# Patient Record
Sex: FEMALE | Race: WHITE | NOT HISPANIC OR LATINO | Employment: OTHER | URBAN - METROPOLITAN AREA
[De-identification: names, ages, dates, MRNs, and addresses within clinical notes are randomized per-mention and may not be internally consistent; named-entity substitution may affect disease eponyms.]

---

## 2018-02-12 ENCOUNTER — TELEPHONE (OUTPATIENT)
Dept: ORTHOPEDICS | Facility: CLINIC | Age: 58
End: 2018-02-12

## 2018-02-12 NOTE — TELEPHONE ENCOUNTER
----- Message from Denise Zavala sent at 2/12/2018  9:48 AM CST -----  Contact: Self / [phone]  Calling to see if a referral was sent to Dr. Acosta from Dr. Najera's office.  Please call.

## 2018-02-19 ENCOUNTER — OFFICE VISIT (OUTPATIENT)
Dept: ORTHOPEDICS | Facility: CLINIC | Age: 58
End: 2018-02-19
Payer: COMMERCIAL

## 2018-02-19 VITALS
SYSTOLIC BLOOD PRESSURE: 148 MMHG | WEIGHT: 147 LBS | DIASTOLIC BLOOD PRESSURE: 87 MMHG | HEIGHT: 64 IN | HEART RATE: 78 BPM | BODY MASS INDEX: 25.1 KG/M2

## 2018-02-19 DIAGNOSIS — M25.551 RIGHT HIP PAIN: Primary | ICD-10-CM

## 2018-02-19 PROCEDURE — 99203 OFFICE O/P NEW LOW 30 MIN: CPT | Mod: 25,S$GLB,, | Performed by: ORTHOPAEDIC SURGERY

## 2018-02-19 PROCEDURE — 20610 DRAIN/INJ JOINT/BURSA W/O US: CPT | Mod: RT,S$GLB,, | Performed by: ORTHOPAEDIC SURGERY

## 2018-02-19 PROCEDURE — 3008F BODY MASS INDEX DOCD: CPT | Mod: S$GLB,,, | Performed by: ORTHOPAEDIC SURGERY

## 2018-02-19 RX ORDER — METFORMIN HYDROCHLORIDE 500 MG/1
500 TABLET ORAL DAILY
COMMUNITY
End: 2018-06-04 | Stop reason: SDUPTHER

## 2018-02-19 RX ADMIN — TRIAMCINOLONE ACETONIDE 40 MG: 40 INJECTION, SUSPENSION INTRA-ARTICULAR; INTRAMUSCULAR at 11:02

## 2018-02-19 NOTE — LETTER
February 22, 2018      Sue Najera MD  [address] [place] [address]           [facility]  [address] [place] [address]  Phone: [phone]  Fax: [phone]          Patient: Liv Garland   MR Number: [id number]   YOB: 1960   Date of Visit: 2/19/2018       Dear Dr. Sue Najera:    Thank you for referring Liv Garland to me for evaluation. Attached you will find relevant portions of my assessment and plan of care.    If you have questions, please do not hesitate to call me. I look forward to following Liv Garland along with you.    Sincerely,    Sebastian Acosta MD    Enclosure  CC:  No Recipients    If you would like to receive this communication electronically, please contact [email or web address] or [phone] to request more information on [facility] access.    For providers and/or their staff who would like to refer a patient to [facility], please contact us through our one-stop-shop provider referral line, [facility], at [phone].    If you feel you have received this communication in error or would no longer like to receive these types of communications, please e-mail [email or web address]

## 2018-02-22 RX ORDER — TRIAMCINOLONE ACETONIDE 40 MG/ML
40 INJECTION, SUSPENSION INTRA-ARTICULAR; INTRAMUSCULAR
Status: DISCONTINUED | OUTPATIENT
Start: 2018-02-19 | End: 2018-02-23 | Stop reason: HOSPADM

## 2018-02-23 NOTE — PROGRESS NOTES
Past Medical History:   Diagnosis Date    Bilateral breast cancer     Breast cancer 2004    Dr. Dos Santos, bilat mast, no rt, tamoxifen stopped after 9 mos due to side effects    High cholesterol     Hypertension        Past Surgical History:   Procedure Laterality Date    ABLATION OF DYSRHYTHMIC FOCUS  2000    Dr. Melgoza    CHOLECYSTECTOMY  2/2001    COLONOSCOPY      Dr. Dos Santos    HYSTERECTOMY  10/1/2003    MASTECTOMY Bilateral 5/16/2004    OOPHORECTOMY Left 5/18/2004    TONSILLECTOMY  1974    TOTAL ABDOMINAL HYSTERECTOMY W/ BILATERAL SALPINGOOPHORECTOMY         Current Outpatient Prescriptions   Medication Sig    metFORMIN (GLUCOPHAGE) 500 MG tablet Take 500 mg by mouth once daily.     No current facility-administered medications for this visit.        Review of patient's allergies indicates:  No Known Allergies    Family History   Problem Relation Age of Onset    Liver cancer Mother     Heart failure Father        Social History     Social History    Marital status: Single     Spouse name: N/A    Number of children: N/A    Years of education: N/A     Occupational History    Not on file.     Social History Main Topics    Smoking status: Former Smoker     Quit date: 1/1/1999    Smokeless tobacco: Never Used    Alcohol use Yes    Drug use: No    Sexual activity: Not on file     Other Topics Concern    Not on file     Social History Narrative    No narrative on file       Chief Complaint:   Chief Complaint   Patient presents with    Left Hip - Pain       Consulting Physician: Sue Najera MD    History of present illness:    This is a 57 y.o. female who complains of right hip pain since fall 12-27-17. Pain 8/10 and worse with use.    Review of Systems:    Constitution: Denies chills, fever, and sweats.  HENT: Denies headaches or blurry vision.  Cardiovascular: Denies chest pain or irregular heart beat.  Respiratory: Denies cough or shortness of breath.  Gastrointestinal: Denies abdominal  "pain, nausea, or vomiting.  Musculoskeletal:  Denies muscle cramps.  Neurological: Denies dizziness or focal weakness.  Psychiatric/Behavioral: Normal mental status.  Hematologic/Lymphatic: Denies bleeding problem or easy bruising/bleeding.  Skin: Denies rash or suspicious lesions.    Examination:    Vital Signs:    Vitals:    02/19/18 0857   BP: (!) 148/87   Pulse: 78   Weight: 66.7 kg (147 lb)   Height: 5' 4" (1.626 m)   PainSc:   7   PainLoc: Hip       Body mass index is 25.23 kg/m².    This a well-developed, well nourished patient in no acute distress.    Alert and oriented x 3 and cooperative to examination.       Physical Exam: Right Hip Exam    Gait:   Normal    Skin  Rash:   None  Scars:   None    Inspection  Erythema:  None  Bruising:  None  Swelling:  None  Masses:  None  Lymphadenopathy: None    Range of Motion  Flexion:  150°  Extension:  0°  External Rotation: 50°  Internal Rotation: 15°  Abduction:  50°    No pain with hip range of motion.    Straight Leg Raise: Negative  Log Roll:  Negative    Tenderness  Groin:   Negative  Greater Trochanter: Positive    Strength:  5/5    Stability:  Normal    Sensation:  Intact    Vascular  Pulses:  Palpable distally          Imaging: X-rays ordered and reviewed today personally of the right hip appear normal        Assessment: Right hip pain  -     Large Joint Aspiration/Injection        Plan:  We will inject her for trochanteric bursits today and start PT. Ultram rx. PRN.      DISCLAIMER: This note may have been dictated using voice recognition software and may contain grammatical errors.     NOTE: Consult report sent to referring provider via EPIC EMR.  "

## 2018-02-23 NOTE — PROCEDURES
Large Joint Aspiration/Injection  Date/Time: 2/19/2018 11:02 PM  Performed by: MEJIA OROZCO  Authorized by: MEJIA OROZCO     Consent Done?:  Yes (Verbal)  Indications:  Pain  Timeout: Prior to procedure the correct patient, procedure, and site was verified      Location:  Hip  Site:  R greater trochanteric bursa  Prep: Patient was prepped and draped in usual sterile fashion    Approach:  Lateral  Medications:  40 mg triamcinolone acetonide 40 mg/mL

## 2018-03-07 ENCOUNTER — TELEPHONE (OUTPATIENT)
Dept: ORTHOPEDICS | Facility: CLINIC | Age: 58
End: 2018-03-07

## 2018-03-07 NOTE — TELEPHONE ENCOUNTER
Contacted pt's PCP's office and spoke w/ Kristy to see if anyone had submitted request to pt's insurance authorizing her to see Dr. Acosta.  She stated that this has not been done, but that they will send request in.

## 2018-03-21 DIAGNOSIS — M70.62 GREATER TROCHANTERIC BURSITIS OF LEFT HIP: Primary | ICD-10-CM

## 2018-03-21 DIAGNOSIS — M25.552 LEFT HIP PAIN: ICD-10-CM

## 2018-04-13 ENCOUNTER — TELEPHONE (OUTPATIENT)
Dept: FAMILY MEDICINE | Facility: CLINIC | Age: 58
End: 2018-04-13

## 2018-04-13 DIAGNOSIS — R73.09 ABNORMAL GLUCOSE: Primary | ICD-10-CM

## 2018-04-13 NOTE — TELEPHONE ENCOUNTER
Pt would like to have labs put in so she can have them done before her 6 month check up with Dr Melton. Pt stated the normal labs for every 6 months.

## 2018-04-17 ENCOUNTER — TELEPHONE (OUTPATIENT)
Dept: FAMILY MEDICINE | Facility: CLINIC | Age: 58
End: 2018-04-17

## 2018-04-17 ENCOUNTER — OFFICE VISIT (OUTPATIENT)
Dept: FAMILY MEDICINE | Facility: CLINIC | Age: 58
End: 2018-04-17
Payer: COMMERCIAL

## 2018-04-17 VITALS
RESPIRATION RATE: 18 BRPM | HEIGHT: 64 IN | SYSTOLIC BLOOD PRESSURE: 131 MMHG | DIASTOLIC BLOOD PRESSURE: 76 MMHG | HEART RATE: 74 BPM | BODY MASS INDEX: 25.1 KG/M2 | OXYGEN SATURATION: 98 % | TEMPERATURE: 97 F | WEIGHT: 147 LBS

## 2018-04-17 DIAGNOSIS — I10 ESSENTIAL HYPERTENSION: ICD-10-CM

## 2018-04-17 DIAGNOSIS — R73.02 IMPAIRED GLUCOSE TOLERANCE: Primary | ICD-10-CM

## 2018-04-17 PROCEDURE — 99213 OFFICE O/P EST LOW 20 MIN: CPT | Mod: S$GLB,,, | Performed by: FAMILY MEDICINE

## 2018-04-17 RX ORDER — METFORMIN HYDROCHLORIDE 500 MG/1
500 TABLET ORAL DAILY
COMMUNITY
End: 2018-06-04 | Stop reason: SDUPTHER

## 2018-04-17 NOTE — PROGRESS NOTES
Subjective:       Patient ID: Liv Garland is a 57 y.o. female.    Chief Complaint: No chief complaint on file.    History of glucose intolerance.  Taking metformin.  Last A1C was 5.7.        Hypertension   This is a chronic problem. The problem has been resolved since onset. The problem is controlled. Pertinent negatives include no chest pain, headaches, neck pain, palpitations or shortness of breath. There are no associated agents to hypertension. Risk factors for coronary artery disease include post-menopausal state. Past treatments include ACE inhibitors. The current treatment provides significant improvement. There are no compliance problems.      Review of Systems   Constitutional: Negative for activity change, appetite change, chills and fatigue.   HENT: Negative for congestion, ear discharge, ear pain, rhinorrhea, sinus pain, sore throat and trouble swallowing.    Eyes: Negative for photophobia, pain, redness, itching and visual disturbance.   Respiratory: Negative for cough, chest tightness, shortness of breath and wheezing.    Cardiovascular: Negative for chest pain, palpitations and leg swelling.   Gastrointestinal: Negative for abdominal distention, abdominal pain, blood in stool, diarrhea, nausea and vomiting.   Genitourinary: Negative for dysuria, pelvic pain, vaginal bleeding, vaginal discharge and vaginal pain.   Musculoskeletal: Negative for arthralgias, back pain, gait problem and neck pain.   Skin: Negative for color change, pallor and rash.   Neurological: Negative for dizziness, tremors, weakness, light-headedness, numbness and headaches.   Psychiatric/Behavioral: Negative for agitation, behavioral problems, confusion and sleep disturbance.       Objective:      Physical Exam   Constitutional: She appears well-developed and well-nourished.   HENT:   Head: Normocephalic.   Right Ear: External ear normal.   Left Ear: External ear normal.   Mouth/Throat: Oropharynx is clear and moist.   Eyes:  Conjunctivae and EOM are normal. Pupils are equal, round, and reactive to light.   Neck: Normal range of motion. Neck supple.   Cardiovascular: Normal rate, regular rhythm, normal heart sounds and intact distal pulses.    Pulmonary/Chest: Effort normal and breath sounds normal.   Neurological: She is alert.   Skin: Skin is warm and dry.   Psychiatric: Her behavior is normal. Judgment normal.       Assessment:       1. Impaired glucose tolerance    2. Essential hypertension        Plan:       Impaired glucose tolerance  -     Hemoglobin A1c; Future; Expected date: 10/17/2018    Essential hypertension

## 2018-04-17 NOTE — TELEPHONE ENCOUNTER
----- Message from Jessy Kuhn sent at 4/17/2018  2:59 PM CDT -----  Contact: [facility]   [facility]  Needs to speak to the nurse about patient labs     Please call lab  [phone]

## 2018-04-18 ENCOUNTER — LAB VISIT (OUTPATIENT)
Dept: LAB | Facility: HOSPITAL | Age: 58
End: 2018-04-18
Attending: FAMILY MEDICINE
Payer: COMMERCIAL

## 2018-04-18 DIAGNOSIS — R73.09 ABNORMAL GLUCOSE: ICD-10-CM

## 2018-04-18 LAB
ALBUMIN SERPL BCP-MCNC: 4.4 G/DL
ALP SERPL-CCNC: 46 U/L
ALT SERPL W/O P-5'-P-CCNC: 37 U/L
ANION GAP SERPL CALC-SCNC: 7 MMOL/L
AST SERPL-CCNC: 26 U/L
BASOPHILS # BLD AUTO: 0.04 K/UL
BASOPHILS NFR BLD: 0.7 %
BILIRUB SERPL-MCNC: 0.3 MG/DL
BUN SERPL-MCNC: 18 MG/DL
CALCIUM SERPL-MCNC: 9.4 MG/DL
CHLORIDE SERPL-SCNC: 107 MMOL/L
CHOLEST SERPL-MCNC: 231 MG/DL
CHOLEST/HDLC SERPL: 4.1 {RATIO}
CO2 SERPL-SCNC: 25 MMOL/L
CREAT SERPL-MCNC: 0.6 MG/DL
DIFFERENTIAL METHOD: ABNORMAL
EOSINOPHIL # BLD AUTO: 0.2 K/UL
EOSINOPHIL NFR BLD: 2.9 %
ERYTHROCYTE [DISTWIDTH] IN BLOOD BY AUTOMATED COUNT: 13.9 %
EST. GFR  (AFRICAN AMERICAN): >60 ML/MIN/1.73 M^2
EST. GFR  (NON AFRICAN AMERICAN): >60 ML/MIN/1.73 M^2
ESTIMATED AVG GLUCOSE: 117 MG/DL
GLUCOSE SERPL-MCNC: 100 MG/DL
HBA1C MFR BLD HPLC: 5.7 %
HCT VFR BLD AUTO: 42.2 %
HDLC SERPL-MCNC: 56 MG/DL
HDLC SERPL: 24.2 %
HGB BLD-MCNC: 14.5 G/DL
IMM GRANULOCYTES # BLD AUTO: 0.01 K/UL
IMM GRANULOCYTES NFR BLD AUTO: 0.2 %
LDLC SERPL CALC-MCNC: 145.4 MG/DL
LYMPHOCYTES # BLD AUTO: 2.5 K/UL
LYMPHOCYTES NFR BLD: 43.8 %
MCH RBC QN AUTO: 31.9 PG
MCHC RBC AUTO-ENTMCNC: 34.4 G/DL
MCV RBC AUTO: 93 FL
MONOCYTES # BLD AUTO: 0.5 K/UL
MONOCYTES NFR BLD: 7.9 %
NEUTROPHILS # BLD AUTO: 2.6 K/UL
NEUTROPHILS NFR BLD: 44.5 %
NONHDLC SERPL-MCNC: 175 MG/DL
NRBC BLD-RTO: 0 /100 WBC
PLATELET # BLD AUTO: 347 K/UL
PMV BLD AUTO: 10.1 FL
POTASSIUM SERPL-SCNC: 4.1 MMOL/L
PROT SERPL-MCNC: 6.9 G/DL
RBC # BLD AUTO: 4.54 M/UL
SODIUM SERPL-SCNC: 139 MMOL/L
TRIGL SERPL-MCNC: 148 MG/DL
WBC # BLD AUTO: 5.8 K/UL

## 2018-04-18 PROCEDURE — 83036 HEMOGLOBIN GLYCOSYLATED A1C: CPT

## 2018-04-18 PROCEDURE — 36415 COLL VENOUS BLD VENIPUNCTURE: CPT

## 2018-04-18 PROCEDURE — 85025 COMPLETE CBC W/AUTO DIFF WBC: CPT

## 2018-04-18 PROCEDURE — 80053 COMPREHEN METABOLIC PANEL: CPT

## 2018-04-18 PROCEDURE — 80061 LIPID PANEL: CPT

## 2018-04-19 ENCOUNTER — PATIENT MESSAGE (OUTPATIENT)
Dept: FAMILY MEDICINE | Facility: CLINIC | Age: 58
End: 2018-04-19

## 2018-06-04 RX ORDER — METFORMIN HYDROCHLORIDE 500 MG/1
TABLET ORAL
Qty: 90 TABLET | Refills: 3 | Status: SHIPPED | OUTPATIENT
Start: 2018-06-04 | End: 2019-04-17

## 2018-06-25 ENCOUNTER — TELEPHONE (OUTPATIENT)
Dept: FAMILY MEDICINE | Facility: CLINIC | Age: 58
End: 2018-06-25

## 2018-06-25 DIAGNOSIS — N60.09 SOLITARY CYST OF BREAST, UNSPECIFIED LATERALITY: ICD-10-CM

## 2018-06-25 DIAGNOSIS — L72.0 EPIDERMAL CYST OF NECK: Primary | ICD-10-CM

## 2018-07-11 ENCOUNTER — OFFICE VISIT (OUTPATIENT)
Dept: SURGERY | Facility: CLINIC | Age: 58
End: 2018-07-11
Payer: COMMERCIAL

## 2018-07-11 VITALS
BODY MASS INDEX: 26.29 KG/M2 | HEART RATE: 80 BPM | DIASTOLIC BLOOD PRESSURE: 92 MMHG | TEMPERATURE: 97 F | OXYGEN SATURATION: 96 % | RESPIRATION RATE: 18 BRPM | WEIGHT: 154 LBS | HEIGHT: 64 IN | SYSTOLIC BLOOD PRESSURE: 148 MMHG

## 2018-07-11 DIAGNOSIS — M79.89 SOFT TISSUE MASS: Primary | ICD-10-CM

## 2018-07-11 PROCEDURE — 99203 OFFICE O/P NEW LOW 30 MIN: CPT | Mod: S$GLB,,, | Performed by: SURGERY

## 2018-07-12 NOTE — PROGRESS NOTES
[facility] H&P Note    Subjective:       Patient ID: Liv Garland is a 58 y.o. female.    Chief Complaint: Consult (epidermal neck cyst)    HPI:  Liv Garland is a 58 y.o. female with a history of breast cancer status post bilateral mastectomy with reconstruction hypertension presents today as a new patient for evaluation of a right neck skin cyst versus soft tissue mass.  Patient stated that this skin cyst/soft tissue mass has been present for a number of weeks to months.  Dr Dos Santos a previous surgeon here in [place] told the patient that she needed to come to get evaluated and had the skin cyst removed. Patient is not noticing pain or symptoms associated with the cyst.  She presents today as a new patient evaluation.    Past Medical History:   Diagnosis Date    Bilateral breast cancer     Breast cancer 2004    Dr. Dos Santos, bilat mast, no rt, tamoxifen stopped after 9 mos due to side effects    High cholesterol     Hypertension      Past Surgical History:   Procedure Laterality Date    ABLATION OF DYSRHYTHMIC FOCUS  2000    Dr. Melgoza    CHOLECYSTECTOMY  2/2001    COLONOSCOPY      Dr. Dos Santos    HYSTERECTOMY  10/1/2003    MASTECTOMY Bilateral 5/16/2004    OOPHORECTOMY Left 5/18/2004    TONSILLECTOMY  1974    TOTAL ABDOMINAL HYSTERECTOMY W/ BILATERAL SALPINGOOPHORECTOMY       Family History   Problem Relation Age of Onset    Liver cancer Mother     Heart failure Father      Social History     Social History    Marital status: Single     Spouse name: N/A    Number of children: N/A    Years of education: N/A     Social History Main Topics    Smoking status: Former Smoker     Quit date: 1/1/1999    Smokeless tobacco: Never Used    Alcohol use Yes    Drug use: No    Sexual activity: Not Asked     Other Topics Concern    None     Social History Narrative    None       Current Outpatient Prescriptions   Medication Sig Dispense Refill    metFORMIN (GLUCOPHAGE) 500 MG tablet TAKE 1 TABLET  "BY MOUTH DAILY 90 tablet 3     No current facility-administered medications for this visit.      Review of patient's allergies indicates:   Allergen Reactions    Lisinopril      Other reaction(s): Angioedema       Review of Systems   Constitutional: Negative for activity change, appetite change, chills and fever.   HENT: Negative for congestion, dental problem and ear discharge.    Eyes: Negative for discharge and itching.   Respiratory: Negative for apnea, choking and chest tightness.    Cardiovascular: Negative for chest pain and leg swelling.   Gastrointestinal: Negative for abdominal distention, abdominal pain, anal bleeding, constipation, diarrhea and nausea.   Endocrine: Negative for cold intolerance and heat intolerance.   Genitourinary: Negative for difficulty urinating and dyspareunia.   Musculoskeletal: Negative for arthralgias and back pain.   Skin: Negative for color change and pallor.   Neurological: Negative for dizziness and facial asymmetry.   Hematological: Negative for adenopathy. Does not bruise/bleed easily.   Psychiatric/Behavioral: Negative for agitation and behavioral problems.       Objective:      Vitals:    07/11/18 1439   BP: (!) 148/92   Pulse: 80   Resp: 18   Temp: 97.2 °F (36.2 °C)   TempSrc: Oral   SpO2: 96%   Weight: 69.9 kg (154 lb)   Height: 5' 4" (1.626 m)     Physical Exam   Constitutional: She is oriented to person, place, and time. She appears well-developed and well-nourished. No distress.   HENT:   Head: Normocephalic and atraumatic.       Eyes: EOM are normal. Pupils are equal, round, and reactive to light.   Neck: Normal range of motion. Neck supple. No thyromegaly present.   Cardiovascular: Normal rate and regular rhythm.    Pulmonary/Chest: Effort normal and breath sounds normal.   Abdominal: Soft. Bowel sounds are normal. She exhibits no distension. There is no tenderness.   Musculoskeletal: Normal range of motion. She exhibits no edema or deformity.   Neurological: She " is alert and oriented to person, place, and time. No cranial nerve deficit.   Skin: Skin is warm. Capillary refill takes less than 2 seconds. No erythema.   Psychiatric: She has a normal mood and affect. Her behavior is normal.        Assessment:       1. Soft tissue mass        Plan:   Soft tissue mass        Medical Decision Making/Counseling:  Patient has a right neck posterior soft tissue mass/skin cyst.  A medical recommendation is for surgical excision of the skin cyst.  I offered the patient both the operative room or resection in the office.  The patient would like to proceed with resection in the office in the coming weeks.  Patient will set up a clinic day and come in for soft tissue mass right neck resection.  Risk and benefits discussed today.  She wished to proceed the near future.  Total clinic time spent today 30 min of which greater than half the time spent face-to-face counseling

## 2018-07-18 ENCOUNTER — OFFICE VISIT (OUTPATIENT)
Dept: SURGERY | Facility: CLINIC | Age: 58
End: 2018-07-18
Payer: COMMERCIAL

## 2018-07-18 VITALS
SYSTOLIC BLOOD PRESSURE: 148 MMHG | TEMPERATURE: 98 F | WEIGHT: 152 LBS | HEIGHT: 64 IN | OXYGEN SATURATION: 96 % | DIASTOLIC BLOOD PRESSURE: 80 MMHG | HEART RATE: 72 BPM | BODY MASS INDEX: 25.95 KG/M2

## 2018-07-18 DIAGNOSIS — M79.89 SOFT TISSUE MASS: Primary | ICD-10-CM

## 2018-07-18 PROCEDURE — 11403 EXC TR-EXT B9+MARG 2.1-3CM: CPT | Mod: S$GLB,,, | Performed by: SURGERY

## 2018-07-18 PROCEDURE — 88304 TISSUE EXAM BY PATHOLOGIST: CPT | Mod: 26,,, | Performed by: PATHOLOGY

## 2018-07-18 PROCEDURE — 99024 POSTOP FOLLOW-UP VISIT: CPT | Mod: S$GLB,,, | Performed by: SURGERY

## 2018-07-18 PROCEDURE — 88304 TISSUE EXAM BY PATHOLOGIST: CPT | Performed by: PATHOLOGY

## 2018-07-18 RX ORDER — CIPROFLOXACIN 500 MG/1
500 TABLET ORAL EVERY 12 HOURS
Qty: 14 TABLET | Refills: 0 | Status: SHIPPED | OUTPATIENT
Start: 2018-07-18 | End: 2018-07-28

## 2018-07-18 RX ORDER — OXYCODONE AND ACETAMINOPHEN 5; 325 MG/1; MG/1
1 TABLET ORAL EVERY 4 HOURS PRN
Qty: 20 TABLET | Refills: 0 | Status: SHIPPED | OUTPATIENT
Start: 2018-07-18 | End: 2018-07-28

## 2018-07-22 NOTE — PROCEDURES
Procedures   Preop diagnosis right neck cyst  Postop diagnosis same  Procedure right neck skin cyst excision 2.2 cm    Indications Ms. Garland is a 58-year-old female presented my clinic with a right neck skin cyst which was causing symptomatic problems with pain. She wished for excision.  Risk and benefits were discussed and she wished to proceed today by signing informed consent.    Procedure patient's right posterior neck was prepped and draped in the standard sterile surgical fashion. Time-out was conducted with all on the operative room in agreement of correct patient correct procedure correct allergies correct antibiotics.  Patient was given 5 cc Marcaine 0.25% with epinephrine.  A 15 blade was used to make a elliptical incision over top of the soft tissue mass.  Soft tissue mass was completely circumscribed with a Bovie electric cautery. Soft tissue mass was consistent with a skin cyst which was handed off as specimen.  It was approximately 2.2 cm in greatest diameter.  Soft tissue was then irrigated out copiously  irrigant.  Three 0 Vicryl were used in the subdermal layer and a for 0 Vicryl was used in a running subcuticular fashion to close the soft tissue defect.  Dermabond was placed over top as a dressing.  The patient tolerated procedure well all counts correct in the procedure including lap pads instruments well as needles.  Plan will be for follow-up in 2 weeks

## 2018-07-25 ENCOUNTER — OFFICE VISIT (OUTPATIENT)
Dept: SURGERY | Facility: CLINIC | Age: 58
End: 2018-07-25
Payer: COMMERCIAL

## 2018-07-25 VITALS
BODY MASS INDEX: 26.46 KG/M2 | HEIGHT: 64 IN | HEART RATE: 80 BPM | WEIGHT: 155 LBS | DIASTOLIC BLOOD PRESSURE: 83 MMHG | SYSTOLIC BLOOD PRESSURE: 153 MMHG | TEMPERATURE: 97 F | OXYGEN SATURATION: 96 %

## 2018-07-25 DIAGNOSIS — Z09 POSTOP CHECK: Primary | ICD-10-CM

## 2018-07-25 PROCEDURE — 99024 POSTOP FOLLOW-UP VISIT: CPT | Mod: S$GLB,,, | Performed by: SURGERY

## 2018-07-25 NOTE — PROGRESS NOTES
"General Surgery  [facility]  Follow-up    HPI/Follow-up exam:  Liv Garland is a 58 y.o. female status post excision of right neck soft tissue mass presents today for follow-up examination.  Patient since surgery has done well. No issues.    PHYSICAL EXAM:  BP (!) 153/83   Pulse 80   Temp 97 °F (36.1 °C) (Oral)   Ht 5' 4" (1.626 m)   Wt 70.3 kg (155 lb)   SpO2 96%   BMI 26.61 kg/m²   Gen: Wd Wn female in NAD  Heent: Nc/At, MMM, right posterior neck surgical site clean dry intact no signs or symptoms of infection.  Cv: RRR  Lung: Non-labored breathing, clear bilaterally  Abd: Soft, non-tender, non-distended  Ext: No cyanosis clubbing or edema    Pathology shows benign inclusion cyst.  Assessment:  Liv Garland is a 58 y.o. female s/p right neck soft tissue mass excision    Plan/Medical Decision Making:  Doing well in the postoperative phase.  Last colonoscopy 2011.  One hyperplastic polyp.  Recommendations are for repeat colonoscopy and 2021    No Follow-up on file.        "

## 2018-08-21 ENCOUNTER — LAB VISIT (OUTPATIENT)
Dept: LAB | Facility: HOSPITAL | Age: 58
End: 2018-08-21
Attending: FAMILY MEDICINE
Payer: COMMERCIAL

## 2018-08-21 ENCOUNTER — OFFICE VISIT (OUTPATIENT)
Dept: FAMILY MEDICINE | Facility: CLINIC | Age: 58
End: 2018-08-21
Payer: COMMERCIAL

## 2018-08-21 VITALS
HEART RATE: 78 BPM | SYSTOLIC BLOOD PRESSURE: 130 MMHG | HEIGHT: 64 IN | RESPIRATION RATE: 15 BRPM | WEIGHT: 149 LBS | DIASTOLIC BLOOD PRESSURE: 70 MMHG | TEMPERATURE: 98 F | BODY MASS INDEX: 25.44 KG/M2

## 2018-08-21 DIAGNOSIS — R73.02 IMPAIRED GLUCOSE TOLERANCE: ICD-10-CM

## 2018-08-21 DIAGNOSIS — Z79.899 ENCOUNTER FOR LONG-TERM (CURRENT) USE OF MEDICATIONS: ICD-10-CM

## 2018-08-21 DIAGNOSIS — G44.52 NEW DAILY PERSISTENT HEADACHE: Primary | ICD-10-CM

## 2018-08-21 DIAGNOSIS — G44.52 NEW DAILY PERSISTENT HEADACHE: ICD-10-CM

## 2018-08-21 LAB
ALBUMIN SERPL BCP-MCNC: 4.8 G/DL
ALP SERPL-CCNC: 53 U/L
ALT SERPL W/O P-5'-P-CCNC: 52 U/L
ANION GAP SERPL CALC-SCNC: 12 MMOL/L
AST SERPL-CCNC: 33 U/L
BASOPHILS # BLD AUTO: 0.04 K/UL
BASOPHILS NFR BLD: 0.6 %
BILIRUB SERPL-MCNC: 0.5 MG/DL
BUN SERPL-MCNC: 17 MG/DL
CALCIUM SERPL-MCNC: 10 MG/DL
CHLORIDE SERPL-SCNC: 103 MMOL/L
CO2 SERPL-SCNC: 26 MMOL/L
CREAT SERPL-MCNC: 0.7 MG/DL
DIFFERENTIAL METHOD: ABNORMAL
EOSINOPHIL # BLD AUTO: 0.1 K/UL
EOSINOPHIL NFR BLD: 1.9 %
ERYTHROCYTE [DISTWIDTH] IN BLOOD BY AUTOMATED COUNT: 12.9 %
ERYTHROCYTE [SEDIMENTATION RATE] IN BLOOD BY WESTERGREN METHOD: 8 MM/HR
EST. GFR  (AFRICAN AMERICAN): >60 ML/MIN/1.73 M^2
EST. GFR  (NON AFRICAN AMERICAN): >60 ML/MIN/1.73 M^2
ESTIMATED AVG GLUCOSE: 120 MG/DL
FOLATE SERPL-MCNC: 11.1 NG/ML
GLUCOSE SERPL-MCNC: 117 MG/DL
HBA1C MFR BLD HPLC: 5.8 %
HCT VFR BLD AUTO: 41.5 %
HGB BLD-MCNC: 14.4 G/DL
IMM GRANULOCYTES # BLD AUTO: 0.01 K/UL
IMM GRANULOCYTES NFR BLD AUTO: 0.1 %
INR PPP: 1
LYMPHOCYTES # BLD AUTO: 2.3 K/UL
LYMPHOCYTES NFR BLD: 33.1 %
MCH RBC QN AUTO: 31.4 PG
MCHC RBC AUTO-ENTMCNC: 34.7 G/DL
MCV RBC AUTO: 91 FL
MONOCYTES # BLD AUTO: 0.5 K/UL
MONOCYTES NFR BLD: 6.8 %
NEUTROPHILS # BLD AUTO: 4 K/UL
NEUTROPHILS NFR BLD: 57.5 %
NRBC BLD-RTO: 0 /100 WBC
PLATELET # BLD AUTO: 369 K/UL
PMV BLD AUTO: 10.1 FL
POTASSIUM SERPL-SCNC: 4.4 MMOL/L
PROT SERPL-MCNC: 7.7 G/DL
PROTHROMBIN TIME: 11.3 SEC
RBC # BLD AUTO: 4.58 M/UL
SODIUM SERPL-SCNC: 141 MMOL/L
TSH SERPL DL<=0.005 MIU/L-ACNC: 1.36 UIU/ML
VIT B12 SERPL-MCNC: 535 PG/ML
WBC # BLD AUTO: 6.94 K/UL

## 2018-08-21 PROCEDURE — 84443 ASSAY THYROID STIM HORMONE: CPT

## 2018-08-21 PROCEDURE — 99213 OFFICE O/P EST LOW 20 MIN: CPT | Mod: S$GLB,,, | Performed by: FAMILY MEDICINE

## 2018-08-21 PROCEDURE — 82607 VITAMIN B-12: CPT

## 2018-08-21 PROCEDURE — 85025 COMPLETE CBC W/AUTO DIFF WBC: CPT

## 2018-08-21 PROCEDURE — 82746 ASSAY OF FOLIC ACID SERUM: CPT

## 2018-08-21 PROCEDURE — 80053 COMPREHEN METABOLIC PANEL: CPT

## 2018-08-21 PROCEDURE — 83036 HEMOGLOBIN GLYCOSYLATED A1C: CPT

## 2018-08-21 PROCEDURE — 85610 PROTHROMBIN TIME: CPT

## 2018-08-21 PROCEDURE — 85651 RBC SED RATE NONAUTOMATED: CPT

## 2018-08-21 PROCEDURE — 36415 COLL VENOUS BLD VENIPUNCTURE: CPT

## 2018-08-21 NOTE — PROGRESS NOTES
Subjective:       Patient ID: Liv Garland is a 58 y.o. female.    Chief Complaint: Headache    Headache    This is a new problem. The current episode started 1 to 4 weeks ago. The problem occurs daily. The pain is located in the occipital region. The pain does not radiate. The pain quality is not similar to prior headaches. The quality of the pain is described as aching and throbbing. Associated symptoms include insomnia. Pertinent negatives include no anorexia, numbness, scalp tenderness, seizures, visual change, weakness or weight loss. Nothing aggravates the symptoms. She has tried NSAIDs for the symptoms. The treatment provided mild relief. Her past medical history is significant for cancer. There is no history of hypertension, migraine headaches, migraines in the family, obesity or recent head traumas.     Review of Systems   Constitutional: Negative for weight loss.   Gastrointestinal: Negative for anorexia.   Neurological: Positive for headaches. Negative for seizures, weakness and numbness.   Psychiatric/Behavioral: The patient has insomnia.        Past Medical History:   Diagnosis Date    Arthritis     Bilateral breast cancer     Breast cancer 2004    Dr. Dos Santos, bilat mast, no rt, tamoxifen stopped after 9 mos due to side effects    High cholesterol     Hypertension      Past Surgical History:   Procedure Laterality Date    ABLATION OF DYSRHYTHMIC FOCUS  2000    Dr. Melgoza    CHOLECYSTECTOMY  2/2001    COLONOSCOPY      Dr. Dos Santos    HYSTERECTOMY  10/1/2003    MASTECTOMY Bilateral 5/16/2004    OOPHORECTOMY Left 5/18/2004    TONSILLECTOMY  1974    TOTAL ABDOMINAL HYSTERECTOMY W/ BILATERAL SALPINGOOPHORECTOMY       Social History     Socioeconomic History    Marital status: Single     Spouse name: Not on file    Number of children: Not on file    Years of education: Not on file    Highest education level: Not on file   Social Needs    Financial resource strain: Not on file    Food  "insecurity - worry: Not on file    Food insecurity - inability: Not on file    Transportation needs - medical: Not on file    Transportation needs - non-medical: Not on file   Occupational History    Not on file   Tobacco Use    Smoking status: Former Smoker     Last attempt to quit: 1999     Years since quittin.6    Smokeless tobacco: Never Used   Substance and Sexual Activity    Alcohol use: Yes    Drug use: No    Sexual activity: Not on file   Other Topics Concern    Not on file   Social History Narrative    Not on file     Family History   Problem Relation Age of Onset    Liver cancer Mother     Cancer Mother     Heart failure Father        Objective:      /70   Pulse 78   Temp 97.8 °F (36.6 °C) (Tympanic)   Resp 15   Ht 5' 4" (1.626 m)   Wt 67.6 kg (149 lb)   BMI 25.58 kg/m²   Physical Exam    Assessment:       1. New daily persistent headache    2. Encounter for long-term (current) use of medications    3. Impaired glucose tolerance        Plan:       New daily persistent headache  -     Sedimentation rate; Future; Expected date: 2018  -     Normal neuro exam, but patient with history of breast cancer  -     Await lab results; will likely refer to neuro for further evaluation    Encounter for long-term (current) use of medications  -     Comprehensive metabolic panel; Future; Expected date: 2018  -     CBC auto differential; Future; Expected date: 2018  -     TSH; Future; Expected date: 2018  -     Sedimentation rate; Future; Expected date: 2018  -     Folate; Future; Expected date: 2018  -     Vitamin B12; Future; Expected date: 2018  -     Protime-INR; Future; Expected date: 2018    Impaired glucose tolerance  -     Hemoglobin A1c; Future; Expected date: 2018            Risks, benefits, and side effects were discussed with the patient. All questions were answered to the fullest satisfaction of the patient, and pt " verbalized understanding and agreement to treatment plan. Pt was to call with any new or worsening symptoms, or present to the ER.

## 2018-08-29 ENCOUNTER — PATIENT MESSAGE (OUTPATIENT)
Dept: FAMILY MEDICINE | Facility: CLINIC | Age: 58
End: 2018-08-29

## 2018-10-17 ENCOUNTER — OFFICE VISIT (OUTPATIENT)
Dept: FAMILY MEDICINE | Facility: CLINIC | Age: 58
End: 2018-10-17
Payer: COMMERCIAL

## 2018-10-17 ENCOUNTER — LAB VISIT (OUTPATIENT)
Dept: LAB | Facility: HOSPITAL | Age: 58
End: 2018-10-17
Attending: FAMILY MEDICINE
Payer: COMMERCIAL

## 2018-10-17 VITALS
RESPIRATION RATE: 18 BRPM | TEMPERATURE: 98 F | BODY MASS INDEX: 25.78 KG/M2 | OXYGEN SATURATION: 99 % | WEIGHT: 151 LBS | SYSTOLIC BLOOD PRESSURE: 153 MMHG | DIASTOLIC BLOOD PRESSURE: 79 MMHG | HEART RATE: 63 BPM | HEIGHT: 64 IN

## 2018-10-17 DIAGNOSIS — Z11.59 ENCOUNTER FOR HEPATITIS C SCREENING TEST FOR LOW RISK PATIENT: ICD-10-CM

## 2018-10-17 DIAGNOSIS — E16.2 HYPOGLYCEMIA: ICD-10-CM

## 2018-10-17 DIAGNOSIS — R74.8 ELEVATED LIVER ENZYMES: ICD-10-CM

## 2018-10-17 DIAGNOSIS — Z23 NEEDS FLU SHOT: Primary | ICD-10-CM

## 2018-10-17 LAB
ALBUMIN SERPL BCP-MCNC: 4.5 G/DL
ALP SERPL-CCNC: 63 U/L
ALT SERPL W/O P-5'-P-CCNC: 48 U/L
ANION GAP SERPL CALC-SCNC: 8 MMOL/L
AST SERPL-CCNC: 31 U/L
BILIRUB SERPL-MCNC: 0.7 MG/DL
BUN SERPL-MCNC: 16 MG/DL
CALCIUM SERPL-MCNC: 9.8 MG/DL
CHLORIDE SERPL-SCNC: 104 MMOL/L
CO2 SERPL-SCNC: 28 MMOL/L
CREAT SERPL-MCNC: 0.5 MG/DL
EST. GFR  (AFRICAN AMERICAN): >60 ML/MIN/1.73 M^2
EST. GFR  (NON AFRICAN AMERICAN): >60 ML/MIN/1.73 M^2
GLUCOSE SERPL-MCNC: 86 MG/DL
POTASSIUM SERPL-SCNC: 3.8 MMOL/L
PROT SERPL-MCNC: 7.6 G/DL
SODIUM SERPL-SCNC: 140 MMOL/L

## 2018-10-17 PROCEDURE — 86803 HEPATITIS C AB TEST: CPT

## 2018-10-17 PROCEDURE — 80053 COMPREHEN METABOLIC PANEL: CPT

## 2018-10-17 PROCEDURE — 36415 COLL VENOUS BLD VENIPUNCTURE: CPT

## 2018-10-17 PROCEDURE — 90682 RIV4 VACC RECOMBINANT DNA IM: CPT | Mod: S$GLB,,, | Performed by: FAMILY MEDICINE

## 2018-10-17 PROCEDURE — 90471 IMMUNIZATION ADMIN: CPT | Mod: S$GLB,,, | Performed by: FAMILY MEDICINE

## 2018-10-17 PROCEDURE — 99214 OFFICE O/P EST MOD 30 MIN: CPT | Mod: 25,S$GLB,, | Performed by: FAMILY MEDICINE

## 2018-10-17 NOTE — PROGRESS NOTES
"Subjective:       Patient ID: Liv Garland is a 58 y.o. female.    Chief Complaint: Follow-up    HPI   Ms. Garland presents for follow-up. Requests a flu shot at this time.     Is taking metformin 500 mg every morning. Reports feeling a "low" in the afternoons about 90 minutes after eating lunch. Has been going on for 4-5 months. Has not noticed any difference in the "low" feeling when changing what she eats for lunch.     Did have mildly elevated ALT on last metabolic panel. Has not been screened for hep c; has 2 tattoos but no h/o blood transfusions or IVDA.    Review of Systems   Constitutional: Negative for chills, fatigue, fever and unexpected weight change.   Respiratory: Negative for cough, chest tightness, shortness of breath and wheezing.    Cardiovascular: Negative for chest pain, palpitations and leg swelling.   Psychiatric/Behavioral: Negative for decreased concentration, dysphoric mood, hallucinations and suicidal ideas.       Past Medical History:   Diagnosis Date    Arthritis     Bilateral breast cancer     Breast cancer 2004    Dr. Dos Santos, bilat mast, no rt, tamoxifen stopped after 9 mos due to side effects    High cholesterol     Hypertension      Past Surgical History:   Procedure Laterality Date    ABLATION OF DYSRHYTHMIC FOCUS  2000    Dr. Melgoza    CHOLECYSTECTOMY  2/2001    COLONOSCOPY      repeat in 2020    HYSTERECTOMY  10/1/2003    MASTECTOMY Bilateral 5/16/2004    OOPHORECTOMY Left 5/18/2004    TONSILLECTOMY  1974    TOTAL ABDOMINAL HYSTERECTOMY W/ BILATERAL SALPINGOOPHORECTOMY       Social History     Socioeconomic History    Marital status: Single     Spouse name: Not on file    Number of children: Not on file    Years of education: Not on file    Highest education level: Not on file   Social Needs    Financial resource strain: Not on file    Food insecurity - worry: Not on file    Food insecurity - inability: Not on file    Transportation needs - medical: Not on " "file    Transportation needs - non-medical: Not on file   Occupational History    Not on file   Tobacco Use    Smoking status: Former Smoker     Last attempt to quit: 1999     Years since quittin.8    Smokeless tobacco: Never Used   Substance and Sexual Activity    Alcohol use: Yes    Drug use: No    Sexual activity: Not on file   Other Topics Concern    Not on file   Social History Narrative    Not on file     Family History   Problem Relation Age of Onset    Liver cancer Mother     Cancer Mother     Heart failure Father        Objective:      BP (!) 153/79   Pulse 63   Temp 98.1 °F (36.7 °C) (Oral)   Resp 18   Ht 5' 4" (1.626 m)   Wt 68.5 kg (151 lb)   SpO2 99%   BMI 25.92 kg/m²   Physical Exam   Constitutional: She is oriented to person, place, and time. She appears well-developed and well-nourished. No distress.   Pulmonary/Chest: Effort normal and breath sounds normal. No stridor. No respiratory distress.   Neurological: She is alert and oriented to person, place, and time.   Skin: Skin is warm and dry. She is not diaphoretic. No erythema.   Psychiatric: She has a normal mood and affect. Her behavior is normal. Judgment and thought content normal.   Vitals reviewed.      Assessment:       1. Needs flu shot    2. Hypoglycemia    3. Elevated liver enzymes    4. Encounter for hepatitis C screening test for low risk patient        Plan:       Needs flu shot  -     Influenza - Quadrivalent (Recombinant) (PF)    Hypoglycemia  -  Patient to check blood sugars 1-2 hours after lunch; may start cutting dose of metformin in half    Elevated liver enzymes  -     Comprehensive metabolic panel; Future; Expected date: 10/17/2018    Encounter for hepatitis C screening test for low risk patient  -     Hepatitis C antibody; Future; Expected date: 10/17/2018            Risks, benefits, and side effects were discussed with the patient. All questions were answered to the fullest satisfaction of the " patient, and pt verbalized understanding and agreement to treatment plan. Pt was to call with any new or worsening symptoms, or present to the ER.

## 2018-10-18 LAB — HCV AB SERPL QL IA: NEGATIVE

## 2018-10-19 ENCOUNTER — TELEPHONE (OUTPATIENT)
Dept: FAMILY MEDICINE | Facility: CLINIC | Age: 58
End: 2018-10-19

## 2018-10-19 DIAGNOSIS — R74.8 ELEVATED LIVER ENZYMES: Primary | ICD-10-CM

## 2018-10-22 ENCOUNTER — HOSPITAL ENCOUNTER (OUTPATIENT)
Dept: RADIOLOGY | Facility: HOSPITAL | Age: 58
Discharge: HOME | End: 2018-10-22
Attending: FAMILY MEDICINE
Payer: COMMERCIAL

## 2018-10-22 DIAGNOSIS — R74.8 ELEVATED LIVER ENZYMES: ICD-10-CM

## 2018-10-22 PROCEDURE — 76705 ECHO EXAM OF ABDOMEN: CPT | Mod: 26,,, | Performed by: RADIOLOGY

## 2018-10-22 PROCEDURE — 76705 ECHO EXAM OF ABDOMEN: CPT | Mod: TC

## 2019-04-01 ENCOUNTER — TELEPHONE (OUTPATIENT)
Dept: FAMILY MEDICINE | Facility: CLINIC | Age: 59
End: 2019-04-01

## 2019-04-01 DIAGNOSIS — R74.8 ELEVATED LIVER ENZYMES: Primary | ICD-10-CM

## 2019-04-01 DIAGNOSIS — Z79.899 ENCOUNTER FOR LONG-TERM (CURRENT) USE OF MEDICATIONS: ICD-10-CM

## 2019-04-01 DIAGNOSIS — E11.9 DIABETES MELLITUS WITHOUT COMPLICATION: ICD-10-CM

## 2019-04-01 DIAGNOSIS — I10 ESSENTIAL HYPERTENSION: ICD-10-CM

## 2019-04-02 ENCOUNTER — LAB VISIT (OUTPATIENT)
Dept: LAB | Facility: HOSPITAL | Age: 59
End: 2019-04-02
Attending: FAMILY MEDICINE
Payer: COMMERCIAL

## 2019-04-02 DIAGNOSIS — R74.8 ELEVATED LIVER ENZYMES: ICD-10-CM

## 2019-04-02 DIAGNOSIS — E11.9 DIABETES MELLITUS WITHOUT COMPLICATION: ICD-10-CM

## 2019-04-02 DIAGNOSIS — Z79.899 ENCOUNTER FOR LONG-TERM (CURRENT) USE OF MEDICATIONS: ICD-10-CM

## 2019-04-02 DIAGNOSIS — I10 ESSENTIAL HYPERTENSION: ICD-10-CM

## 2019-04-02 LAB
ALBUMIN SERPL BCP-MCNC: 4.4 G/DL (ref 3.5–5.2)
ALP SERPL-CCNC: 55 U/L (ref 55–135)
ALT SERPL W/O P-5'-P-CCNC: 30 U/L (ref 10–44)
ANION GAP SERPL CALC-SCNC: 12 MMOL/L (ref 8–16)
AST SERPL-CCNC: 27 U/L (ref 10–40)
BASOPHILS # BLD AUTO: 0.04 K/UL (ref 0–0.2)
BASOPHILS NFR BLD: 0.8 % (ref 0–1.9)
BILIRUB SERPL-MCNC: 0.8 MG/DL (ref 0.1–1)
BUN SERPL-MCNC: 15 MG/DL (ref 6–20)
CALCIUM SERPL-MCNC: 9.3 MG/DL (ref 8.7–10.5)
CHLORIDE SERPL-SCNC: 103 MMOL/L (ref 95–110)
CHOLEST SERPL-MCNC: 215 MG/DL (ref 120–199)
CHOLEST/HDLC SERPL: 4.5 {RATIO} (ref 2–5)
CO2 SERPL-SCNC: 24 MMOL/L (ref 23–29)
CREAT SERPL-MCNC: 0.7 MG/DL (ref 0.5–1.4)
DIFFERENTIAL METHOD: NORMAL
EOSINOPHIL # BLD AUTO: 0.2 K/UL (ref 0–0.5)
EOSINOPHIL NFR BLD: 3.2 % (ref 0–8)
ERYTHROCYTE [DISTWIDTH] IN BLOOD BY AUTOMATED COUNT: 13.7 % (ref 11.5–14.5)
EST. GFR  (AFRICAN AMERICAN): >60 ML/MIN/1.73 M^2
EST. GFR  (NON AFRICAN AMERICAN): >60 ML/MIN/1.73 M^2
ESTIMATED AVG GLUCOSE: 120 MG/DL (ref 68–131)
GLUCOSE SERPL-MCNC: 114 MG/DL (ref 70–110)
HBA1C MFR BLD HPLC: 5.8 % (ref 4.5–6.2)
HCT VFR BLD AUTO: 41.3 % (ref 37–48.5)
HDLC SERPL-MCNC: 48 MG/DL (ref 40–75)
HDLC SERPL: 22.3 % (ref 20–50)
HGB BLD-MCNC: 13.7 G/DL (ref 12–16)
IMM GRANULOCYTES # BLD AUTO: 0.02 K/UL (ref 0–0.04)
IMM GRANULOCYTES NFR BLD AUTO: 0.4 % (ref 0–0.5)
LDLC SERPL CALC-MCNC: 124.4 MG/DL (ref 63–159)
LYMPHOCYTES # BLD AUTO: 2.1 K/UL (ref 1–4.8)
LYMPHOCYTES NFR BLD: 42.7 % (ref 18–48)
MCH RBC QN AUTO: 30.9 PG (ref 27–31)
MCHC RBC AUTO-ENTMCNC: 33.2 G/DL (ref 32–36)
MCV RBC AUTO: 93 FL (ref 82–98)
MONOCYTES # BLD AUTO: 0.4 K/UL (ref 0.3–1)
MONOCYTES NFR BLD: 8.5 % (ref 4–15)
NEUTROPHILS # BLD AUTO: 2.2 K/UL (ref 1.8–7.7)
NEUTROPHILS NFR BLD: 44.4 % (ref 38–73)
NONHDLC SERPL-MCNC: 167 MG/DL
NRBC BLD-RTO: 0 /100 WBC
PLATELET # BLD AUTO: 294 K/UL (ref 150–350)
PMV BLD AUTO: 9.9 FL (ref 9.2–12.9)
POTASSIUM SERPL-SCNC: 3.9 MMOL/L (ref 3.5–5.1)
PROT SERPL-MCNC: 7.3 G/DL (ref 6–8.4)
RBC # BLD AUTO: 4.44 M/UL (ref 4–5.4)
SODIUM SERPL-SCNC: 139 MMOL/L (ref 136–145)
T4 FREE SERPL-MCNC: 0.83 NG/DL (ref 0.71–1.51)
TRIGL SERPL-MCNC: 213 MG/DL (ref 30–150)
TSH SERPL DL<=0.005 MIU/L-ACNC: 2.63 UIU/ML (ref 0.34–5.6)
WBC # BLD AUTO: 4.96 K/UL (ref 3.9–12.7)

## 2019-04-02 PROCEDURE — 84443 ASSAY THYROID STIM HORMONE: CPT

## 2019-04-02 PROCEDURE — 85025 COMPLETE CBC W/AUTO DIFF WBC: CPT

## 2019-04-02 PROCEDURE — 80053 COMPREHEN METABOLIC PANEL: CPT

## 2019-04-02 PROCEDURE — 83036 HEMOGLOBIN GLYCOSYLATED A1C: CPT

## 2019-04-02 PROCEDURE — 84439 ASSAY OF FREE THYROXINE: CPT

## 2019-04-02 PROCEDURE — 80061 LIPID PANEL: CPT

## 2019-04-02 PROCEDURE — 36415 COLL VENOUS BLD VENIPUNCTURE: CPT

## 2019-04-17 ENCOUNTER — OFFICE VISIT (OUTPATIENT)
Dept: FAMILY MEDICINE | Facility: CLINIC | Age: 59
End: 2019-04-17
Payer: COMMERCIAL

## 2019-04-17 VITALS
TEMPERATURE: 99 F | BODY MASS INDEX: 26.4 KG/M2 | HEIGHT: 64 IN | HEART RATE: 65 BPM | WEIGHT: 154.63 LBS | SYSTOLIC BLOOD PRESSURE: 137 MMHG | OXYGEN SATURATION: 95 % | DIASTOLIC BLOOD PRESSURE: 78 MMHG

## 2019-04-17 DIAGNOSIS — E78.00 HIGH CHOLESTEROL: ICD-10-CM

## 2019-04-17 DIAGNOSIS — R74.8 ELEVATED LIVER ENZYMES: Primary | ICD-10-CM

## 2019-04-17 DIAGNOSIS — M25.50 ARTHRALGIA, UNSPECIFIED JOINT: ICD-10-CM

## 2019-04-17 DIAGNOSIS — E11.9 DIABETES MELLITUS WITHOUT COMPLICATION: ICD-10-CM

## 2019-04-17 PROCEDURE — 99212 PR OFFICE/OUTPT VISIT, EST, LEVL II, 10-19 MIN: ICD-10-PCS | Mod: S$GLB,,, | Performed by: NURSE PRACTITIONER

## 2019-04-17 PROCEDURE — 99212 OFFICE O/P EST SF 10 MIN: CPT | Mod: S$GLB,,, | Performed by: NURSE PRACTITIONER

## 2019-04-17 RX ORDER — FLUOROURACIL 50 MG/G
CREAM TOPICAL
COMMUNITY
Start: 2019-03-28 | End: 2019-09-09

## 2019-04-17 RX ORDER — ASCORBIC ACID 1000 MG
175 TABLET ORAL DAILY
COMMUNITY
End: 2019-09-09

## 2019-04-17 NOTE — PROGRESS NOTES
Subjective:       Patient ID: Liv Garland is a 58 y.o. female.    Chief Complaint: No chief complaint on file.  59 y/o female new to this provider presents for 6 mo check up. She has a significant history but does not take medication. H/o taking metformin with multiple low's thus stopped and Hgb A1c WNL. All labs discussed as normal. H/o elevated LFT's which have also normalized. She is up to date on all of her screening. She does take a few herbal medications including CBD oil with decreased pain noted. History and MAR updated by NP.     Past Medical History:   Diagnosis Date    Arthritis     Bilateral breast cancer     Breast cancer     Dr. Dos Santos, bilat mast, no rt, tamoxifen stopped after 9 mos due to side effects    High cholesterol     Hypertension        Past Surgical History:   Procedure Laterality Date    ABLATION OF DYSRHYTHMIC FOCUS      Dr. Melgoza    CHOLECYSTECTOMY  2001    COLONOSCOPY      repeat in     HYSTERECTOMY  10/1/2003    MASTECTOMY Bilateral 2004    MITRAL VALVE REPLACEMENT  1980    OOPHORECTOMY Left 2004    TONSILLECTOMY  1974    TOTAL ABDOMINAL HYSTERECTOMY W/ BILATERAL SALPINGOOPHORECTOMY          Social History     Socioeconomic History    Marital status: Single     Spouse name: Not on file    Number of children: Not on file    Years of education: Not on file    Highest education level: Not on file   Occupational History    Not on file   Social Needs    Financial resource strain: Not on file    Food insecurity:     Worry: Not on file     Inability: Not on file    Transportation needs:     Medical: Not on file     Non-medical: Not on file   Tobacco Use    Smoking status: Former Smoker     Last attempt to quit: 1999     Years since quittin.3    Smokeless tobacco: Never Used   Substance and Sexual Activity    Alcohol use: Yes    Drug use: No    Sexual activity: Not on file   Lifestyle    Physical activity:     Days per week: Not  on file     Minutes per session: Not on file    Stress: Not on file   Relationships    Social connections:     Talks on phone: Not on file     Gets together: Not on file     Attends Hindu service: Not on file     Active member of club or organization: Not on file     Attends meetings of clubs or organizations: Not on file     Relationship status: Not on file   Other Topics Concern    Not on file   Social History Narrative    Not on file       Review of patient's allergies indicates:   Allergen Reactions    Lisinopril      Other reaction(s): Angioedema          Current Outpatient Medications:     Berb Esparza/herbal complex no.18 (BERBERINE-HERBAL COMB NO.18 ORAL), Take by mouth., Disp: , Rfl:     fluorouracil (EFUDEX) 5 % cream, , Disp: , Rfl:     milk thistle 175 mg tablet, Take 175 mg by mouth once daily., Disp: , Rfl:      HPI  Review of Systems   Constitutional: Negative for chills, diaphoresis, fever and unexpected weight change.   HENT: Negative for dental problem and trouble swallowing.    Eyes: Negative for visual disturbance.   Respiratory: Negative for shortness of breath and wheezing.    Cardiovascular: Negative for chest pain and palpitations.   Gastrointestinal: Negative for abdominal pain, constipation, diarrhea, nausea and vomiting.   Endocrine: Negative for polydipsia and polyuria.   Genitourinary: Negative for dysuria.   Musculoskeletal: Negative for joint swelling.   Skin: Negative for rash.   Neurological: Negative for syncope and headaches.   Psychiatric/Behavioral: Negative for agitation and confusion.       Objective:      Physical Exam   Constitutional: She is oriented to person, place, and time. She appears well-developed and well-nourished.   HENT:   Head: Normocephalic.   Eyes: Pupils are equal, round, and reactive to light.   Neck: Normal range of motion. Neck supple.   Cardiovascular: Normal rate, regular rhythm and normal heart sounds.   No murmur heard.  Pulmonary/Chest: Effort  normal and breath sounds normal. She has no wheezes.   Abdominal: Soft. Bowel sounds are normal. She exhibits no distension. There is no tenderness.   Musculoskeletal: Normal range of motion. She exhibits no edema.   Neurological: She is alert and oriented to person, place, and time.   Skin: Skin is warm and dry. Capillary refill takes less than 2 seconds. No rash noted.   Psychiatric: She has a normal mood and affect. Judgment and thought content normal.   Vitals reviewed.      Assessment:       1. Elevated liver enzymes    2. Diabetes mellitus without complication    3. High cholesterol    4. Arthralgia, unspecified joint        Plan:     1- RTC in one year or sooner if needed.     Risks, benefits, and side effects were discussed with the patient. All questions were answered to the fullest satisfaction of the patient, and pt verbalized understanding and agreement to treatment plan. Pt was to call with any new or worsening symptoms, or present to the ER.

## 2019-05-02 DIAGNOSIS — Z12.11 COLON CANCER SCREENING: ICD-10-CM

## 2019-09-09 ENCOUNTER — TELEPHONE (OUTPATIENT)
Dept: FAMILY MEDICINE | Facility: CLINIC | Age: 59
End: 2019-09-09

## 2019-09-09 ENCOUNTER — OFFICE VISIT (OUTPATIENT)
Dept: FAMILY MEDICINE | Facility: CLINIC | Age: 59
End: 2019-09-09
Payer: COMMERCIAL

## 2019-09-09 ENCOUNTER — HOSPITAL ENCOUNTER (OUTPATIENT)
Dept: CARDIOLOGY | Facility: HOSPITAL | Age: 59
Discharge: HOME | End: 2019-09-09
Attending: FAMILY MEDICINE
Payer: COMMERCIAL

## 2019-09-09 VITALS
BODY MASS INDEX: 25.95 KG/M2 | WEIGHT: 152 LBS | SYSTOLIC BLOOD PRESSURE: 156 MMHG | DIASTOLIC BLOOD PRESSURE: 83 MMHG | RESPIRATION RATE: 18 BRPM | HEIGHT: 64 IN | HEART RATE: 64 BPM | OXYGEN SATURATION: 98 %

## 2019-09-09 DIAGNOSIS — R07.9 CHEST PAIN, UNSPECIFIED TYPE: Primary | ICD-10-CM

## 2019-09-09 DIAGNOSIS — R07.89 COSTOCHONDRAL CHEST PAIN: ICD-10-CM

## 2019-09-09 PROCEDURE — 99214 OFFICE O/P EST MOD 30 MIN: CPT | Mod: S$GLB,,, | Performed by: FAMILY MEDICINE

## 2019-09-09 PROCEDURE — 99214 PR OFFICE/OUTPT VISIT, EST, LEVL IV, 30-39 MIN: ICD-10-PCS | Mod: S$GLB,,, | Performed by: FAMILY MEDICINE

## 2019-09-09 PROCEDURE — 93005 ELECTROCARDIOGRAM TRACING: CPT

## 2019-09-09 RX ORDER — NAPROXEN 500 MG/1
500 TABLET ORAL 2 TIMES DAILY WITH MEALS
Qty: 30 TABLET | Refills: 0 | Status: SHIPPED | OUTPATIENT
Start: 2019-09-09 | End: 2019-09-16

## 2019-09-09 NOTE — PROGRESS NOTES
"[facility] - Clinic Note    Subjective      Ms. Garland is a 59 y.o. female who presents to clinic for chest pain.     Chest pain present for the past 2 weeks.   Occurred randomly and gradually.   Describes the pain in the middle, substernal tightness. No radiation  Constant tight nagging pain  No alleviating factors. Goes away on its own  No aggravating factors. Not worse with exertion, eating, or positions.   Has never had this pain before  Works in a hair salon. Denies any emotional stress or anxiety.  States that she had her mitral valve cauterized years ago as "it was in the wrong place."  Admits to shortness of breath intermittently.  Denies recently URI. Reports a stomach virus this past week.  Denies recent trauma or falls.    [facility] Liv has a past medical history of Arthritis, Bilateral breast cancer, Breast cancer (2004), High cholesterol, and Hypertension.   PSXH Liv has a past surgical history that includes Tonsillectomy (1974); Hysterectomy (10/1/2003); Mastectomy (Bilateral, 5/16/2004); Cholecystectomy (2/2001); Oophorectomy (Left, 5/18/2004); Ablation of dysrhythmic focus (2000); Total abdominal hysterectomy w/ bilateral salpingoophorectomy; Colonoscopy; and Mitral valve replacement (1980).    Liv's family history includes Cancer in her mother; Early death in her brother; Heart failure in her father; Liver cancer in her mother; No Known Problems in her sister.    Liv reports that she quit smoking about 20 years ago. She has never used smokeless tobacco. She reports that she drinks alcohol. She reports that she does not use drugs.   ALG Liv is allergic to lisinopril.   MED Liv has a current medication list which includes the following prescription(s): berb martínez/herbal complex no.18 and naproxen.     Review of Systems   Constitutional: Negative for activity change, appetite change, chills, fatigue and fever.   Eyes: Positive for visual disturbance.   Respiratory: Positive for " "shortness of breath.    Cardiovascular: Positive for chest pain. Negative for palpitations and leg swelling.   Gastrointestinal: Negative for abdominal pain, nausea and vomiting.   Neurological: Negative for dizziness, syncope and headaches.   Psychiatric/Behavioral: The patient is not nervous/anxious.      Objective     BP (!) 156/83 (BP Location: Right arm, Patient Position: Sitting, BP Method: Medium (Automatic))   Pulse 64   Resp 18   Ht 5' 4" (1.626 m)   Wt 68.9 kg (152 lb)   SpO2 98%   BMI 26.09 kg/m²     Physical Exam   Constitutional: She is well-developed, well-nourished, and in no distress. No distress.   HENT:   Head: Normocephalic and atraumatic.   Eyes: Right eye exhibits no discharge. Left eye exhibits no discharge.   Cardiovascular: Normal rate, regular rhythm, normal heart sounds and intact distal pulses. Exam reveals no gallop and no friction rub.   No murmur heard.  Pulmonary/Chest: Effort normal and breath sounds normal. No respiratory distress. She has no wheezes. She has no rales. She exhibits tenderness.   +reproducible chest pain on the right up sternal border   Musculoskeletal: She exhibits no edema.   Lymphadenopathy:     She has no cervical adenopathy.   Neurological: She is alert.   Skin: Skin is warm and dry. She is not diaphoretic.   Psychiatric: Mood and affect normal.   Vitals reviewed.    Assessment/Plan     Liv was seen today for chest pain.    Diagnoses and all orders for this visit:    Chest pain, unspecified type  -     IN OFFICE EKG 12-LEAD (to Muse)    Costochondral chest pain  -     naproxen (NAPROSYN) 500 MG tablet; Take 1 tablet (500 mg total) by mouth 2 (two) times daily with meals. for 7 days      -not characteristic for stable angina. Due to h/o of HLD, age, and previous smoker will obtain EKG for further eval. Pain is reproducible likely musculoskeletal. Will try a trial with NSAIDs x1 week, alternate ice/heat, and chest stretches. Will f/u in 1 week, if no " relief will obtain labs and stress test. Pt v/u to the above plan.    Follow up in about 1 week (around 9/16/2019) for chest pain.    Future Appointments   Date Time Provider Department Center   9/9/2019  3:45 PM [facility] EKG [facility]   9/18/2019  1:40 PM Vargas Paulino MD [facility]       Vargas Paulino MD

## 2019-09-09 NOTE — PATIENT INSTRUCTIONS
Chest Wall Pain, Costochondritis (Child)  Your childs ribs are joined to the breastbone (sternum). This is the long flat bone in front of the chest. If these joints or the cartilage around the ribs become inflamed, it is called costochondritis. This is a common condition in preteens. Costochondritis causes tenderness on the sides of the breastbone. Your child may have mild swelling and sharp pain with breathing or coughing. Costochondritis often follows a viral illness that causes the child to cough a lot. It can also follow a trauma, such as a fall or car accident.  Costochondritis pain may last for weeks, but eventually goes away on its own. The usual treatment is to take ibuprofen for 1 to 2 weeks as instructed on the label to ease discomfort. Ibuprofen is an anti-inflammatory medicine and is available over the counter. Your child may also need to take medicine to stop a cough. These are also available over-the-counter. Ask your healthcare provider to recommend specific medicines if you are not sure what to give your child.  Home care  · Follow the healthcare providers instructions for giving medicines to your child. Dont give any medicines that the provider has not approved.  · Allow your child to rest as needed. Give pain medicine before an activity or before sleeping at night.  · Put a covered heating pad or warm cloth on the area for 20 minutes. Do this 4 times a day. This may ease pain and swelling. You can also alternate the heat with cold. You can make a cold pack by wrapping a bag of chipped ice or frozen vegetables in a thin towel.  · Have your child hold a pillow against his or her chest to ease pain when coughing.  · Talk with your child about how he or she is feeling and what things help ease pain. Talk with your childs provider if prescribed medicines dont relieve the pain.  · Ask the provider about exercises to stretch the chest muscles and ease pain. Exercises should not be done if they  cause your child any pain.  Follow-up care  Follow up with your childs healthcare provider, or as advised.  Special note to parents  Your child should avoid playing sports until his or her healthcare provider says its OK.  When to seek medical advice  Call your childs healthcare provider right away if any of these occur:  · Pain doesnt get better or gets worse even with medicine  · Difficulty breathing, shortness of breath, or fast breathing  · Change in the type of pain or pain gets worse  · Chest pain does not resolve in 7 days  Unless advised otherwise by your childs healthcare provider, call the provider right away if:  · Your child is of any age and has repeated fevers above 104°F (40°C).  · Your child is younger than 2 years of age and a fever of 100.4°F (38°C) continues for more than 1 day.  · Your child is 2 years old or older and a fever of 100.4°F (38°C) continues for more than 3 days.  Date Last Reviewed: 4/17/2016  © 9286-0757 [employer]. [address] [place] [address], [place] [address]. All rights reserved. This information is not intended as a substitute for professional medical care. Always follow your healthcare professional's instructions.

## 2019-09-09 NOTE — TELEPHONE ENCOUNTER
Attempted to call pt. LVM x1    ----- Message from Priscilla Holguin sent at 9/9/2019  1:12 PM CDT -----  Contact: self/ [phone]  Patient is requesting a call back from the nurse stated she has shortness of breath and chest pains. Patient initially wanted to schedule an appointment, unable to schedule with her symptoms.  Several calls been placed to [facility] on call, the calls are being redirected to the que by the .  Patient stated she will just come to the clinic, she's tired of holding.    Please call the patient upon request at phone number [phone].     pcp nurse not available on [employer].

## 2019-09-10 ENCOUNTER — TELEPHONE (OUTPATIENT)
Dept: FAMILY MEDICINE | Facility: CLINIC | Age: 59
End: 2019-09-10

## 2019-09-10 NOTE — TELEPHONE ENCOUNTER
Notified patient of WNL results. Patient verbalized understanding and grateful of the call. Patient stated she does feel some relief from the medication.

## 2019-09-10 NOTE — TELEPHONE ENCOUNTER
----- Message from Vargas Paulino MD sent at 9/10/2019  8:38 AM CDT -----  Please inform patient that her EKG was normal.     Thank you!

## 2019-09-16 ENCOUNTER — PATIENT OUTREACH (OUTPATIENT)
Dept: ADMINISTRATIVE | Facility: HOSPITAL | Age: 59
End: 2019-09-16

## 2019-09-18 ENCOUNTER — OFFICE VISIT (OUTPATIENT)
Dept: FAMILY MEDICINE | Facility: CLINIC | Age: 59
End: 2019-09-18
Payer: COMMERCIAL

## 2019-09-18 ENCOUNTER — TELEPHONE (OUTPATIENT)
Dept: FAMILY MEDICINE | Facility: CLINIC | Age: 59
End: 2019-09-18

## 2019-09-18 VITALS
RESPIRATION RATE: 15 BRPM | WEIGHT: 154.25 LBS | HEART RATE: 68 BPM | SYSTOLIC BLOOD PRESSURE: 141 MMHG | OXYGEN SATURATION: 96 % | DIASTOLIC BLOOD PRESSURE: 80 MMHG | BODY MASS INDEX: 26.34 KG/M2 | HEIGHT: 64 IN

## 2019-09-18 DIAGNOSIS — R07.89 COSTOCHONDRAL CHEST PAIN: Primary | ICD-10-CM

## 2019-09-18 DIAGNOSIS — Z86.39 HISTORY OF DIABETES MELLITUS: Primary | ICD-10-CM

## 2019-09-18 DIAGNOSIS — E78.49 OTHER HYPERLIPIDEMIA: ICD-10-CM

## 2019-09-18 PROCEDURE — 99213 OFFICE O/P EST LOW 20 MIN: CPT | Mod: S$GLB,,, | Performed by: FAMILY MEDICINE

## 2019-09-18 PROCEDURE — 99213 PR OFFICE/OUTPT VISIT, EST, LEVL III, 20-29 MIN: ICD-10-PCS | Mod: S$GLB,,, | Performed by: FAMILY MEDICINE

## 2019-09-18 NOTE — PROGRESS NOTES
[facility] - Clinic Note    Subjective      Ms. Garland is a 59 y.o. female who presents to clinic for a follow-up of chest pain.    Patient was seen 1 week ago for chest pain. Chest pain was consistent with costochondritis.  Prescribed naproxen 500 mg b.i.d. for 1 week.  An EKG was obtained and was normal.  Recommended NSAIDs, heat compresses, and chest stretches.    Today patient reports that her chest pain has resolved.  Has been taking naproxen b.i.d. for a week.  Denies use hot compresses or perform chest stretches.  Denies any other associated symptoms.    [facility] Liv has a past medical history of Arthritis, Bilateral breast cancer, Breast cancer (2004), High cholesterol, and Hypertension.   PSXH Liv has a past surgical history that includes Tonsillectomy (1974); Hysterectomy (10/1/2003); Mastectomy (Bilateral, 5/16/2004); Cholecystectomy (2/2001); Oophorectomy (Left, 5/18/2004); Ablation of dysrhythmic focus (2000); Total abdominal hysterectomy w/ bilateral salpingoophorectomy; Colonoscopy; and Mitral valve replacement (1980).    Liv's family history includes Cancer in her mother; Early death in her brother; Heart failure in her father; Liver cancer in her mother; No Known Problems in her sister.    Liv reports that she quit smoking about 20 years ago. She has never used smokeless tobacco. She reports that she drinks alcohol. She reports that she does not use drugs.   EDWIGE Peck is allergic to lisinopril.   MED Liv has a current medication list which includes the following prescription(s): berb martínez/herbal complex no.18.     Review of Systems   Constitutional: Negative for chills and fever.   Eyes: Negative for visual disturbance.   Respiratory: Negative for shortness of breath.    Cardiovascular: Negative for chest pain and leg swelling.   Neurological: Negative for dizziness and headaches.     Objective     BP (!) 141/80 (BP Location: Right arm, Patient Position: Sitting, BP Method: Medium  "(Automatic))   Pulse 68   Resp 15   Ht 5' 4" (1.626 m)   Wt 70 kg (154 lb 4 oz)   LMP  (LMP Unknown)   SpO2 96%   BMI 26.48 kg/m²     Physical Exam   Constitutional: She is well-developed, well-nourished, and in no distress. No distress.   HENT:   Head: Normocephalic and atraumatic.   Eyes: Right eye exhibits no discharge. Left eye exhibits no discharge.   Cardiovascular: Normal rate, regular rhythm, normal heart sounds and intact distal pulses. Exam reveals no gallop and no friction rub.   No murmur heard.  Pulmonary/Chest: Effort normal and breath sounds normal. No respiratory distress. She has no wheezes. She has no rales. She exhibits no tenderness.   Musculoskeletal: She exhibits no edema.   Neurological: She is alert.   Skin: Skin is warm and dry. She is not diaphoretic.   Psychiatric: Mood normal.   Vitals reviewed.    Assessment/Plan     Liv was seen today for follow-up of costochondritis.    Diagnoses and all orders for this visit:    Costochondral chest pain  -resolved  -use naproxen p.r.n. no longer scheduled.      Follow up in about 1 month (around 10/18/2019) for annual.    Future Appointments   Date Time Provider Department Center   10/16/2019  2:00 PM Vargas Paulino MD [facility]       Vargas Paulino MD            "

## 2019-09-18 NOTE — TELEPHONE ENCOUNTER
Patient requesting to have labs done prior to NOV on 10/16/18. States she will come have these drawn the week before her appt.

## 2019-09-18 NOTE — PATIENT INSTRUCTIONS

## 2019-10-01 ENCOUNTER — PATIENT OUTREACH (OUTPATIENT)
Dept: ADMINISTRATIVE | Facility: HOSPITAL | Age: 59
End: 2019-10-01

## 2019-10-08 ENCOUNTER — LAB VISIT (OUTPATIENT)
Dept: LAB | Facility: HOSPITAL | Age: 59
End: 2019-10-08
Attending: FAMILY MEDICINE
Payer: COMMERCIAL

## 2019-10-08 DIAGNOSIS — E78.49 OTHER HYPERLIPIDEMIA: ICD-10-CM

## 2019-10-08 DIAGNOSIS — Z86.39 HISTORY OF DIABETES MELLITUS: ICD-10-CM

## 2019-10-08 LAB
ALBUMIN SERPL BCP-MCNC: 4.5 G/DL (ref 3.5–5.2)
ALP SERPL-CCNC: 58 U/L (ref 55–135)
ALT SERPL W/O P-5'-P-CCNC: 33 U/L (ref 10–44)
ANION GAP SERPL CALC-SCNC: 18 MMOL/L (ref 8–16)
AST SERPL-CCNC: 24 U/L (ref 10–40)
BILIRUB SERPL-MCNC: 0.7 MG/DL (ref 0.1–1)
BUN SERPL-MCNC: 15 MG/DL (ref 8–23)
CALCIUM SERPL-MCNC: 9.1 MG/DL (ref 8.7–10.5)
CHLORIDE SERPL-SCNC: 105 MMOL/L (ref 95–110)
CHOLEST SERPL-MCNC: 233 MG/DL (ref 120–199)
CHOLEST/HDLC SERPL: 4.8 {RATIO} (ref 2–5)
CO2 SERPL-SCNC: 18 MMOL/L (ref 23–29)
CREAT SERPL-MCNC: 0.6 MG/DL (ref 0.5–1.4)
EST. GFR  (AFRICAN AMERICAN): >60 ML/MIN/1.73 M^2
EST. GFR  (NON AFRICAN AMERICAN): >60 ML/MIN/1.73 M^2
ESTIMATED AVG GLUCOSE: 114 MG/DL (ref 68–131)
GLUCOSE SERPL-MCNC: 99 MG/DL (ref 70–110)
HBA1C MFR BLD HPLC: 5.6 % (ref 4.5–6.2)
HDLC SERPL-MCNC: 49 MG/DL (ref 40–75)
HDLC SERPL: 21 % (ref 20–50)
LDLC SERPL CALC-MCNC: 151.4 MG/DL (ref 63–159)
NONHDLC SERPL-MCNC: 184 MG/DL
POTASSIUM SERPL-SCNC: 4.1 MMOL/L (ref 3.5–5.1)
PROT SERPL-MCNC: 7.3 G/DL (ref 6–8.4)
SODIUM SERPL-SCNC: 141 MMOL/L (ref 136–145)
TRIGL SERPL-MCNC: 163 MG/DL (ref 30–150)

## 2019-10-08 PROCEDURE — 80061 LIPID PANEL: CPT

## 2019-10-08 PROCEDURE — 83036 HEMOGLOBIN GLYCOSYLATED A1C: CPT

## 2019-10-08 PROCEDURE — 80053 COMPREHEN METABOLIC PANEL: CPT

## 2019-10-08 PROCEDURE — 36415 COLL VENOUS BLD VENIPUNCTURE: CPT

## 2019-10-24 ENCOUNTER — OFFICE VISIT (OUTPATIENT)
Dept: FAMILY MEDICINE | Facility: CLINIC | Age: 59
End: 2019-10-24
Payer: COMMERCIAL

## 2019-10-24 VITALS
SYSTOLIC BLOOD PRESSURE: 142 MMHG | DIASTOLIC BLOOD PRESSURE: 81 MMHG | WEIGHT: 150.25 LBS | HEIGHT: 64 IN | HEART RATE: 65 BPM | OXYGEN SATURATION: 97 % | RESPIRATION RATE: 16 BRPM | BODY MASS INDEX: 25.65 KG/M2

## 2019-10-24 DIAGNOSIS — Z00.00 ANNUAL PHYSICAL EXAM: Primary | ICD-10-CM

## 2019-10-24 DIAGNOSIS — Z23 NEEDS FLU SHOT: ICD-10-CM

## 2019-10-24 DIAGNOSIS — E78.5 DYSLIPIDEMIA: ICD-10-CM

## 2019-10-24 DIAGNOSIS — Z23 NEED FOR SHINGLES VACCINE: ICD-10-CM

## 2019-10-24 PROBLEM — E78.1 HYPERTRIGLYCERIDEMIA: Status: ACTIVE | Noted: 2019-10-24

## 2019-10-24 PROCEDURE — 99396 PR PREVENTIVE VISIT,EST,40-64: ICD-10-PCS | Mod: 25,S$GLB,, | Performed by: FAMILY MEDICINE

## 2019-10-24 PROCEDURE — 90471 IMMUNIZATION ADMIN: CPT | Mod: S$GLB,,, | Performed by: FAMILY MEDICINE

## 2019-10-24 PROCEDURE — 90686 IIV4 VACC NO PRSV 0.5 ML IM: CPT | Mod: S$GLB,,, | Performed by: FAMILY MEDICINE

## 2019-10-24 PROCEDURE — 90686 FLU VACCINE (QUAD) GREATER THAN OR EQUAL TO 3YO PRESERVATIVE FREE IM: ICD-10-PCS | Mod: S$GLB,,, | Performed by: FAMILY MEDICINE

## 2019-10-24 PROCEDURE — 99396 PREV VISIT EST AGE 40-64: CPT | Mod: 25,S$GLB,, | Performed by: FAMILY MEDICINE

## 2019-10-24 PROCEDURE — 90471 FLU VACCINE (QUAD) GREATER THAN OR EQUAL TO 3YO PRESERVATIVE FREE IM: ICD-10-PCS | Mod: S$GLB,,, | Performed by: FAMILY MEDICINE

## 2019-10-24 NOTE — PROGRESS NOTES
Patient identifiers checked times 2, vaccine questions asked, vaccine logged, administered in Lt deltoid IM without redness/swelling at site and bandaid applied, suggested to stay in clinic for 15 min to monitor in case adverse reaction occurs. No questions or concerns voiced at this time.

## 2019-10-24 NOTE — PATIENT INSTRUCTIONS
It was a pleasure seeing you today!    Vargas Paulino MD        Low-Salt Diet  This diet removes foods that are high in salt. It also limits the amount of salt you use when cooking. It is most often used for people with high blood pressure, edema (fluid retention), and kidney, liver, or heart disease.  Table salt contains the mineral sodium. Your body needs sodium to work normally. But too much sodium can make your health problems worse. Your healthcare provider is recommending a low-salt (also called low-sodium) diet for you. Your total daily allowance of salt is 1,500 to 2,300 milligrams (mg). It is less than 1 teaspoon of table salt. This means you can have only about 500 to 700 mg of sodium at each meal. People with certain health problems should limit salt intake to the lower end of the recommended range.    When you cook, dont add much salt. If you can cook without using salt, even better. Dont add salt to your food at the table.  When shopping, read food labels. Salt is often called sodium on the label. Choose foods that are salt-free, low salt, or very low salt. Note that foods with reduced salt may not lower your salt intake enough.    Beans, potatoes, and pasta  Ok: Dry beans, split peas, lentils, potatoes, rice, macaroni, pasta, spaghetti without added salt  Avoid: Potato chips, tortilla chips, and similar products  Breads and cereals  Ok: Low-sodium breads, rolls, cereals, and cakes; low-salt crackers, matzo crackers  Avoid: Salted crackers, pretzels, popcorn, [place] toast, pancakes, muffins  Dairy  Ok: Milk, chocolate milk, hot chocolate mix, low-salt cheeses, and yogurt  Avoid: Processed cheese and cheese spreads; Roquefort, Camembert, and cottage cheese; buttermilk, instant breakfast drink  Desserts  Ok: Ice cream, frozen yogurt, juice bars, gelatin, cookies and pies, sugar, honey, jelly, hard candy  Avoid: Most pies, cakes and cookies prepared or processed with salt; instant pudding  Drinks  Ok:  Tea, coffee, fizzy (carbonated) drinks, juices  Avoid: Flavored coffees, electrolyte replacement drinks, sports drinks  Meats  Ok: All fresh meat, fish, poultry, low-salt tuna, eggs, egg substitute  Avoid: Smoked, pickled, brine-cured, or salted meats and fish. This includes holden, chipped beef, corned beef, hot dogs, deli meats, ham, kosher meats, salt pork, sausage, canned tuna, salted codfish, smoked salmon, herring, sardines, or anchovies.  Seasonings and spices  Ok: Most seasonings are okay. Good substitutes for salt include: fresh herb blends, hot sauce, lemon, garlic, sargent, vinegar, dry mustard, parsley, cilantro, horseradish, tomato paste, regular margarine, mayonnaise, unsalted butter, cream cheese, vegetable oil, cream, low-salt salad dressing and gravy.  Avoid: Regular ketchup, relishes, pickles, soy sauce, teriyaki sauce, Worcestershire sauce, BBQ sauce, tartar sauce, meat tenderizer, chili sauce, regular gravy, regular salad dressing, salted butter  Soups  Ok: Low-salt soups and broths made with allowed foods  Avoid: Bouillon cubes, soups with smoked or salted meats, regular soup and broth  Vegetables  Ok: Most vegetables are okay; also low-salt tomato and vegetable juices  Avoid: Sauerkraut and other brine-soaked vegetables; pickles and other pickled vegetables; tomato juice, olives  Date Last Reviewed: 8/1/2016 © 2000-2017 [employer]. [address] [place] [address], [place] [address]. All rights reserved. This information is not intended as a substitute for professional medical care. Always follow your healthcare professional's instructions.

## 2019-10-24 NOTE — PROGRESS NOTES
[facility] - Clinic Note    Subjective      Ms. Garland is a 59 y.o. female who presents to clinic for an annual.    Reports that everything is going well.  Has no complaints at this time.  Has a past medical history of breast cancer in 2004.  Underwent chemotherapy at that time.  She has had bilateral mastectomies.  Has silicone implants.  She also had a total hysterectomy in 2004.  She is currently not on any prescribed or over the counter medications.  Last colonoscopy was in 2011 and revealed 1 hyperplastic polyp.  Recommended repeat colonoscopy to be done in 2021.  Patient is not currently sexually active.  She is single.  Patient is a previous smoker quit about 20 years ago.    Denies any illicit drug use.  Patient does drink alcohol.  States that she drinks red wine about a couple glasses a night.    Annual lab work was obtained prior to visit:  A1c 5.6  Lipid screening revealed elevated cholesterol at 233 and elevated triglycerides at 163  CMP was within normal limits    Would like to have the flu and shingles vaccine.  States that she did get the Zostavax about 3 years ago.    [facility] Liv has a past medical history of Arthritis, Bilateral breast cancer, Breast cancer (2004), High cholesterol, and Hypertension.   PSX Liv has a past surgical history that includes Tonsillectomy (1974); Hysterectomy (10/1/2003); Mastectomy (Bilateral, 5/16/2004); Cholecystectomy (2/2001); Oophorectomy (Left, 5/18/2004); Ablation of dysrhythmic focus (2000); Total abdominal hysterectomy w/ bilateral salpingoophorectomy; Colonoscopy; and Mitral valve replacement (1980).    Liv's family history includes Cancer in her mother; Early death in her brother; Heart failure in her father; Liver cancer in her mother; No Known Problems in her sister.    Liv reports that she quit smoking about 20 years ago. She has never used smokeless tobacco. She reports that she drinks alcohol. She reports that she does not use drugs.  "  EDWIGE Peck is allergic to lisinopril.   MED Liv has a current medication list which includes the following prescription(s): berb martínez/herbal complex no.18 and varicella-zoster [id number] (pf).     Review of Systems   Constitutional: Negative for chills and fever.   Eyes: Negative for visual disturbance.   Respiratory: Negative for shortness of breath.    Cardiovascular: Negative for chest pain and leg swelling.   Gastrointestinal: Negative for abdominal pain.   Neurological: Negative for dizziness, syncope and headaches.   All other systems reviewed and are negative.    Objective     BP (!) 142/81 (BP Location: Right arm, Patient Position: Sitting, BP Method: Medium (Automatic))   Pulse 65   Resp 16   Ht 5' 4" (1.626 m)   Wt 68.2 kg (150 lb 4 oz)   LMP  (LMP Unknown)   SpO2 97%   BMI 25.79 kg/m²     Physical Exam   Constitutional: She is well-developed, well-nourished, and in no distress. No distress.   HENT:   Head: Normocephalic and atraumatic.   Right Ear: External ear normal.   Left Ear: External ear normal.   Nose: Nose normal.   Mouth/Throat: Oropharynx is clear and moist. No oropharyngeal exudate.   Eyes: Pupils are equal, round, and reactive to light. Right eye exhibits no discharge. Left eye exhibits no discharge. No scleral icterus.   Neck: Neck supple. No thyromegaly present.   Cardiovascular: Normal rate, regular rhythm, normal heart sounds and intact distal pulses. Exam reveals no gallop and no friction rub.   No murmur heard.  No pitting edema in the lower extremities bilaterally   Pulmonary/Chest: Effort normal and breath sounds normal. No respiratory distress. She has no wheezes. She has no rales.   Musculoskeletal: She exhibits no edema.   Lymphadenopathy:     She has no cervical adenopathy.   Neurological: She is alert.   Skin: Skin is warm and dry. She is not diaphoretic.   Psychiatric: Mood and affect normal.   Vitals reviewed.    Assessment/Plan     Liv was seen today for an annual " exam.     Diagnoses and all orders for this visit:    Annual physical exam  -Counseled on healthy diet and exercise.  -BP slightly elevated today. Patient does not want to start medications. Will make lifestyle changes and follow up in 2-3 months for BP check.    Dyslipidemia       - Elevated cholesterol and triglycerides. Recommended avoiding fried, fatty, and oily foods as well as red meat.  Increasing her fiber in his diet which can be found oats, grains, green leafy vegetables, and fruits. Also being more active and losing weight. Recommended OTC red yeast rice and OTC omega-3/fish oil supplements.    Needs flu shot  -     Influenza - Quadrivalent (6 months+) (PF)    Need for shingles vaccine  -     varicella-zoster gE-AS01B, PF, (SHINGRIX) 50 mcg/0.5 mL injection; Inject 0.5 mLs into the muscle once. for 1 dose    Follow up in about 3 months (around 1/24/2020) for BP.    Future Appointments   Date Time Provider Department Center   1/24/2020  3:00 PM Vargas Paulino MD [facility]       Vargas Paulino MD  Family Medicine  [facility]

## 2019-12-04 ENCOUNTER — OFFICE VISIT (OUTPATIENT)
Dept: FAMILY MEDICINE | Facility: CLINIC | Age: 59
End: 2019-12-04
Payer: COMMERCIAL

## 2019-12-04 VITALS
HEIGHT: 64 IN | SYSTOLIC BLOOD PRESSURE: 160 MMHG | WEIGHT: 150.38 LBS | HEART RATE: 85 BPM | OXYGEN SATURATION: 97 % | RESPIRATION RATE: 18 BRPM | DIASTOLIC BLOOD PRESSURE: 93 MMHG | TEMPERATURE: 99 F | BODY MASS INDEX: 25.67 KG/M2

## 2019-12-04 DIAGNOSIS — T50.Z95A ADVERSE EFFECT OF VACCINE, INITIAL ENCOUNTER: Primary | ICD-10-CM

## 2019-12-04 PROCEDURE — 99213 OFFICE O/P EST LOW 20 MIN: CPT | Mod: S$GLB,,, | Performed by: FAMILY MEDICINE

## 2019-12-04 PROCEDURE — 99213 PR OFFICE/OUTPT VISIT, EST, LEVL III, 20-29 MIN: ICD-10-PCS | Mod: S$GLB,,, | Performed by: FAMILY MEDICINE

## 2019-12-05 NOTE — PROGRESS NOTES
[facility] - Clinic Note    Subjective      Ms. Garland is a 59 y.o. female who presents to clinic with complaints of an allergic reaction to vaccine.     Reports that she had the Shingrix vaccine yesterday afternoon.   States that hours after she has had a constant headache and does not feel good. Reports that she feels like she has the flu.  Locates the headache in the front and back of the head. Aching in nature. 9/10 in severity.  Has not taken anything for the headache.   Admits to chills, fatigue, and low grade fever at 100.1 x2.  Temp in visit today is 98.5F.   No known sick contacts.   BP elevated today at 160/93. States that she checked it at home and was 165/98 and 170/96.  Previous BP well controlled.   Denies congestion, swelling or redness in the area of the vaccine, chest pain, dizziness, myalgias, or emesis.    [facility] Liv has a past medical history of Arthritis, Bilateral breast cancer, Breast cancer (2004), High cholesterol, and Hypertension.   PSXH Liv has a past surgical history that includes Tonsillectomy (1974); Hysterectomy (10/1/2003); Mastectomy (Bilateral, 5/16/2004); Cholecystectomy (2/2001); Oophorectomy (Left, 5/18/2004); Ablation of dysrhythmic focus (2000); Total abdominal hysterectomy w/ bilateral salpingoophorectomy; Colonoscopy; and Mitral valve replacement (1980).    Liv's family history includes Cancer in her mother; Early death in her brother; Heart failure in her father; Liver cancer in her mother; No Known Problems in her sister.    Liv reports that she quit smoking about 20 years ago. She has never used smokeless tobacco. She reports that she drinks alcohol. She reports that she does not use drugs.   ALG Liv is allergic to lisinopril.   MED Liv has a current medication list which includes the following prescription(s): berb martínez/herbal complex no.18 and red yeast rice.     Review of Systems   Constitutional: Positive for chills, fatigue and fever. Negative  "for activity change and appetite change.   HENT: Negative for congestion, ear discharge, ear pain, postnasal drip, rhinorrhea, sinus pressure, sinus pain, sneezing and sore throat.    Eyes: Negative for visual disturbance.   Respiratory: Negative for cough and shortness of breath.    Cardiovascular: Negative for chest pain, palpitations and leg swelling.   Gastrointestinal: Negative for abdominal pain, nausea and vomiting.   Musculoskeletal: Negative for joint swelling.   Skin: Negative for color change and rash.   Neurological: Positive for headaches. Negative for dizziness, syncope and light-headedness.     Objective     BP (!) 160/93 (BP Location: Right arm, Patient Position: Sitting, BP Method: Medium (Automatic))   Pulse 85   Temp 98.5 °F (36.9 °C) (Oral)   Resp 18   Ht 5' 4" (1.626 m)   Wt 68.2 kg (150 lb 5.7 oz)   LMP  (LMP Unknown)   SpO2 97%   BMI 25.81 kg/m²     Physical Exam   Constitutional: She is well-developed, well-nourished, and in no distress. No distress.   HENT:   Head: Normocephalic and atraumatic.   Right Ear: External ear normal.   Left Ear: External ear normal.   Nose: Nose normal.   Mouth/Throat: Oropharynx is clear and moist. No oropharyngeal exudate.   Eyes: Right eye exhibits no discharge. Left eye exhibits no discharge.   Neck: No thyromegaly present.   Cardiovascular: Normal rate, regular rhythm, normal heart sounds and intact distal pulses. Exam reveals no gallop and no friction rub.   No murmur heard.  Pulmonary/Chest: Effort normal and breath sounds normal. No respiratory distress. She has no wheezes. She has no rales.   Musculoskeletal: She exhibits no edema.   Lymphadenopathy:     She has no cervical adenopathy.   Neurological: She is alert.   Skin: Skin is warm and dry. She is not diaphoretic.   Psychiatric: Mood and affect normal.   Vitals reviewed.    Assessment/Plan     Liv was seen today for allergic reaction.    Diagnoses and all orders for this visit:    Adverse " effect of vaccine, initial encounter  -Her symptoms seem consistent to the adverse effects after having the vaccine.  -also looked at the website of shingrix and reports headache, chills, and fatigue are found in more than 50% of patient which was shown and discussed with the patient.   - instructed patient to rest and alternate tylenol or ibuprofen prn for low grade fever and headache. Instructed to continue to check BP and if continues to be elevated or symptoms get worse to inform me.   -Patient verbalized understanding and agreed to the plan above.      Follow up if symptoms worsen or fail to improve.    Future Appointments   Date Time Provider Department Center   1/24/2020  3:00 PM Vargas Paulino MD [facility]       Vargas Paulino MD  Family Medicine  [facility]

## 2020-01-09 ENCOUNTER — PATIENT OUTREACH (OUTPATIENT)
Dept: ADMINISTRATIVE | Facility: HOSPITAL | Age: 60
End: 2020-01-09

## 2020-01-24 ENCOUNTER — OFFICE VISIT (OUTPATIENT)
Dept: FAMILY MEDICINE | Facility: CLINIC | Age: 60
End: 2020-01-24
Payer: COMMERCIAL

## 2020-01-24 VITALS
WEIGHT: 151.38 LBS | RESPIRATION RATE: 16 BRPM | SYSTOLIC BLOOD PRESSURE: 135 MMHG | OXYGEN SATURATION: 98 % | BODY MASS INDEX: 25.84 KG/M2 | HEART RATE: 64 BPM | HEIGHT: 64 IN | DIASTOLIC BLOOD PRESSURE: 77 MMHG

## 2020-01-24 DIAGNOSIS — I10 ESSENTIAL HYPERTENSION: Primary | ICD-10-CM

## 2020-01-24 DIAGNOSIS — E78.1 HYPERTRIGLYCERIDEMIA: ICD-10-CM

## 2020-01-24 DIAGNOSIS — E78.00 HYPERCHOLESTEROLEMIA: ICD-10-CM

## 2020-01-24 PROCEDURE — 99214 PR OFFICE/OUTPT VISIT, EST, LEVL IV, 30-39 MIN: ICD-10-PCS | Mod: S$GLB,,, | Performed by: FAMILY MEDICINE

## 2020-01-24 PROCEDURE — 99214 OFFICE O/P EST MOD 30 MIN: CPT | Mod: S$GLB,,, | Performed by: FAMILY MEDICINE

## 2020-01-24 NOTE — PROGRESS NOTES
"[facility] - Clinic Note    Subjective      Ms. Garland is a 59 y.o. female who presents to clinic for HTN.     Patient presents today for blood pressure check.   She has a history of HTN but not on medications.   The past couple of visits BP has been slightly elevated.   She has been watching her diet and started cardio exercises at the gym.   Reports that she is doing well.   Denies chest pain or dyspnea.   BP today is 135/77.    [facility] Liv has a past medical history of Arthritis, Bilateral breast cancer, Breast cancer (2004), High cholesterol, and Hypertension.   PSXH Liv has a past surgical history that includes Tonsillectomy (1974); Hysterectomy (10/1/2003); Mastectomy (Bilateral, 5/16/2004); Cholecystectomy (2/2001); Oophorectomy (Left, 5/18/2004); Ablation of dysrhythmic focus (2000); Total abdominal hysterectomy w/ bilateral salpingoophorectomy; Colonoscopy; and Mitral valve replacement (1980).    Liv's family history includes Cancer in her mother; Early death in her brother; Heart failure in her father; Liver cancer in her mother; No Known Problems in her sister.   BLANCHE Peck reports that she quit smoking about 21 years ago. She has never used smokeless tobacco. She reports that she drinks alcohol. She reports that she does not use drugs.   EDWIGE Peck is allergic to lisinopril.   MED Liv has a current medication list which includes the following prescription(s): berb martínez/herbal complex no.18 and red yeast rice.     Review of Systems   Constitutional: Negative for chills, fatigue and fever.   Eyes: Negative for visual disturbance.   Respiratory: Negative for cough and shortness of breath.    Cardiovascular: Negative for chest pain, palpitations and leg swelling.   Neurological: Negative for dizziness, syncope and headaches.   Psychiatric/Behavioral: Negative for confusion.     Objective     /77   Pulse 64   Resp 16   Ht 5' 4" (1.626 m)   Wt 68.7 kg (151 lb 6 oz)   LMP  (LMP Unknown)  "  SpO2 98%   BMI 25.98 kg/m²     Physical Exam   Constitutional: She appears well-developed and well-nourished. No distress.   HENT:   Head: Normocephalic and atraumatic.   Eyes: Right eye exhibits no discharge. Left eye exhibits no discharge.   Cardiovascular: Normal rate, regular rhythm, normal heart sounds and intact distal pulses. Exam reveals no gallop and no friction rub.   No murmur heard.  No pitting edema in the lower extremities bilaterally   Pulmonary/Chest: Effort normal and breath sounds normal. No respiratory distress. She has no wheezes. She has no rales.   Lymphadenopathy:     She has no cervical adenopathy.   Neurological: She is alert.   Skin: Skin is warm and dry. Capillary refill takes less than 2 seconds. She is not diaphoretic.   Psychiatric: She has a normal mood and affect. Her behavior is normal.   Vitals reviewed.     Assessment/Plan     Liv was seen today for follow-up of HTN.    Diagnoses and all orders for this visit:    Essential hypertension  -well controlled. No medication needed at this time. Continue lifestyle modifications.     Hypercholesterolemia  -     Lipid panel; Future    Hypertriglyceridemia  -     Lipid panel; Future    -recheck lipid panel in 3 months.     Vargas Paulino MD  Family Medicine  [facility]

## 2020-03-03 ENCOUNTER — TELEPHONE (OUTPATIENT)
Dept: FAMILY MEDICINE | Facility: CLINIC | Age: 60
End: 2020-03-03

## 2020-03-03 NOTE — TELEPHONE ENCOUNTER
Pt requested apt for labs. I scheduled pt labs for Monday and explained that she does not need to come in for 3 more months per last office visit note unless she was having other problems or issues. Pt stated that we could cancel today's apt and she would make a new apt on the portal closer to the time it is due.

## 2020-03-09 ENCOUNTER — LAB VISIT (OUTPATIENT)
Dept: LAB | Facility: HOSPITAL | Age: 60
End: 2020-03-09
Attending: FAMILY MEDICINE
Payer: COMMERCIAL

## 2020-03-09 DIAGNOSIS — E78.1 HYPERTRIGLYCERIDEMIA: ICD-10-CM

## 2020-03-09 DIAGNOSIS — E78.00 HYPERCHOLESTEROLEMIA: ICD-10-CM

## 2020-03-09 LAB
CHOLEST SERPL-MCNC: 239 MG/DL (ref 120–199)
CHOLEST/HDLC SERPL: 5.2 {RATIO} (ref 2–5)
HDLC SERPL-MCNC: 46 MG/DL (ref 40–75)
HDLC SERPL: 19.2 % (ref 20–50)
LDLC SERPL CALC-MCNC: 158.6 MG/DL (ref 63–159)
NONHDLC SERPL-MCNC: 193 MG/DL
TRIGL SERPL-MCNC: 172 MG/DL (ref 30–150)

## 2020-03-09 PROCEDURE — 36415 COLL VENOUS BLD VENIPUNCTURE: CPT

## 2020-03-09 PROCEDURE — 80061 LIPID PANEL: CPT

## 2020-05-05 ENCOUNTER — PATIENT MESSAGE (OUTPATIENT)
Dept: ADMINISTRATIVE | Facility: HOSPITAL | Age: 60
End: 2020-05-05

## 2020-05-20 ENCOUNTER — PATIENT MESSAGE (OUTPATIENT)
Dept: ADMINISTRATIVE | Facility: HOSPITAL | Age: 60
End: 2020-05-20

## 2020-07-02 ENCOUNTER — TELEPHONE (OUTPATIENT)
Dept: FAMILY MEDICINE | Facility: CLINIC | Age: 60
End: 2020-07-02

## 2020-07-02 DIAGNOSIS — E78.00 HYPERCHOLESTEROLEMIA: Primary | ICD-10-CM

## 2020-07-02 NOTE — TELEPHONE ENCOUNTER
Labs appointment 7/13/2020        ----- Message from Vargas Paulino MD sent at 7/2/2020  1:06 PM CDT -----  Regarding: RE: [phone]  Labs have been ordered. Please call to schedule  ----- Message -----  From: Maansa Ruby LPN  Sent: 7/2/2020  10:59 AM CDT  To: Vargas Paulino MD  Subject: FW: [phone]                                   ----- Message -----  From: Erin Connolly  Sent: 7/2/2020  10:10 AM CDT  To: Lora VIVAR Staff  Subject: [phone]                                     Type: Needs lab work ordered    Who Called:  Patient  Best Call Back Number: [phone]  Additional Information:  Patient has appointment scheduled on July 13th/requesting to have lab work ordered before appointment/please order and call patient back to schedule.

## 2020-07-09 ENCOUNTER — TELEPHONE (OUTPATIENT)
Dept: FAMILY MEDICINE | Facility: CLINIC | Age: 60
End: 2020-07-09

## 2020-07-09 NOTE — TELEPHONE ENCOUNTER
Attempted to call pt, no answer, left message to return the call.            ----- Message from Rebekah Pascual sent at 7/9/2020  8:18 AM CDT -----  Regarding: Requesting call  Contact: Patient  [phone]

## 2020-07-09 NOTE — TELEPHONE ENCOUNTER
----- Message from Bienvenido Edge sent at 7/9/2020  9:22 AM CDT -----  Type:  Patient Returning Call    Who Called:  Patient  Who Left Message for Patient:  Manasa  Does the patient know what this is regarding?:  Lab work  Best Call Back Number:  [phone]  Additional Information:

## 2020-07-16 ENCOUNTER — LAB VISIT (OUTPATIENT)
Dept: LAB | Facility: HOSPITAL | Age: 60
End: 2020-07-16
Attending: FAMILY MEDICINE
Payer: COMMERCIAL

## 2020-07-16 DIAGNOSIS — E78.00 HYPERCHOLESTEROLEMIA: ICD-10-CM

## 2020-07-16 LAB
ALBUMIN SERPL BCP-MCNC: 4.6 G/DL (ref 3.5–5.2)
ALP SERPL-CCNC: 60 U/L (ref 55–135)
ALT SERPL W/O P-5'-P-CCNC: 41 U/L (ref 10–44)
ANION GAP SERPL CALC-SCNC: 11 MMOL/L (ref 8–16)
AST SERPL-CCNC: 27 U/L (ref 10–40)
BILIRUB SERPL-MCNC: 1 MG/DL (ref 0.1–1)
BUN SERPL-MCNC: 15 MG/DL (ref 8–23)
CALCIUM SERPL-MCNC: 9.5 MG/DL (ref 8.7–10.5)
CHLORIDE SERPL-SCNC: 104 MMOL/L (ref 95–110)
CHOLEST SERPL-MCNC: 246 MG/DL (ref 120–199)
CHOLEST/HDLC SERPL: 4 {RATIO} (ref 2–5)
CO2 SERPL-SCNC: 25 MMOL/L (ref 23–29)
CREAT SERPL-MCNC: 0.7 MG/DL (ref 0.5–1.4)
EST. GFR  (AFRICAN AMERICAN): >60 ML/MIN/1.73 M^2
EST. GFR  (NON AFRICAN AMERICAN): >60 ML/MIN/1.73 M^2
GLUCOSE SERPL-MCNC: 95 MG/DL (ref 70–110)
HDLC SERPL-MCNC: 61 MG/DL (ref 40–75)
HDLC SERPL: 24.8 % (ref 20–50)
LDLC SERPL CALC-MCNC: 151 MG/DL (ref 63–159)
NONHDLC SERPL-MCNC: 185 MG/DL
POTASSIUM SERPL-SCNC: 4.9 MMOL/L (ref 3.5–5.1)
PROT SERPL-MCNC: 7.5 G/DL (ref 6–8.4)
SODIUM SERPL-SCNC: 140 MMOL/L (ref 136–145)
TRIGL SERPL-MCNC: 170 MG/DL (ref 30–150)

## 2020-07-16 PROCEDURE — 36415 COLL VENOUS BLD VENIPUNCTURE: CPT

## 2020-07-16 PROCEDURE — 80061 LIPID PANEL: CPT

## 2020-07-16 PROCEDURE — 80053 COMPREHEN METABOLIC PANEL: CPT

## 2020-07-21 ENCOUNTER — OFFICE VISIT (OUTPATIENT)
Dept: FAMILY MEDICINE | Facility: CLINIC | Age: 60
End: 2020-07-21
Payer: COMMERCIAL

## 2020-07-21 VITALS
BODY MASS INDEX: 25.5 KG/M2 | HEIGHT: 64 IN | OXYGEN SATURATION: 98 % | HEART RATE: 65 BPM | WEIGHT: 149.38 LBS | RESPIRATION RATE: 17 BRPM | DIASTOLIC BLOOD PRESSURE: 84 MMHG | SYSTOLIC BLOOD PRESSURE: 150 MMHG | TEMPERATURE: 98 F

## 2020-07-21 DIAGNOSIS — I10 ESSENTIAL HYPERTENSION: ICD-10-CM

## 2020-07-21 DIAGNOSIS — E78.5 DYSLIPIDEMIA: Primary | ICD-10-CM

## 2020-07-21 DIAGNOSIS — Z23 NEED FOR PNEUMOCOCCAL VACCINE: ICD-10-CM

## 2020-07-21 PROCEDURE — 90732 PPSV23 VACC 2 YRS+ SUBQ/IM: CPT | Mod: S$GLB,,, | Performed by: FAMILY MEDICINE

## 2020-07-21 PROCEDURE — 90732 PNEUMOCOCCAL POLYSACCHARIDE VACCINE 23-VALENT =>2YO SQ IM: ICD-10-PCS | Mod: S$GLB,,, | Performed by: FAMILY MEDICINE

## 2020-07-21 PROCEDURE — 90471 IMMUNIZATION ADMIN: CPT | Mod: S$GLB,,, | Performed by: FAMILY MEDICINE

## 2020-07-21 PROCEDURE — 90471 PNEUMOCOCCAL POLYSACCHARIDE VACCINE 23-VALENT =>2YO SQ IM: ICD-10-PCS | Mod: S$GLB,,, | Performed by: FAMILY MEDICINE

## 2020-07-21 PROCEDURE — 99214 OFFICE O/P EST MOD 30 MIN: CPT | Mod: 25,S$GLB,, | Performed by: FAMILY MEDICINE

## 2020-07-21 PROCEDURE — 99214 PR OFFICE/OUTPT VISIT, EST, LEVL IV, 30-39 MIN: ICD-10-PCS | Mod: 25,S$GLB,, | Performed by: FAMILY MEDICINE

## 2020-07-21 RX ORDER — ROSUVASTATIN CALCIUM 5 MG/1
5 TABLET, COATED ORAL DAILY
Qty: 90 TABLET | Refills: 3 | Status: SHIPPED | OUTPATIENT
Start: 2020-07-21 | End: 2020-08-19

## 2020-07-21 NOTE — PROGRESS NOTES
[facility] - Clinic Note    Subjective      Ms. Garland is a 60 y.o. female who presents to clinic for a follow up of HTN and dyslipidemia.    She has a history of HTN but not on medications. Lifestyle controlled.  BP has been well controlled.   BP today is 159/87 and on recheck is 150/84.   Denies chest pain or shortness of breath.   CMP WNL.     Lipid panel obtained prior to visit.   Total cholesterol continues to be elevated and increasing over the past 2 years.   Triglycerides elevated at 170.   Takes OTC red yeast rice.   Did not do well on lipitor due to myalgias.    [facility] Liv has a past medical history of Arthritis, Bilateral breast cancer, Breast cancer (2004), High cholesterol, and Hypertension.   PSXH Liv has a past surgical history that includes Tonsillectomy (1974); Hysterectomy (10/1/2003); Mastectomy (Bilateral, 5/16/2004); Cholecystectomy (2/2001); Oophorectomy (Left, 5/18/2004); Ablation of dysrhythmic focus (2000); Total abdominal hysterectomy w/ bilateral salpingoophorectomy; Colonoscopy; and Mitral valve replacement (1980).    Liv's family history includes Cancer in her mother; Early death in her brother; Heart failure in her father; Liver cancer in her mother; No Known Problems in her sister.    Liv reports that she quit smoking about 21 years ago. She has never used smokeless tobacco. She reports current alcohol use. She reports that she does not use drugs.   EDWIGE Peck is allergic to lisinopril.   [facility] Liv has a current medication list which includes the following prescription(s): berb martínez/herbal complex no.18 and rosuvastatin.     Review of Systems   Constitutional: Negative for chills, fatigue and fever.   Eyes: Negative for visual disturbance.   Respiratory: Negative for cough and shortness of breath.    Cardiovascular: Negative for chest pain and leg swelling.   Gastrointestinal: Negative for abdominal pain.   Neurological: Negative for dizziness and headaches.  "  Psychiatric/Behavioral: Negative for confusion.     Objective     BP (!) 150/84   Pulse 65   Temp 97.8 °F (36.6 °C) (Temporal)   Resp 17   Ht 5' 4" (1.626 m)   Wt 67.8 kg (149 lb 6 oz)   LMP  (LMP Unknown)   SpO2 98%   BMI 25.64 kg/m²     Physical Exam   Constitutional:  Non-toxic appearance. She does not appear ill. No distress.   HENT:   Head: Normocephalic and atraumatic.   Eyes: Right eye exhibits no discharge.   Cardiovascular: Normal rate, regular rhythm, normal heart sounds and normal pulses. Exam reveals no gallop and no friction rub.   No murmur heard.  Pulmonary/Chest: Effort normal and breath sounds normal. No stridor. No respiratory distress. She has no wheezes. She has no rhonchi. She has no rales.   Abdominal: Normal appearance.   Musculoskeletal:      Right lower leg: No edema.      Left lower leg: No edema.   Lymphadenopathy:     She has no cervical adenopathy.   Neurological: She is alert.   Skin: Skin is warm and dry. Capillary refill takes less than 2 seconds.   Psychiatric: Her behavior is normal. Mood, judgment and thought content normal.   Vitals reviewed.     Assessment/Plan     Liv was seen today for follow-up.    Diagnoses and all orders for this visit:    Dyslipidemia  -     rosuvastatin (CRESTOR) 5 MG tablet; Take 1 tablet (5 mg total) by mouth once daily.  -     Lipid Panel; Future  - Due to continuing increase in cholesterol over the past couple of years with lifestyle modifications and OTC supplementation with no control, will start crestor and recheck in 6 months. Stop red yeast rice. Start OTC fish oil supplements for elevated trigs.    Essential hypertension  -     Comprehensive metabolic panel; Future  -     CBC auto differential; Future  - 2 week nurse visit for BP check. Counseled to check BP at home a couple times a week and bring log into next visit. Counseled patient a low sodium diet and moderate intensity exercise 30 mins daily.    Need for pneumococcal " vaccine  -     (In Office Administered) Pneumococcal Polysaccharide Vaccine (23 Valent) (SQ/IM)  - Indicated history due to breast cancer and previous tobacco user.    Follow up in about 6 months (around 1/21/2021).    Future Appointments   Date Time Provider Department Center   8/4/2020  9:00 AM NURSE SCHEDULE, [facility]   1/21/2021  8:00 AM Vargas Paulino MD [facility]       Vargas Paulino MD  Family Medicine  [facility]

## 2020-08-04 ENCOUNTER — CLINICAL SUPPORT (OUTPATIENT)
Dept: FAMILY MEDICINE | Facility: CLINIC | Age: 60
End: 2020-08-04
Payer: COMMERCIAL

## 2020-08-04 VITALS — HEART RATE: 71 BPM | DIASTOLIC BLOOD PRESSURE: 80 MMHG | SYSTOLIC BLOOD PRESSURE: 139 MMHG

## 2020-08-04 DIAGNOSIS — I10 ESSENTIAL HYPERTENSION: Primary | ICD-10-CM

## 2020-08-04 PROCEDURE — 99499 NO LOS: ICD-10-PCS | Mod: S$GLB,,, | Performed by: FAMILY MEDICINE

## 2020-08-04 PROCEDURE — 99499 UNLISTED E&M SERVICE: CPT | Mod: S$GLB,,, | Performed by: FAMILY MEDICINE

## 2020-08-04 NOTE — PROGRESS NOTES
Patient presented to clinic for repeat BP check.   Patient placed in exam room and instructed to relax for 5 minutes prior to recheck.     Rechecked patient blood pressure, and reported to physician.   Patient left the clinic in satisfactory condition.

## 2020-08-19 ENCOUNTER — OFFICE VISIT (OUTPATIENT)
Dept: FAMILY MEDICINE | Facility: CLINIC | Age: 60
End: 2020-08-19
Payer: COMMERCIAL

## 2020-08-19 ENCOUNTER — HOSPITAL ENCOUNTER (OUTPATIENT)
Dept: RADIOLOGY | Facility: HOSPITAL | Age: 60
Discharge: HOME | End: 2020-08-19
Attending: FAMILY MEDICINE
Payer: COMMERCIAL

## 2020-08-19 VITALS
HEIGHT: 64 IN | RESPIRATION RATE: 15 BRPM | TEMPERATURE: 98 F | BODY MASS INDEX: 25.78 KG/M2 | SYSTOLIC BLOOD PRESSURE: 148 MMHG | OXYGEN SATURATION: 98 % | DIASTOLIC BLOOD PRESSURE: 89 MMHG | WEIGHT: 151 LBS | HEART RATE: 65 BPM

## 2020-08-19 DIAGNOSIS — M75.02 ADHESIVE CAPSULITIS OF LEFT SHOULDER: ICD-10-CM

## 2020-08-19 DIAGNOSIS — M75.02 ADHESIVE CAPSULITIS OF LEFT SHOULDER: Primary | ICD-10-CM

## 2020-08-19 PROCEDURE — 20610 DRAIN/INJ JOINT/BURSA W/O US: CPT | Mod: LT,S$GLB,, | Performed by: FAMILY MEDICINE

## 2020-08-19 PROCEDURE — 99214 PR OFFICE/OUTPT VISIT, EST, LEVL IV, 30-39 MIN: ICD-10-PCS | Mod: 25,S$GLB,, | Performed by: FAMILY MEDICINE

## 2020-08-19 PROCEDURE — 73030 X-RAY EXAM OF SHOULDER: CPT | Mod: TC,FY,LT

## 2020-08-19 PROCEDURE — 20610 PR DRAIN/INJECT LARGE JOINT/BURSA: ICD-10-PCS | Mod: LT,S$GLB,, | Performed by: FAMILY MEDICINE

## 2020-08-19 PROCEDURE — 73030 X-RAY EXAM OF SHOULDER: CPT | Mod: 26,LT,, | Performed by: RADIOLOGY

## 2020-08-19 PROCEDURE — 99214 OFFICE O/P EST MOD 30 MIN: CPT | Mod: 25,S$GLB,, | Performed by: FAMILY MEDICINE

## 2020-08-19 PROCEDURE — 73030 XR SHOULDER COMPLETE 2 OR MORE VIEWS LEFT: ICD-10-PCS | Mod: 26,LT,, | Performed by: RADIOLOGY

## 2020-08-19 RX ORDER — LIDOCAINE HYDROCHLORIDE 10 MG/ML
2 INJECTION INFILTRATION; PERINEURAL
Status: COMPLETED | OUTPATIENT
Start: 2020-08-19 | End: 2020-08-19

## 2020-08-19 RX ORDER — OMEGA-3 FATTY ACIDS/FISH OIL 340-1000MG
1 CAPSULE ORAL DAILY
COMMUNITY
End: 2021-09-03

## 2020-08-19 RX ORDER — TRIAMCINOLONE ACETONIDE 40 MG/ML
40 INJECTION, SUSPENSION INTRA-ARTICULAR; INTRAMUSCULAR
Status: COMPLETED | OUTPATIENT
Start: 2020-08-19 | End: 2020-08-19

## 2020-08-19 RX ADMIN — TRIAMCINOLONE ACETONIDE 40 MG: 40 INJECTION, SUSPENSION INTRA-ARTICULAR; INTRAMUSCULAR at 10:08

## 2020-08-19 RX ADMIN — LIDOCAINE HYDROCHLORIDE 2 ML: 10 INJECTION INFILTRATION; PERINEURAL at 11:08

## 2020-08-19 NOTE — PROGRESS NOTES
[facility] - Clinic Note    Subjective      Ms. Garland is a 60 y.o. female who presents to clinic with shoulder pain.     Locates the pain on the left shoulder.   Present for the past couple months and has progressively gotten worse  Locates the pain on the posterolateral side of the shoulder with radiation to the front and down the left arm.  Constant pain that is aching in nature.   Currently 8/10 in severity.   States that she is unable to use or raise her left arm.  She states that she is only able to bring her left arm across her body.   Unable to lay on it.   Denies trauma to the area or heavy lifting.   No previous surgery on the left shoulder.   Denies color change or swelling.    PMH Liv has a past medical history of Arthritis, Bilateral breast cancer, Breast cancer (2004), High cholesterol, and Hypertension.   PSXH Liv has a past surgical history that includes Tonsillectomy (1974); Hysterectomy (10/1/2003); Mastectomy (Bilateral, 5/16/2004); Cholecystectomy (2/2001); Oophorectomy (Left, 5/18/2004); Ablation of dysrhythmic focus (2000); Total abdominal hysterectomy w/ bilateral salpingoophorectomy; Colonoscopy; and Mitral valve replacement (1980).    Liv's family history includes Cancer in her mother; Early death in her brother; Heart failure in her father; Liver cancer in her mother; No Known Problems in her sister.   BLANCHE Peck reports that she quit smoking about 21 years ago. She has never used smokeless tobacco. She reports current alcohol use. She reports that she does not use drugs.   ALG Liv is allergic to lisinopril.   MED Liv has a current medication list which includes the following prescription(s): fish oil.     Review of Systems   Constitutional: Positive for activity change. Negative for appetite change, chills, fatigue and fever.   Eyes: Negative for visual disturbance.   Respiratory: Negative for cough and shortness of breath.    Cardiovascular: Negative for chest pain,  "palpitations and leg swelling.   Gastrointestinal: Negative for abdominal pain, nausea and vomiting.   Musculoskeletal: Positive for arthralgias.        Left shoulder pain   Skin: Negative for color change and wound.   Neurological: Positive for weakness. Negative for dizziness and headaches.   Psychiatric/Behavioral: Negative for confusion.     Objective     BP (!) 148/89   Pulse 65   Temp 98.2 °F (36.8 °C) (Temporal)   Resp 15   Ht 5' 4" (1.626 m)   Wt 68.5 kg (151 lb)   LMP  (LMP Unknown)   SpO2 98%   BMI 25.92 kg/m²     Physical Exam   Constitutional: She appears well-developed and well-nourished.  Non-toxic appearance. She does not appear ill. No distress.   HENT:   Head: Normocephalic and atraumatic.   Eyes: Right eye exhibits no discharge. Left eye exhibits no discharge.   Neck: Neck supple.   Cardiovascular: Normal rate, regular rhythm, normal heart sounds and normal pulses. Exam reveals no gallop and no friction rub.   No murmur heard.  Pulmonary/Chest: Effort normal and breath sounds normal. No respiratory distress. She has no wheezes. She has no rhonchi. She has no rales.   Abdominal: Normal appearance.   Musculoskeletal:      Right lower leg: No edema.      Left lower leg: No edema.      Comments: Left shoulder: No effusion, erythema, ecchymosis, warmth, or deformities noted. Non-tender to palpation.   Adduction intact. Unable to externally rotation. Unable to actively abduction her left arm to 90 degrees. Passively able to adduction to 95 degrees but illicits pain. Unable to perform apleys.   Lymphadenopathy:     She has no cervical adenopathy.   Neurological: She is alert.   Skin: Skin is warm and dry. Capillary refill takes less than 2 seconds. She is not diaphoretic.   Psychiatric: Her behavior is normal. Mood, judgment and thought content normal.   Vitals reviewed.     Procedure Note:    Diagnosis: Acute left shoulder pain secondary to left frozen shoulder  Procedure: left subacromial " injection    Verbal and written consent was obtained. The area was prepped with Beta Iodine in the usual sterile manner. 25'' gauge needle was inserted into the posterolateral aspect of the left shoulder. Solution of 2cc 1% Lidocaine and 1cc of Kenalog 40 was injected. Minimal bleeding and pressure was applied. Area was cleaned and band-aid placed.  Patient tolerated the procedure well. There were no complications during this procedure.   Standard post-procedure care explained to the patient and precautions were given.   Patient instructed to ice the area today and rest.     Assessment/Plan     Liv was seen today for left shoulder pain.    Diagnoses and all orders for this visit:    Adhesive capsulitis of left shoulder  -     lidocaine HCL 10 mg/ml (1%) injection 2 mL  -     triamcinolone acetonide injection 40 mg  -     Ambulatory referral/consult to Physical/Occupational Therapy; Future  -     X-Ray Shoulder 2 or More Views Left; Future  -     Ambulatory referral/consult to Orthopedics; Future    Follow up in about 1 month (around 9/19/2020).    Future Appointments   Date Time Provider Department Center   8/19/2020  1:30 PM [facility] XR1  FL1 [facility] XRAY [facility]   9/18/2020 11:00 AM Vargas Paulino MD [facility]   1/14/2021  8:20 AM [facility], LABORATORY [facility] LAB [facility]   1/21/2021  8:00 AM Vargas Paulino MD [facility]       Vargas Paulino MD  Family Medicine  [facility]

## 2020-08-25 ENCOUNTER — OFFICE VISIT (OUTPATIENT)
Dept: ORTHOPEDICS | Facility: CLINIC | Age: 60
End: 2020-08-25
Payer: COMMERCIAL

## 2020-08-25 ENCOUNTER — TELEPHONE (OUTPATIENT)
Dept: ORTHOPEDICS | Facility: CLINIC | Age: 60
End: 2020-08-25

## 2020-08-25 ENCOUNTER — HOSPITAL ENCOUNTER (OUTPATIENT)
Dept: RADIOLOGY | Facility: HOSPITAL | Age: 60
Discharge: HOME | End: 2020-08-25
Attending: ORTHOPAEDIC SURGERY
Payer: COMMERCIAL

## 2020-08-25 VITALS — HEIGHT: 64 IN | RESPIRATION RATE: 15 BRPM | BODY MASS INDEX: 25.78 KG/M2 | TEMPERATURE: 98 F | WEIGHT: 151 LBS

## 2020-08-25 DIAGNOSIS — M25.512 LEFT SHOULDER PAIN, UNSPECIFIED CHRONICITY: ICD-10-CM

## 2020-08-25 DIAGNOSIS — M75.02 ADHESIVE CAPSULITIS OF LEFT SHOULDER: ICD-10-CM

## 2020-08-25 DIAGNOSIS — M75.22 BICEPS TENDONITIS, LEFT: Primary | ICD-10-CM

## 2020-08-25 DIAGNOSIS — M25.512 LEFT SHOULDER PAIN, UNSPECIFIED CHRONICITY: Primary | ICD-10-CM

## 2020-08-25 PROCEDURE — 99204 PR OFFICE/OUTPT VISIT, NEW, LEVL IV, 45-59 MIN: ICD-10-PCS | Mod: S$GLB,,, | Performed by: ORTHOPAEDIC SURGERY

## 2020-08-25 PROCEDURE — 99999 PR PBB SHADOW E&M-EST. PATIENT-LVL III: ICD-10-PCS | Mod: PBBFAC,,, | Performed by: ORTHOPAEDIC SURGERY

## 2020-08-25 PROCEDURE — 73030 X-RAY EXAM OF SHOULDER: CPT | Mod: 26,LT,, | Performed by: RADIOLOGY

## 2020-08-25 PROCEDURE — 73030 XR SHOULDER TRAUMA 3 VIEW LEFT: ICD-10-PCS | Mod: 26,LT,, | Performed by: RADIOLOGY

## 2020-08-25 PROCEDURE — 99999 PR PBB SHADOW E&M-EST. PATIENT-LVL III: CPT | Mod: PBBFAC,,, | Performed by: ORTHOPAEDIC SURGERY

## 2020-08-25 PROCEDURE — 73030 X-RAY EXAM OF SHOULDER: CPT | Mod: TC,PN,LT

## 2020-08-25 PROCEDURE — 99204 OFFICE O/P NEW MOD 45 MIN: CPT | Mod: S$GLB,,, | Performed by: ORTHOPAEDIC SURGERY

## 2020-08-25 RX ORDER — METHYLPREDNISOLONE 4 MG/1
TABLET ORAL
Qty: 1 PACKAGE | Refills: 0 | Status: SHIPPED | OUTPATIENT
Start: 2020-08-25 | End: 2020-09-15

## 2020-08-25 RX ORDER — KETOROLAC TROMETHAMINE 10 MG/1
10 TABLET, FILM COATED ORAL EVERY 6 HOURS
Qty: 20 TABLET | Refills: 0 | Status: SHIPPED | OUTPATIENT
Start: 2020-08-25 | End: 2020-08-30

## 2020-08-25 NOTE — PROGRESS NOTES
CC:  60-year-old female presents for evaluation of left shoulder pain.  Chronic pain in the left shoulder for several months now.  She has been taking Tylenol over-the-counter without relief.  She recently saw her primary care provider who diagnosed her with adhesive capsulitis and gave her an intra-articular injection via posterior approach.  The patient states she got a little relief with the injection but not complete relief.  She still complains of pain with daily activities.  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    ROS:    Constitution: Denies chills, fever, and sweats.  HENT: Denies headaches or blurry vision.  Cardiovascular: Denies chest pain or irregular heart beat.  Respiratory: Denies cough or shortness of breath.  Gastrointestinal: Denies abdominal pain, nausea, or vomiting.  Genitourinary:  Denies urinary incontinence, bladder and kidney issues  Musculoskeletal:  Denies muscle cramps.  Positive for left shoulder pain  Neurological: Denies dizziness or focal weakness.  Psychiatric/Behavioral: Normal mental status.  Hematologic/Lymphatic: Denies bleeding problem or easy bruising/bleeding.  Skin: Denies rash or suspicious lesions.    Physical examination     Gen - No acute distress, well nourished, well groomed   Eyes - Extraoccular motions intact, pupils equally round and reactive to light and accommodation   ENT - normocephalic, atruamtic, oropharynx clear   Neck - Supple, no abnormal masses   Cardiovascular - regular rate and rhythm   Pulmonary - clear to auscultation bilaterally, no wheezes, ronchi, or rales   Abdomen - soft, non-tender, non-distended, positive bowel sounds   Psych - The patient is alert and oriented x3 with normal mood and affect    Left Upper Extremity Examination     Skin is intact throughout   Motor is intact distally radial, median, ulnar, AIN, PIN   +2 radial and ulnar pulses   Sensation to light touch is  intact distally radial, median, and ulnar     Examination of the Left shoulder:   ROM:   For - 150   Abd - 150   Ext - 50   Int - T12     Tenderness to palpation:   Subacromial space - negative  Biceps Tendon - positive  Anterior Glenohumeral Joint - positive  AC joint - negative  Glenohumeral instability - negative  Empty Can test - negative  Speeds test - positive  Beaver/Neers sign - positive  Cross-arm adduction test - negative    X-ray images were examined and personally interpreted by me.  Three views left shoulder show the humeral head is well reduced in the glenoid.  There are no acute fractures.  Normal bony anatomy.    Dx:  Biceps tendonitis and chronic pain left shoulder    Plan:  At this point the patient has not tried NSAIDs or physical therapy.  We send in a pulse dose steroids combined with 5 days of Toradol and gave her a referral to PT.  I will see her back in a few weeks.  If she fails to respond to that we will consider MRI of the left shoulder.

## 2020-08-25 NOTE — PATIENT INSTRUCTIONS
Ketorolac tablets  What is this medicine?  KETOROLAC (heike toe ROLE ak) is a non-steroidal anti-inflammatory drug (NSAID). It is used for a short while to treat moderate to severe pain, including pain after surgery. It should not be used for more than 5 days.  How should I use this medicine?  Take this medicine by mouth with a full glass of water. Follow the directions on the prescription label. Take your medicine at regular intervals. Do not take your medicine more often than directed. Do not take more than the recommended dose.  A special MedGuide will be given to you by the pharmacist with each prescription and refill. Be sure to read this information carefully each time.  Talk to your pediatrician regarding the use of this medicine in children. While this drug may be prescribed for children as young as 16 years of age for selected conditions, precautions do apply.  Patients over 65 years old may have a stronger reaction and need a smaller dose.  What side effects may I notice from receiving this medicine?  Side effects that you should report to your doctor or health care professional as soon as possible:  · allergic reactions like skin rash, itching or hives, swelling of the face, lips, or tongue  · breathing problems  · high blood pressure  · nausea, vomiting  · redness, blistering, peeling or loosening of the skin, including inside the mouth  · severe stomach pain  · signs and symptoms of bleeding such as bloody or black, tarry stools; red or dark-brown urine; spitting up blood or brown material that looks like coffee grounds; red spots on the skin; unusual bruising or bleeding from the eye, gums, or nose  · signs and symptoms of a stroke like changes in vision; confusion; trouble speaking or understanding; severe headaches; sudden numbness or weakness of the face, arm or leg; trouble walking; dizziness; loss of balance or coordination  · trouble passing urine or change in the amount of urine  · unexplained  weight gain or swelling  · unusually weak or tired  · yellowing of eyes or skin  Side effects that usually do not require medical attention (report to your doctor or health care professional if they continue or are bothersome):  · diarrhea  · dizziness  · headache  · heartburn  What may interact with this medicine?  Do not take this medicine with any of the following medications:  · aspirin and aspirin-like medicines  · cidofovir  · methotrexate  · NSAIDs, medicines for pain and inflammation, like ibuprofen or naproxen  · pemetrexed  · probenecid  This medicine may also interact with the following medications:  · alcohol  · alendronate  · alprazolam  · carbamazepine  · cyclosporine  · diuretics  · flavocoxid  · fluoxetine  · ginkgo  · lithium  · medicines for high blood pressure like enalapril  · medicines that affect platelets like pentoxifylline  · medicines that treat or prevent blood clots like heparin, warfarin  · muscle relaxants  · phenytoin  · steroid medicines like prednisone or cortisone  · thiothixene  What if I miss a dose?  If you miss a dose, take it as soon as you can. If it is almost time for your next dose, take only that dose. Do not take double or extra doses.  Where should I keep my medicine?  Keep out of the reach of children.  Store at room temperature between 20 and 25 degrees C (68 and 77 degrees F). Throw away any unused medicine after the expiration date.  What should I tell my health care provider before I take this medicine?  They need to know if you have any of these conditions:  · asthma  · bleeding problems like hemophilia  · cigarette smoker  · drink more than 3 alcohol containing drinks a day  · heart disease or circulation problems such as heart failure or leg edema (fluid retention)  · high blood pressure  · kidney disease  · liver disease  · stomach bleeding or ulcers  · an unusual or allergic reaction to ketorolac, aspirin, other NSAIDs, other medicines, foods, dyes, or  preservatives  · pregnant or trying to get pregnant  · breast-feeding  What should I watch for while using this medicine?  Tell your doctor or health care professional if your pain does not get better. Talk to your doctor before taking another medicine for pain. Do not treat yourself.  This medicine does not prevent heart attack or stroke. In fact, this medicine may increase the chance of a heart attack or stroke. The chance may increase with longer use of this medicine and in people who have heart disease. If you take aspirin to prevent heart attack or stroke, talk with your doctor or health care professional.  Do not take medicines such as ibuprofen and naproxen with this medicine. Side effects such as stomach upset, nausea, or ulcers may be more likely to occur. Many medicines available without a prescription should not be taken with this medicine.  This medicine can cause ulcers and bleeding in the stomach and intestines at any time during treatment. Do not smoke cigarettes or drink alcohol. These increase irritation to your stomach and can make it more susceptible to damage from this medicine. Ulcers and bleeding can happen without warning symptoms and can cause death.  You may get drowsy or dizzy. Do not drive, use machinery, or do anything that needs mental alertness until you know how this medicine affects you. Do not stand or sit up quickly, especially if you are an older patient. This reduces the risk of dizzy or fainting spells.  This medicine can cause you to bleed more easily. Try to avoid damage to your teeth and gums when you brush or floss your teeth.  NOTE:This sheet is a summary. It may not cover all possible information. If you have questions about this medicine, talk to your doctor, pharmacist, or health care provider. Copyright© 2017 [employer]        Methylprednisolone tablets  What is this medicine?  METHYLPREDNISOLONE (meth ill pred NISS oh lone) is a corticosteroid. It is commonly used to  treat inflammation of the skin, joints, lungs, and other organs. Common conditions treated include asthma, allergies, and arthritis. It is also used for other conditions, such as blood disorders and diseases of the adrenal glands.  How should I use this medicine?  Take this medicine by mouth with a drink of water. Follow the directions on the prescription label. Take it with food or milk to avoid stomach upset. If you are taking this medicine once a day, take it in the morning. Do not take more medicine than you are told to take. Do not suddenly stop taking your medicine because you may develop a severe reaction. Your doctor will tell you how much medicine to take. If your doctor wants you to stop the medicine, the dose may be slowly lowered over time to avoid any side effects.  Talk to your pediatrician regarding the use of this medicine in children. Special care may be needed.  What side effects may I notice from receiving this medicine?  Side effects that you should report to your doctor or health care professional as soon as possible:  · allergic reactions like skin rash, itching or hives, swelling of the face, lips, or tongue  · eye pain, decreased or blurred vision, or bulging eyes  · fever, sore throat, sneezing, cough, or other signs of infection, wounds that will not heal  · increased thirst  · mental depression, mood swings, mistaken feelings of self importance or of being mistreated  · pain in hips, back, ribs, arms, shoulders, or legs  · swelling of the ankles, feet, hands  · trouble passing urine or change in the amount of urine  Side effects that usually do not require medical attention (report to your doctor or health care professional if they continue or are bothersome):  · confusion, excitement, restlessness  · headache  · nausea, vomiting  · skin problems, acne, thin and shiny skin  · weight gain  What may interact with this medicine?  Do not take this medicine with any of the following  medications:  · mifepristone  This medicine may also interact with the following medications:  · tacrolimus  · vaccines  · warfarin  What if I miss a dose?  If you miss a dose, take it as soon as you can. If it is almost time for your next dose, talk to your doctor or health care professional. You may need to miss a dose or take an extra dose. Do not take double or extra doses without advice.  Where should I keep my medicine?  Keep out of the reach of children.  Store at room temperature between 20 and 25 degrees C (68 and 77 degrees F). Throw away any unused medicine after the expiration date.  What should I tell my health care provider before I take this medicine?  They need to know if you have any of these conditions:  · Cushing's syndrome  · diabetes  · glaucoma  · heart problems or disease  · high blood pressure  · infection such as herpes, measles, tuberculosis, or chickenpox  · kidney disease  · liver disease  · mental problems  · myasthenia gravis  · osteoporosis  · seizures  · stomach ulcer or intestine disease including colitis and diverticulitis  · thyroid problem  · an unusual or allergic reaction to lactose, methylprednisolone, other medicines, foods, dyes, or preservatives  · pregnant or trying to get pregnant  · breast-feeding  What should I watch for while using this medicine?  Visit your doctor or health care professional for regular checks on your progress. If you are taking this medicine for a long time, carry an identification card with your name and address, the type and dose of your medicine, and your doctor's name and address.  The medicine may increase your risk of getting an infection. Stay away from people who are sick. Tell your doctor or health care professional if you are around anyone with measles or chickenpox.  If you are going to have surgery, tell your doctor or health care professional that you have taken this medicine within the last twelve months.  Ask your doctor or health care  professional about your diet. You may need to lower the amount of salt you eat.  The medicine can increase your blood sugar. If you are a diabetic check with your doctor if you need help adjusting the dose of your diabetic medicine.  NOTE:This sheet is a summary. It may not cover all possible information. If you have questions about this medicine, talk to your doctor, pharmacist, or health care provider. Copyright© 2017 [employer]

## 2020-09-18 ENCOUNTER — OFFICE VISIT (OUTPATIENT)
Dept: FAMILY MEDICINE | Facility: CLINIC | Age: 60
End: 2020-09-18
Payer: COMMERCIAL

## 2020-09-18 VITALS
HEART RATE: 61 BPM | RESPIRATION RATE: 16 BRPM | TEMPERATURE: 98 F | OXYGEN SATURATION: 98 % | WEIGHT: 154.5 LBS | SYSTOLIC BLOOD PRESSURE: 152 MMHG | DIASTOLIC BLOOD PRESSURE: 85 MMHG | BODY MASS INDEX: 26.38 KG/M2 | HEIGHT: 64 IN

## 2020-09-18 DIAGNOSIS — I10 ESSENTIAL HYPERTENSION: Primary | ICD-10-CM

## 2020-09-18 PROCEDURE — 99214 OFFICE O/P EST MOD 30 MIN: CPT | Mod: S$GLB,,, | Performed by: FAMILY MEDICINE

## 2020-09-18 PROCEDURE — 99214 PR OFFICE/OUTPT VISIT, EST, LEVL IV, 30-39 MIN: ICD-10-PCS | Mod: S$GLB,,, | Performed by: FAMILY MEDICINE

## 2020-09-18 RX ORDER — LOSARTAN POTASSIUM 25 MG/1
12.5 TABLET ORAL DAILY
Qty: 45 TABLET | Refills: 3 | Status: SHIPPED | OUTPATIENT
Start: 2020-09-18 | End: 2020-10-30 | Stop reason: CLARIF

## 2020-09-18 NOTE — PROGRESS NOTES
[facility] - Clinic Note    Subjective      Ms. Garland is a 60 y.o. female who presents to clinic for a follow up.     Patient was seen 1 month ago for left shoulder pain.   Was referred to ortho.   Seen Dr. Moreno in [place].   Had a steroid injection in the joint and feeling better.     BP elevated today.   History of HTN that she is not on medication.   Has been elevated on readings in the past.     PMH Liv has a past medical history of Arthritis, Bilateral breast cancer, Breast cancer (2004), High cholesterol, and Hypertension.   PSXH Liv has a past surgical history that includes Tonsillectomy (1974); Hysterectomy (10/1/2003); Mastectomy (Bilateral, 5/16/2004); Cholecystectomy (2/2001); Oophorectomy (Left, 5/18/2004); Ablation of dysrhythmic focus (2000); Total abdominal hysterectomy w/ bilateral salpingoophorectomy; Colonoscopy; and Mitral valve replacement (1980).    Liv's family history includes Cancer in her mother; Early death in her brother; Heart failure in her father; Liver cancer in her mother; No Known Problems in her sister.   BLANCHE Peck reports that she quit smoking about 21 years ago. She has never used smokeless tobacco. She reports current alcohol use. She reports that she does not use drugs.   EDWIGE Peck is allergic to lisinopril.   MOO Peck has a current medication list which includes the following prescription(s): fish oil, hydrocodone-acetaminophen, ondansetron, and oxycodone-acetaminophen.     Review of Systems   Constitutional: Negative for activity change, appetite change, chills, fatigue and fever.   Eyes: Negative for visual disturbance.   Respiratory: Negative for cough and shortness of breath.    Cardiovascular: Negative for chest pain, palpitations and leg swelling.   Gastrointestinal: Negative for abdominal pain.   Neurological: Negative for dizziness and headaches.   Psychiatric/Behavioral: Negative for confusion.     Objective     BP (!) 152/85   Pulse 61   Temp  "97.6 °F (36.4 °C) (Temporal)   Resp 16   Ht 5' 4" (1.626 m)   Wt 70.1 kg (154 lb 8 oz)   LMP  (LMP Unknown)   SpO2 98%   BMI 26.52 kg/m²     Physical Exam   Constitutional: She appears well-developed and well-nourished.  Non-toxic appearance. She does not appear ill. No distress.   HENT:   Head: Normocephalic and atraumatic.   Eyes: Right eye exhibits no discharge. Left eye exhibits no discharge.   Neck: Neck supple.   Cardiovascular: Normal rate, regular rhythm, normal heart sounds and normal pulses. Exam reveals no gallop and no friction rub.   No murmur heard.  Pulmonary/Chest: Effort normal and breath sounds normal. No respiratory distress. She has no wheezes. She has no rhonchi. She has no rales.   Abdominal: Normal appearance.   Lymphadenopathy:     She has no cervical adenopathy.   Neurological: She is alert.   Skin: Skin is warm and dry. Capillary refill takes less than 2 seconds. She is not diaphoretic.   Psychiatric: Her behavior is normal. Mood, judgment and thought content normal.   Vitals reviewed.     Assessment/Plan     Liv was seen today for follow-up.    Diagnoses and all orders for this visit:    Essential hypertension  -    losartan (COZAAR) 25 MG tablet; Take 0.5 tablets (12.5 mg total) by mouth once daily.      Follow up in about 4 weeks (around 10/16/2020).      Vargas Paulino MD  Family Medicine  [facility]          "

## 2020-09-30 ENCOUNTER — PATIENT OUTREACH (OUTPATIENT)
Dept: ADMINISTRATIVE | Facility: HOSPITAL | Age: 60
End: 2020-09-30

## 2020-10-01 ENCOUNTER — PATIENT OUTREACH (OUTPATIENT)
Dept: ADMINISTRATIVE | Facility: OTHER | Age: 60
End: 2020-10-01

## 2020-10-01 NOTE — PROGRESS NOTES
Chart was reviewed for overdue [facility] ([facility])  topics  Updates were requested from care everywhere  Health Maintenance has been updated  [place] immunization registry triggered

## 2020-10-06 ENCOUNTER — OFFICE VISIT (OUTPATIENT)
Dept: ORTHOPEDICS | Facility: CLINIC | Age: 60
End: 2020-10-06
Payer: COMMERCIAL

## 2020-10-06 VITALS — BODY MASS INDEX: 26.29 KG/M2 | WEIGHT: 154 LBS | HEIGHT: 64 IN | RESPIRATION RATE: 16 BRPM

## 2020-10-06 DIAGNOSIS — M70.61 TROCHANTERIC BURSITIS, RIGHT HIP: Primary | ICD-10-CM

## 2020-10-06 PROCEDURE — 99999 PR PBB SHADOW E&M-EST. PATIENT-LVL III: CPT | Mod: PBBFAC,,, | Performed by: ORTHOPAEDIC SURGERY

## 2020-10-06 PROCEDURE — 99212 OFFICE O/P EST SF 10 MIN: CPT | Mod: S$GLB,,, | Performed by: ORTHOPAEDIC SURGERY

## 2020-10-06 PROCEDURE — 99999 PR PBB SHADOW E&M-EST. PATIENT-LVL III: ICD-10-PCS | Mod: PBBFAC,,, | Performed by: ORTHOPAEDIC SURGERY

## 2020-10-06 PROCEDURE — 99212 PR OFFICE/OUTPT VISIT, EST, LEVL II, 10-19 MIN: ICD-10-PCS | Mod: S$GLB,,, | Performed by: ORTHOPAEDIC SURGERY

## 2020-10-06 RX ORDER — METHYLPREDNISOLONE 4 MG/1
TABLET ORAL
Qty: 1 PACKAGE | Refills: 0 | Status: SHIPPED | OUTPATIENT
Start: 2020-10-06 | End: 2020-10-27 | Stop reason: ALTCHOICE

## 2020-10-06 RX ORDER — KETOROLAC TROMETHAMINE 10 MG/1
10 TABLET, FILM COATED ORAL EVERY 6 HOURS
Qty: 20 TABLET | Refills: 0 | Status: SHIPPED | OUTPATIENT
Start: 2020-10-06 | End: 2020-10-11

## 2020-10-06 NOTE — PROGRESS NOTES
CC:  60-year-old female follows up with chronic left shoulder pain and biceps tendinitis.  At her last visit we had given her a pulse dose steroids combined with 5 days of Toradol and send her to physical therapy.  Overall she is doing well.  Her shoulder pain has completely resolved.  She has a new complaint of right-sided hip pain.  The pain localizes over the lateral aspect of the right hip and radiates down towards the knee.    Left Upper Extremity Examination     Skin is intact throughout   Motor is intact distally radial, median, ulnar, AIN, PIN   +2 radial and ulnar pulses   Sensation to light touch is intact distally radial, median, and ulnar     Examination of the Left shoulder:   ROM:   For - 150   Abd - 150   Ext - 50   Int - T12     Tenderness to palpation:   Subacromial space - negative  Biceps Tendon - negative  Anterior Glenohumeral Joint - negative  AC joint - negative  Glenohumeral instability - negative  Empty Can test - negative  Speeds test - negative  Beaver/Neers sign - negative  Cross-arm adduction test - negative     Examination of the Right Lower Extremity    Skin is intact throughout  Motor in intact EHL,FHL,TA,laurie  +2 DP/PT  Sensation LT intact D/P/1st    Examination of the Right Hip    C-Sign negative  Logroll negative  Stenchfield negative    Pain with ROM negative    ROM:    Flexion   120  Extension   30  Abduction   45  Adduction   20  External Rotation 45  Internal Rotation 35    Flexion contracture negative    FADIR negative  FADER negative    Tenderness to palpation over lateral and posterolateral greater tochanter positive    Dx:  Left shoulder biceps tendinitis, resolved.  New onset of greater trochanteric bursitis    Plan:  A gave the patient a refill on a Dosepak and Toradol to use for her bursitis.  She had some concerns about this being out of network and we recommended that she follow up with 1 of our orthopedic surgeons in [place], Dr. Suárez.  She can follow up with  us as needed.

## 2020-10-14 ENCOUNTER — HOSPITAL ENCOUNTER (OUTPATIENT)
Dept: RADIOLOGY | Facility: HOSPITAL | Age: 60
Discharge: HOME | End: 2020-10-14
Attending: FAMILY MEDICINE
Payer: COMMERCIAL

## 2020-10-14 ENCOUNTER — OFFICE VISIT (OUTPATIENT)
Dept: FAMILY MEDICINE | Facility: CLINIC | Age: 60
End: 2020-10-14
Payer: COMMERCIAL

## 2020-10-14 VITALS
TEMPERATURE: 97 F | WEIGHT: 152 LBS | HEART RATE: 61 BPM | HEIGHT: 64 IN | BODY MASS INDEX: 25.95 KG/M2 | SYSTOLIC BLOOD PRESSURE: 160 MMHG | RESPIRATION RATE: 16 BRPM | DIASTOLIC BLOOD PRESSURE: 83 MMHG | OXYGEN SATURATION: 96 %

## 2020-10-14 DIAGNOSIS — I10 ESSENTIAL HYPERTENSION: ICD-10-CM

## 2020-10-14 DIAGNOSIS — M25.551 RIGHT HIP PAIN: Primary | ICD-10-CM

## 2020-10-14 DIAGNOSIS — M25.551 RIGHT HIP PAIN: ICD-10-CM

## 2020-10-14 PROCEDURE — 96372 THER/PROPH/DIAG INJ SC/IM: CPT | Mod: S$GLB,,, | Performed by: FAMILY MEDICINE

## 2020-10-14 PROCEDURE — 73502 XR PELVIS 3 VIEW INC HIP 2 VIEW RIGHT: ICD-10-PCS | Mod: 26,RT,, | Performed by: RADIOLOGY

## 2020-10-14 PROCEDURE — 96372 PR INJECTION,THERAP/PROPH/DIAG2ST, IM OR SUBCUT: ICD-10-PCS | Mod: S$GLB,,, | Performed by: FAMILY MEDICINE

## 2020-10-14 PROCEDURE — 73502 X-RAY EXAM HIP UNI 2-3 VIEWS: CPT | Mod: TC,FY,RT

## 2020-10-14 PROCEDURE — 99214 OFFICE O/P EST MOD 30 MIN: CPT | Mod: 25,S$GLB,, | Performed by: FAMILY MEDICINE

## 2020-10-14 PROCEDURE — 73502 X-RAY EXAM HIP UNI 2-3 VIEWS: CPT | Mod: 26,RT,, | Performed by: RADIOLOGY

## 2020-10-14 PROCEDURE — 99214 PR OFFICE/OUTPT VISIT, EST, LEVL IV, 30-39 MIN: ICD-10-PCS | Mod: 25,S$GLB,, | Performed by: FAMILY MEDICINE

## 2020-10-14 RX ORDER — KETOROLAC TROMETHAMINE 30 MG/ML
30 INJECTION, SOLUTION INTRAMUSCULAR; INTRAVENOUS
Status: DISCONTINUED | OUTPATIENT
Start: 2020-10-14 | End: 2020-10-14

## 2020-10-14 RX ORDER — KETOROLAC TROMETHAMINE 30 MG/ML
60 INJECTION, SOLUTION INTRAMUSCULAR; INTRAVENOUS
Status: DISCONTINUED | OUTPATIENT
Start: 2020-10-14 | End: 2020-10-14

## 2020-10-14 RX ORDER — KETOROLAC TROMETHAMINE 30 MG/ML
60 INJECTION, SOLUTION INTRAMUSCULAR; INTRAVENOUS
Status: COMPLETED | OUTPATIENT
Start: 2020-10-14 | End: 2020-10-14

## 2020-10-14 RX ADMIN — KETOROLAC TROMETHAMINE 60 MG: 30 INJECTION, SOLUTION INTRAMUSCULAR; INTRAVENOUS at 03:10

## 2020-10-14 NOTE — PROGRESS NOTES
[facility] - Clinic Note    Subjective      Ms. Garland is a 60 y.o. female who presents to clinic with complaints of hip pain.     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   Pain is getting progressively worse.   Not relieved with OTC tylenol and NSAIDs.   Went to see ortho in [place] was told it was trochanteric bursitis. Received a medrol dose pack with no relief.   She is not in network in [place].   denies trauma.       HTN: was started on lasartan 1 month ago for HTN. She has been taking her med at home. Reports BP well controlled but elevated when she has pain.      PMCHEMA Peck has a past medical history of Arthritis, Bilateral breast cancer, Breast cancer (2004), High cholesterol, and Hypertension.   PSXH Liv has a past surgical history that includes Tonsillectomy (1974); Hysterectomy (10/1/2003); Mastectomy (Bilateral, 5/16/2004); Cholecystectomy (2/2001); Oophorectomy (Left, 5/18/2004); Ablation of dysrhythmic focus (2000); Total abdominal hysterectomy w/ bilateral salpingoophorectomy; Colonoscopy; and Mitral valve replacement (1980).    Liv's family history includes Cancer in her mother; Early death in her brother; Heart failure in her father; Liver cancer in her mother; No Known Problems in her sister.   BLANCHE Peck reports that she quit smoking about 21 years ago. She has never used smokeless tobacco. She reports current alcohol use. She reports that she does not use drugs.   ALG Liv is allergic to lisinopril.   MOO Peck has a current medication list which includes the following prescription(s): losartan, methylprednisolone, fish oil, and tramadol.     Review of Systems   Constitutional: Negative for activity change, appetite change, chills, fatigue and fever.   Eyes: Negative for  "visual disturbance.   Respiratory: Negative for cough and shortness of breath.    Cardiovascular: Negative for chest pain, palpitations and leg swelling.   Gastrointestinal: Negative for abdominal pain.   Musculoskeletal: Positive for arthralgias and gait problem.        Right hip pain   Skin: Negative for wound.   Neurological: Negative for dizziness and headaches.   Psychiatric/Behavioral: Negative for confusion.     Objective     BP (!) 160/83 (BP Location: Right arm, Patient Position: Sitting, BP Method: Medium (Automatic))   Pulse 61   Temp 97.1 °F (36.2 °C) (Temporal)   Resp 16   Ht 5' 4" (1.626 m)   Wt 68.9 kg (152 lb)   LMP  (LMP Unknown)   SpO2 96%   BMI 26.09 kg/m²     Physical Exam   Constitutional: She appears well-developed and well-nourished.  Non-toxic appearance. She does not appear ill. No distress.   HENT:   Head: Normocephalic and atraumatic.   Eyes: Right eye exhibits no discharge. Left eye exhibits no discharge.   Neck: Neck supple.   Cardiovascular: Normal rate, regular rhythm, normal heart sounds and normal pulses. Exam reveals no gallop and no friction rub.   No murmur heard.  Pulmonary/Chest: Effort normal and breath sounds normal. No respiratory distress. She has no wheezes. She has no rhonchi. She has no rales.   Abdominal: Normal appearance.   Musculoskeletal:      Comments: Right hip: No effusion, erythema, ecchymosis, warmth, or deformities noted. Non-tender to palpation over the right trochanteric area. ROM intact although pain with right hip flexion and internal rotation.  Unable to bear weight on the right leg     Lymphadenopathy:     She has no cervical adenopathy.   Neurological: She is alert.   Skin: Skin is warm and dry. Capillary refill takes less than 2 seconds. She is not diaphoretic.   Psychiatric: Her behavior is normal. Mood, judgment and thought content normal.   Vitals reviewed.     Assessment/Plan     Liv was seen today for follow-up.    Diagnoses and all " orders for this visit:    Right hip pain  -     X-Ray Pelvis 3 view inc Hip 2 view Right; Future  -     ketorolac injection 60 mg  - obtain imaging and treat as indicated    Essential HTN  -likely elevated today secondary to pain   -reassess at nurse visit in 2 weeks.     Follow up in about 4 weeks (around 11/11/2020).    Future Appointments   Date Time Provider Department Center   10/27/2020  9:20 AM Vargas Paulino MD [facility]   11/6/2020  1:00 PM Don Suárez DO [address]   11/16/2020  2:40 PM Vargas Paulino MD [facility]   1/14/2021  8:20 AM [facility], LABORATORY [facility]   1/21/2021  8:00 AM Vargas Paulino MD [facility]       Vargas Paulino MD  Family Medicine  [facility]

## 2020-10-15 ENCOUNTER — TELEPHONE (OUTPATIENT)
Dept: FAMILY MEDICINE | Facility: CLINIC | Age: 60
End: 2020-10-15

## 2020-10-15 NOTE — TELEPHONE ENCOUNTER
Spoke with patient, advised once xray results reviewed the office would be in contact.   Patient also wanted to notify office, the shot she received yesterday has not touched her pain. Advised I would send to MD. Patient v/u.

## 2020-10-15 NOTE — TELEPHONE ENCOUNTER
----- Message from Bienvenido Edge sent at 10/15/2020 11:58 AM CDT -----  Type: Needs Medical Advice  Who Called:  Patient    Best Call Back Number:[phone]  Additional Information: Patient states that she would like a callback regarding her X ray results

## 2020-10-16 ENCOUNTER — PATIENT MESSAGE (OUTPATIENT)
Dept: FAMILY MEDICINE | Facility: CLINIC | Age: 60
End: 2020-10-16

## 2020-10-16 NOTE — TELEPHONE ENCOUNTER
10/16/2020 4:00Pm-- Patient spoke with Mariella earlier and stated Dr Salas will not be back until next week and would send the message.       ----- Message from Malcom Hoyt sent at 10/16/2020  9:48 AM CDT -----  Contact: patient  Type: Needs Medical Advice  Who Called:  patient  Symptoms (please be specific):    How long has patient had these symptoms:    Pharmacy name and phone #:    Best Call Back Number: [phone]  Additional Information: patient requesting a call back stated that she is in a lot of pain,stated someone was suppose to call back no response

## 2020-10-19 ENCOUNTER — PATIENT MESSAGE (OUTPATIENT)
Dept: FAMILY MEDICINE | Facility: CLINIC | Age: 60
End: 2020-10-19

## 2020-10-19 DIAGNOSIS — M16.12 PRIMARY OSTEOARTHRITIS OF LEFT HIP: Primary | ICD-10-CM

## 2020-10-19 RX ORDER — TRAMADOL HYDROCHLORIDE 50 MG/1
TABLET ORAL
Qty: 90 TABLET | Refills: 0 | Status: SHIPPED | OUTPATIENT
Start: 2020-10-19 | End: 2020-10-27

## 2020-10-19 NOTE — PROGRESS NOTES
Sent in tramadol to pharmacy for left hip pain due to advanced OA as she is not responding to OTC NSAIDs, medrol dose pack, or tylenol. Will get patient an appt with Dr. Suárez.

## 2020-10-20 ENCOUNTER — TELEPHONE (OUTPATIENT)
Dept: FAMILY MEDICINE | Facility: CLINIC | Age: 60
End: 2020-10-20

## 2020-10-20 NOTE — TELEPHONE ENCOUNTER
----- Message from Bienvenido Minesh sent at 10/20/2020 12:57 PM CDT -----  Type: Needs Medical Advice     Best Call Back Number: [phone]  Additional Information: Patient states that she would like a callback ASA.  Patient would not specify what the call was about except that she has sent multiple messages for a callback with no response and is very upset.

## 2020-10-20 NOTE — TELEPHONE ENCOUNTER
Patient called upset, as charisma informed patient that her doctors office needed to do the PA on Tramadol. Advised patient of Goodrx price of 26.37. printed coupon for patient, advised the pharmacy takes this, and patient agreed to pay this price and  coupon.

## 2020-10-27 ENCOUNTER — OFFICE VISIT (OUTPATIENT)
Dept: FAMILY MEDICINE | Facility: CLINIC | Age: 60
End: 2020-10-27
Payer: COMMERCIAL

## 2020-10-27 VITALS
HEART RATE: 80 BPM | RESPIRATION RATE: 16 BRPM | WEIGHT: 150.38 LBS | DIASTOLIC BLOOD PRESSURE: 78 MMHG | SYSTOLIC BLOOD PRESSURE: 132 MMHG | OXYGEN SATURATION: 98 % | BODY MASS INDEX: 25.67 KG/M2 | HEIGHT: 64 IN | TEMPERATURE: 98 F

## 2020-10-27 DIAGNOSIS — I10 ESSENTIAL HYPERTENSION: ICD-10-CM

## 2020-10-27 DIAGNOSIS — M16.12 PRIMARY OSTEOARTHRITIS OF LEFT HIP: Primary | ICD-10-CM

## 2020-10-27 PROCEDURE — 99214 PR OFFICE/OUTPT VISIT, EST, LEVL IV, 30-39 MIN: ICD-10-PCS | Mod: S$GLB,,, | Performed by: FAMILY MEDICINE

## 2020-10-27 PROCEDURE — 99214 OFFICE O/P EST MOD 30 MIN: CPT | Mod: S$GLB,,, | Performed by: FAMILY MEDICINE

## 2020-10-27 RX ORDER — HYDROCODONE BITARTRATE AND ACETAMINOPHEN 5; 325 MG/1; MG/1
1 TABLET ORAL EVERY 8 HOURS PRN
Qty: 30 TABLET | Refills: 0 | Status: SHIPPED | OUTPATIENT
Start: 2020-10-27 | End: 2020-12-14

## 2020-10-27 NOTE — PROGRESS NOTES
[facility] - Clinic Note    Subjective      Ms. Garland is a 60 y.o. female who presents to clinic for a follow up of hip pain.     Patient seen 2 weeks ago for right hip pain.   X-ray revealed moderate right and severe left hip joint space loss accompanied by bulky marginal osteophyte formation.  Was given tramadol for pain as she was not responding to NSAIDs.   States that the tramadol made her sick and unable to tolerate.   Hip pain is getting progressively worse.        PMH Liv has a past medical history of Arthritis, Bilateral breast cancer, Breast cancer (2004), High cholesterol, and Hypertension.   PSXH Liv has a past surgical history that includes Tonsillectomy (1974); Hysterectomy (10/1/2003); Mastectomy (Bilateral, 5/16/2004); Cholecystectomy (2/2001); Oophorectomy (Left, 5/18/2004); Ablation of dysrhythmic focus (2000); Total abdominal hysterectomy w/ bilateral salpingoophorectomy; Colonoscopy; and Mitral valve replacement (1980).   RANDY Peck's family history includes Cancer in her mother; Early death in her brother; Heart failure in her father; Liver cancer in her mother; No Known Problems in her sister.   BLANCHE Peck reports that she quit smoking about 21 years ago. She has never used smokeless tobacco. She reports current alcohol use. She reports that she does not use drugs.   EDWIGE Peck is allergic to lisinopril.   MOO Peck has a current medication list which includes the following prescription(s): fish oil, hydrocodone-acetaminophen, and oxycodone-acetaminophen.     Review of Systems   Constitutional: Negative for activity change, appetite change, chills, fatigue and fever.   Eyes: Negative for visual disturbance.   Respiratory: Negative for cough and shortness of breath.    Cardiovascular: Negative for chest pain, palpitations and leg swelling.   Musculoskeletal: Positive for arthralgias.        Right hip pain   Skin: Negative for wound.   Neurological: Negative for dizziness and  "headaches.   Psychiatric/Behavioral: Negative for confusion.     Objective     /78   Pulse 80   Temp 97.7 °F (36.5 °C) (Temporal)   Resp 16   Ht 5' 4" (1.626 m)   Wt 68.2 kg (150 lb 6 oz)   LMP  (LMP Unknown)   SpO2 98%   BMI 25.81 kg/m²     Physical Exam   Constitutional: She appears well-developed and well-nourished.  Non-toxic appearance. She does not appear ill. No distress.   HENT:   Head: Normocephalic and atraumatic.   Eyes: Right eye exhibits no discharge. Left eye exhibits no discharge.   Neck: Neck supple.   Cardiovascular: Normal rate, regular rhythm, normal heart sounds and normal pulses. Exam reveals no gallop and no friction rub.   No murmur heard.  Pulmonary/Chest: Effort normal and breath sounds normal. No respiratory distress. She has no wheezes. She has no rhonchi. She has no rales.   Abdominal: Normal appearance.   Musculoskeletal:      Right lower leg: No edema.      Left lower leg: No edema.   Lymphadenopathy:     She has no cervical adenopathy.   Neurological: She is alert.   Skin: Skin is warm and dry. Capillary refill takes less than 2 seconds. She is not diaphoretic.   Psychiatric: Her behavior is normal. Mood, judgment and thought content normal.   Vitals reviewed.     Assessment/Plan     Liv was seen today for follow-up and hip pain.    Diagnoses and all orders for this visit:    Primary osteoarthritis of left hip  -     HYDROcodone-acetaminophen (NORCO) 5-325 mg per tablet; Take 1 tablet by mouth every 8 (eight) hours as needed for Pain.  - Will send norco prn pain.  reviewed by me and no suspicious activity was noted.  - appt made with Dr. Suárez in 1 week.     Essential hypertension  -well controlled. Continue losartan.       Future Appointments   Date Time Provider Department Center   11/25/2020  8:00 AM Rios Colon MD [facility]   1/14/2021  8:20 AM [facility] [place] [facility], LAB[facility]ATORY [facility]   1/21/2021  8:00 AM Vagras Paulino MD [facility] FAMMED " [facility] Paulino MD  Family Medicine  [facility]

## 2020-10-29 ENCOUNTER — HOSPITAL ENCOUNTER (EMERGENCY)
Facility: HOSPITAL | Age: 60
Discharge: HOME | End: 2020-10-29
Attending: FAMILY MEDICINE
Payer: COMMERCIAL

## 2020-10-29 VITALS
WEIGHT: 150 LBS | SYSTOLIC BLOOD PRESSURE: 94 MMHG | BODY MASS INDEX: 25.61 KG/M2 | DIASTOLIC BLOOD PRESSURE: 70 MMHG | HEART RATE: 71 BPM | TEMPERATURE: 98 F | OXYGEN SATURATION: 99 % | HEIGHT: 64 IN | RESPIRATION RATE: 18 BRPM

## 2020-10-29 DIAGNOSIS — S52.602A CLOSED FRACTURE OF DISTAL END OF LEFT ULNA, UNSPECIFIED FRACTURE MORPHOLOGY, INITIAL ENCOUNTER: ICD-10-CM

## 2020-10-29 DIAGNOSIS — T14.90XA TRAUMA: ICD-10-CM

## 2020-10-29 DIAGNOSIS — S52.502A CLOSED FRACTURE OF DISTAL END OF LEFT RADIUS, UNSPECIFIED FRACTURE MORPHOLOGY, INITIAL ENCOUNTER: ICD-10-CM
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
## 2020-10-29 DIAGNOSIS — M25.532 ACUTE PAIN OF LEFT WRIST: ICD-10-CM

## 2020-10-29 PROCEDURE — 73110 XR WRIST COMPLETE 3 VIEWS LEFT: ICD-10-PCS | Mod: 26,LT,, | Performed by: RADIOLOGY

## 2020-10-29 PROCEDURE — 73080 X-RAY EXAM OF ELBOW: CPT | Mod: TC,FY,LT

## 2020-10-29 PROCEDURE — 73080 X-RAY EXAM OF ELBOW: CPT | Mod: 26,LT,, | Performed by: RADIOLOGY

## 2020-10-29 PROCEDURE — 99284 EMERGENCY DEPT VISIT MOD MDM: CPT | Mod: 25

## 2020-10-29 PROCEDURE — 73110 X-RAY EXAM OF WRIST: CPT | Mod: TC,FY,LT

## 2020-10-29 PROCEDURE — 73110 X-RAY EXAM OF WRIST: CPT | Mod: 26,LT,, | Performed by: RADIOLOGY

## 2020-10-29 PROCEDURE — 25000003 PHARM REV CODE 250: Performed by: NURSE PRACTITIONER

## 2020-10-29 PROCEDURE — 73080 XR ELBOW COMPLETE 3 VIEW LEFT: ICD-10-PCS | Mod: 26,LT,, | Performed by: RADIOLOGY

## 2020-10-29 PROCEDURE — 63600175 PHARM REV CODE 636 W HCPCS: Performed by: NURSE PRACTITIONER

## 2020-10-29 PROCEDURE — 96372 THER/PROPH/DIAG INJ SC/IM: CPT

## 2020-10-29 PROCEDURE — 29125 APPL SHORT ARM SPLINT STATIC: CPT | Mod: LT

## 2020-10-29 RX ORDER — KETOROLAC TROMETHAMINE 10 MG/1
20 TABLET, FILM COATED ORAL
Status: COMPLETED | OUTPATIENT
Start: 2020-10-29 | End: 2020-10-29

## 2020-10-29 RX ORDER — HYDROMORPHONE HYDROCHLORIDE 2 MG/ML
1 INJECTION, SOLUTION INTRAMUSCULAR; INTRAVENOUS; SUBCUTANEOUS
Status: COMPLETED | OUTPATIENT
Start: 2020-10-29 | End: 2020-10-29

## 2020-10-29 RX ORDER — PROMETHAZINE HYDROCHLORIDE 25 MG/ML
12.5 INJECTION, SOLUTION INTRAMUSCULAR; INTRAVENOUS
Status: COMPLETED | OUTPATIENT
Start: 2020-10-29 | End: 2020-10-29

## 2020-10-29 RX ORDER — OXYCODONE AND ACETAMINOPHEN 5; 325 MG/1; MG/1
1 TABLET ORAL EVERY 6 HOURS PRN
Qty: 16 TABLET | Refills: 0 | Status: SHIPPED | OUTPATIENT
Start: 2020-10-29 | End: 2020-12-14

## 2020-10-29 RX ADMIN — PROMETHAZINE HYDROCHLORIDE 12.5 MG: 25 INJECTION INTRAMUSCULAR; INTRAVENOUS at 12:10

## 2020-10-29 RX ADMIN — KETOROLAC TROMETHAMINE 20 MG: 10 TABLET, FILM COATED ORAL at 11:10

## 2020-10-29 RX ADMIN — HYDROMORPHONE HYDROCHLORIDE 1 MG: 2 INJECTION, SOLUTION INTRAMUSCULAR; INTRAVENOUS; SUBCUTANEOUS at 12:10

## 2020-10-29 NOTE — ED TRIAGE NOTES
Stepped on board, fell landing on outstretched left hand.  Mild deformity noted, pain from wrist to elbow.  Splint applied in triage.

## 2020-10-29 NOTE — DISCHARGE INSTRUCTIONS
Do not drink alcohol, drive or operate heavy machinery while taking pain medications.  Only take medication as written and never take more than the maximum recommended dose of acetaminophen as written on the bottle.  Remember, most pain medications have acetaminophen in them and that needs to be taken into account with the total dose of acetaminophen daily.      Download the [employer] tai to access your health records & test results.  Please remember that you had a visit to the emergency room today and this does not substitute as primary care services for ongoing management because emergency services is a snap shot in time.  Should you have any worsening condition that requires emergency services do not hesitate to return to the ER.    COVID-19 TESTING  Hot Line [phone]  [address] [place] [address], [place] [address]  [facility]  Hours: 8am-5pm Monday - Friday   8am-noon Saturday - Sunday

## 2020-10-29 NOTE — ED PROVIDER NOTES
Encounter Date: 10/29/2020       History     Chief Complaint   Patient presents with    Fall    Wrist Pain     60-year-old female presents to ER for concerns of left wrist pain s/p fall PTA; patient states pain exacerbates with direct contact and range of motion, denies significant alleviating factors, no prescription or OTC medications are being taken, pain was sudden and has not changed described as moderate-severe currently    Denies:  Head injury, loss of conscious, neck pain, previous injury or surgery to the left wrist    Past medical/surgical history, allergies & current medications reviewed with patient -- hysterectomy    Known SARS-CoV2 exposure:  No  Room:  13    The history is provided by the patient. No  was used.     Review of patient's allergies indicates:   Allergen Reactions    Lisinopril      Other reaction(s): Angioedema     Past Medical History:   Diagnosis Date    Arthritis     Bilateral breast cancer     Breast cancer     Dr. Dos Santos, bilat mast, no rt, tamoxifen stopped after 9 mos due to side effects    High cholesterol     Hypertension      Past Surgical History:   Procedure Laterality Date    ABLATION OF DYSRHYTHMIC FOCUS      Dr. Melgoza    CHOLECYSTECTOMY  2001    COLONOSCOPY      repeat in     HYSTERECTOMY  10/1/2003    MASTECTOMY Bilateral 2004    MITRAL VALVE REPLACEMENT  1980    OOPHORECTOMY Left 2004    TONSILLECTOMY  1974    TOTAL ABDOMINAL HYSTERECTOMY W/ BILATERAL SALPINGOOPHORECTOMY       Family History   Problem Relation Age of Onset    Liver cancer Mother         69 y/o    Cancer Mother     Heart failure Father         79 y/o with dementia     No Known Problems Sister     Early death Brother         accident at 31 y/o     Social History     Tobacco Use    Smoking status: Former Smoker     Quit date: 1999     Years since quittin.8    Smokeless tobacco: Never Used   Substance Use Topics    Alcohol use: Yes     Drug use: No     Review of Systems   Constitutional: Negative for fever.   HENT: Negative for sore throat.    Respiratory: Negative for cough and shortness of breath.    Cardiovascular: Negative for chest pain.   Gastrointestinal: Negative for abdominal pain and nausea.   Genitourinary: Negative for dysuria.   Musculoskeletal: Positive for arthralgias and myalgias. Negative for back pain, gait problem and neck pain.   Skin: Positive for color change. Negative for rash.   Neurological: Negative for weakness and headaches.   Hematological: Does not bruise/bleed easily.   All other systems reviewed and are negative.      Physical Exam     Initial Vitals [10/29/20 1054]   BP Pulse Resp Temp SpO2   94/70 71 18 97.9 °F (36.6 °C) 99 %      MAP       --         Physical Exam    Nursing note and vitals reviewed.  Constitutional: She appears well-developed. She does not appear ill. No distress.   AF, VSS   HENT:   Head: Normocephalic and atraumatic.   Right Ear: External ear normal.   Left Ear: External ear normal.   Nose: Nose normal.   Eyes: Lids are normal.   Neck: Neck supple.   Cardiovascular: Normal rate.   Pulses:       Radial pulses are 2+ on the right side and 2+ on the left side.   Pulmonary/Chest: Effort normal. No respiratory distress.   Abdominal: She exhibits no distension.   Musculoskeletal:      Left wrist: She exhibits decreased range of motion, tenderness, bony tenderness, swelling, crepitus and deformity. She exhibits no effusion and no laceration.      Cervical back: Normal.      Thoracic back: Normal.      Lumbar back: Normal.        Left hand: Normal.      Comments: Other than noted positives extremities examination is atraumatic unimpressive   Neurological: She is alert.   Skin: Skin is warm. Capillary refill takes less than 2 seconds. No rash noted.   Psychiatric: She has a normal mood and affect.         ED Course   Orthopedic Injury    Date/Time: 10/29/2020 4:33 PM  Performed by: Thaddeus Mesa  NP  Authorized by: Elijah Ellis MD     Location procedure was performed:  [facility] ACCESS  Consent Done?:  Yes  Universal Protocol:     Verbal consent obtained?: Yes      Risks and benefits: Risks, benefits and alternatives were discussed      Consent given by:  Patient  Injury:     Injury location: Left wrist.      Pre-procedure assessment:     Neurovascular status: Neurovascularly intact      Distal perfusion: normal      Neurological function: normal      Range of motion: reduced      Local anesthesia used?: No      Patient sedated?: No        Selections made in this section will also lock the Injury type section above.:     Immobilization:  Splint    Splint type:  Sugar tong    Complications: No    Post-procedure assessment:     Neurovascular status: Neurovascularly intact      Distal perfusion: normal      Neurological function: normal      Range of motion: splinted      Patient tolerance:  Patient tolerated the procedure well with no immediate complications     Applied by RN, assisted by NP      Labs Reviewed - No data to display       Imaging Results          X-Ray Elbow Complete Left (Final result)  Result time 10/29/20 11:25:03    Final result by Rina Whitten MD (10/29/20 11:25:03)                 Impression:      No acute abnormality.      Electronically signed by: Rina Whitten  Date:    10/29/2020  Time:    11:25             Narrative:    EXAMINATION:  XR ELBOW COMPLETE 3 VIEW LEFT    CLINICAL HISTORY:  Injury, unspecified, initial encounter    TECHNIQUE:  AP, lateral, and oblique views of the left elbow were performed.    COMPARISON:  None    FINDINGS:  There is no fracture, dislocation or joint effusion.  Mild degenerative changes present at radiocapitellar joint.  Soft tissue calcification incidentally adjacent to lateral epicondyle, perhaps the result of previous ligamentous injury.                               X-Ray Wrist Complete Left (Final result)  Result  time 10/29/20 11:23:19    Final result by Rina Whitten MD (10/29/20 11:23:19)                 Impression:      Acute comminuted intra-articular distal radial fracture.  Ulnar styloid process fracture.      Electronically signed by: Rina Whitten  Date:    10/29/2020  Time:    11:23             Narrative:    EXAMINATION:  XR WRIST COMPLETE 3 VIEWS LEFT    CLINICAL HISTORY:  Injury, unspecified, initial encounter    TECHNIQUE:  PA, lateral, and oblique views of the left wrist were performed.    COMPARISON:  None    FINDINGS:  Three views of the left wrist were obtained, demonstrating an acute, comminuted, intra-articular distal radial fracture with dorsal displacement of the primary distal fracture fragments.  There is a fracture of the base of the ulnar styloid process as well.  There are moderately severe osteoarthritic changes at the wrist, particularly involving the 1st metacarpal-carpal joint.                                 Medical Decision Making:   ED Management:  Left elbow x-ray image/report reviewed:  No acute abnormality    Left wrist x-ray image/report reviewed:  Acute comminuted intra-articular distal radial fracture.  Ulnar styloid process fracture.    Medications given:  Toradol, Dilaudid, Phenergan     Findings, diagnosis & plan of care discussed with patient:  Fall, left wrist trauma/pain/fracture; splint and sling applied, we will discharge patient home with Percocet for pain management, care instructions given -- instructed to follow-up with PCP for further pain management concerns, we will for patient orthopedics    All questions answered, strict return precautions given, patient agrees with plan of care & verbalizes understanding to all instructions, pleasant visit -- vital signs are stable & patient is in no distress at discharge    MS/LA  reviewed x1 year -- no history found  We discussed the pros, cons and risks of opiate use, patient verbally acknowledged the risks of  opioid/controlled substance use, patient instructed to read the information given with the medication by the pharmacy prior to taking the 1st dose.    Disclaimer:  This note was prepared with [employer] Naturally Speaking voice recognition transcription software. Garbled syntax, mangled pronouns, and other bizarre constructions may be attributed to that software system.  Should there be any questions do not hesitate to contact me for clarification.                               Clinical Impression:       ICD-10-CM ICD-9-CM   1. Fall, initial encounter  W19.XXXA E888.9   2. Trauma  T14.90XA 959.9   3. Acute pain of left wrist  M25.532 719.43   4. Closed fracture of distal end of left radius, unspecified fracture morphology, initial encounter  S52.502A 813.42   5. Closed fracture of distal end of left ulna, unspecified fracture morphology, initial encounter  S52.602A 813.43                          ED Disposition Condition    Discharge Stable        ED Prescriptions     Medication Sig Dispense Start Date End Date Auth. Provider    oxyCODONE-acetaminophen (PERCOCET) 5-325 mg per tablet Take 1 tablet by mouth every 6 (six) hours as needed for Pain. 16 tablet 10/29/2020  Thaddeus Mesa NP        Follow-up Information     Follow up With Specialties Details Why Contact Info    Vargas Paulnio MD Family Medicine Go to  For any further pain management concerns [address] [facility] [place] [address] [phone]      Don Suárez, DO Orthopedic Surgery Go to  When scheduled; you should be contacted within 2-3 business days with appointment date/time [address] [facility] [place] [address] [phone]                                         Thaddeus Mesa NP  10/29/20 0611

## 2020-10-29 NOTE — ED NOTES
"Per ZACHARY Mesa NP, "She does not need sugar tong, risk for redisplacement post reduction is high, she has good pulses. She needs sugar tong and will follow up with Ortho."   "

## 2020-10-30 ENCOUNTER — TELEPHONE (OUTPATIENT)
Dept: ORTHOPEDICS | Facility: CLINIC | Age: 60
End: 2020-10-30

## 2020-10-30 ENCOUNTER — HOSPITAL ENCOUNTER (EMERGENCY)
Facility: HOSPITAL | Age: 60
Discharge: HOME | End: 2020-10-30
Payer: COMMERCIAL

## 2020-10-30 VITALS
BODY MASS INDEX: 25.61 KG/M2 | RESPIRATION RATE: 20 BRPM | SYSTOLIC BLOOD PRESSURE: 134 MMHG | WEIGHT: 150 LBS | OXYGEN SATURATION: 96 % | HEART RATE: 81 BPM | DIASTOLIC BLOOD PRESSURE: 87 MMHG | HEIGHT: 64 IN | TEMPERATURE: 98 F

## 2020-10-30 DIAGNOSIS — S52.572D OTHER CLOSED INTRA-ARTICULAR FRACTURE OF DISTAL END OF LEFT RADIUS WITH ROUTINE HEALING, SUBSEQUENT ENCOUNTER: Primary | ICD-10-CM

## 2020-10-30 PROCEDURE — 25000003 PHARM REV CODE 250: Performed by: PHYSICIAN ASSISTANT

## 2020-10-30 PROCEDURE — 29105 APPLICATION LONG ARM SPLINT: CPT | Mod: LT

## 2020-10-30 PROCEDURE — 99283 EMERGENCY DEPT VISIT LOW MDM: CPT | Mod: 25

## 2020-10-30 RX ORDER — ONDANSETRON 4 MG/1
4 TABLET, ORALLY DISINTEGRATING ORAL
Status: COMPLETED | OUTPATIENT
Start: 2020-10-30 | End: 2020-10-30

## 2020-10-30 RX ORDER — ONDANSETRON 4 MG/1
4 TABLET, ORALLY DISINTEGRATING ORAL EVERY 6 HOURS PRN
Qty: 8 TABLET | Refills: 0 | Status: SHIPPED | OUTPATIENT
Start: 2020-10-30 | End: 2020-11-01

## 2020-10-30 RX ADMIN — ONDANSETRON 4 MG: 4 TABLET, ORALLY DISINTEGRATING ORAL at 11:10

## 2020-10-30 NOTE — TELEPHONE ENCOUNTER
----- Message from Genet Hermosillo sent at 10/30/2020 12:43 PM CDT -----  Type:  Same Day Appointment Request    Caller is requesting a same day appointment.  Caller declined first available appointment listed below.      Name of Caller:  Patient   When is the first available appointment?  Scheduled for 11/6  Symptoms:  Broken Left Wrist   Best Call Back Number:     Additional Information:  Please advise-thank you

## 2020-10-30 NOTE — DISCHARGE INSTRUCTIONS
Take the medications as previously prescribed. Return for any worsening or new symptoms. Follow up with Primary Care Provider as needed. See Dr. Glen NI.

## 2020-10-30 NOTE — ED PROVIDER NOTES
Please note that my documentation in this Electronic Healthcare Record was produced using speech recognition software and therefore may contain errors related to that software.These could include grammar, punctuation and spelling errors or the inclusion/ exclusion of phrases that were not intended. Please contact myself for any clarification, questions or concerns.    HPI: Patient is a 60 y.o. female who presents with the chief complaint of worsening left hand swelling.  Feels as if the splint is applied too tightly around the elbow joint.  Patient sustained a distal radial fracture yesterday.  She was placed in a sugar-tong splint and has an appointment with an orthopedic in 1 week.  She has been taken the medication that was prescribed previously.  Denies any numbness or tingling.  Does endorse some pain and swelling.  Rates her pain a 10/10.  Denies any other trauma or injury.  Denies any chest pain, difficulty breathing, lightheadedness, dizziness.    REVIEW OF SYSTEMS - 10 systems were independently reviewed and are otherwise negative with the exception of those items previously documented in the HPI and nursing notes.    Allergy: Lisinopril    Past medical history:   Past Medical History:   Diagnosis Date    Arthritis     Bilateral breast cancer     Breast cancer 2004    Dr. Dos Santos, bilat mast, no rt, tamoxifen stopped after 9 mos due to side effects    High cholesterol     Hypertension        Surgical History:   Past Surgical History:   Procedure Laterality Date    ABLATION OF DYSRHYTHMIC FOCUS  2000    Dr. Melgoza    CHOLECYSTECTOMY  2/2001    COLONOSCOPY      repeat in 2020    HYSTERECTOMY  10/1/2003    MASTECTOMY Bilateral 5/16/2004    MITRAL VALVE REPLACEMENT  1980    OOPHORECTOMY Left 5/18/2004    TONSILLECTOMY  1974    TOTAL ABDOMINAL HYSTERECTOMY W/ BILATERAL SALPINGOOPHORECTOMY         Social history:   Social History     Socioeconomic History    Marital status: Single     Spouse name:  "Not on file    Number of children: Not on file    Years of education: Not on file    Highest education level: Not on file   Occupational History    Not on file   Social Needs    Financial resource strain: Not on file    Food insecurity     Worry: Not on file     Inability: Not on file    Transportation needs     Medical: Not on file     Non-medical: Not on file   Tobacco Use    Smoking status: Former Smoker     Quit date: 1999     Years since quittin.8    Smokeless tobacco: Never Used   Substance and Sexual Activity    Alcohol use: Yes    Drug use: No    Sexual activity: Yes   Lifestyle    Physical activity     Days per week: 3 days     Minutes per session: Not on file    Stress: Not on file   Relationships    Social connections     Talks on phone: Twice a week     Gets together: Once a week     Attends Mosque service: Not on file     Active member of club or organization: Yes     Attends meetings of clubs or organizations: More than 4 times per year     Relationship status:    Other Topics Concern    Not on file   Social History Narrative    Not on file       Family history: non-contributory    EHR: reviewed    Vitals: /87   Pulse 81   Temp 98.4 °F (36.9 °C)   Resp 20   Ht 5' 4" (1.626 m)   Wt 68 kg (150 lb)   LMP  (LMP Unknown)   SpO2 96%   Breastfeeding No   BMI 25.75 kg/m²     PHYSICAL EXAM:    General-60-year-old female awake and alert, oriented, GCS 15, in no acute distress,  HEENT- normocephalic, atraumatic, sclera anicteric, moist mucous membranes, PERRL, EOMI  CARDIOVASCULAR- regular rate and rhythm  PULMONARY- nonlabored, no respiratory distress  NEUROLOGIC- mental status normal, speech fluid, cognition normal, CN II-XII grossly intact, sensations equal normal bilateral upper and lower extremities, peripheral pulse 2 +/4, ambulatory with proper gait.  MUSCULOSKELETAL- well-nourished, well-developed, cap refill less than 3 sec, deformity, swelling, and " pain over the left distal radius.  DERMATOLOGIC- warm and dry, no visible rashes  PSYCHIATRIC- normal affect, normal concentration           Labs Reviewed - No data to display    No orders to display       MEDICAL DECISION MAKING: Patient is a 60 y.o. female who presented with chief complaint of splint being too tight with some worsening left hand swelling.  Patient sustained a fracture yesterday after a fall.  Has an appointment with Dr. Suárez in 1 week.  Patient states this appointment was scheduled prior to the fall because of arthritis.  Denies any other falls or trauma at this time.  On examination, the splint and sling were intact.  Both were removed and patient was neurovascular intact.  She did have some swelling, tenderness, deformity over the distal radius.  No repeat x-rays performed today.  I did speak with Orthopedic on-call in [place], Dr. Ny, given the extent of her fracture.  He had agreed to see the patient today and 1 hr and put on the schedule for surgery.  I advised the patient of this recommendation but stated her insurance will not cover a different state.  She did call Dr. Suárez office while she was in the room and is pending a return phone call from the nurse for an earlier appointment.  I did apply a new sugar-tong splint and patient was neuro intact after application.  Advised to continue taking her medication as prescribed.  Was given Zofran to help with the nausea from the pain medication.  She will follow-up with Dr. Suárez as soon as possible and return for any worsening symptoms.    CLINICAL IMPRESSION:  1. Other closed intra-articular fracture of distal end of left radius with routine healing, subsequent encounter         XUAN Carlin  10/30/20 3351       XUAN Carlin  10/30/20 1809

## 2020-11-02 ENCOUNTER — TELEPHONE (OUTPATIENT)
Dept: ORTHOPEDICS | Facility: CLINIC | Age: 60
End: 2020-11-02

## 2020-11-02 NOTE — TELEPHONE ENCOUNTER
----- Message from Anastasiya Ledesma sent at 11/2/2020  8:28 AM CST -----  Regarding: Sameday appointment  Contact: Patient  Type:  Same Day Appointment Request    Caller is requesting a same day appointment.  Caller declined first available appointment listed below.      Name of Caller:  Patient  When is the first available appointment?    Symptoms:  Broken L Hand  Best Call Back Number:  [phone]  Additional Information:   Patient called and stated she went to the ER on 10/29/20 and then the patient went again on Friday 10/30/20, she has tried to get in touch with the office and has been unable to do so. The ER told her she does not need to wait until 11/06/20 when her appointment is to be seen. Patient would please like a call back as soon as possible.

## 2020-11-02 NOTE — TELEPHONE ENCOUNTER
Returned call to patient. Informed patient that I reached out to her Friday 10/30 to schedule appointment and LVM advising her to contact the office.  Patient stated the ED informed her that she did not need to wait until the 6th. Informed patient typically we wait a few days to allow the swelling to go down. Appointment rescheduled for tomorrow.

## 2020-11-04 ENCOUNTER — TELEPHONE (OUTPATIENT)
Dept: ORTHOPEDICS | Facility: CLINIC | Age: 60
End: 2020-11-04

## 2020-11-06 ENCOUNTER — TELEPHONE (OUTPATIENT)
Dept: PAIN MEDICINE | Facility: CLINIC | Age: 60
End: 2020-11-06

## 2020-11-06 ENCOUNTER — OFFICE VISIT (OUTPATIENT)
Dept: ORTHOPEDICS | Facility: CLINIC | Age: 60
End: 2020-11-06
Payer: COMMERCIAL

## 2020-11-06 VITALS
TEMPERATURE: 98 F | OXYGEN SATURATION: 99 % | BODY MASS INDEX: 25.61 KG/M2 | HEIGHT: 64 IN | RESPIRATION RATE: 19 BRPM | WEIGHT: 150 LBS | HEART RATE: 86 BPM

## 2020-11-06 DIAGNOSIS — G89.29 CHRONIC RIGHT SI JOINT PAIN: Primary | ICD-10-CM

## 2020-11-06 DIAGNOSIS — M53.3 CHRONIC RIGHT SI JOINT PAIN: Primary | ICD-10-CM

## 2020-11-06 DIAGNOSIS — M16.0 PRIMARY OSTEOARTHRITIS OF BOTH HIPS: ICD-10-CM

## 2020-11-06 PROCEDURE — 99999 PR PBB SHADOW E&M-EST. PATIENT-LVL III: ICD-10-PCS | Mod: PBBFAC,,, | Performed by: ORTHOPAEDIC SURGERY

## 2020-11-06 PROCEDURE — 99999 PR PBB SHADOW E&M-EST. PATIENT-LVL III: CPT | Mod: PBBFAC,,, | Performed by: ORTHOPAEDIC SURGERY

## 2020-11-06 PROCEDURE — 99203 OFFICE O/P NEW LOW 30 MIN: CPT | Mod: S$GLB,,, | Performed by: ORTHOPAEDIC SURGERY

## 2020-11-06 PROCEDURE — 99203 PR OFFICE/OUTPT VISIT, NEW, LEVL III, 30-44 MIN: ICD-10-PCS | Mod: S$GLB,,, | Performed by: ORTHOPAEDIC SURGERY

## 2020-11-06 NOTE — PROGRESS NOTES
Subjective:      Patient ID: Liv Garland is a 60 y.o. female.    Chief Complaint: Pain of the Right Hip    Referring Provider: No referring provider defined for this encounter.    HPI:  Ms. Hayes is a 60-year-old lady who presented today for evaluation of approximately 3 months of right posterior hip/buttocks pain which began insidiously.  She stated that she has a constant aching and no specific movement exacerbates her pain.  She stated sometimes when I stand up it catches .  Nothing seems to improve her symptoms.  She has taken NSAIDs without help.  She has not done physical therapy nor had injections.  Currently she can ambulate 1-1/2 miles and climb approximately 2 stairs.  She does not use an ambulatory assistive device.    Past Medical History:   Diagnosis Date    Arthritis     Bilateral breast cancer     Breast cancer bilateral treated with mastectomy and chemo    * Diabetes    High cholesterol     Hypertension      Past Surgical History:   Procedure Laterality Date    ABLATION OF DYSRHYTHMIC FOCUS     * BILATERAL BREAST RECONSTRUCTION WITH IMPLANT    CHOLECYSTECTOMY  2001    COLONOSCOPY     * ORIF LEFT WRIST    HYSTERECTOMY  10/1/2003    MASTECTOMY bilateral Bilateral 2004    MITRAL VALVE REPLACEMENT  1980    OOPHORECTOMY Left 2004    TONSILLECTOMY  1974    TOTAL ABDOMINAL HYSTERECTOMY W/ BILATERAL SALPINGOOPHORECTOMY         Review of patient's allergies indicates:   Allergen Reactions    Lisinopril      Other reaction(s): Angioedema       Social History     Occupational History       Tobacco Use    Smoking status: Former Smoker     Quit date: 1999     Years since quittin.8    Smokeless tobacco: Never Used   Substance and Sexual Activity    Alcohol use: Yes    Drug use: No    Sexual activity: Yes      Family History   Problem Relation Age of Onset    Liver cancer Mother         69 y/o    Cancer Mother     Heart failure Father          79 y/o with dementia     No Known Problems Sister     Early death Brother         accident at 31 y/o       Previous Hospitalizations:  Mastectomy.    ROS:   Review of Systems   Constitution: Negative for chills and fever.   HENT: Negative for congestion.    Eyes: Negative for blurred vision.   Cardiovascular: Negative for chest pain.   Respiratory: Negative for cough.    Endocrine: Negative for polydipsia.   Hematologic/Lymphatic: Negative for adenopathy.   Skin: Negative for flushing and itching.   Musculoskeletal: Negative for gout and muscle weakness.   Gastrointestinal: Negative for constipation, diarrhea and heartburn.   Genitourinary: Negative for nocturia.   Neurological: Negative for headaches and seizures.   Psychiatric/Behavioral: Negative for depression. The patient is not nervous/anxious.    Allergic/Immunologic: Negative for environmental allergies.           Objective:      Physical Exam:   General:  Unique, AAOx3.  No acute distress  HEENT: Normocephalic, PEARLA EOMI, Good Dentition  Neck: Supple, No JVD  Chest: Symetric, equal excursion on inspiration  Abdomen: Soft NTND  Vascular:  Pulses intact and equal bilaterally.  Capillary refill less than 3 seconds and equal bilaterally  Neurologic:  Pinprick and soft touch intact and equal bilaterally  Integment:  No ecchymosis, no errythema  Extremity:  Pelvis/hip:  Forward flexion/backward flexion equal bilaterally greater than 95°/15°.  Internal/external rotation equal bilaterally.  Nontender with hip motion.  Zak/fabere/logroll/push-pull negative both hips.  Nontender over the greater trochanter both hips. Yael's negative bilaterally.  Nontender with groin palpation bilaterally.  Tender over the right sacroiliac joint.  Stable with compression/rock although patient has SI pain with pelvis distraction.                        Lumbosacral spine:  Forward flexion/backward flexion 45/15 degrees.  Right/left rotation 45/45 degrees.  Right/left side  bending 25/25 degrees.  No change in symptoms with spine motion.  Nontender with palpation lumbosacral spine.  Negative straight leg raising.  Able to heel and toe walk.  Radiography:  Personally reviewed x-rays which include AP pelvis and right hip showed advanced arthritic changes of both hips with femoral head osteophytes and cyst in the left femoral head.      Assessment:       Impression:      1. Chronic right SI joint pain    2. Primary osteoarthritis of both hips          Plan:       1.  Discussed physical examination and radiographic findings with the patient. Liv understands that she appears to have sacroiliitis and she may improve with an SI joint injection.  Discussed with the patient that she will be referred to Pain Management to evaluate her for SI joint injections.  2.  Refer to pain management for SI joint injection to the right hemipelvis.  3.  Continue with NSAIDs as tolerated allowed by PCM.  4.  Discussed possible referral to physical therapy declined for now.  5.  Home exercises to include hip stretching exercises were reinforced.  6.  Follow up p.r.n.

## 2020-11-06 NOTE — TELEPHONE ENCOUNTER
Made an appointment for her on 11/25  ----- Message from Rae Hall LPN sent at 11/6/2020  2:31 PM CST -----    ----- Message -----  From: Shine Fried  Sent: 11/6/2020   2:09 PM CST  To: Darlene Nation Staff    Type:  Patient Returning Call    Who Called:  Patient  Who Left Message for Patient:  Kristy Smith?  Does the patient know what this is regarding?:  no  Best Call Back Number:  [phone]  Additional Information:  NA

## 2020-11-16 ENCOUNTER — TELEPHONE (OUTPATIENT)
Dept: PAIN MEDICINE | Facility: CLINIC | Age: 60
End: 2020-11-16

## 2020-11-16 NOTE — TELEPHONE ENCOUNTER
----- Message from Genet Hermosillo sent at 11/16/2020  7:57 AM CST -----  Type:  Sooner Apoointment Request    Caller is requesting a sooner appointment.  Caller declined first available appointment listed below.  Caller will not accept being placed on the waitlist and is requesting a message be sent to doctor.    Name of Caller:  Patient   When is the first available appointment?  Scheduled for 11/25  Symptoms:  pain   Best Call Back Number:     Additional Information:  Please advise-thank you

## 2020-11-18 ENCOUNTER — OFFICE VISIT (OUTPATIENT)
Dept: PAIN MEDICINE | Facility: CLINIC | Age: 60
End: 2020-11-18
Payer: COMMERCIAL

## 2020-11-18 VITALS
WEIGHT: 151 LBS | HEART RATE: 93 BPM | TEMPERATURE: 99 F | OXYGEN SATURATION: 98 % | BODY MASS INDEX: 25.78 KG/M2 | DIASTOLIC BLOOD PRESSURE: 86 MMHG | RESPIRATION RATE: 19 BRPM | SYSTOLIC BLOOD PRESSURE: 152 MMHG | HEIGHT: 64 IN

## 2020-11-18 DIAGNOSIS — Z01.818 PRE-OP TESTING: ICD-10-CM

## 2020-11-18 DIAGNOSIS — M16.11 PRIMARY OSTEOARTHRITIS OF RIGHT HIP: Primary | ICD-10-CM

## 2020-11-18 DIAGNOSIS — M70.61 GREATER TROCHANTERIC BURSITIS OF BOTH HIPS: ICD-10-CM

## 2020-11-18 DIAGNOSIS — G89.29 CHRONIC RIGHT HIP PAIN: ICD-10-CM

## 2020-11-18 DIAGNOSIS — M25.551 CHRONIC RIGHT HIP PAIN: ICD-10-CM

## 2020-11-18 DIAGNOSIS — M70.62 GREATER TROCHANTERIC BURSITIS OF BOTH HIPS: ICD-10-CM

## 2020-11-18 PROCEDURE — 99244 PR OFFICE CONSULTATION,LEVEL IV: ICD-10-PCS | Mod: S$GLB,,, | Performed by: ANESTHESIOLOGY

## 2020-11-18 PROCEDURE — 99999 PR PBB SHADOW E&M-EST. PATIENT-LVL III: CPT | Mod: PBBFAC,,, | Performed by: ANESTHESIOLOGY

## 2020-11-18 PROCEDURE — 99244 OFF/OP CNSLTJ NEW/EST MOD 40: CPT | Mod: S$GLB,,, | Performed by: ANESTHESIOLOGY

## 2020-11-18 PROCEDURE — 99999 PR PBB SHADOW E&M-EST. PATIENT-LVL III: ICD-10-PCS | Mod: PBBFAC,,, | Performed by: ANESTHESIOLOGY

## 2020-11-18 RX ORDER — MELOXICAM 15 MG/1
15 TABLET ORAL DAILY
Qty: 30 TABLET | Refills: 0 | Status: SHIPPED | OUTPATIENT
Start: 2020-11-18 | End: 2021-06-30

## 2020-11-18 RX ORDER — ONDANSETRON 4 MG/1
TABLET, ORALLY DISINTEGRATING ORAL
COMMUNITY
Start: 2020-11-02 | End: 2021-06-30

## 2020-11-18 NOTE — LETTER
November 18, 2020      Don Suárez DO  [address]  [place] [address]           [facility]  [address] [facility] [place] [address]  Phone: [phone]  Fax: [phone]          Patient: Liv Garland   MR Number: [id number]   YOB: 1960   Date of Visit: 11/18/2020       Dear Dr. Don Suárez:    Thank you for referring Liv Garland to me for evaluation. Attached you will find relevant portions of my assessment and plan of care.    If you have questions, please do not hesitate to call me. I look forward to following Liv Garland along with you.    Sincerely,    Rios Colon MD    Enclosure  CC:  No Recipients    If you would like to receive this communication electronically, please contact [email or web address] or [phone] to request more information on [employer] access.    For providers and/or their staff who would like to refer a patient to [facility], please contact us through our one-stop-shop provider referral line, [facility] include 234 beds at the [facility], at [phone].    If you feel you have received this communication in error or would no longer like to receive these types of communications, please e-mail [email or web address]

## 2020-11-18 NOTE — PROGRESS NOTES
"Referring Physician: Don Suárez DO    PCP: Vargas Paulino MD    CC: right sided back/hip pain    HPI:   Liv Garland is a 60 y.o. female with PMH significant for HTN, DM II (no medications required), and hx of breast cancer in remission presents as a referral for the evaluation of right sided back/hip pain. The patient reports that her pain began approximately 4 months ago after no inciting incident or trauma. The patient reports that her pain caused her to fall with a resultant left wrist fracture requiring surgical repair. The patient localizes her pain to the right side of her back/hip area. The patient reports of radiation to her right hip and right groin running down the medial aspect of her RLE to her entire foot. The patient describes her pain as a "fire" type of pain. The patient reports of numbness in her left leg but not her right leg. The patient reports that her pain is a 10/10. Patient denies of any urinary/fecal incontinence, saddle anesthesia, or weakness.     Aggravating factors: moving, walking    Mitigating factors: N/A    Relevant Surgeries: no    Interventional Therapies: yes   2/22/2018: R greater trochanteric bursa via Dr. Acosta - reports of some benefit for months at a time     : Reviewed and consistent with medication use as prescribed.      Non-pharmacologic Treatment:     · Physical Therapy: yes; last did in 2018 with no benefit   · Ice/Heat: no  · TENS: no  · Massage: no  · Chiropractic care: no  · Acupuncture: no         Pain Medications:         · Currently taking: N/A    · Has tried in the past:    · Opioids: yes; no benefit   · NSAIDS: yes; ibuprofen  · Tylenol: yes; tried without benefit  · Muscle relaxants: no  · TCAs: no  · SNRIs: no  · Anticonvulsants: no  · topical creams: yes; tried several creams without benefit    Anticoagulation: no    ROS:  Review of Systems   Constitutional: Negative for chills and fever.   HENT: Negative for sore throat.    Eyes: " Negative for visual disturbance.   Respiratory: Negative for shortness of breath.    Cardiovascular: Negative for chest pain.   Gastrointestinal: Negative for nausea and vomiting.   Genitourinary: Negative for difficulty urinating.   Musculoskeletal: Positive for arthralgias and back pain.   Skin: Negative for rash.   Allergic/Immunologic: Negative for immunocompromised state.   Neurological: Negative for syncope.   Hematological: Does not bruise/bleed easily.   Psychiatric/Behavioral: Negative for suicidal ideas.        Past Medical History:   Diagnosis Date    Arthritis     Bilateral breast cancer     Breast cancer 2004    Dr. Dos Santos, bilat mast, no rt, tamoxifen stopped after 9 mos due to side effects    High cholesterol     Hypertension      Past Surgical History:   Procedure Laterality Date    ABLATION OF DYSRHYTHMIC FOCUS  2000    Dr. Melgoza    CHOLECYSTECTOMY  2/2001    COLONOSCOPY      repeat in 2020    HYSTERECTOMY  10/1/2003    MASTECTOMY Bilateral 5/16/2004    MITRAL VALVE REPLACEMENT  1980    OOPHORECTOMY Left 5/18/2004    TONSILLECTOMY  1974    TOTAL ABDOMINAL HYSTERECTOMY W/ BILATERAL SALPINGOOPHORECTOMY       Family History   Problem Relation Age of Onset    Liver cancer Mother         69 y/o    Cancer Mother     Heart failure Father         81 y/o with dementia     No Known Problems Sister     Early death Brother         accident at 29 y/o     Social History     Socioeconomic History    Marital status: Single     Spouse name: Not on file    Number of children: Not on file    Years of education: Not on file    Highest education level: Not on file   Occupational History    Not on file   Social Needs    Financial resource strain: Not on file    Food insecurity     Worry: Not on file     Inability: Not on file    Transportation needs     Medical: Not on file     Non-medical: Not on file   Tobacco Use    Smoking status: Former Smoker     Quit date: 1/1/1999     Years since  "quittin.8    Smokeless tobacco: Never Used   Substance and Sexual Activity    Alcohol use: Yes    Drug use: No    Sexual activity: Yes   Lifestyle    Physical activity     Days per week: 3 days     Minutes per session: Not on file    Stress: Not on file   Relationships    Social connections     Talks on phone: Twice a week     Gets together: Once a week     Attends Bahai service: Not on file     Active member of club or organization: Yes     Attends meetings of clubs or organizations: More than 4 times per year     Relationship status:    Other Topics Concern    Not on file   Social History Narrative    Not on file         Allergies: See med card    Vitals:    20 1338   BP: (!) 152/86   Pulse: 93   Resp: 19   Temp: 98.5 °F (36.9 °C)   SpO2: 98%   Weight: 68.5 kg (151 lb)   Height: 5' 4" (1.626 m)   PainSc: 10-Worst pain ever         Physical exam:  Physical Exam   Constitutional: She is oriented to person, place, and time and well-developed, well-nourished, and in no distress.   HENT:   Head: Normocephalic and atraumatic.   Eyes: Conjunctivae and EOM are normal. Right eye exhibits no discharge. Left eye exhibits no discharge.   Cardiovascular: Normal rate.   Pulmonary/Chest: Effort normal and breath sounds normal. No respiratory distress.   Abdominal: Soft.   Neurological: She is alert and oriented to person, place, and time.   Skin: Skin is warm and dry. No rash noted. She is not diaphoretic.   Psychiatric: Mood, memory, affect and judgment normal.   Nursing note and vitals reviewed.       UPPER EXTREMITIES: Normal alignment, normal range of motion, no atrophy, no skin changes,  hair growth and nail growth normal and equal bilaterally. No swelling, no tenderness.    LOWER EXTREMITIES:  Normal alignment, normal range of motion, no atrophy, no skin changes,  hair growth and nail growth normal and equal bilaterally. No swelling, no tenderness.    LUMBAR SPINE  Lumbar spine: ROM is full " with flexion extension and oblique extension with increased pain with extension.     ((--)) Supine straight leg raise    ((+)) Facet loading   Internal and external rotation of the hip causes increased pain on the right side. TTP at the site of the bilateral greater trochanter.   Myofascial exam: No tenderness to palpation across lumbar paraspinous muscles.    ((+)) TTP at the SI joint on the right  ((+)) GEORGE's test - positive for anterior and posterior pain on the right side  ((--)) One leg stand - unable to perform on the left side secondary to pain  ((--)) Distraction test    CRANIAL NERVES:  II:  PERRL bilaterally,   III,IV,VI: EOMI.    V:  Facial sensation equal bilaterally  VII:  Facial motor function normal.  VIII:  Hearing equal to finger rub bilaterally  IX/X: Gag normal, palate symmetric  XI:  Shoulder shrug equal, head turn equal  XII:  Tongue midline without fasciculations      MOTOR: Tone and bulk: normal bilateral upper and lower Strength: normal   Delt Bi Tri WE WF     R 5 5 5 5 5 5   L 5 5 5 5 5 5     IP ADD ABD Quad TA Gas HAM  R 5 5 5 5 5 5 5  L 5 5 5 5 5 5 5    SENSATION: Light touch and pinprick intact bilaterally  REFLEXES: normal, symmetric, nonbrisk.  Toes down, no clonus. Negative hooks's sign bilaterally.  GAIT: normal rise, base, steps, and arm swing.        Imaging:  X-ray left elbow (10/29/2020):  There is no fracture, dislocation or joint effusion.  Mild degenerative changes present at radiocapitellar joint.  Soft tissue calcification incidentally adjacent to lateral epicondyle, perhaps the result of previous ligamentous injury.    X-ray left wrist (10/29/2020):  Three views of the left wrist were obtained, demonstrating an acute, comminuted, intra-articular distal radial fracture with dorsal displacement of the primary distal fracture fragments.  There is a fracture of the base of the ulnar styloid process as well.  There are moderately severe osteoarthritic changes at the  wrist, particularly involving the 1st metacarpal-carpal joint.    X-ray right hip (10/14/2020):  No fracture or dislocation.  There is moderate right and severe left hip joint space loss accompanied by bulky marginal osteophyte formation.    X-ray left shoulder (8/25/2020):  No acute fracture or malalignment.  Degenerative change mild at the AC joint and minimal at the glenohumeral joint.  No abnormal soft tissue mineralization about the left shoulder.      Assessment: Liv Garland is a 60 y.o. female with PMH significant for HTN, DM II (no medications required), and hx of breast cancer in remission presents as a referral for the evaluation of right sided back/hip pain. Physical examination was significant for reproducible pain with internal and external rotation of the right hip. Most recent plain film of the right hip was significant for moderate OA. Treatment plan outlined below.     Plan:  - Schedule for right intra-articular hip injection under fluoroscopy  - Prescribed meloxicam 15 mg PO q day for pain and inflammation  - I have stressed the importance of physical activity and a home exercise plan to help with chronic pain and improve health.  - RTC for the procedure as outlined above. Would evaluate her lumbar spine and/or sacroiliac joint in the future if her pain persists    Thank you for referring this interesting patient, and I look forward to continuing to collaborate in her care.    Rios Colon MD  Pain Management

## 2020-11-18 NOTE — H&P (VIEW-ONLY)
"Referring Physician: Don Suárez DO    PCP: Vargas Paulino MD    CC: right sided back/hip pain    HPI:   Liv Garland is a 60 y.o. female with PMH significant for HTN, DM II (no medications required), and hx of breast cancer in remission presents as a referral for the evaluation of right sided back/hip pain. The patient reports that her pain began approximately 4 months ago after no inciting incident or trauma. The patient reports that her pain caused her to fall with a resultant left wrist fracture requiring surgical repair. The patient localizes her pain to the right side of her back/hip area. The patient reports of radiation to her right hip and right groin running down the medial aspect of her RLE to her entire foot. The patient describes her pain as a "fire" type of pain. The patient reports of numbness in her left leg but not her right leg. The patient reports that her pain is a 10/10. Patient denies of any urinary/fecal incontinence, saddle anesthesia, or weakness.     Aggravating factors: moving, walking    Mitigating factors: N/A    Relevant Surgeries: no    Interventional Therapies: yes   2/22/2018: R greater trochanteric bursa via Dr. Acosta - reports of some benefit for months at a time     : Reviewed and consistent with medication use as prescribed.      Non-pharmacologic Treatment:     · Physical Therapy: yes; last did in 2018 with no benefit   · Ice/Heat: no  · TENS: no  · Massage: no  · Chiropractic care: no  · Acupuncture: no         Pain Medications:         · Currently taking: N/A    · Has tried in the past:    · Opioids: yes; no benefit   · NSAIDS: yes; ibuprofen  · Tylenol: yes; tried without benefit  · Muscle relaxants: no  · TCAs: no  · SNRIs: no  · Anticonvulsants: no  · topical creams: yes; tried several creams without benefit    Anticoagulation: no    ROS:  Review of Systems   Constitutional: Negative for chills and fever.   HENT: Negative for sore throat.    Eyes: " Negative for visual disturbance.   Respiratory: Negative for shortness of breath.    Cardiovascular: Negative for chest pain.   Gastrointestinal: Negative for nausea and vomiting.   Genitourinary: Negative for difficulty urinating.   Musculoskeletal: Positive for arthralgias and back pain.   Skin: Negative for rash.   Allergic/Immunologic: Negative for immunocompromised state.   Neurological: Negative for syncope.   Hematological: Does not bruise/bleed easily.   Psychiatric/Behavioral: Negative for suicidal ideas.        Past Medical History:   Diagnosis Date    Arthritis     Bilateral breast cancer     Breast cancer 2004    Dr. Dos Santos, bilat mast, no rt, tamoxifen stopped after 9 mos due to side effects    High cholesterol     Hypertension      Past Surgical History:   Procedure Laterality Date    ABLATION OF DYSRHYTHMIC FOCUS  2000    Dr. Melgoza    CHOLECYSTECTOMY  2/2001    COLONOSCOPY      repeat in 2020    HYSTERECTOMY  10/1/2003    MASTECTOMY Bilateral 5/16/2004    MITRAL VALVE REPLACEMENT  1980    OOPHORECTOMY Left 5/18/2004    TONSILLECTOMY  1974    TOTAL ABDOMINAL HYSTERECTOMY W/ BILATERAL SALPINGOOPHORECTOMY       Family History   Problem Relation Age of Onset    Liver cancer Mother         67 y/o    Cancer Mother     Heart failure Father         79 y/o with dementia     No Known Problems Sister     Early death Brother         accident at 31 y/o     Social History     Socioeconomic History    Marital status: Single     Spouse name: Not on file    Number of children: Not on file    Years of education: Not on file    Highest education level: Not on file   Occupational History    Not on file   Social Needs    Financial resource strain: Not on file    Food insecurity     Worry: Not on file     Inability: Not on file    Transportation needs     Medical: Not on file     Non-medical: Not on file   Tobacco Use    Smoking status: Former Smoker     Quit date: 1/1/1999     Years since  "quittin.8    Smokeless tobacco: Never Used   Substance and Sexual Activity    Alcohol use: Yes    Drug use: No    Sexual activity: Yes   Lifestyle    Physical activity     Days per week: 3 days     Minutes per session: Not on file    Stress: Not on file   Relationships    Social connections     Talks on phone: Twice a week     Gets together: Once a week     Attends Islam service: Not on file     Active member of club or organization: Yes     Attends meetings of clubs or organizations: More than 4 times per year     Relationship status:    Other Topics Concern    Not on file   Social History Narrative    Not on file         Allergies: See med card    Vitals:    20 1338   BP: (!) 152/86   Pulse: 93   Resp: 19   Temp: 98.5 °F (36.9 °C)   SpO2: 98%   Weight: 68.5 kg (151 lb)   Height: 5' 4" (1.626 m)   PainSc: 10-Worst pain ever         Physical exam:  Physical Exam   Constitutional: She is oriented to person, place, and time and well-developed, well-nourished, and in no distress.   HENT:   Head: Normocephalic and atraumatic.   Eyes: Conjunctivae and EOM are normal. Right eye exhibits no discharge. Left eye exhibits no discharge.   Cardiovascular: Normal rate.   Pulmonary/Chest: Effort normal and breath sounds normal. No respiratory distress.   Abdominal: Soft.   Neurological: She is alert and oriented to person, place, and time.   Skin: Skin is warm and dry. No rash noted. She is not diaphoretic.   Psychiatric: Mood, memory, affect and judgment normal.   Nursing note and vitals reviewed.       UPPER EXTREMITIES: Normal alignment, normal range of motion, no atrophy, no skin changes,  hair growth and nail growth normal and equal bilaterally. No swelling, no tenderness.    LOWER EXTREMITIES:  Normal alignment, normal range of motion, no atrophy, no skin changes,  hair growth and nail growth normal and equal bilaterally. No swelling, no tenderness.    LUMBAR SPINE  Lumbar spine: ROM is full " with flexion extension and oblique extension with increased pain with extension.     ((--)) Supine straight leg raise    ((+)) Facet loading   Internal and external rotation of the hip causes increased pain on the right side. TTP at the site of the bilateral greater trochanter.   Myofascial exam: No tenderness to palpation across lumbar paraspinous muscles.    ((+)) TTP at the SI joint on the right  ((+)) GEORGE's test - positive for anterior and posterior pain on the right side  ((--)) One leg stand - unable to perform on the left side secondary to pain  ((--)) Distraction test    CRANIAL NERVES:  II:  PERRL bilaterally,   III,IV,VI: EOMI.    V:  Facial sensation equal bilaterally  VII:  Facial motor function normal.  VIII:  Hearing equal to finger rub bilaterally  IX/X: Gag normal, palate symmetric  XI:  Shoulder shrug equal, head turn equal  XII:  Tongue midline without fasciculations      MOTOR: Tone and bulk: normal bilateral upper and lower Strength: normal   Delt Bi Tri WE WF     R 5 5 5 5 5 5   L 5 5 5 5 5 5     IP ADD ABD Quad TA Gas HAM  R 5 5 5 5 5 5 5  L 5 5 5 5 5 5 5    SENSATION: Light touch and pinprick intact bilaterally  REFLEXES: normal, symmetric, nonbrisk.  Toes down, no clonus. Negative hooks's sign bilaterally.  GAIT: normal rise, base, steps, and arm swing.        Imaging:  X-ray left elbow (10/29/2020):  There is no fracture, dislocation or joint effusion.  Mild degenerative changes present at radiocapitellar joint.  Soft tissue calcification incidentally adjacent to lateral epicondyle, perhaps the result of previous ligamentous injury.    X-ray left wrist (10/29/2020):  Three views of the left wrist were obtained, demonstrating an acute, comminuted, intra-articular distal radial fracture with dorsal displacement of the primary distal fracture fragments.  There is a fracture of the base of the ulnar styloid process as well.  There are moderately severe osteoarthritic changes at the  wrist, particularly involving the 1st metacarpal-carpal joint.    X-ray right hip (10/14/2020):  No fracture or dislocation.  There is moderate right and severe left hip joint space loss accompanied by bulky marginal osteophyte formation.    X-ray left shoulder (8/25/2020):  No acute fracture or malalignment.  Degenerative change mild at the AC joint and minimal at the glenohumeral joint.  No abnormal soft tissue mineralization about the left shoulder.      Assessment: Liv Garland is a 60 y.o. female with PMH significant for HTN, DM II (no medications required), and hx of breast cancer in remission presents as a referral for the evaluation of right sided back/hip pain. Physical examination was significant for reproducible pain with internal and external rotation of the right hip. Most recent plain film of the right hip was significant for moderate OA. Treatment plan outlined below.     Plan:  - Schedule for right intra-articular hip injection under fluoroscopy  - Prescribed meloxicam 15 mg PO q day for pain and inflammation  - I have stressed the importance of physical activity and a home exercise plan to help with chronic pain and improve health.  - RTC for the procedure as outlined above. Would evaluate her lumbar spine and/or sacroiliac joint in the future if her pain persists    Thank you for referring this interesting patient, and I look forward to continuing to collaborate in her care.    Rios Colon MD  Pain Management

## 2020-11-22 ENCOUNTER — LAB VISIT (OUTPATIENT)
Dept: FAMILY MEDICINE | Facility: CLINIC | Age: 60
End: 2020-11-22
Payer: COMMERCIAL

## 2020-11-22 DIAGNOSIS — Z01.818 PRE-OP TESTING: ICD-10-CM

## 2020-11-22 PROCEDURE — U0003 INFECTIOUS AGENT DETECTION BY NUCLEIC ACID (DNA OR RNA); SEVERE ACUTE RESPIRATORY SYNDROME CORONAVIRUS 2 (SARS-COV-2) (CORONAVIRUS DISEASE [COVID-19]), AMPLIFIED PROBE TECHNIQUE, MAKING USE OF HIGH THROUGHPUT TECHNOLOGIES AS DESCRIBED BY CMS-2020-01-R: HCPCS

## 2020-11-23 LAB — SARS-COV-2 RNA RESP QL NAA+PROBE: NOT DETECTED

## 2020-11-25 ENCOUNTER — HOSPITAL ENCOUNTER (OUTPATIENT)
Facility: HOSPITAL | Age: 60
Discharge: HOME | End: 2020-11-25
Attending: ANESTHESIOLOGY | Admitting: ANESTHESIOLOGY
Payer: COMMERCIAL

## 2020-11-25 VITALS
TEMPERATURE: 98 F | RESPIRATION RATE: 16 BRPM | HEART RATE: 66 BPM | HEIGHT: 64 IN | DIASTOLIC BLOOD PRESSURE: 95 MMHG | WEIGHT: 150 LBS | OXYGEN SATURATION: 98 % | SYSTOLIC BLOOD PRESSURE: 131 MMHG | BODY MASS INDEX: 25.61 KG/M2

## 2020-11-25 DIAGNOSIS — M16.11 PRIMARY OSTEOARTHRITIS OF RIGHT HIP: Primary | ICD-10-CM

## 2020-11-25 DIAGNOSIS — M16.11 OSTEOARTHRITIS OF RIGHT HIP: ICD-10-CM

## 2020-11-25 PROCEDURE — 77002 NEEDLE LOCALIZATION BY XRAY: CPT | Mod: 26,,, | Performed by: ANESTHESIOLOGY

## 2020-11-25 PROCEDURE — 25500020 PHARM REV CODE 255: Performed by: ANESTHESIOLOGY

## 2020-11-25 PROCEDURE — 63600175 PHARM REV CODE 636 W HCPCS: Performed by: ANESTHESIOLOGY

## 2020-11-25 PROCEDURE — 20610 DRAIN/INJ JOINT/BURSA W/O US: CPT | Mod: RT,,, | Performed by: ANESTHESIOLOGY

## 2020-11-25 PROCEDURE — 20610 PR DRAIN/INJECT LARGE JOINT/BURSA: ICD-10-PCS | Mod: RT,,, | Performed by: ANESTHESIOLOGY

## 2020-11-25 PROCEDURE — 77002 PR FLUOROSCOPIC GUIDANCE NEEDLE PLACEMENT: ICD-10-PCS | Mod: 26,,, | Performed by: ANESTHESIOLOGY

## 2020-11-25 PROCEDURE — 20610 DRAIN/INJ JOINT/BURSA W/O US: CPT | Mod: RT | Performed by: ANESTHESIOLOGY

## 2020-11-25 PROCEDURE — 25000003 PHARM REV CODE 250: Performed by: ANESTHESIOLOGY

## 2020-11-25 RX ORDER — METHYLPREDNISOLONE ACETATE 80 MG/ML
INJECTION, SUSPENSION INTRA-ARTICULAR; INTRALESIONAL; INTRAMUSCULAR; SOFT TISSUE
Status: DISCONTINUED | OUTPATIENT
Start: 2020-11-25 | End: 2020-11-25 | Stop reason: HOSPADM

## 2020-11-25 RX ORDER — BUPIVACAINE HYDROCHLORIDE 5 MG/ML
INJECTION, SOLUTION EPIDURAL; INTRACAUDAL
Status: DISCONTINUED | OUTPATIENT
Start: 2020-11-25 | End: 2020-11-25 | Stop reason: HOSPADM

## 2020-11-25 RX ORDER — LIDOCAINE HYDROCHLORIDE 10 MG/ML
INJECTION INFILTRATION; PERINEURAL
Status: DISCONTINUED | OUTPATIENT
Start: 2020-11-25 | End: 2020-11-25 | Stop reason: HOSPADM

## 2020-11-25 RX ORDER — ALPRAZOLAM 0.5 MG/1
0.5 TABLET, ORALLY DISINTEGRATING ORAL
Status: COMPLETED | OUTPATIENT
Start: 2020-11-25 | End: 2020-11-25

## 2020-11-25 RX ORDER — LIDOCAINE HYDROCHLORIDE 10 MG/ML
INJECTION INFILTRATION; PERINEURAL
Status: DISCONTINUED
Start: 2020-11-25 | End: 2020-11-25 | Stop reason: HOSPADM

## 2020-11-25 RX ORDER — METHYLPREDNISOLONE ACETATE 80 MG/ML
INJECTION, SUSPENSION INTRA-ARTICULAR; INTRALESIONAL; INTRAMUSCULAR; SOFT TISSUE
Status: DISCONTINUED
Start: 2020-11-25 | End: 2020-11-25 | Stop reason: HOSPADM

## 2020-11-25 RX ORDER — SODIUM CHLORIDE, SODIUM LACTATE, POTASSIUM CHLORIDE, CALCIUM CHLORIDE 600; 310; 30; 20 MG/100ML; MG/100ML; MG/100ML; MG/100ML
INJECTION, SOLUTION INTRAVENOUS ONCE AS NEEDED
Status: DISCONTINUED | OUTPATIENT
Start: 2020-11-25 | End: 2020-11-25 | Stop reason: HOSPADM

## 2020-11-25 RX ORDER — BUPIVACAINE HYDROCHLORIDE 5 MG/ML
INJECTION, SOLUTION EPIDURAL; INTRACAUDAL
Status: DISCONTINUED
Start: 2020-11-25 | End: 2020-11-25 | Stop reason: HOSPADM

## 2020-11-25 RX ORDER — LOSARTAN POTASSIUM 25 MG/1
12.5 TABLET ORAL DAILY
COMMUNITY
End: 2021-06-15

## 2020-11-25 RX ADMIN — ALPRAZOLAM 0.5 MG: 0.5 TABLET, ORALLY DISINTEGRATING ORAL at 10:11

## 2020-11-25 NOTE — OP NOTE
PROCEDURE DATE: 11/25/2020    PROCEDURE:  Right  Hip joint injection under fluoroscopy.      Diagnosis: Chronic right hip pain  Post Op diagnosis: Same       PHYSICIAN: Rios Colon MD                                                                               Local anesthetic:  Lidocaine 1%, 4 ml total                                                                                                              MEDICATIONS INJECTED:  Methylprednisone 80mg and bupivacaine 0.5% 4 mL                                                                             ESTIMATED BLOOD LOSS:  none                                                                                                                             COMPLICATIONS:  none                                                                                                                                    TECHNIQUE: A time-out taken to identify patient and procedure side prior to starting the procedure.  With the patient lying in the supine position, the right greater trochanter, femoral neck and femoral head were visualized. The area was prepped and draped in the usual sterile fashion.  Local anesthetic was used, given by raising a wheal and going down to the hub of the 25-gauge needle 1.5inch needle.  A 3.5inch 22-gauge needle was introduced under fluoroscopy to the lateral portion of the femoral neck and then walked medially to enter the hip joint capsule.  When the tip of the needle was presumed to be in appropriate position, 2 mL contrast was injected and noted to spread throughout the joint space.  After negative aspiration for blood, the medication as noted above was then injected slowly.  The patient tolerated the procedure well.                                                                                                                                                                                                The patient was monitored after the  procedure and was given post procedure and discharge instructions to follow at home. The patient was discharged in a stable condition.

## 2020-11-25 NOTE — INTERVAL H&P NOTE
The patient has been examined and the H&P has been reviewed:    I concur with the findings and no changes have occurred since H&P was written.    This patient has been cleared for surgery in an ambulatory surgical facility    ASA 3,  Mallampatti Score 3  No history of anesthetic complications  Plan for oral sedation      Anesthesia/Surgery risks, benefits and alternative options discussed and understood by patient/family.          Active Hospital Problems    Diagnosis  POA    Osteoarthritis of right hip [M16.11]  Yes      Resolved Hospital Problems   No resolved problems to display.

## 2020-12-14 ENCOUNTER — OFFICE VISIT (OUTPATIENT)
Dept: PAIN MEDICINE | Facility: CLINIC | Age: 60
End: 2020-12-14
Payer: COMMERCIAL

## 2020-12-14 VITALS
BODY MASS INDEX: 25.46 KG/M2 | DIASTOLIC BLOOD PRESSURE: 86 MMHG | HEART RATE: 66 BPM | WEIGHT: 149.13 LBS | HEIGHT: 64 IN | TEMPERATURE: 98 F | SYSTOLIC BLOOD PRESSURE: 145 MMHG | RESPIRATION RATE: 20 BRPM

## 2020-12-14 DIAGNOSIS — M16.11 PRIMARY OSTEOARTHRITIS OF RIGHT HIP: Primary | ICD-10-CM

## 2020-12-14 PROCEDURE — 99999 PR PBB SHADOW E&M-EST. PATIENT-LVL III: CPT | Mod: PBBFAC,,, | Performed by: NURSE PRACTITIONER

## 2020-12-14 PROCEDURE — 99214 OFFICE O/P EST MOD 30 MIN: CPT | Mod: S$GLB,,, | Performed by: NURSE PRACTITIONER

## 2020-12-14 PROCEDURE — 99999 PR PBB SHADOW E&M-EST. PATIENT-LVL III: ICD-10-PCS | Mod: PBBFAC,,, | Performed by: NURSE PRACTITIONER

## 2020-12-14 PROCEDURE — 99214 PR OFFICE/OUTPT VISIT, EST, LEVL IV, 30-39 MIN: ICD-10-PCS | Mod: S$GLB,,, | Performed by: NURSE PRACTITIONER

## 2020-12-14 RX ORDER — GABAPENTIN 300 MG/1
300 CAPSULE ORAL 3 TIMES DAILY
Qty: 60 CAPSULE | Refills: 0 | Status: SHIPPED | OUTPATIENT
Start: 2020-12-14 | End: 2021-06-30

## 2020-12-14 NOTE — PROGRESS NOTES
"FOLLOW UP NOTE:     CHIEF COMPLAINT: hip pain (at needle insertion site)    INITIAL HISTORY OF PRESENT ILLNESS: Liv Garland is a 60 y.o. female with PMH significant for HTN, DM II (no medications required), and hx of breast cancer in remission presents as a referral for the evaluation of right sided back/hip pain. The patient reports that her pain began approximately 4 months ago after no inciting incident or trauma. The patient reports that her pain caused her to fall with a resultant left wrist fracture requiring surgical repair. The patient localizes her pain to the right side of her back/hip area. The patient reports of radiation to her right hip and right groin running down the medial aspect of her RLE to her entire foot. The patient describes her pain as a "fire" type of pain. The patient reports of numbness in her left leg but not her right leg. The patient reports that her pain is a 10/10. Patient denies of any urinary/fecal incontinence, saddle anesthesia, or weakness.      Aggravating factors: moving, walking     Mitigating factors: N/A     Relevant Surgeries: no     INTERVAL HISTORY OF PRESENT ILLNESS: Liv Garland is a 60 y.o. female  with PMH significant for HTN, DM II (no medications required), and hx of breast cancer in remission presents as a walk in for c/o pain 4/10 to her hip at the needle insertion site s/p right hip injection on 11/25/2020. She has gotten about 90% relief of her hip pain, but she is having pain to her right hip/groin at the needle insertion site. She said it started about 3 days ago after heavy walking and activity while shopping. She denies trauma or direct injury. No drainage or s/s infection.        Patient denies of any urinary/fecal incontinence, saddle anesthesia, or weakness.     INTERVENTIONAL PAIN HISTORY:  11/25/2020: Right intra-articular hip injection - 90% benefit  2/22/2018: R greater trochanteric bursa via Dr. Acosta - reports of some benefit for months at " "a time     CURRENT PAIN MEDICATIONS:   Tylenol OTC as needed  Meloxicam 15 mg PO daily as needed    : N/A    IMAGING: No new imaging to review    ROS:  Review of Systems   Constitutional: Negative for chills and fever.   HENT: Negative for sore throat.    Eyes: Negative for visual disturbance.   Respiratory: Negative for shortness of breath.    Cardiovascular: Negative for chest pain.   Gastrointestinal: Negative for nausea and vomiting.   Genitourinary: Negative for difficulty urinating.   Musculoskeletal: Positive for arthralgias and back pain.   Skin: Negative for rash.   Allergic/Immunologic: Negative for immunocompromised state.   Neurological: Negative for syncope.   Hematological: Does not bruise/bleed easily.   Psychiatric/Behavioral: Negative for suicidal ideas.        MEDICAL, SURGICAL, FAMILY, SOCIAL HX: reviewed    MEDICATIONS/ALLERGIES: reviewed    PHYSICAL EXAM:    VITALS: Vitals reviewed.   Vitals:    12/14/20 1504   BP: (!) 145/86   Pulse: 66   Resp: 20   Temp: 97.5 °F (36.4 °C)   TempSrc: Temporal   Weight: 67.7 kg (149 lb 2.3 oz)   Height: 5' 4" (1.626 m)   PainSc:   4   PainLoc: Hip       Physical Exam   Constitutional: She is oriented to person, place, and time and well-developed, well-nourished, and in no distress.   HENT:   Head: Normocephalic and atraumatic.   Eyes: Conjunctivae and EOM are normal. Right eye exhibits no discharge. Left eye exhibits no discharge.   Cardiovascular: Normal rate.   Pulmonary/Chest: Effort normal and breath sounds normal. No respiratory distress.   Abdominal: Soft.   Musculoskeletal: Normal range of motion.         General: Tenderness present. No deformity or edema.      Comments: Tender to right hip/groin insertion site with deep palpation. No redness or swelling.    Neurological: She is alert and oriented to person, place, and time.   Skin: Skin is warm and dry. No rash noted. She is not diaphoretic.   Psychiatric: Mood, memory, affect and judgment normal. " "  Nursing note and vitals reviewed.       ASSESSMENT: Liv Garland is a 60 y.o. female with PMH significant for HTN, DM II (no medications required), and hx of breast cancer in remission presents as a referral for the evaluation of pain at the insertion site of her hip injection. The patient reports that her general hip pain is much better, but she is just having pain "where the needle was inserted". Discussed procedure and plan of care. She verbalizes understanding. Plan below:      1. Primary osteoarthritis of right hip  gabapentin (NEURONTIN) 300 MG capsule       PLAN:  1. Start Gabapentin 300 MG, titrating up to an initial dose of 300 mg TID.  Will titrate up as tolerated to a goal dose between 1800mg and 3200 mg daily. Stay at most comfortable dose. May increase further if tolerated. Advised of possible side effects including sedation, dizziness, and swelling in the extremities.   2. Ice to right groin as needed for pain or inflammation.   3. Continue meloxicam 15 mg PO daily  4. I have stressed the importance of physical activity and a home exercise plan to help with chronic pain and improve health.  5. I discussed with the patient potential secondary side effects of medication prescribed and urged the patient to read all FDA approved materials that the pharmacy provides with the prescription.  6. RTC in 1 month or sooner if needed      Christie French CNP  Pain Management            "

## 2020-12-16 ENCOUNTER — PATIENT MESSAGE (OUTPATIENT)
Dept: PAIN MEDICINE | Facility: CLINIC | Age: 60
End: 2020-12-16

## 2020-12-19 ENCOUNTER — PATIENT MESSAGE (OUTPATIENT)
Dept: PAIN MEDICINE | Facility: CLINIC | Age: 60
End: 2020-12-19

## 2020-12-20 ENCOUNTER — PATIENT MESSAGE (OUTPATIENT)
Dept: PAIN MEDICINE | Facility: CLINIC | Age: 60
End: 2020-12-20

## 2020-12-21 ENCOUNTER — OFFICE VISIT (OUTPATIENT)
Dept: PAIN MEDICINE | Facility: CLINIC | Age: 60
End: 2020-12-21
Payer: COMMERCIAL

## 2020-12-21 VITALS
WEIGHT: 148.5 LBS | HEIGHT: 64 IN | SYSTOLIC BLOOD PRESSURE: 127 MMHG | RESPIRATION RATE: 19 BRPM | BODY MASS INDEX: 25.35 KG/M2 | TEMPERATURE: 97 F | HEART RATE: 83 BPM | DIASTOLIC BLOOD PRESSURE: 80 MMHG

## 2020-12-21 DIAGNOSIS — M16.0 PRIMARY OSTEOARTHRITIS OF BOTH HIPS: Primary | ICD-10-CM

## 2020-12-21 PROCEDURE — 99214 OFFICE O/P EST MOD 30 MIN: CPT | Mod: S$GLB,,, | Performed by: NURSE PRACTITIONER

## 2020-12-21 PROCEDURE — 99214 PR OFFICE/OUTPT VISIT, EST, LEVL IV, 30-39 MIN: ICD-10-PCS | Mod: S$GLB,,, | Performed by: NURSE PRACTITIONER

## 2020-12-21 PROCEDURE — 99999 PR PBB SHADOW E&M-EST. PATIENT-LVL III: CPT | Mod: PBBFAC,,, | Performed by: NURSE PRACTITIONER

## 2020-12-21 PROCEDURE — 99999 PR PBB SHADOW E&M-EST. PATIENT-LVL III: ICD-10-PCS | Mod: PBBFAC,,, | Performed by: NURSE PRACTITIONER

## 2020-12-21 NOTE — PROGRESS NOTES
"refFOLLOW UP NOTE:     CHIEF COMPLAINT: Hip pain    INITIAL HISTORY OF PRESENT ILLNESS:  Liv Garland is a 60 y.o. female with PMH significant for HTN, DM II (no medications required), and hx of breast cancer in remission presents as a referral for the evaluation of right sided back/hip pain. The patient reports that her pain began approximately 4 months ago after no inciting incident or trauma. The patient reports that her pain caused her to fall with a resultant left wrist fracture requiring surgical repair. The patient localizes her pain to the right side of her back/hip area. The patient reports of radiation to her right hip and right groin running down the medial aspect of her RLE to her entire foot. The patient describes her pain as a "fire" type of pain. The patient reports of numbness in her left leg but not her right leg. The patient reports that her pain is a 10/10. Patient denies of any urinary/fecal incontinence, saddle anesthesia, or weakness.      Aggravating factors: moving, walking     Mitigating factors: N/A     Relevant Surgeries: no     INTERVAL HISTORY OF PRESENT ILLNESS: Liv Garland is a 60 y.o. female  with PMH significant for HTN, DM II (no medications required), and hx of breast cancer in remission presents as a walk in for c/o continued pain to right groin. The patient denies hip pain. She states that the injection helped with this, but she is having persistent pain in her right groin and right thigh that is "crippling". She says that the pain increases with standing or walking. She denies weakness in her legs as described in her message. She denies lower back pain. Denies other pain.    Patient denies of any urinary/fecal incontinence, saddle anesthesia, or weakness.     INTERVENTIONAL PAIN HISTORY:  11/25/2020: Right intra-articular hip injection - 90% benefit  2/22/2018: R greater trochanteric bursa via Dr. Acosta - reports of some benefit for months at a time     CURRENT PAIN " "MEDICATIONS:   Tylenol OTC as needed  Meloxicam 15 mg PO daily as needed  Gabapentin 300 mg PO TID    : N/A    IMAGING:  No new imaging to review    ROS:  Review of Systems   Constitutional: Negative for chills and fever.   HENT: Negative for sore throat.    Eyes: Negative for visual disturbance.   Respiratory: Negative for shortness of breath.    Cardiovascular: Negative for chest pain.   Gastrointestinal: Negative for nausea and vomiting.   Genitourinary: Negative for difficulty urinating.   Musculoskeletal: Positive for arthralgias and back pain.   Skin: Negative for rash.   Allergic/Immunologic: Negative for immunocompromised state.   Neurological: Negative for syncope.   Hematological: Does not bruise/bleed easily.   Psychiatric/Behavioral: Negative for suicidal ideas.        MEDICAL, SURGICAL, FAMILY, SOCIAL HX: reviewed    MEDICATIONS/ALLERGIES: reviewed    PHYSICAL EXAM:    VITALS: Vitals reviewed.   Vitals:    12/21/20 1301   BP: 127/80   Pulse: 83   Resp: 19   Temp: 97.3 °F (36.3 °C)   TempSrc: Temporal   Weight: 67.4 kg (148 lb 7.7 oz)   Height: 5' 4" (1.626 m)   PainSc:   8   PainLoc: Leg       Physical Exam   Constitutional: She is oriented to person, place, and time and well-developed, well-nourished, and in no distress.   HENT:   Head: Normocephalic and atraumatic.   Eyes: Conjunctivae and EOM are normal. Right eye exhibits no discharge. Left eye exhibits no discharge.   Cardiovascular: Normal rate.   Pulmonary/Chest: Effort normal and breath sounds normal. No respiratory distress.   Abdominal: Soft.   Musculoskeletal: Normal range of motion.         General: Tenderness present. No deformity or edema.      Comments: Tender to right hip/groin    Neurological: She is alert and oriented to person, place, and time.   Skin: Skin is warm and dry. No rash noted. She is not diaphoretic.   Psychiatric: Mood, memory, affect and judgment normal.   Nursing note and vitals reviewed.       UPPER EXTREMITIES: " Normal alignment, normal range of motion, no atrophy, no skin changes,  hair growth and nail growth normal and equal bilaterally. No swelling, no tenderness.    LOWER EXTREMITIES:  Normal alignment, normal range of motion, no atrophy, no skin changes,  hair growth and nail growth normal and equal bilaterally. No swelling, no tenderness.    LUMBAR SPINE:    Lumbar spine: ROM is full with flexion extension and oblique extension with no increased pain.    ((--)) Supine straight leg raise   ((--)) Facet loading   Internal and external rotation of the hip causes no increased pain on either side.  Myofascial exam: No tenderness to palpation across lumbar paraspinous muscles.    ((--)) TTP at the SI joint  ((--)) GEORGE's test  ((--)) One leg stand      CRANIAL NERVES:  II:  PERRL bilaterally,   III,IV,VI: EOMI.    V:  Facial sensation equal bilaterally  VII:  Facial motor function normal.  VIII:  Hearing equal to finger rub bilaterally  IX/X: Gag normal, palate symmetric  XI:  Shoulder shrug equal, head turn equal  XII:  Tongue midline without fasciculations      MOTOR: Tone and bulk: normal bilateral upper and lower Strength: normal   Delt Bi Tri WE WF     R 5 5 5 5 5 5   L 5 5 5 5 5 5     IP ADD ABD Quad TA Gas HAM  R 5 5 5 5 5 5 5  L 5 5 5 5 5 5 5      NEUROLOGICAL:    Gen: No clonus or spasticity.   Gait: Normal without antalgic lean. Normal rise, base, steps, and arm swing.    BABINSKI: Absent bilaterally  Sensory: Intact to light touch and proprioception BLE  REFLEXES: normal, symmetric, nonbrisk.  Toes down, no clonus. Negative hooks's sign bilaterally.      ASSESSMENT: Liv Garland is a 60 y.o. female with PMH significant for HTN, DM II (no medications required), and hx of breast cancer in remission presents for persistent pain to her right groin. She denies other pain. She has severe osteoarthritis to bilateral hips. Dr. Suárez requested an SI joint injection. However, the patient has no SI Joint  findings on exam. She is requesting referral to [facility] with Dr. Freed, whom she has seen in the past. Plan below:    1. Primary osteoarthritis of both hips  Ambulatory referral/consult to Orthopedics       PLAN:  1. Referral to [facility]  2. Offered Coolief of right hip. Pt would like to wait until after Ortho evaluation. Procedure explained in detail, including need for diagnostic block.   3. Continue meloxicam 15 mg PO daily and Gabapentin 300 mg PO TID  4. I have stressed the importance of physical activity and a home exercise plan to help with chronic pain and improve health.  5. I discussed with the patient potential secondary side effects of medication prescribed and urged the patient to read all FDA approved materials that the pharmacy provides with the prescription.  6. RTC once evaluation completed by Ortho.       Christie French CNP  Pain Management

## 2021-01-14 ENCOUNTER — LAB VISIT (OUTPATIENT)
Dept: LAB | Facility: HOSPITAL | Age: 61
End: 2021-01-14
Attending: FAMILY MEDICINE
Payer: COMMERCIAL

## 2021-01-14 DIAGNOSIS — E78.5 DYSLIPIDEMIA: ICD-10-CM

## 2021-01-14 DIAGNOSIS — I10 ESSENTIAL HYPERTENSION: ICD-10-CM

## 2021-01-14 LAB
ALBUMIN SERPL BCP-MCNC: 4.6 G/DL (ref 3.5–5.2)
ALP SERPL-CCNC: 62 U/L (ref 55–135)
ALT SERPL W/O P-5'-P-CCNC: 25 U/L (ref 10–44)
ANION GAP SERPL CALC-SCNC: 11 MMOL/L (ref 8–16)
AST SERPL-CCNC: 18 U/L (ref 10–40)
BASOPHILS # BLD AUTO: 0.04 K/UL (ref 0–0.2)
BASOPHILS NFR BLD: 0.5 % (ref 0–1.9)
BILIRUB SERPL-MCNC: 0.4 MG/DL (ref 0.1–1)
BUN SERPL-MCNC: 23 MG/DL (ref 8–23)
CALCIUM SERPL-MCNC: 9.2 MG/DL (ref 8.7–10.5)
CHLORIDE SERPL-SCNC: 101 MMOL/L (ref 95–110)
CHOLEST SERPL-MCNC: 263 MG/DL (ref 120–199)
CHOLEST/HDLC SERPL: 4.7 {RATIO} (ref 2–5)
CO2 SERPL-SCNC: 25 MMOL/L (ref 23–29)
CREAT SERPL-MCNC: 0.5 MG/DL (ref 0.5–1.4)
DIFFERENTIAL METHOD: ABNORMAL
EOSINOPHIL # BLD AUTO: 0.1 K/UL (ref 0–0.5)
EOSINOPHIL NFR BLD: 0.6 % (ref 0–8)
ERYTHROCYTE [DISTWIDTH] IN BLOOD BY AUTOMATED COUNT: 13.8 % (ref 11.5–14.5)
EST. GFR  (AFRICAN AMERICAN): >60 ML/MIN/1.73 M^2
EST. GFR  (NON AFRICAN AMERICAN): >60 ML/MIN/1.73 M^2
GLUCOSE SERPL-MCNC: 120 MG/DL (ref 70–110)
HCT VFR BLD AUTO: 43.1 % (ref 37–48.5)
HDLC SERPL-MCNC: 56 MG/DL (ref 40–75)
HDLC SERPL: 21.3 % (ref 20–50)
HGB BLD-MCNC: 14.2 G/DL (ref 12–16)
IMM GRANULOCYTES # BLD AUTO: 0.03 K/UL (ref 0–0.04)
IMM GRANULOCYTES NFR BLD AUTO: 0.4 % (ref 0–0.5)
LDLC SERPL CALC-MCNC: 161 MG/DL (ref 63–159)
LYMPHOCYTES # BLD AUTO: 2.8 K/UL (ref 1–4.8)
LYMPHOCYTES NFR BLD: 33.5 % (ref 18–48)
MCH RBC QN AUTO: 30.9 PG (ref 27–31)
MCHC RBC AUTO-ENTMCNC: 32.9 G/DL (ref 32–36)
MCV RBC AUTO: 94 FL (ref 82–98)
MONOCYTES # BLD AUTO: 0.5 K/UL (ref 0.3–1)
MONOCYTES NFR BLD: 5.3 % (ref 4–15)
NEUTROPHILS # BLD AUTO: 5 K/UL (ref 1.8–7.7)
NEUTROPHILS NFR BLD: 59.7 % (ref 38–73)
NONHDLC SERPL-MCNC: 207 MG/DL
NRBC BLD-RTO: 0 /100 WBC
PLATELET # BLD AUTO: 431 K/UL (ref 150–350)
PMV BLD AUTO: 9.7 FL (ref 9.2–12.9)
POTASSIUM SERPL-SCNC: 3.7 MMOL/L (ref 3.5–5.1)
PROT SERPL-MCNC: 7.8 G/DL (ref 6–8.4)
RBC # BLD AUTO: 4.6 M/UL (ref 4–5.4)
SODIUM SERPL-SCNC: 137 MMOL/L (ref 136–145)
TRIGL SERPL-MCNC: 230 MG/DL (ref 30–150)
WBC # BLD AUTO: 8.43 K/UL (ref 3.9–12.7)

## 2021-01-14 PROCEDURE — 85025 COMPLETE CBC W/AUTO DIFF WBC: CPT

## 2021-01-14 PROCEDURE — 80053 COMPREHEN METABOLIC PANEL: CPT

## 2021-01-14 PROCEDURE — 36415 COLL VENOUS BLD VENIPUNCTURE: CPT

## 2021-01-14 PROCEDURE — 80061 LIPID PANEL: CPT

## 2021-01-21 ENCOUNTER — OFFICE VISIT (OUTPATIENT)
Dept: FAMILY MEDICINE | Facility: CLINIC | Age: 61
End: 2021-01-21
Payer: COMMERCIAL

## 2021-01-21 VITALS
BODY MASS INDEX: 25.23 KG/M2 | HEART RATE: 63 BPM | DIASTOLIC BLOOD PRESSURE: 81 MMHG | WEIGHT: 147 LBS | TEMPERATURE: 98 F | SYSTOLIC BLOOD PRESSURE: 130 MMHG

## 2021-01-21 DIAGNOSIS — R73.9 HYPERGLYCEMIA: ICD-10-CM

## 2021-01-21 DIAGNOSIS — M16.11 PRIMARY OSTEOARTHRITIS OF RIGHT HIP: ICD-10-CM

## 2021-01-21 DIAGNOSIS — I10 ESSENTIAL HYPERTENSION: Primary | ICD-10-CM

## 2021-01-21 DIAGNOSIS — E78.5 DYSLIPIDEMIA: ICD-10-CM

## 2021-01-21 PROCEDURE — 99214 PR OFFICE/OUTPT VISIT, EST, LEVL IV, 30-39 MIN: ICD-10-PCS | Mod: S$GLB,,, | Performed by: FAMILY MEDICINE

## 2021-01-21 PROCEDURE — 99214 OFFICE O/P EST MOD 30 MIN: CPT | Mod: S$GLB,,, | Performed by: FAMILY MEDICINE

## 2021-02-01 ENCOUNTER — PATIENT MESSAGE (OUTPATIENT)
Dept: FAMILY MEDICINE | Facility: CLINIC | Age: 61
End: 2021-02-01

## 2021-02-01 ENCOUNTER — PATIENT MESSAGE (OUTPATIENT)
Dept: ORTHOPEDICS | Facility: CLINIC | Age: 61
End: 2021-02-01

## 2021-02-03 DIAGNOSIS — M25.559 HIP PAIN: Primary | ICD-10-CM

## 2021-02-04 ENCOUNTER — HOSPITAL ENCOUNTER (OUTPATIENT)
Dept: PREADMISSION TESTING | Facility: HOSPITAL | Age: 61
Discharge: HOME | End: 2021-02-04
Attending: ORTHOPAEDIC SURGERY
Payer: COMMERCIAL

## 2021-02-04 ENCOUNTER — OFFICE VISIT (OUTPATIENT)
Dept: ORTHOPEDICS | Facility: CLINIC | Age: 61
End: 2021-02-04
Payer: COMMERCIAL

## 2021-02-04 ENCOUNTER — HOSPITAL ENCOUNTER (OUTPATIENT)
Dept: RADIOLOGY | Facility: HOSPITAL | Age: 61
Discharge: HOME | End: 2021-02-04
Attending: ORTHOPAEDIC SURGERY
Payer: COMMERCIAL

## 2021-02-04 VITALS
OXYGEN SATURATION: 98 % | HEART RATE: 66 BPM | TEMPERATURE: 98 F | BODY MASS INDEX: 25.1 KG/M2 | WEIGHT: 147 LBS | HEIGHT: 64 IN

## 2021-02-04 DIAGNOSIS — M70.61 TROCHANTERIC BURSITIS, RIGHT HIP: ICD-10-CM

## 2021-02-04 DIAGNOSIS — M16.0 BILATERAL HIP JOINT ARTHRITIS: Primary | ICD-10-CM

## 2021-02-04 DIAGNOSIS — M25.559 HIP PAIN: ICD-10-CM

## 2021-02-04 DIAGNOSIS — M16.11 PRIMARY OSTEOARTHRITIS OF RIGHT HIP: Primary | ICD-10-CM

## 2021-02-04 DIAGNOSIS — M70.61 TROCHANTERIC BURSITIS, RIGHT HIP: Primary | ICD-10-CM

## 2021-02-04 DIAGNOSIS — M54.10 RADICULAR PAIN OF RIGHT LOWER EXTREMITY: ICD-10-CM

## 2021-02-04 DIAGNOSIS — M16.11 OSTEOARTHRITIS OF RIGHT HIP: ICD-10-CM

## 2021-02-04 DIAGNOSIS — Z01.818 PRE-OP TESTING: ICD-10-CM

## 2021-02-04 PROCEDURE — 73502 XR PELVIS 3 VIEW INC HIP 2 VIEW RIGHT: ICD-10-PCS | Mod: 26,RT,, | Performed by: RADIOLOGY

## 2021-02-04 PROCEDURE — 99900103 DSU ONLY-NO CHARGE-INITIAL HR (STAT)

## 2021-02-04 PROCEDURE — 99214 OFFICE O/P EST MOD 30 MIN: CPT | Mod: S$GLB,,, | Performed by: ORTHOPAEDIC SURGERY

## 2021-02-04 PROCEDURE — 73502 X-RAY EXAM HIP UNI 2-3 VIEWS: CPT | Mod: 26,RT,, | Performed by: RADIOLOGY

## 2021-02-04 PROCEDURE — 73502 X-RAY EXAM HIP UNI 2-3 VIEWS: CPT | Mod: TC,PN,RT

## 2021-02-04 PROCEDURE — 99214 PR OFFICE/OUTPT VISIT, EST, LEVL IV, 30-39 MIN: ICD-10-PCS | Mod: S$GLB,,, | Performed by: ORTHOPAEDIC SURGERY

## 2021-02-04 PROCEDURE — 99999 PR PBB SHADOW E&M-EST. PATIENT-LVL III: CPT | Mod: PBBFAC,,, | Performed by: ORTHOPAEDIC SURGERY

## 2021-02-04 PROCEDURE — 99999 PR PBB SHADOW E&M-EST. PATIENT-LVL III: ICD-10-PCS | Mod: PBBFAC,,, | Performed by: ORTHOPAEDIC SURGERY

## 2021-02-04 RX ORDER — MUPIROCIN 20 MG/G
OINTMENT TOPICAL
Status: CANCELLED | OUTPATIENT
Start: 2021-02-04

## 2021-02-07 ENCOUNTER — LAB VISIT (OUTPATIENT)
Dept: FAMILY MEDICINE | Facility: CLINIC | Age: 61
End: 2021-02-07
Payer: COMMERCIAL

## 2021-02-07 DIAGNOSIS — Z01.818 PRE-OP TESTING: ICD-10-CM

## 2021-02-07 PROCEDURE — U0005 INFEC AGEN DETEC AMPLI PROBE: HCPCS

## 2021-02-07 PROCEDURE — U0003 INFECTIOUS AGENT DETECTION BY NUCLEIC ACID (DNA OR RNA); SEVERE ACUTE RESPIRATORY SYNDROME CORONAVIRUS 2 (SARS-COV-2) (CORONAVIRUS DISEASE [COVID-19]), AMPLIFIED PROBE TECHNIQUE, MAKING USE OF HIGH THROUGHPUT TECHNOLOGIES AS DESCRIBED BY CMS-2020-01-R: HCPCS

## 2021-02-08 LAB — SARS-COV-2 RNA RESP QL NAA+PROBE: NOT DETECTED

## 2021-02-10 ENCOUNTER — HOSPITAL ENCOUNTER (OUTPATIENT)
Facility: HOSPITAL | Age: 61
Discharge: HOME | End: 2021-02-10
Attending: ORTHOPAEDIC SURGERY | Admitting: ORTHOPAEDIC SURGERY
Payer: COMMERCIAL

## 2021-02-10 ENCOUNTER — ANESTHESIA EVENT (OUTPATIENT)
Dept: SURGERY | Facility: HOSPITAL | Age: 61
End: 2021-02-10
Payer: COMMERCIAL

## 2021-02-10 ENCOUNTER — ANESTHESIA (OUTPATIENT)
Dept: SURGERY | Facility: HOSPITAL | Age: 61
End: 2021-02-10
Payer: COMMERCIAL

## 2021-02-10 DIAGNOSIS — M16.11 OSTEOARTHRITIS OF RIGHT HIP: Primary | ICD-10-CM

## 2021-02-10 DIAGNOSIS — Z01.818 PRE-OP TESTING: ICD-10-CM

## 2021-02-10 DIAGNOSIS — M16.11 PRIMARY OSTEOARTHRITIS OF RIGHT HIP: ICD-10-CM

## 2021-02-10 PROCEDURE — 63600175 PHARM REV CODE 636 W HCPCS: Performed by: ANESTHESIOLOGY

## 2021-02-10 PROCEDURE — 71000033 HC RECOVERY, INTIAL HOUR: Performed by: ORTHOPAEDIC SURGERY

## 2021-02-10 PROCEDURE — 37000009 HC ANESTHESIA EA ADD 15 MINS: Performed by: ORTHOPAEDIC SURGERY

## 2021-02-10 PROCEDURE — 36000705 HC OR TIME LEV I EA ADD 15 MIN: Performed by: ORTHOPAEDIC SURGERY

## 2021-02-10 PROCEDURE — 36000704 HC OR TIME LEV I 1ST 15 MIN: Performed by: ORTHOPAEDIC SURGERY

## 2021-02-10 PROCEDURE — 25500020 PHARM REV CODE 255: Performed by: ORTHOPAEDIC SURGERY

## 2021-02-10 PROCEDURE — 77002 NEEDLE LOCALIZATION BY XRAY: CPT | Mod: 26,,, | Performed by: ORTHOPAEDIC SURGERY

## 2021-02-10 PROCEDURE — 25000003 PHARM REV CODE 250: Performed by: ORTHOPAEDIC SURGERY

## 2021-02-10 PROCEDURE — 63600175 PHARM REV CODE 636 W HCPCS: Performed by: NURSE ANESTHETIST, CERTIFIED REGISTERED

## 2021-02-10 PROCEDURE — D9220A PRA ANESTHESIA: Mod: ,,, | Performed by: ANESTHESIOLOGY

## 2021-02-10 PROCEDURE — 20610 PR DRAIN/INJECT LARGE JOINT/BURSA: ICD-10-PCS | Mod: RT,,, | Performed by: ORTHOPAEDIC SURGERY

## 2021-02-10 PROCEDURE — 71000015 HC POSTOP RECOV 1ST HR: Performed by: ORTHOPAEDIC SURGERY

## 2021-02-10 PROCEDURE — 63600175 PHARM REV CODE 636 W HCPCS

## 2021-02-10 PROCEDURE — 25000003 PHARM REV CODE 250: Performed by: ANESTHESIOLOGY

## 2021-02-10 PROCEDURE — 77002 PR FLUOROSCOPIC GUIDANCE NEEDLE PLACEMENT: ICD-10-PCS | Mod: 26,,, | Performed by: ORTHOPAEDIC SURGERY

## 2021-02-10 PROCEDURE — 37000008 HC ANESTHESIA 1ST 15 MINUTES: Performed by: ORTHOPAEDIC SURGERY

## 2021-02-10 PROCEDURE — 20610 DRAIN/INJ JOINT/BURSA W/O US: CPT | Mod: RT,,, | Performed by: ORTHOPAEDIC SURGERY

## 2021-02-10 PROCEDURE — D9220A PRA ANESTHESIA: ICD-10-PCS | Mod: ,,, | Performed by: ANESTHESIOLOGY

## 2021-02-10 RX ORDER — PROPOFOL 10 MG/ML
VIAL (ML) INTRAVENOUS
Status: DISCONTINUED | OUTPATIENT
Start: 2021-02-10 | End: 2021-02-10

## 2021-02-10 RX ORDER — MEPERIDINE HYDROCHLORIDE 50 MG/ML
INJECTION INTRAMUSCULAR; INTRAVENOUS; SUBCUTANEOUS
Status: DISCONTINUED | OUTPATIENT
Start: 2021-02-10 | End: 2021-02-10

## 2021-02-10 RX ORDER — BUPIVACAINE HYDROCHLORIDE 2.5 MG/ML
INJECTION, SOLUTION EPIDURAL; INFILTRATION; INTRACAUDAL
Status: DISCONTINUED | OUTPATIENT
Start: 2021-02-10 | End: 2021-02-10 | Stop reason: HOSPADM

## 2021-02-10 RX ORDER — FAMOTIDINE 10 MG/ML
20 INJECTION INTRAVENOUS ONCE
Status: COMPLETED | OUTPATIENT
Start: 2021-02-10 | End: 2021-02-10

## 2021-02-10 RX ORDER — LIDOCAINE HYDROCHLORIDE 10 MG/ML
1 INJECTION, SOLUTION EPIDURAL; INFILTRATION; INTRACAUDAL; PERINEURAL ONCE
Status: DISCONTINUED | OUTPATIENT
Start: 2021-02-10 | End: 2021-02-10 | Stop reason: HOSPADM

## 2021-02-10 RX ORDER — FAMOTIDINE 10 MG/ML
INJECTION INTRAVENOUS
Status: DISCONTINUED
Start: 2021-02-10 | End: 2021-02-10 | Stop reason: HOSPADM

## 2021-02-10 RX ORDER — SODIUM CHLORIDE, SODIUM LACTATE, POTASSIUM CHLORIDE, CALCIUM CHLORIDE 600; 310; 30; 20 MG/100ML; MG/100ML; MG/100ML; MG/100ML
125 INJECTION, SOLUTION INTRAVENOUS CONTINUOUS
Status: DISCONTINUED | OUTPATIENT
Start: 2021-02-10 | End: 2021-02-10 | Stop reason: HOSPADM

## 2021-02-10 RX ORDER — ONDANSETRON 2 MG/ML
4 INJECTION INTRAMUSCULAR; INTRAVENOUS DAILY PRN
Status: DISCONTINUED | OUTPATIENT
Start: 2021-02-10 | End: 2021-02-10 | Stop reason: HOSPADM

## 2021-02-10 RX ORDER — MORPHINE SULFATE 4 MG/ML
2 INJECTION, SOLUTION INTRAMUSCULAR; INTRAVENOUS EVERY 5 MIN PRN
Status: DISCONTINUED | OUTPATIENT
Start: 2021-02-10 | End: 2021-02-10 | Stop reason: HOSPADM

## 2021-02-10 RX ORDER — LIDOCAINE HYDROCHLORIDE 10 MG/ML
INJECTION INFILTRATION; PERINEURAL
Status: DISCONTINUED | OUTPATIENT
Start: 2021-02-10 | End: 2021-02-10 | Stop reason: HOSPADM

## 2021-02-10 RX ORDER — MUPIROCIN 20 MG/G
OINTMENT TOPICAL
Status: DISCONTINUED | OUTPATIENT
Start: 2021-02-10 | End: 2021-02-10 | Stop reason: HOSPADM

## 2021-02-10 RX ORDER — SODIUM CHLORIDE, SODIUM LACTATE, POTASSIUM CHLORIDE, CALCIUM CHLORIDE 600; 310; 30; 20 MG/100ML; MG/100ML; MG/100ML; MG/100ML
INJECTION, SOLUTION INTRAVENOUS CONTINUOUS
Status: DISCONTINUED | OUTPATIENT
Start: 2021-02-10 | End: 2021-02-10 | Stop reason: HOSPADM

## 2021-02-10 RX ORDER — DIPHENHYDRAMINE HYDROCHLORIDE 50 MG/ML
12.5 INJECTION INTRAMUSCULAR; INTRAVENOUS
Status: DISCONTINUED | OUTPATIENT
Start: 2021-02-10 | End: 2021-02-10 | Stop reason: HOSPADM

## 2021-02-10 RX ORDER — MORPHINE SULFATE 4 MG/ML
INJECTION, SOLUTION INTRAMUSCULAR; INTRAVENOUS
Status: COMPLETED
Start: 2021-02-10 | End: 2021-02-10

## 2021-02-10 RX ORDER — MIDAZOLAM HYDROCHLORIDE 1 MG/ML
INJECTION INTRAMUSCULAR; INTRAVENOUS
Status: DISCONTINUED | OUTPATIENT
Start: 2021-02-10 | End: 2021-02-10

## 2021-02-10 RX ADMIN — SODIUM CHLORIDE, SODIUM LACTATE, POTASSIUM CHLORIDE, AND CALCIUM CHLORIDE: .6; .31; .03; .02 INJECTION, SOLUTION INTRAVENOUS at 07:02

## 2021-02-10 RX ADMIN — PROPOFOL 100 MG: 10 INJECTION, EMULSION INTRAVENOUS at 07:02

## 2021-02-10 RX ADMIN — MORPHINE SULFATE 2 MG: 4 INJECTION INTRAVENOUS at 08:02

## 2021-02-10 RX ADMIN — MIDAZOLAM HYDROCHLORIDE 2 MG: 1 INJECTION, SOLUTION INTRAMUSCULAR; INTRAVENOUS at 07:02

## 2021-02-10 RX ADMIN — FAMOTIDINE 20 MG: 10 INJECTION INTRAVENOUS at 07:02

## 2021-02-10 RX ADMIN — MEPERIDINE HYDROCHLORIDE 50 MG: 50 INJECTION INTRAMUSCULAR; INTRAVENOUS; SUBCUTANEOUS at 07:02

## 2021-02-17 VITALS
BODY MASS INDEX: 24.75 KG/M2 | RESPIRATION RATE: 11 BRPM | OXYGEN SATURATION: 94 % | SYSTOLIC BLOOD PRESSURE: 141 MMHG | WEIGHT: 145 LBS | TEMPERATURE: 98 F | HEART RATE: 62 BPM | HEIGHT: 64 IN | DIASTOLIC BLOOD PRESSURE: 73 MMHG

## 2021-02-23 ENCOUNTER — PATIENT MESSAGE (OUTPATIENT)
Dept: FAMILY MEDICINE | Facility: CLINIC | Age: 61
End: 2021-02-23

## 2021-02-23 ENCOUNTER — OFFICE VISIT (OUTPATIENT)
Dept: ORTHOPEDICS | Facility: CLINIC | Age: 61
End: 2021-02-23
Payer: COMMERCIAL

## 2021-02-23 VITALS — WEIGHT: 145 LBS | BODY MASS INDEX: 24.75 KG/M2 | HEIGHT: 64 IN

## 2021-02-23 DIAGNOSIS — Z01.818 PRE-OP TESTING: ICD-10-CM

## 2021-02-23 DIAGNOSIS — M87.051 ASEPTIC NECROSIS OF FEMORAL HEAD, RIGHT: Primary | ICD-10-CM

## 2021-02-23 DIAGNOSIS — M16.11 PRIMARY OSTEOARTHRITIS OF RIGHT HIP: ICD-10-CM

## 2021-02-23 PROCEDURE — 99213 PR OFFICE/OUTPT VISIT, EST, LEVL III, 20-29 MIN: ICD-10-PCS | Mod: 57,S$GLB,, | Performed by: ORTHOPAEDIC SURGERY

## 2021-02-23 PROCEDURE — 99213 OFFICE O/P EST LOW 20 MIN: CPT | Mod: 57,S$GLB,, | Performed by: ORTHOPAEDIC SURGERY

## 2021-02-23 PROCEDURE — 99999 PR PBB SHADOW E&M-EST. PATIENT-LVL III: ICD-10-PCS | Mod: PBBFAC,,, | Performed by: ORTHOPAEDIC SURGERY

## 2021-02-23 PROCEDURE — 99999 PR PBB SHADOW E&M-EST. PATIENT-LVL III: CPT | Mod: PBBFAC,,, | Performed by: ORTHOPAEDIC SURGERY

## 2021-02-24 ENCOUNTER — TELEPHONE (OUTPATIENT)
Dept: ORTHOPEDICS | Facility: CLINIC | Age: 61
End: 2021-02-24

## 2021-02-25 DIAGNOSIS — M87.052 AVASCULAR NECROSIS OF BONE OF HIP, LEFT: ICD-10-CM

## 2021-02-25 DIAGNOSIS — M87.051 ASEPTIC NECROSIS OF FEMORAL HEAD, RIGHT: Primary | ICD-10-CM

## 2021-02-25 DIAGNOSIS — Z01.818 PRE-OP TESTING: ICD-10-CM

## 2021-02-25 RX ORDER — MUPIROCIN 20 MG/G
OINTMENT TOPICAL
Status: CANCELLED | OUTPATIENT
Start: 2021-02-25

## 2021-03-12 ENCOUNTER — HOSPITAL ENCOUNTER (OUTPATIENT)
Dept: PREADMISSION TESTING | Facility: HOSPITAL | Age: 61
Discharge: HOME | End: 2021-03-12
Attending: FAMILY MEDICINE
Payer: COMMERCIAL

## 2021-03-12 ENCOUNTER — ANESTHESIA EVENT (OUTPATIENT)
Dept: SURGERY | Facility: HOSPITAL | Age: 61
DRG: 470 | End: 2021-03-12
Payer: COMMERCIAL

## 2021-03-18 ENCOUNTER — PATIENT MESSAGE (OUTPATIENT)
Dept: ORTHOPEDICS | Facility: CLINIC | Age: 61
End: 2021-03-18

## 2021-03-22 ENCOUNTER — TELEPHONE (OUTPATIENT)
Dept: ORTHOPEDICS | Facility: CLINIC | Age: 61
End: 2021-03-22

## 2021-03-22 DIAGNOSIS — Z96.641 STATUS POST TOTAL REPLACEMENT OF RIGHT HIP: Primary | ICD-10-CM

## 2021-03-23 ENCOUNTER — OFFICE VISIT (OUTPATIENT)
Dept: ORTHOPEDICS | Facility: CLINIC | Age: 61
End: 2021-03-23
Payer: COMMERCIAL

## 2021-03-23 VITALS
TEMPERATURE: 98 F | WEIGHT: 145 LBS | HEIGHT: 64 IN | OXYGEN SATURATION: 99 % | RESPIRATION RATE: 17 BRPM | BODY MASS INDEX: 24.75 KG/M2

## 2021-03-23 DIAGNOSIS — M25.559 HIP PAIN: ICD-10-CM

## 2021-03-23 DIAGNOSIS — M16.11 PRIMARY OSTEOARTHRITIS OF RIGHT HIP: ICD-10-CM

## 2021-03-23 DIAGNOSIS — M87.052 AVASCULAR NECROSIS OF BONE OF HIP, LEFT: ICD-10-CM

## 2021-03-23 DIAGNOSIS — M87.051 ASEPTIC NECROSIS OF FEMORAL HEAD, RIGHT: Primary | ICD-10-CM

## 2021-03-23 PROCEDURE — 99499 UNLISTED E&M SERVICE: CPT | Mod: S$GLB,,, | Performed by: ORTHOPAEDIC SURGERY

## 2021-03-23 PROCEDURE — 99999 PR PBB SHADOW E&M-EST. PATIENT-LVL III: ICD-10-PCS | Mod: PBBFAC,,, | Performed by: ORTHOPAEDIC SURGERY

## 2021-03-23 PROCEDURE — 99999 PR PBB SHADOW E&M-EST. PATIENT-LVL III: CPT | Mod: PBBFAC,,, | Performed by: ORTHOPAEDIC SURGERY

## 2021-03-23 PROCEDURE — 99499 NO LOS: ICD-10-PCS | Mod: S$GLB,,, | Performed by: ORTHOPAEDIC SURGERY

## 2021-04-03 ENCOUNTER — LAB VISIT (OUTPATIENT)
Dept: FAMILY MEDICINE | Facility: CLINIC | Age: 61
End: 2021-04-03
Payer: COMMERCIAL

## 2021-04-03 DIAGNOSIS — Z01.818 PRE-OP TESTING: ICD-10-CM

## 2021-04-03 LAB — SARS-COV-2 RNA RESP QL NAA+PROBE: NOT DETECTED

## 2021-04-03 PROCEDURE — U0003 INFECTIOUS AGENT DETECTION BY NUCLEIC ACID (DNA OR RNA); SEVERE ACUTE RESPIRATORY SYNDROME CORONAVIRUS 2 (SARS-COV-2) (CORONAVIRUS DISEASE [COVID-19]), AMPLIFIED PROBE TECHNIQUE, MAKING USE OF HIGH THROUGHPUT TECHNOLOGIES AS DESCRIBED BY CMS-2020-01-R: HCPCS | Performed by: ORTHOPAEDIC SURGERY

## 2021-04-03 PROCEDURE — U0005 INFEC AGEN DETEC AMPLI PROBE: HCPCS | Performed by: ORTHOPAEDIC SURGERY

## 2021-04-04 ENCOUNTER — LAB VISIT (OUTPATIENT)
Dept: LAB | Facility: HOSPITAL | Age: 61
End: 2021-04-04
Attending: ORTHOPAEDIC SURGERY
Payer: COMMERCIAL

## 2021-04-04 DIAGNOSIS — M16.11 PRIMARY OSTEOARTHRITIS OF RIGHT HIP: ICD-10-CM

## 2021-04-04 DIAGNOSIS — Z01.818 PRE-OP TESTING: ICD-10-CM

## 2021-04-04 LAB
ABO + RH BLD: NORMAL
BLD GP AB SCN CELLS X3 SERPL QL: NORMAL

## 2021-04-04 PROCEDURE — 36415 COLL VENOUS BLD VENIPUNCTURE: CPT | Performed by: ORTHOPAEDIC SURGERY

## 2021-04-04 PROCEDURE — 86900 BLOOD TYPING SEROLOGIC ABO: CPT | Performed by: ORTHOPAEDIC SURGERY

## 2021-04-04 PROCEDURE — 86920 COMPATIBILITY TEST SPIN: CPT | Performed by: ORTHOPAEDIC SURGERY

## 2021-04-05 ENCOUNTER — HOSPITAL ENCOUNTER (INPATIENT)
Facility: HOSPITAL | Age: 61
LOS: 2 days | Discharge: HOME HEALTH | DRG: 470 | End: 2021-04-07
Attending: ORTHOPAEDIC SURGERY | Admitting: ORTHOPAEDIC SURGERY
Payer: COMMERCIAL

## 2021-04-05 ENCOUNTER — ANESTHESIA (OUTPATIENT)
Dept: SURGERY | Facility: HOSPITAL | Age: 61
DRG: 470 | End: 2021-04-05
Payer: COMMERCIAL

## 2021-04-05 DIAGNOSIS — M87.052 AVASCULAR NECROSIS OF BONE OF HIP, LEFT: ICD-10-CM

## 2021-04-05 DIAGNOSIS — Z01.818 PRE-OP TESTING: ICD-10-CM

## 2021-04-05 DIAGNOSIS — R73.02 IMPAIRED GLUCOSE TOLERANCE: ICD-10-CM

## 2021-04-05 DIAGNOSIS — Z01.818 PRE-OP EXAM: ICD-10-CM

## 2021-04-05 DIAGNOSIS — M87.051 ASEPTIC NECROSIS OF FEMORAL HEAD, RIGHT: Primary | ICD-10-CM

## 2021-04-05 LAB
HCT VFR BLD AUTO: 35.9 % (ref 37–48.5)
HGB BLD-MCNC: 12.1 G/DL (ref 12–16)

## 2021-04-05 PROCEDURE — 88305 TISSUE EXAM BY PATHOLOGIST: CPT | Performed by: PATHOLOGY

## 2021-04-05 PROCEDURE — 27130 TOTAL HIP ARTHROPLASTY: CPT | Mod: RT,,, | Performed by: ORTHOPAEDIC SURGERY

## 2021-04-05 PROCEDURE — 25000003 PHARM REV CODE 250: Performed by: ORTHOPAEDIC SURGERY

## 2021-04-05 PROCEDURE — 25000003 PHARM REV CODE 250: Performed by: FAMILY MEDICINE

## 2021-04-05 PROCEDURE — 37000009 HC ANESTHESIA EA ADD 15 MINS: Performed by: ORTHOPAEDIC SURGERY

## 2021-04-05 PROCEDURE — 11000001 HC ACUTE MED/SURG PRIVATE ROOM

## 2021-04-05 PROCEDURE — 63600175 PHARM REV CODE 636 W HCPCS: Performed by: NURSE ANESTHETIST, CERTIFIED REGISTERED

## 2021-04-05 PROCEDURE — 97161 PT EVAL LOW COMPLEX 20 MIN: CPT

## 2021-04-05 PROCEDURE — 99222 1ST HOSP IP/OBS MODERATE 55: CPT | Mod: ,,, | Performed by: FAMILY MEDICINE

## 2021-04-05 PROCEDURE — 63600175 PHARM REV CODE 636 W HCPCS: Performed by: FAMILY MEDICINE

## 2021-04-05 PROCEDURE — 88311 PR  DECALCIFY TISSUE: ICD-10-PCS | Mod: 26,,, | Performed by: PATHOLOGY

## 2021-04-05 PROCEDURE — D9220A PRA ANESTHESIA: Mod: ,,, | Performed by: ANESTHESIOLOGY

## 2021-04-05 PROCEDURE — 27201423 OPTIME MED/SURG SUP & DEVICES STERILE SUPPLY: Performed by: ORTHOPAEDIC SURGERY

## 2021-04-05 PROCEDURE — 88305 TISSUE EXAM BY PATHOLOGIST: ICD-10-PCS | Mod: 26,,, | Performed by: PATHOLOGY

## 2021-04-05 PROCEDURE — 85018 HEMOGLOBIN: CPT | Performed by: ORTHOPAEDIC SURGERY

## 2021-04-05 PROCEDURE — 63600175 PHARM REV CODE 636 W HCPCS: Performed by: ANESTHESIOLOGY

## 2021-04-05 PROCEDURE — 88311 DECALCIFY TISSUE: CPT | Mod: 26,,, | Performed by: PATHOLOGY

## 2021-04-05 PROCEDURE — 93010 ELECTROCARDIOGRAM REPORT: CPT | Mod: ,,, | Performed by: INTERNAL MEDICINE

## 2021-04-05 PROCEDURE — 25000003 PHARM REV CODE 250: Performed by: NURSE ANESTHETIST, CERTIFIED REGISTERED

## 2021-04-05 PROCEDURE — 97166 OT EVAL MOD COMPLEX 45 MIN: CPT

## 2021-04-05 PROCEDURE — 71000039 HC RECOVERY, EACH ADD'L HOUR: Performed by: ORTHOPAEDIC SURGERY

## 2021-04-05 PROCEDURE — 97530 THERAPEUTIC ACTIVITIES: CPT

## 2021-04-05 PROCEDURE — 88311 DECALCIFY TISSUE: CPT | Performed by: PATHOLOGY

## 2021-04-05 PROCEDURE — D9220A PRA ANESTHESIA: ICD-10-PCS | Mod: ,,, | Performed by: ANESTHESIOLOGY

## 2021-04-05 PROCEDURE — 63600175 PHARM REV CODE 636 W HCPCS: Performed by: ORTHOPAEDIC SURGERY

## 2021-04-05 PROCEDURE — 85014 HEMATOCRIT: CPT | Performed by: ORTHOPAEDIC SURGERY

## 2021-04-05 PROCEDURE — 93010 EKG 12-LEAD: ICD-10-PCS | Mod: ,,, | Performed by: INTERNAL MEDICINE

## 2021-04-05 PROCEDURE — 36000710: Performed by: ORTHOPAEDIC SURGERY

## 2021-04-05 PROCEDURE — 97535 SELF CARE MNGMENT TRAINING: CPT

## 2021-04-05 PROCEDURE — 37000008 HC ANESTHESIA 1ST 15 MINUTES: Performed by: ORTHOPAEDIC SURGERY

## 2021-04-05 PROCEDURE — 36000711: Performed by: ORTHOPAEDIC SURGERY

## 2021-04-05 PROCEDURE — 25000003 PHARM REV CODE 250

## 2021-04-05 PROCEDURE — 36415 COLL VENOUS BLD VENIPUNCTURE: CPT | Performed by: ORTHOPAEDIC SURGERY

## 2021-04-05 PROCEDURE — 71000033 HC RECOVERY, INTIAL HOUR: Performed by: ORTHOPAEDIC SURGERY

## 2021-04-05 PROCEDURE — 99222 PR INITIAL HOSPITAL CARE,LEVL II: ICD-10-PCS | Mod: ,,, | Performed by: FAMILY MEDICINE

## 2021-04-05 PROCEDURE — C1713 ANCHOR/SCREW BN/BN,TIS/BN: HCPCS | Performed by: ORTHOPAEDIC SURGERY

## 2021-04-05 PROCEDURE — C1776 JOINT DEVICE (IMPLANTABLE): HCPCS | Performed by: ORTHOPAEDIC SURGERY

## 2021-04-05 PROCEDURE — 93005 ELECTROCARDIOGRAM TRACING: CPT

## 2021-04-05 PROCEDURE — 27130 PR TOTAL HIP ARTHROPLASTY: ICD-10-PCS | Mod: RT,,, | Performed by: ORTHOPAEDIC SURGERY

## 2021-04-05 PROCEDURE — 88305 TISSUE EXAM BY PATHOLOGIST: CPT | Mod: 26,,, | Performed by: PATHOLOGY

## 2021-04-05 DEVICE — IMPLANTABLE DEVICE: Type: IMPLANTABLE DEVICE | Site: HIP | Status: FUNCTIONAL

## 2021-04-05 DEVICE — IMPLANTABLE DEVICE
Type: IMPLANTABLE DEVICE | Site: HIP | Status: FUNCTIONAL
Brand: CONTINUUM® TRILOGY® ALLOFIT® LONGEVITY®

## 2021-04-05 DEVICE — IMPLANTABLE DEVICE
Type: IMPLANTABLE DEVICE | Site: HIP | Status: FUNCTIONAL
Brand: VERSYS®

## 2021-04-05 DEVICE — IMPLANTABLE DEVICE
Type: IMPLANTABLE DEVICE | Site: HIP | Status: FUNCTIONAL
Brand: CONTINUUM® TRABECULAR METAL™

## 2021-04-05 RX ORDER — MORPHINE SULFATE 4 MG/ML
2 INJECTION, SOLUTION INTRAMUSCULAR; INTRAVENOUS EVERY 5 MIN PRN
Status: DISCONTINUED | OUTPATIENT
Start: 2021-04-05 | End: 2021-04-05

## 2021-04-05 RX ORDER — MEPERIDINE HYDROCHLORIDE 50 MG/ML
INJECTION INTRAMUSCULAR; INTRAVENOUS; SUBCUTANEOUS
Status: DISCONTINUED | OUTPATIENT
Start: 2021-04-05 | End: 2021-04-05

## 2021-04-05 RX ORDER — MUPIROCIN 20 MG/G
OINTMENT TOPICAL
Status: DISCONTINUED | OUTPATIENT
Start: 2021-04-05 | End: 2021-04-05

## 2021-04-05 RX ORDER — LIDOCAINE HYDROCHLORIDE 20 MG/ML
INJECTION, SOLUTION EPIDURAL; INFILTRATION; INTRACAUDAL; PERINEURAL
Status: DISCONTINUED | OUTPATIENT
Start: 2021-04-05 | End: 2021-04-05

## 2021-04-05 RX ORDER — GABAPENTIN 300 MG/1
300 CAPSULE ORAL 3 TIMES DAILY
Status: DISCONTINUED | OUTPATIENT
Start: 2021-04-05 | End: 2021-04-07 | Stop reason: HOSPADM

## 2021-04-05 RX ORDER — FAMOTIDINE 10 MG/ML
20 INJECTION INTRAVENOUS ONCE
Status: COMPLETED | OUTPATIENT
Start: 2021-04-05 | End: 2021-04-05

## 2021-04-05 RX ORDER — ONDANSETRON 2 MG/ML
4 INJECTION INTRAMUSCULAR; INTRAVENOUS EVERY 8 HOURS PRN
Status: DISCONTINUED | OUTPATIENT
Start: 2021-04-05 | End: 2021-04-05

## 2021-04-05 RX ORDER — TRANEXAMIC ACID 100 MG/ML
INJECTION, SOLUTION INTRAVENOUS ONCE
Status: COMPLETED | OUTPATIENT
Start: 2021-04-05 | End: 2021-04-05

## 2021-04-05 RX ORDER — MORPHINE SULFATE 4 MG/ML
6 INJECTION, SOLUTION INTRAMUSCULAR; INTRAVENOUS
Status: DISCONTINUED | OUTPATIENT
Start: 2021-04-05 | End: 2021-04-05

## 2021-04-05 RX ORDER — ONDANSETRON 2 MG/ML
4 INJECTION INTRAMUSCULAR; INTRAVENOUS DAILY PRN
Status: DISCONTINUED | OUTPATIENT
Start: 2021-04-05 | End: 2021-04-05 | Stop reason: HOSPADM

## 2021-04-05 RX ORDER — ONDANSETRON 2 MG/ML
4 INJECTION INTRAMUSCULAR; INTRAVENOUS DAILY PRN
Status: DISCONTINUED | OUTPATIENT
Start: 2021-04-05 | End: 2021-04-05

## 2021-04-05 RX ORDER — ROCURONIUM BROMIDE 10 MG/ML
INJECTION, SOLUTION INTRAVENOUS
Status: DISCONTINUED | OUTPATIENT
Start: 2021-04-05 | End: 2021-04-05

## 2021-04-05 RX ORDER — CEFAZOLIN SODIUM 2 G/50ML
SOLUTION INTRAVENOUS
Status: COMPLETED
Start: 2021-04-05 | End: 2021-04-05

## 2021-04-05 RX ORDER — HYDROMORPHONE HYDROCHLORIDE 2 MG/ML
0.5 INJECTION, SOLUTION INTRAMUSCULAR; INTRAVENOUS; SUBCUTANEOUS
Status: DISCONTINUED | OUTPATIENT
Start: 2021-04-05 | End: 2021-04-07 | Stop reason: HOSPADM

## 2021-04-05 RX ORDER — DEXAMETHASONE SODIUM PHOSPHATE 4 MG/ML
INJECTION, SOLUTION INTRA-ARTICULAR; INTRALESIONAL; INTRAMUSCULAR; INTRAVENOUS; SOFT TISSUE
Status: DISCONTINUED | OUTPATIENT
Start: 2021-04-05 | End: 2021-04-05

## 2021-04-05 RX ORDER — ONDANSETRON 2 MG/ML
INJECTION INTRAMUSCULAR; INTRAVENOUS
Status: DISCONTINUED | OUTPATIENT
Start: 2021-04-05 | End: 2021-04-05

## 2021-04-05 RX ORDER — CEFAZOLIN SODIUM 2 G/50ML
2 SOLUTION INTRAVENOUS
Status: COMPLETED | OUTPATIENT
Start: 2021-04-05 | End: 2021-04-05

## 2021-04-05 RX ORDER — ONDANSETRON 2 MG/ML
4 INJECTION INTRAMUSCULAR; INTRAVENOUS EVERY 4 HOURS PRN
Status: DISCONTINUED | OUTPATIENT
Start: 2021-04-05 | End: 2021-04-05

## 2021-04-05 RX ORDER — CEFAZOLIN SODIUM 1 G/50ML
1 SOLUTION INTRAVENOUS
Status: COMPLETED | OUTPATIENT
Start: 2021-04-05 | End: 2021-04-06

## 2021-04-05 RX ORDER — OMEGA-3-ACID ETHYL ESTERS 1 G/1
1 CAPSULE, LIQUID FILLED ORAL DAILY
Status: DISCONTINUED | OUTPATIENT
Start: 2021-04-05 | End: 2021-04-07 | Stop reason: HOSPADM

## 2021-04-05 RX ORDER — FAMOTIDINE 10 MG/ML
INJECTION INTRAVENOUS
Status: COMPLETED
Start: 2021-04-05 | End: 2021-04-05

## 2021-04-05 RX ORDER — DIPHENHYDRAMINE HYDROCHLORIDE 50 MG/ML
12.5 INJECTION INTRAMUSCULAR; INTRAVENOUS
Status: DISCONTINUED | OUTPATIENT
Start: 2021-04-05 | End: 2021-04-05 | Stop reason: HOSPADM

## 2021-04-05 RX ORDER — SODIUM CHLORIDE, SODIUM LACTATE, POTASSIUM CHLORIDE, CALCIUM CHLORIDE 600; 310; 30; 20 MG/100ML; MG/100ML; MG/100ML; MG/100ML
INJECTION, SOLUTION INTRAVENOUS CONTINUOUS
Status: DISCONTINUED | OUTPATIENT
Start: 2021-04-05 | End: 2021-04-05

## 2021-04-05 RX ORDER — MIDAZOLAM HYDROCHLORIDE 1 MG/ML
INJECTION INTRAMUSCULAR; INTRAVENOUS
Status: DISCONTINUED | OUTPATIENT
Start: 2021-04-05 | End: 2021-04-05

## 2021-04-05 RX ORDER — DIPHENHYDRAMINE HYDROCHLORIDE 50 MG/ML
12.5 INJECTION INTRAMUSCULAR; INTRAVENOUS
Status: DISCONTINUED | OUTPATIENT
Start: 2021-04-05 | End: 2021-04-05

## 2021-04-05 RX ORDER — SODIUM CHLORIDE, SODIUM LACTATE, POTASSIUM CHLORIDE, CALCIUM CHLORIDE 600; 310; 30; 20 MG/100ML; MG/100ML; MG/100ML; MG/100ML
125 INJECTION, SOLUTION INTRAVENOUS CONTINUOUS
Status: DISCONTINUED | OUTPATIENT
Start: 2021-04-05 | End: 2021-04-05

## 2021-04-05 RX ORDER — OXYCODONE AND ACETAMINOPHEN 10; 325 MG/1; MG/1
1 TABLET ORAL EVERY 4 HOURS PRN
Status: DISCONTINUED | OUTPATIENT
Start: 2021-04-05 | End: 2021-04-07 | Stop reason: HOSPADM

## 2021-04-05 RX ORDER — DIPHENHYDRAMINE HYDROCHLORIDE 50 MG/ML
INJECTION INTRAMUSCULAR; INTRAVENOUS
Status: DISCONTINUED | OUTPATIENT
Start: 2021-04-05 | End: 2021-04-05

## 2021-04-05 RX ORDER — ONDANSETRON 2 MG/ML
8 INJECTION INTRAMUSCULAR; INTRAVENOUS EVERY 4 HOURS PRN
Status: DISCONTINUED | OUTPATIENT
Start: 2021-04-05 | End: 2021-04-07 | Stop reason: HOSPADM

## 2021-04-05 RX ORDER — ACETAMINOPHEN 325 MG/1
650 TABLET ORAL EVERY 4 HOURS PRN
Status: DISCONTINUED | OUTPATIENT
Start: 2021-04-05 | End: 2021-04-07 | Stop reason: HOSPADM

## 2021-04-05 RX ORDER — PHENYLEPHRINE HYDROCHLORIDE 10 MG/ML
INJECTION INTRAVENOUS
Status: DISCONTINUED | OUTPATIENT
Start: 2021-04-05 | End: 2021-04-05

## 2021-04-05 RX ORDER — LIDOCAINE HYDROCHLORIDE 10 MG/ML
1 INJECTION, SOLUTION EPIDURAL; INFILTRATION; INTRACAUDAL; PERINEURAL ONCE
Status: DISCONTINUED | OUTPATIENT
Start: 2021-04-05 | End: 2021-04-05

## 2021-04-05 RX ORDER — MORPHINE SULFATE 4 MG/ML
2 INJECTION, SOLUTION INTRAMUSCULAR; INTRAVENOUS EVERY 5 MIN PRN
Status: DISCONTINUED | OUTPATIENT
Start: 2021-04-05 | End: 2021-04-05 | Stop reason: HOSPADM

## 2021-04-05 RX ADMIN — SODIUM CHLORIDE, POTASSIUM CHLORIDE, SODIUM LACTATE AND CALCIUM CHLORIDE: 600; 310; 30; 20 INJECTION, SOLUTION INTRAVENOUS at 06:04

## 2021-04-05 RX ADMIN — PHENYLEPHRINE HYDROCHLORIDE 100 MCG: 10 INJECTION INTRAVENOUS at 08:04

## 2021-04-05 RX ADMIN — ONDANSETRON 4 MG: 2 INJECTION INTRAMUSCULAR; INTRAVENOUS at 07:04

## 2021-04-05 RX ADMIN — ROCURONIUM BROMIDE 20 MG: 10 INJECTION, SOLUTION INTRAVENOUS at 08:04

## 2021-04-05 RX ADMIN — SODIUM CHLORIDE, POTASSIUM CHLORIDE, SODIUM LACTATE AND CALCIUM CHLORIDE: 600; 310; 30; 20 INJECTION, SOLUTION INTRAVENOUS at 08:04

## 2021-04-05 RX ADMIN — ONDANSETRON 4 MG: 2 INJECTION INTRAMUSCULAR; INTRAVENOUS at 08:04

## 2021-04-05 RX ADMIN — MORPHINE SULFATE 6 MG: 4 INJECTION, SOLUTION INTRAMUSCULAR; INTRAVENOUS at 04:04

## 2021-04-05 RX ADMIN — MIDAZOLAM HYDROCHLORIDE 2 MG: 1 INJECTION, SOLUTION INTRAMUSCULAR; INTRAVENOUS at 07:04

## 2021-04-05 RX ADMIN — PHENYLEPHRINE HYDROCHLORIDE 50 MCG: 10 INJECTION INTRAVENOUS at 09:04

## 2021-04-05 RX ADMIN — OXYCODONE HYDROCHLORIDE AND ACETAMINOPHEN 1 TABLET: 10; 325 TABLET ORAL at 11:04

## 2021-04-05 RX ADMIN — CEFAZOLIN SODIUM 2 G: 2 SOLUTION INTRAVENOUS at 08:04

## 2021-04-05 RX ADMIN — HYDROMORPHONE HYDROCHLORIDE 0.5 MG: 2 INJECTION, SOLUTION INTRAMUSCULAR; INTRAVENOUS; SUBCUTANEOUS at 10:04

## 2021-04-05 RX ADMIN — CEFAZOLIN SODIUM 1 G: 1 SOLUTION INTRAVENOUS at 03:04

## 2021-04-05 RX ADMIN — MORPHINE SULFATE 6 MG: 4 INJECTION, SOLUTION INTRAMUSCULAR; INTRAVENOUS at 01:04

## 2021-04-05 RX ADMIN — TRANEXAMIC ACID 1000 MG: 100 INJECTION, SOLUTION INTRAVENOUS at 08:04

## 2021-04-05 RX ADMIN — MORPHINE SULFATE 2 MG: 4 INJECTION, SOLUTION INTRAMUSCULAR; INTRAVENOUS at 11:04

## 2021-04-05 RX ADMIN — SUGAMMADEX 200 MG: 100 INJECTION, SOLUTION INTRAVENOUS at 10:04

## 2021-04-05 RX ADMIN — MEPERIDINE HYDROCHLORIDE 25 MG: 50 INJECTION INTRAMUSCULAR; INTRAVENOUS; SUBCUTANEOUS at 07:04

## 2021-04-05 RX ADMIN — ROCURONIUM BROMIDE 20 MG: 10 INJECTION, SOLUTION INTRAVENOUS at 09:04

## 2021-04-05 RX ADMIN — ONDANSETRON HYDROCHLORIDE 4 MG: 2 SOLUTION INTRAMUSCULAR; INTRAVENOUS at 03:04

## 2021-04-05 RX ADMIN — PHENYLEPHRINE HYDROCHLORIDE 100 MCG: 10 INJECTION INTRAVENOUS at 09:04

## 2021-04-05 RX ADMIN — ONDANSETRON 4 MG: 2 INJECTION INTRAMUSCULAR; INTRAVENOUS at 10:04

## 2021-04-05 RX ADMIN — LIDOCAINE HYDROCHLORIDE 50 MG: 20 INJECTION, SOLUTION EPIDURAL; INFILTRATION; INTRACAUDAL; PERINEURAL at 07:04

## 2021-04-05 RX ADMIN — DIPHENHYDRAMINE HYDROCHLORIDE 12.5 MG: 50 INJECTION INTRAMUSCULAR; INTRAVENOUS at 07:04

## 2021-04-05 RX ADMIN — ONDANSETRON HYDROCHLORIDE 4 MG: 2 SOLUTION INTRAMUSCULAR; INTRAVENOUS at 11:04

## 2021-04-05 RX ADMIN — ONDANSETRON HYDROCHLORIDE 8 MG: 2 SOLUTION INTRAMUSCULAR; INTRAVENOUS at 04:04

## 2021-04-05 RX ADMIN — MORPHINE SULFATE 2 MG: 4 INJECTION, SOLUTION INTRAMUSCULAR; INTRAVENOUS at 10:04

## 2021-04-05 RX ADMIN — SODIUM CHLORIDE, POTASSIUM CHLORIDE, SODIUM LACTATE AND CALCIUM CHLORIDE 125 ML/HR: 600; 310; 30; 20 INJECTION, SOLUTION INTRAVENOUS at 10:04

## 2021-04-05 RX ADMIN — FAMOTIDINE 20 MG: 10 INJECTION INTRAVENOUS at 06:04

## 2021-04-05 RX ADMIN — MUPIROCIN: 20 OINTMENT TOPICAL at 06:04

## 2021-04-05 RX ADMIN — DEXAMETHASONE SODIUM PHOSPHATE 8 MG: 4 INJECTION, SOLUTION INTRAMUSCULAR; INTRAVENOUS at 08:04

## 2021-04-05 RX ADMIN — ROCURONIUM BROMIDE 30 MG: 10 INJECTION, SOLUTION INTRAVENOUS at 08:04

## 2021-04-05 RX ADMIN — MEPERIDINE HYDROCHLORIDE 25 MG: 50 INJECTION INTRAMUSCULAR; INTRAVENOUS; SUBCUTANEOUS at 08:04

## 2021-04-05 RX ADMIN — ONDANSETRON HYDROCHLORIDE 8 MG: 2 SOLUTION INTRAMUSCULAR; INTRAVENOUS at 08:04

## 2021-04-05 RX ADMIN — RIVAROXABAN 10 MG: 10 TABLET, FILM COATED ORAL at 08:04

## 2021-04-06 DIAGNOSIS — Z96.649 STATUS POST TOTAL REPLACEMENT OF HIP, UNSPECIFIED LATERALITY: Primary | ICD-10-CM

## 2021-04-06 LAB
ALBUMIN SERPL BCP-MCNC: 3.7 G/DL (ref 3.5–5.2)
ALP SERPL-CCNC: 62 U/L (ref 55–135)
ALT SERPL W/O P-5'-P-CCNC: 77 U/L (ref 10–44)
ANION GAP SERPL CALC-SCNC: 12 MMOL/L (ref 8–16)
AST SERPL-CCNC: 47 U/L (ref 10–40)
BACTERIA #/AREA URNS HPF: ABNORMAL /HPF
BILIRUB SERPL-MCNC: 0.8 MG/DL (ref 0.1–1)
BILIRUB UR QL STRIP: NEGATIVE
BILIRUB UR QL STRIP: NEGATIVE
BUN SERPL-MCNC: 12 MG/DL (ref 8–23)
CALCIUM SERPL-MCNC: 8.7 MG/DL (ref 8.7–10.5)
CHLORIDE SERPL-SCNC: 98 MMOL/L (ref 95–110)
CLARITY UR: CLEAR
CLARITY UR: CLEAR
CO2 SERPL-SCNC: 27 MMOL/L (ref 23–29)
COLOR UR: YELLOW
COLOR UR: YELLOW
CREAT SERPL-MCNC: 0.5 MG/DL (ref 0.5–1.4)
EST. GFR  (AFRICAN AMERICAN): >60 ML/MIN/1.73 M^2
EST. GFR  (NON AFRICAN AMERICAN): >60 ML/MIN/1.73 M^2
GLUCOSE SERPL-MCNC: 111 MG/DL (ref 70–110)
GLUCOSE UR QL STRIP: NEGATIVE
GLUCOSE UR QL STRIP: NEGATIVE
HCT VFR BLD AUTO: 32.7 % (ref 37–48.5)
HGB BLD-MCNC: 11.1 G/DL (ref 12–16)
HGB UR QL STRIP: NEGATIVE
HGB UR QL STRIP: NEGATIVE
HYALINE CASTS #/AREA URNS LPF: ABNORMAL /LPF
KETONES UR QL STRIP: NEGATIVE
KETONES UR QL STRIP: NEGATIVE
LEUKOCYTE ESTERASE UR QL STRIP: ABNORMAL
LEUKOCYTE ESTERASE UR QL STRIP: ABNORMAL
MICROSCOPIC COMMENT: ABNORMAL
NITRITE UR QL STRIP: NEGATIVE
NITRITE UR QL STRIP: NEGATIVE
PH UR STRIP: 6 [PH] (ref 5–8)
PH UR STRIP: 6 [PH] (ref 5–8)
POTASSIUM SERPL-SCNC: 3.8 MMOL/L (ref 3.5–5.1)
PROT SERPL-MCNC: 6 G/DL (ref 6–8.4)
PROT UR QL STRIP: NEGATIVE
PROT UR QL STRIP: NEGATIVE
SODIUM SERPL-SCNC: 137 MMOL/L (ref 136–145)
SP GR UR STRIP: 1.02 (ref 1–1.03)
SP GR UR STRIP: 1.02 (ref 1–1.03)
SQUAMOUS #/AREA URNS HPF: ABNORMAL /HPF
URN SPEC COLLECT METH UR: ABNORMAL
URN SPEC COLLECT METH UR: ABNORMAL
UROBILINOGEN UR STRIP-ACNC: NEGATIVE EU/DL
UROBILINOGEN UR STRIP-ACNC: NEGATIVE EU/DL
WBC #/AREA URNS HPF: 2 /HPF (ref 0–5)

## 2021-04-06 PROCEDURE — 25000003 PHARM REV CODE 250: Performed by: FAMILY MEDICINE

## 2021-04-06 PROCEDURE — 85014 HEMATOCRIT: CPT | Performed by: FAMILY MEDICINE

## 2021-04-06 PROCEDURE — 63600175 PHARM REV CODE 636 W HCPCS: Performed by: FAMILY MEDICINE

## 2021-04-06 PROCEDURE — 97110 THERAPEUTIC EXERCISES: CPT | Mod: CQ

## 2021-04-06 PROCEDURE — 36415 COLL VENOUS BLD VENIPUNCTURE: CPT | Performed by: FAMILY MEDICINE

## 2021-04-06 PROCEDURE — 85018 HEMOGLOBIN: CPT | Performed by: FAMILY MEDICINE

## 2021-04-06 PROCEDURE — 97535 SELF CARE MNGMENT TRAINING: CPT

## 2021-04-06 PROCEDURE — 97110 THERAPEUTIC EXERCISES: CPT

## 2021-04-06 PROCEDURE — 80053 COMPREHEN METABOLIC PANEL: CPT | Performed by: FAMILY MEDICINE

## 2021-04-06 PROCEDURE — 81000 URINALYSIS NONAUTO W/SCOPE: CPT | Performed by: FAMILY MEDICINE

## 2021-04-06 PROCEDURE — 99232 SBSQ HOSP IP/OBS MODERATE 35: CPT | Mod: ,,, | Performed by: FAMILY MEDICINE

## 2021-04-06 PROCEDURE — 11000001 HC ACUTE MED/SURG PRIVATE ROOM

## 2021-04-06 PROCEDURE — 99232 PR SUBSEQUENT HOSPITAL CARE,LEVL II: ICD-10-PCS | Mod: ,,, | Performed by: FAMILY MEDICINE

## 2021-04-06 PROCEDURE — 97530 THERAPEUTIC ACTIVITIES: CPT

## 2021-04-06 RX ORDER — SODIUM CHLORIDE 9 MG/ML
INJECTION, SOLUTION INTRAVENOUS CONTINUOUS
Status: DISCONTINUED | OUTPATIENT
Start: 2021-04-06 | End: 2021-04-07 | Stop reason: HOSPADM

## 2021-04-06 RX ORDER — IBUPROFEN 400 MG/1
400 TABLET ORAL EVERY 4 HOURS PRN
Status: DISCONTINUED | OUTPATIENT
Start: 2021-04-06 | End: 2021-04-07 | Stop reason: HOSPADM

## 2021-04-06 RX ADMIN — ACETAMINOPHEN 650 MG: 325 TABLET ORAL at 07:04

## 2021-04-06 RX ADMIN — ACETAMINOPHEN 650 MG: 325 TABLET ORAL at 08:04

## 2021-04-06 RX ADMIN — IBUPROFEN 400 MG: 400 TABLET, FILM COATED ORAL at 10:04

## 2021-04-06 RX ADMIN — CEFAZOLIN SODIUM 1 G: 1 SOLUTION INTRAVENOUS at 07:04

## 2021-04-06 RX ADMIN — OXYCODONE HYDROCHLORIDE AND ACETAMINOPHEN 1 TABLET: 10; 325 TABLET ORAL at 03:04

## 2021-04-06 RX ADMIN — IBUPROFEN 400 MG: 400 TABLET, FILM COATED ORAL at 07:04

## 2021-04-06 RX ADMIN — LOSARTAN POTASSIUM 12.5 MG: 25 TABLET, FILM COATED ORAL at 07:04

## 2021-04-06 RX ADMIN — ONDANSETRON HYDROCHLORIDE 8 MG: 2 SOLUTION INTRAMUSCULAR; INTRAVENOUS at 10:04

## 2021-04-06 RX ADMIN — CEFAZOLIN SODIUM 1 G: 1 SOLUTION INTRAVENOUS at 12:04

## 2021-04-06 RX ADMIN — ACETAMINOPHEN 650 MG: 325 TABLET ORAL at 02:04

## 2021-04-06 RX ADMIN — HYDROMORPHONE HYDROCHLORIDE 0.5 MG: 2 INJECTION, SOLUTION INTRAMUSCULAR; INTRAVENOUS; SUBCUTANEOUS at 06:04

## 2021-04-06 RX ADMIN — ONDANSETRON HYDROCHLORIDE 8 MG: 2 SOLUTION INTRAMUSCULAR; INTRAVENOUS at 06:04

## 2021-04-06 RX ADMIN — SODIUM CHLORIDE: 0.9 INJECTION, SOLUTION INTRAVENOUS at 09:04

## 2021-04-06 RX ADMIN — SODIUM CHLORIDE: 0.9 INJECTION, SOLUTION INTRAVENOUS at 10:04

## 2021-04-07 VITALS
BODY MASS INDEX: 24.75 KG/M2 | OXYGEN SATURATION: 96 % | DIASTOLIC BLOOD PRESSURE: 79 MMHG | RESPIRATION RATE: 16 BRPM | TEMPERATURE: 98 F | HEIGHT: 64 IN | SYSTOLIC BLOOD PRESSURE: 169 MMHG | HEART RATE: 70 BPM | WEIGHT: 145 LBS

## 2021-04-07 DIAGNOSIS — Z96.649 STATUS POST TOTAL REPLACEMENT OF HIP, UNSPECIFIED LATERALITY: Primary | ICD-10-CM

## 2021-04-07 PROBLEM — M87.052 AVASCULAR NECROSIS OF BONE OF HIP, LEFT: Status: RESOLVED | Noted: 2021-04-05 | Resolved: 2021-04-07

## 2021-04-07 LAB
HCT VFR BLD AUTO: 32.6 % (ref 37–48.5)
HGB BLD-MCNC: 11.1 G/DL (ref 12–16)

## 2021-04-07 PROCEDURE — 99238 HOSP IP/OBS DSCHRG MGMT 30/<: CPT | Mod: ,,, | Performed by: FAMILY MEDICINE

## 2021-04-07 PROCEDURE — 25000003 PHARM REV CODE 250: Performed by: HOSPITALIST

## 2021-04-07 PROCEDURE — 85014 HEMATOCRIT: CPT | Performed by: FAMILY MEDICINE

## 2021-04-07 PROCEDURE — 85018 HEMOGLOBIN: CPT | Performed by: FAMILY MEDICINE

## 2021-04-07 PROCEDURE — 97530 THERAPEUTIC ACTIVITIES: CPT

## 2021-04-07 PROCEDURE — 97116 GAIT TRAINING THERAPY: CPT

## 2021-04-07 PROCEDURE — 99238 PR HOSPITAL DISCHARGE DAY,<30 MIN: ICD-10-PCS | Mod: ,,, | Performed by: FAMILY MEDICINE

## 2021-04-07 PROCEDURE — 25000003 PHARM REV CODE 250: Performed by: FAMILY MEDICINE

## 2021-04-07 PROCEDURE — 36415 COLL VENOUS BLD VENIPUNCTURE: CPT | Performed by: FAMILY MEDICINE

## 2021-04-07 PROCEDURE — 94760 N-INVAS EAR/PLS OXIMETRY 1: CPT

## 2021-04-07 PROCEDURE — 94799 UNLISTED PULMONARY SVC/PX: CPT

## 2021-04-07 RX ORDER — HYDROXYZINE PAMOATE 25 MG/1
25 CAPSULE ORAL NIGHTLY PRN
Status: DISCONTINUED | OUTPATIENT
Start: 2021-04-07 | End: 2021-04-07 | Stop reason: HOSPADM

## 2021-04-07 RX ADMIN — HYDROXYZINE PAMOATE 25 MG: 25 CAPSULE ORAL at 01:04

## 2021-04-07 RX ADMIN — ACETAMINOPHEN 650 MG: 325 TABLET ORAL at 07:04

## 2021-04-07 RX ADMIN — ACETAMINOPHEN 650 MG: 325 TABLET ORAL at 12:04

## 2021-04-07 RX ADMIN — LOSARTAN POTASSIUM 12.5 MG: 25 TABLET, FILM COATED ORAL at 07:04

## 2021-04-08 ENCOUNTER — PATIENT OUTREACH (OUTPATIENT)
Dept: ADMINISTRATIVE | Facility: CLINIC | Age: 61
End: 2021-04-08

## 2021-04-08 LAB
BLD PROD TYP BPU: NORMAL
BLD PROD TYP BPU: NORMAL
BLOOD UNIT EXPIRATION DATE: NORMAL
BLOOD UNIT EXPIRATION DATE: NORMAL
BLOOD UNIT TYPE CODE: 6200
BLOOD UNIT TYPE CODE: 6200
BLOOD UNIT TYPE: NORMAL
BLOOD UNIT TYPE: NORMAL
CODING SYSTEM: NORMAL
CODING SYSTEM: NORMAL
DISPENSE STATUS: NORMAL
DISPENSE STATUS: NORMAL
NUM UNITS TRANS PACKED RBC: NORMAL
NUM UNITS TRANS PACKED RBC: NORMAL

## 2021-04-12 LAB
FINAL PATHOLOGIC DIAGNOSIS: NORMAL
GROSS: NORMAL
Lab: NORMAL

## 2021-04-14 ENCOUNTER — PATIENT MESSAGE (OUTPATIENT)
Dept: ORTHOPEDICS | Facility: CLINIC | Age: 61
End: 2021-04-14

## 2021-04-22 ENCOUNTER — PATIENT OUTREACH (OUTPATIENT)
Dept: ADMINISTRATIVE | Facility: OTHER | Age: 61
End: 2021-04-22

## 2021-04-23 ENCOUNTER — OFFICE VISIT (OUTPATIENT)
Dept: ORTHOPEDICS | Facility: CLINIC | Age: 61
End: 2021-04-23
Payer: COMMERCIAL

## 2021-04-23 ENCOUNTER — TELEPHONE (OUTPATIENT)
Dept: ORTHOPEDICS | Facility: CLINIC | Age: 61
End: 2021-04-23

## 2021-04-23 VITALS
WEIGHT: 145 LBS | RESPIRATION RATE: 19 BRPM | BODY MASS INDEX: 24.75 KG/M2 | HEIGHT: 64 IN | OXYGEN SATURATION: 96 % | HEART RATE: 76 BPM

## 2021-04-23 DIAGNOSIS — Z96.649 STATUS POST TOTAL REPLACEMENT OF HIP, UNSPECIFIED LATERALITY: ICD-10-CM

## 2021-04-23 DIAGNOSIS — Z96.641 STATUS POST RIGHT HIP REPLACEMENT: Primary | ICD-10-CM

## 2021-04-23 PROCEDURE — 99999 PR PBB SHADOW E&M-EST. PATIENT-LVL III: ICD-10-PCS | Mod: PBBFAC,,, | Performed by: ORTHOPAEDIC SURGERY

## 2021-04-23 PROCEDURE — 99024 POSTOP FOLLOW-UP VISIT: CPT | Mod: S$GLB,,, | Performed by: ORTHOPAEDIC SURGERY

## 2021-04-23 PROCEDURE — 99024 PR POST-OP FOLLOW-UP VISIT: ICD-10-PCS | Mod: S$GLB,,, | Performed by: ORTHOPAEDIC SURGERY

## 2021-04-23 PROCEDURE — 99999 PR PBB SHADOW E&M-EST. PATIENT-LVL III: CPT | Mod: PBBFAC,,, | Performed by: ORTHOPAEDIC SURGERY

## 2021-05-18 DIAGNOSIS — Z96.649 STATUS POST TOTAL REPLACEMENT OF HIP, UNSPECIFIED LATERALITY: Primary | ICD-10-CM

## 2021-05-27 ENCOUNTER — OFFICE VISIT (OUTPATIENT)
Dept: ORTHOPEDICS | Facility: CLINIC | Age: 61
End: 2021-05-27
Payer: COMMERCIAL

## 2021-05-27 ENCOUNTER — HOSPITAL ENCOUNTER (OUTPATIENT)
Dept: RADIOLOGY | Facility: HOSPITAL | Age: 61
Discharge: HOME | End: 2021-05-27
Attending: ORTHOPAEDIC SURGERY
Payer: COMMERCIAL

## 2021-05-27 VITALS
HEIGHT: 64 IN | RESPIRATION RATE: 17 BRPM | HEART RATE: 93 BPM | BODY MASS INDEX: 24.75 KG/M2 | OXYGEN SATURATION: 98 % | WEIGHT: 145 LBS

## 2021-05-27 DIAGNOSIS — Z96.649 STATUS POST TOTAL REPLACEMENT OF HIP, UNSPECIFIED LATERALITY: Primary | ICD-10-CM

## 2021-05-27 DIAGNOSIS — Z96.649 STATUS POST TOTAL REPLACEMENT OF HIP, UNSPECIFIED LATERALITY: ICD-10-CM

## 2021-05-27 PROCEDURE — 73502 XR PELVIS 3 VIEW INC HIP 2 VIEW RIGHT: ICD-10-PCS | Mod: 26,RT,, | Performed by: RADIOLOGY

## 2021-05-27 PROCEDURE — 99024 PR POST-OP FOLLOW-UP VISIT: ICD-10-PCS | Mod: S$GLB,,, | Performed by: ORTHOPAEDIC SURGERY

## 2021-05-27 PROCEDURE — 99999 PR PBB SHADOW E&M-EST. PATIENT-LVL III: CPT | Mod: PBBFAC,,, | Performed by: ORTHOPAEDIC SURGERY

## 2021-05-27 PROCEDURE — 73502 X-RAY EXAM HIP UNI 2-3 VIEWS: CPT | Mod: TC,PN,RT

## 2021-05-27 PROCEDURE — 73502 X-RAY EXAM HIP UNI 2-3 VIEWS: CPT | Mod: 26,RT,, | Performed by: RADIOLOGY

## 2021-05-27 PROCEDURE — 99999 PR PBB SHADOW E&M-EST. PATIENT-LVL III: ICD-10-PCS | Mod: PBBFAC,,, | Performed by: ORTHOPAEDIC SURGERY

## 2021-05-27 PROCEDURE — 99024 POSTOP FOLLOW-UP VISIT: CPT | Mod: S$GLB,,, | Performed by: ORTHOPAEDIC SURGERY

## 2021-06-30 ENCOUNTER — OFFICE VISIT (OUTPATIENT)
Dept: FAMILY MEDICINE | Facility: CLINIC | Age: 61
End: 2021-06-30
Payer: COMMERCIAL

## 2021-06-30 VITALS
HEIGHT: 64 IN | SYSTOLIC BLOOD PRESSURE: 139 MMHG | RESPIRATION RATE: 14 BRPM | OXYGEN SATURATION: 95 % | HEART RATE: 66 BPM | WEIGHT: 153 LBS | BODY MASS INDEX: 26.12 KG/M2 | DIASTOLIC BLOOD PRESSURE: 82 MMHG

## 2021-06-30 DIAGNOSIS — Z12.11 SCREENING FOR COLON CANCER: ICD-10-CM

## 2021-06-30 DIAGNOSIS — R39.15 URINARY URGENCY: Primary | ICD-10-CM

## 2021-06-30 DIAGNOSIS — Z96.649 STATUS POST TOTAL REPLACEMENT OF HIP, UNSPECIFIED LATERALITY: Primary | ICD-10-CM

## 2021-06-30 DIAGNOSIS — N89.8 VAGINAL ITCHING: ICD-10-CM

## 2021-06-30 LAB
BILIRUB UR QL STRIP: NEGATIVE
CLARITY UR: CLEAR
COLOR UR: YELLOW
GLUCOSE UR QL STRIP: NEGATIVE
HGB UR QL STRIP: NEGATIVE
KETONES UR QL STRIP: NEGATIVE
LEUKOCYTE ESTERASE UR QL STRIP: NEGATIVE
NITRITE UR QL STRIP: NEGATIVE
PH UR STRIP: 5 [PH] (ref 5–8)
PROT UR QL STRIP: NEGATIVE
SP GR UR STRIP: 1.02 (ref 1–1.03)
URN SPEC COLLECT METH UR: NORMAL
UROBILINOGEN UR STRIP-ACNC: NEGATIVE EU/DL

## 2021-06-30 PROCEDURE — 99214 PR OFFICE/OUTPT VISIT, EST, LEVL IV, 30-39 MIN: ICD-10-PCS | Mod: S$GLB,,, | Performed by: FAMILY MEDICINE

## 2021-06-30 PROCEDURE — 81003 URINALYSIS AUTO W/O SCOPE: CPT | Performed by: FAMILY MEDICINE

## 2021-06-30 PROCEDURE — 99214 OFFICE O/P EST MOD 30 MIN: CPT | Mod: S$GLB,,, | Performed by: FAMILY MEDICINE

## 2021-06-30 RX ORDER — FLUCONAZOLE 150 MG/1
150 TABLET ORAL DAILY
Qty: 1 TABLET | Refills: 0 | Status: SHIPPED | OUTPATIENT
Start: 2021-06-30 | End: 2021-07-01

## 2021-06-30 RX ORDER — CRANBERRY CONC/C/BACILL COAG 450-30-50
TABLET ORAL
COMMUNITY
End: 2021-09-03

## 2021-06-30 RX ORDER — NITROFURANTOIN 25; 75 MG/1; MG/1
100 CAPSULE ORAL 2 TIMES DAILY
Qty: 14 CAPSULE | Refills: 0 | Status: SHIPPED | OUTPATIENT
Start: 2021-06-30 | End: 2021-07-07

## 2021-07-07 ENCOUNTER — TELEPHONE (OUTPATIENT)
Dept: FAMILY MEDICINE | Facility: CLINIC | Age: 61
End: 2021-07-07

## 2021-07-08 ENCOUNTER — OFFICE VISIT (OUTPATIENT)
Dept: ORTHOPEDICS | Facility: CLINIC | Age: 61
End: 2021-07-08
Payer: COMMERCIAL

## 2021-07-08 ENCOUNTER — HOSPITAL ENCOUNTER (OUTPATIENT)
Dept: RADIOLOGY | Facility: HOSPITAL | Age: 61
Discharge: HOME | End: 2021-07-08
Attending: ORTHOPAEDIC SURGERY
Payer: COMMERCIAL

## 2021-07-08 VITALS
HEIGHT: 64 IN | HEART RATE: 81 BPM | BODY MASS INDEX: 26.12 KG/M2 | WEIGHT: 153 LBS | RESPIRATION RATE: 17 BRPM | OXYGEN SATURATION: 100 %

## 2021-07-08 DIAGNOSIS — Z96.649 STATUS POST TOTAL REPLACEMENT OF HIP, UNSPECIFIED LATERALITY: ICD-10-CM

## 2021-07-08 DIAGNOSIS — M87.052 ASEPTIC NECROSIS OF FEMORAL HEAD, LEFT: Primary | ICD-10-CM

## 2021-07-08 DIAGNOSIS — Z96.641 STATUS POST RIGHT HIP REPLACEMENT: ICD-10-CM

## 2021-07-08 PROCEDURE — 73502 X-RAY EXAM HIP UNI 2-3 VIEWS: CPT | Mod: 26,RT,, | Performed by: RADIOLOGY

## 2021-07-08 PROCEDURE — 99999 PR PBB SHADOW E&M-EST. PATIENT-LVL III: ICD-10-PCS | Mod: PBBFAC,,, | Performed by: ORTHOPAEDIC SURGERY

## 2021-07-08 PROCEDURE — 99213 OFFICE O/P EST LOW 20 MIN: CPT | Mod: S$GLB,,, | Performed by: ORTHOPAEDIC SURGERY

## 2021-07-08 PROCEDURE — 99213 PR OFFICE/OUTPT VISIT, EST, LEVL III, 20-29 MIN: ICD-10-PCS | Mod: S$GLB,,, | Performed by: ORTHOPAEDIC SURGERY

## 2021-07-08 PROCEDURE — 73502 XR PELVIS 3 VIEW INC HIP 2 VIEW RIGHT: ICD-10-PCS | Mod: 26,RT,, | Performed by: RADIOLOGY

## 2021-07-08 PROCEDURE — 73502 X-RAY EXAM HIP UNI 2-3 VIEWS: CPT | Mod: TC,PN,RT

## 2021-07-08 PROCEDURE — 99999 PR PBB SHADOW E&M-EST. PATIENT-LVL III: CPT | Mod: PBBFAC,,, | Performed by: ORTHOPAEDIC SURGERY

## 2021-07-29 LAB — NONINV COLON CA DNA+OCC BLD SCRN STL QL: NEGATIVE

## 2021-07-30 ENCOUNTER — PATIENT MESSAGE (OUTPATIENT)
Dept: FAMILY MEDICINE | Facility: CLINIC | Age: 61
End: 2021-07-30

## 2021-08-05 ENCOUNTER — PATIENT MESSAGE (OUTPATIENT)
Dept: FAMILY MEDICINE | Facility: CLINIC | Age: 61
End: 2021-08-05

## 2021-08-06 ENCOUNTER — PATIENT MESSAGE (OUTPATIENT)
Dept: FAMILY MEDICINE | Facility: CLINIC | Age: 61
End: 2021-08-06

## 2021-08-07 ENCOUNTER — PATIENT MESSAGE (OUTPATIENT)
Dept: FAMILY MEDICINE | Facility: CLINIC | Age: 61
End: 2021-08-07

## 2021-08-11 ENCOUNTER — PATIENT MESSAGE (OUTPATIENT)
Dept: FAMILY MEDICINE | Facility: CLINIC | Age: 61
End: 2021-08-11

## 2021-08-23 ENCOUNTER — PATIENT MESSAGE (OUTPATIENT)
Dept: FAMILY MEDICINE | Facility: CLINIC | Age: 61
End: 2021-08-23

## 2021-08-23 ENCOUNTER — PATIENT OUTREACH (OUTPATIENT)
Dept: ADMINISTRATIVE | Facility: HOSPITAL | Age: 61
End: 2021-08-23

## 2021-08-23 DIAGNOSIS — M10.9 GOUT, UNSPECIFIED CAUSE, UNSPECIFIED CHRONICITY, UNSPECIFIED SITE: ICD-10-CM

## 2021-08-23 DIAGNOSIS — E78.5 DYSLIPIDEMIA: Primary | ICD-10-CM

## 2021-08-23 DIAGNOSIS — D64.9 ANEMIA, UNSPECIFIED TYPE: ICD-10-CM

## 2021-08-24 ENCOUNTER — PATIENT MESSAGE (OUTPATIENT)
Dept: FAMILY MEDICINE | Facility: CLINIC | Age: 61
End: 2021-08-24

## 2021-08-24 DIAGNOSIS — M10.9 ACUTE GOUT, UNSPECIFIED CAUSE, UNSPECIFIED SITE: Primary | ICD-10-CM

## 2021-08-25 ENCOUNTER — LAB VISIT (OUTPATIENT)
Dept: LAB | Facility: HOSPITAL | Age: 61
End: 2021-08-25
Attending: FAMILY MEDICINE
Payer: COMMERCIAL

## 2021-08-25 DIAGNOSIS — M10.9 GOUT, UNSPECIFIED CAUSE, UNSPECIFIED CHRONICITY, UNSPECIFIED SITE: ICD-10-CM

## 2021-08-25 DIAGNOSIS — E78.5 DYSLIPIDEMIA: ICD-10-CM

## 2021-08-25 DIAGNOSIS — D64.9 ANEMIA, UNSPECIFIED TYPE: ICD-10-CM

## 2021-08-25 LAB
BASOPHILS # BLD AUTO: 0.05 K/UL (ref 0–0.2)
BASOPHILS NFR BLD: 0.9 % (ref 0–1.9)
CHOLEST SERPL-MCNC: 252 MG/DL (ref 120–199)
CHOLEST/HDLC SERPL: 4.6 {RATIO} (ref 2–5)
DIFFERENTIAL METHOD: ABNORMAL
EOSINOPHIL # BLD AUTO: 0.2 K/UL (ref 0–0.5)
EOSINOPHIL NFR BLD: 4.1 % (ref 0–8)
ERYTHROCYTE [DISTWIDTH] IN BLOOD BY AUTOMATED COUNT: 14.4 % (ref 11.5–14.5)
HCT VFR BLD AUTO: 40.8 % (ref 37–48.5)
HDLC SERPL-MCNC: 55 MG/DL (ref 40–75)
HDLC SERPL: 21.8 % (ref 20–50)
HGB BLD-MCNC: 14.1 G/DL (ref 12–16)
IMM GRANULOCYTES # BLD AUTO: 0.01 K/UL (ref 0–0.04)
IMM GRANULOCYTES NFR BLD AUTO: 0.2 % (ref 0–0.5)
LDLC SERPL CALC-MCNC: 138.2 MG/DL (ref 63–159)
LYMPHOCYTES # BLD AUTO: 3 K/UL (ref 1–4.8)
LYMPHOCYTES NFR BLD: 52.8 % (ref 18–48)
MCH RBC QN AUTO: 32.2 PG (ref 27–31)
MCHC RBC AUTO-ENTMCNC: 34.6 G/DL (ref 32–36)
MCV RBC AUTO: 93 FL (ref 82–98)
MONOCYTES # BLD AUTO: 0.4 K/UL (ref 0.3–1)
MONOCYTES NFR BLD: 7.8 % (ref 4–15)
NEUTROPHILS # BLD AUTO: 1.9 K/UL (ref 1.8–7.7)
NEUTROPHILS NFR BLD: 34.2 % (ref 38–73)
NONHDLC SERPL-MCNC: 197 MG/DL
NRBC BLD-RTO: 0 /100 WBC
PLATELET # BLD AUTO: 300 K/UL (ref 150–450)
PMV BLD AUTO: 9.4 FL (ref 9.2–12.9)
RBC # BLD AUTO: 4.38 M/UL (ref 4–5.4)
TRIGL SERPL-MCNC: 294 MG/DL (ref 30–150)
URATE SERPL-MCNC: 6.2 MG/DL (ref 2.4–5.7)
WBC # BLD AUTO: 5.64 K/UL (ref 3.9–12.7)

## 2021-08-25 PROCEDURE — 84550 ASSAY OF BLOOD/URIC ACID: CPT | Performed by: FAMILY MEDICINE

## 2021-08-25 PROCEDURE — 80061 LIPID PANEL: CPT | Performed by: FAMILY MEDICINE

## 2021-08-25 PROCEDURE — 36415 COLL VENOUS BLD VENIPUNCTURE: CPT | Performed by: FAMILY MEDICINE

## 2021-08-25 PROCEDURE — 85025 COMPLETE CBC W/AUTO DIFF WBC: CPT | Performed by: FAMILY MEDICINE

## 2021-08-31 RX ORDER — PREDNISONE 20 MG/1
40 TABLET ORAL DAILY
Qty: 10 TABLET | Refills: 0 | Status: SHIPPED | OUTPATIENT
Start: 2021-08-31 | End: 2021-09-03

## 2021-09-03 ENCOUNTER — OFFICE VISIT (OUTPATIENT)
Dept: FAMILY MEDICINE | Facility: CLINIC | Age: 61
End: 2021-09-03
Payer: COMMERCIAL

## 2021-09-03 ENCOUNTER — HOSPITAL ENCOUNTER (OUTPATIENT)
Dept: RADIOLOGY | Facility: HOSPITAL | Age: 61
Discharge: HOME | End: 2021-09-03
Attending: FAMILY MEDICINE
Payer: COMMERCIAL

## 2021-09-03 VITALS
OXYGEN SATURATION: 97 % | BODY MASS INDEX: 26.46 KG/M2 | WEIGHT: 155 LBS | RESPIRATION RATE: 16 BRPM | SYSTOLIC BLOOD PRESSURE: 130 MMHG | HEART RATE: 67 BPM | HEIGHT: 64 IN | DIASTOLIC BLOOD PRESSURE: 88 MMHG

## 2021-09-03 DIAGNOSIS — E78.00 HYPERCHOLESTEROLEMIA: ICD-10-CM

## 2021-09-03 DIAGNOSIS — M79.672 LEFT FOOT PAIN: Primary | ICD-10-CM

## 2021-09-03 DIAGNOSIS — I10 ESSENTIAL HYPERTENSION: ICD-10-CM

## 2021-09-03 DIAGNOSIS — M79.672 LEFT FOOT PAIN: ICD-10-CM

## 2021-09-03 PROCEDURE — 99214 OFFICE O/P EST MOD 30 MIN: CPT | Mod: S$GLB,,, | Performed by: FAMILY MEDICINE

## 2021-09-03 PROCEDURE — 99214 PR OFFICE/OUTPT VISIT, EST, LEVL IV, 30-39 MIN: ICD-10-PCS | Mod: S$GLB,,, | Performed by: FAMILY MEDICINE

## 2021-09-03 PROCEDURE — 73630 X-RAY EXAM OF FOOT: CPT | Mod: 26,LT,, | Performed by: RADIOLOGY

## 2021-09-03 PROCEDURE — 73630 XR FOOT COMPLETE 3 VIEW LEFT: ICD-10-PCS | Mod: 26,LT,, | Performed by: RADIOLOGY

## 2021-09-03 PROCEDURE — 73630 X-RAY EXAM OF FOOT: CPT | Mod: TC,FY,LT

## 2021-09-07 ENCOUNTER — PATIENT OUTREACH (OUTPATIENT)
Dept: ADMINISTRATIVE | Facility: OTHER | Age: 61
End: 2021-09-07

## 2021-09-07 ENCOUNTER — PATIENT MESSAGE (OUTPATIENT)
Dept: ORTHOPEDICS | Facility: CLINIC | Age: 61
End: 2021-09-07

## 2021-09-07 ENCOUNTER — OFFICE VISIT (OUTPATIENT)
Dept: ORTHOPEDICS | Facility: CLINIC | Age: 61
End: 2021-09-07
Payer: COMMERCIAL

## 2021-09-07 ENCOUNTER — HOSPITAL ENCOUNTER (OUTPATIENT)
Dept: RADIOLOGY | Facility: HOSPITAL | Age: 61
Discharge: HOME | End: 2021-09-07
Attending: ORTHOPAEDIC SURGERY
Payer: COMMERCIAL

## 2021-09-07 VITALS — HEIGHT: 64 IN | BODY MASS INDEX: 26.46 KG/M2 | WEIGHT: 155 LBS

## 2021-09-07 DIAGNOSIS — M84.378A STRESS FRACTURE OF PHALANX OF TOE OF LEFT FOOT, INITIAL ENCOUNTER: ICD-10-CM

## 2021-09-07 DIAGNOSIS — M79.672 LEFT FOOT PAIN: ICD-10-CM

## 2021-09-07 DIAGNOSIS — M84.375A METATARSAL STRESS FRACTURE, LEFT, INITIAL ENCOUNTER: Primary | ICD-10-CM

## 2021-09-07 DIAGNOSIS — M79.673 PAIN OF FOOT, UNSPECIFIED LATERALITY: Primary | ICD-10-CM

## 2021-09-07 DIAGNOSIS — M77.32 HEEL SPUR, LEFT: ICD-10-CM

## 2021-09-07 DIAGNOSIS — M79.673 PAIN OF FOOT, UNSPECIFIED LATERALITY: ICD-10-CM

## 2021-09-07 PROCEDURE — 28470 CLTX METATARSAL FX WO MNP EA: CPT | Mod: LT,S$GLB,, | Performed by: ORTHOPAEDIC SURGERY

## 2021-09-07 PROCEDURE — 99999 PR PBB SHADOW E&M-EST. PATIENT-LVL III: ICD-10-PCS | Mod: PBBFAC,,, | Performed by: ORTHOPAEDIC SURGERY

## 2021-09-07 PROCEDURE — 28470 PR CLOSED RX METATARSAL FX: ICD-10-PCS | Mod: LT,S$GLB,, | Performed by: ORTHOPAEDIC SURGERY

## 2021-09-07 PROCEDURE — 99213 OFFICE O/P EST LOW 20 MIN: CPT | Mod: 57,S$GLB,, | Performed by: ORTHOPAEDIC SURGERY

## 2021-09-07 PROCEDURE — 99213 PR OFFICE/OUTPT VISIT, EST, LEVL III, 20-29 MIN: ICD-10-PCS | Mod: 57,S$GLB,, | Performed by: ORTHOPAEDIC SURGERY

## 2021-09-07 PROCEDURE — 99999 PR PBB SHADOW E&M-EST. PATIENT-LVL III: CPT | Mod: PBBFAC,,, | Performed by: ORTHOPAEDIC SURGERY

## 2021-09-15 ENCOUNTER — HOSPITAL ENCOUNTER (OUTPATIENT)
Dept: RADIOLOGY | Facility: HOSPITAL | Age: 61
Discharge: HOME | End: 2021-09-15
Attending: ORTHOPAEDIC SURGERY
Payer: COMMERCIAL

## 2021-09-15 DIAGNOSIS — M84.378A STRESS FRACTURE OF PHALANX OF TOE OF LEFT FOOT, INITIAL ENCOUNTER: ICD-10-CM

## 2021-09-15 PROCEDURE — 73718 MRI LOWER EXTREMITY W/O DYE: CPT | Mod: 26,LT,, | Performed by: RADIOLOGY

## 2021-09-15 PROCEDURE — 73718 MRI FOOT (FOREFOOT) LEFT WITHOUT CONTRAST: ICD-10-PCS | Mod: 26,LT,, | Performed by: RADIOLOGY

## 2021-09-15 PROCEDURE — 73718 MRI LOWER EXTREMITY W/O DYE: CPT | Mod: TC,PN,LT

## 2021-09-20 ENCOUNTER — PATIENT MESSAGE (OUTPATIENT)
Dept: ORTHOPEDICS | Facility: CLINIC | Age: 61
End: 2021-09-20

## 2021-09-22 ENCOUNTER — OFFICE VISIT (OUTPATIENT)
Dept: ORTHOPEDICS | Facility: CLINIC | Age: 61
End: 2021-09-22
Payer: COMMERCIAL

## 2021-09-22 VITALS
WEIGHT: 155 LBS | HEART RATE: 69 BPM | BODY MASS INDEX: 26.46 KG/M2 | OXYGEN SATURATION: 95 % | RESPIRATION RATE: 16 BRPM | HEIGHT: 64 IN

## 2021-09-22 DIAGNOSIS — M92.72 FREIBERG'S INFRACTION, LEFT: Primary | ICD-10-CM

## 2021-09-22 PROCEDURE — 99999 PR PBB SHADOW E&M-EST. PATIENT-LVL III: ICD-10-PCS | Mod: PBBFAC,,, | Performed by: ORTHOPAEDIC SURGERY

## 2021-09-22 PROCEDURE — 99024 POSTOP FOLLOW-UP VISIT: CPT | Mod: S$GLB,,, | Performed by: ORTHOPAEDIC SURGERY

## 2021-09-22 PROCEDURE — 99024 PR POST-OP FOLLOW-UP VISIT: ICD-10-PCS | Mod: S$GLB,,, | Performed by: ORTHOPAEDIC SURGERY

## 2021-09-22 PROCEDURE — 99999 PR PBB SHADOW E&M-EST. PATIENT-LVL III: CPT | Mod: PBBFAC,,, | Performed by: ORTHOPAEDIC SURGERY

## 2021-09-27 DIAGNOSIS — M92.72 FREIBERG'S INFRACTION, LEFT: Primary | ICD-10-CM

## 2021-09-30 ENCOUNTER — DOCUMENTATION ONLY (OUTPATIENT)
Dept: ORTHOPEDICS | Facility: CLINIC | Age: 61
End: 2021-09-30

## 2021-11-08 DIAGNOSIS — Z96.641 STATUS POST RIGHT HIP REPLACEMENT: Primary | ICD-10-CM

## 2021-11-08 DIAGNOSIS — M84.378A STRESS FRACTURE OF PHALANX OF TOE OF LEFT FOOT, INITIAL ENCOUNTER: ICD-10-CM

## 2021-11-10 ENCOUNTER — HOSPITAL ENCOUNTER (OUTPATIENT)
Dept: RADIOLOGY | Facility: HOSPITAL | Age: 61
Discharge: HOME | End: 2021-11-10
Attending: ORTHOPAEDIC SURGERY
Payer: COMMERCIAL

## 2021-11-10 ENCOUNTER — OFFICE VISIT (OUTPATIENT)
Dept: ORTHOPEDICS | Facility: CLINIC | Age: 61
End: 2021-11-10
Payer: COMMERCIAL

## 2021-11-10 VITALS — BODY MASS INDEX: 26.46 KG/M2 | RESPIRATION RATE: 16 BRPM | HEIGHT: 64 IN | WEIGHT: 155 LBS

## 2021-11-10 DIAGNOSIS — Z96.649 STATUS POST TOTAL REPLACEMENT OF HIP, UNSPECIFIED LATERALITY: ICD-10-CM

## 2021-11-10 DIAGNOSIS — M84.378A STRESS FRACTURE OF PHALANX OF TOE OF LEFT FOOT, INITIAL ENCOUNTER: ICD-10-CM

## 2021-11-10 DIAGNOSIS — Z96.641 STATUS POST RIGHT HIP REPLACEMENT: ICD-10-CM

## 2021-11-10 DIAGNOSIS — M92.72 FREIBERG'S INFRACTION, LEFT: Primary | ICD-10-CM

## 2021-11-10 PROCEDURE — 73630 X-RAY EXAM OF FOOT: CPT | Mod: 26,LT,, | Performed by: RADIOLOGY

## 2021-11-10 PROCEDURE — 99214 OFFICE O/P EST MOD 30 MIN: CPT | Mod: 25,S$GLB,, | Performed by: ORTHOPAEDIC SURGERY

## 2021-11-10 PROCEDURE — 73502 X-RAY EXAM HIP UNI 2-3 VIEWS: CPT | Mod: 26,RT,, | Performed by: RADIOLOGY

## 2021-11-10 PROCEDURE — 20600 DRAIN/INJ JOINT/BURSA W/O US: CPT | Mod: 58,LT,S$GLB, | Performed by: ORTHOPAEDIC SURGERY

## 2021-11-10 PROCEDURE — 99999 PR PBB SHADOW E&M-EST. PATIENT-LVL II: ICD-10-PCS | Mod: PBBFAC,,, | Performed by: ORTHOPAEDIC SURGERY

## 2021-11-10 PROCEDURE — 73502 X-RAY EXAM HIP UNI 2-3 VIEWS: CPT | Mod: TC,PN,RT

## 2021-11-10 PROCEDURE — 73502 XR PELVIS 3 VIEW INC HIP 2 VIEW RIGHT: ICD-10-PCS | Mod: 26,RT,, | Performed by: RADIOLOGY

## 2021-11-10 PROCEDURE — 99214 PR OFFICE/OUTPT VISIT, EST, LEVL IV, 30-39 MIN: ICD-10-PCS | Mod: 25,S$GLB,, | Performed by: ORTHOPAEDIC SURGERY

## 2021-11-10 PROCEDURE — 20600 SMALL JOINT ASPIRATION/INJECTION: L SECOND MTP: ICD-10-PCS | Mod: 58,LT,S$GLB, | Performed by: ORTHOPAEDIC SURGERY

## 2021-11-10 PROCEDURE — 99999 PR PBB SHADOW E&M-EST. PATIENT-LVL II: CPT | Mod: PBBFAC,,, | Performed by: ORTHOPAEDIC SURGERY

## 2021-11-10 PROCEDURE — 73630 X-RAY EXAM OF FOOT: CPT | Mod: TC,PN,LT

## 2021-11-10 PROCEDURE — 73630 XR FOOT COMPLETE 3 VIEW LEFT: ICD-10-PCS | Mod: 26,LT,, | Performed by: RADIOLOGY

## 2021-12-17 ENCOUNTER — PATIENT MESSAGE (OUTPATIENT)
Dept: ORTHOPEDICS | Facility: CLINIC | Age: 61
End: 2021-12-17
Payer: COMMERCIAL

## 2021-12-20 DIAGNOSIS — M92.72 FREIBERG'S INFRACTION, LEFT: Primary | ICD-10-CM

## 2021-12-21 ENCOUNTER — PATIENT OUTREACH (OUTPATIENT)
Dept: ADMINISTRATIVE | Facility: OTHER | Age: 61
End: 2021-12-21
Payer: COMMERCIAL

## 2021-12-22 ENCOUNTER — HOSPITAL ENCOUNTER (OUTPATIENT)
Dept: RADIOLOGY | Facility: HOSPITAL | Age: 61
Discharge: HOME | End: 2021-12-22
Attending: ORTHOPAEDIC SURGERY
Payer: COMMERCIAL

## 2021-12-22 ENCOUNTER — OFFICE VISIT (OUTPATIENT)
Dept: ORTHOPEDICS | Facility: CLINIC | Age: 61
End: 2021-12-22
Payer: COMMERCIAL

## 2021-12-22 VITALS — HEIGHT: 64 IN | BODY MASS INDEX: 26.46 KG/M2 | RESPIRATION RATE: 18 BRPM | WEIGHT: 155 LBS

## 2021-12-22 DIAGNOSIS — M92.72 FREIBERG'S INFRACTION, LEFT: Primary | ICD-10-CM

## 2021-12-22 DIAGNOSIS — M92.72 FREIBERG'S INFRACTION, LEFT: ICD-10-CM

## 2021-12-22 PROCEDURE — 1159F MED LIST DOCD IN RCRD: CPT | Mod: CPTII,S$GLB,, | Performed by: ORTHOPAEDIC SURGERY

## 2021-12-22 PROCEDURE — 99999 PR PBB SHADOW E&M-EST. PATIENT-LVL II: CPT | Mod: PBBFAC,,, | Performed by: ORTHOPAEDIC SURGERY

## 2021-12-22 PROCEDURE — 4010F PR ACE/ARB THEARPY RXD/TAKEN: ICD-10-PCS | Mod: CPTII,S$GLB,, | Performed by: ORTHOPAEDIC SURGERY

## 2021-12-22 PROCEDURE — 3008F BODY MASS INDEX DOCD: CPT | Mod: CPTII,S$GLB,, | Performed by: ORTHOPAEDIC SURGERY

## 2021-12-22 PROCEDURE — 73630 X-RAY EXAM OF FOOT: CPT | Mod: TC,PN,LT

## 2021-12-22 PROCEDURE — 1159F PR MEDICATION LIST DOCUMENTED IN MEDICAL RECORD: ICD-10-PCS | Mod: CPTII,S$GLB,, | Performed by: ORTHOPAEDIC SURGERY

## 2021-12-22 PROCEDURE — 99213 PR OFFICE/OUTPT VISIT, EST, LEVL III, 20-29 MIN: ICD-10-PCS | Mod: S$GLB,,, | Performed by: ORTHOPAEDIC SURGERY

## 2021-12-22 PROCEDURE — 99999 PR PBB SHADOW E&M-EST. PATIENT-LVL II: ICD-10-PCS | Mod: PBBFAC,,, | Performed by: ORTHOPAEDIC SURGERY

## 2021-12-22 PROCEDURE — 73630 XR FOOT COMPLETE 3 VIEW LEFT: ICD-10-PCS | Mod: 26,LT,, | Performed by: RADIOLOGY

## 2021-12-22 PROCEDURE — 3008F PR BODY MASS INDEX (BMI) DOCUMENTED: ICD-10-PCS | Mod: CPTII,S$GLB,, | Performed by: ORTHOPAEDIC SURGERY

## 2021-12-22 PROCEDURE — 73630 X-RAY EXAM OF FOOT: CPT | Mod: 26,LT,, | Performed by: RADIOLOGY

## 2021-12-22 PROCEDURE — 4010F ACE/ARB THERAPY RXD/TAKEN: CPT | Mod: CPTII,S$GLB,, | Performed by: ORTHOPAEDIC SURGERY

## 2021-12-22 PROCEDURE — 99213 OFFICE O/P EST LOW 20 MIN: CPT | Mod: S$GLB,,, | Performed by: ORTHOPAEDIC SURGERY

## 2022-01-20 DIAGNOSIS — M92.72 FREIBERG'S INFRACTION, LEFT: Primary | ICD-10-CM

## 2022-01-21 ENCOUNTER — HOSPITAL ENCOUNTER (OUTPATIENT)
Dept: RADIOLOGY | Facility: HOSPITAL | Age: 62
Discharge: HOME | End: 2022-01-21
Attending: ORTHOPAEDIC SURGERY
Payer: COMMERCIAL

## 2022-01-21 ENCOUNTER — OFFICE VISIT (OUTPATIENT)
Dept: ORTHOPEDICS | Facility: CLINIC | Age: 62
End: 2022-01-21
Payer: COMMERCIAL

## 2022-01-21 ENCOUNTER — PATIENT OUTREACH (OUTPATIENT)
Dept: ADMINISTRATIVE | Facility: OTHER | Age: 62
End: 2022-01-21
Payer: COMMERCIAL

## 2022-01-21 DIAGNOSIS — Z98.890 POST-OPERATIVE STATE: Primary | ICD-10-CM

## 2022-01-21 DIAGNOSIS — M92.72 FREIBERG'S INFRACTION, LEFT: ICD-10-CM

## 2022-01-21 DIAGNOSIS — Z01.818 PRE-OP TESTING: ICD-10-CM

## 2022-01-21 DIAGNOSIS — M79.672 LEFT FOOT PAIN: ICD-10-CM

## 2022-01-21 DIAGNOSIS — M92.72 FREIBERG'S INFRACTION, LEFT: Primary | ICD-10-CM

## 2022-01-21 PROCEDURE — 99999 PR PBB SHADOW E&M-EST. PATIENT-LVL II: ICD-10-PCS | Mod: PBBFAC,,, | Performed by: ORTHOPAEDIC SURGERY

## 2022-01-21 PROCEDURE — 1159F MED LIST DOCD IN RCRD: CPT | Mod: CPTII,S$GLB,, | Performed by: ORTHOPAEDIC SURGERY

## 2022-01-21 PROCEDURE — 99999 PR PBB SHADOW E&M-EST. PATIENT-LVL II: CPT | Mod: PBBFAC,,, | Performed by: ORTHOPAEDIC SURGERY

## 2022-01-21 PROCEDURE — 99214 OFFICE O/P EST MOD 30 MIN: CPT | Mod: 57,S$GLB,, | Performed by: ORTHOPAEDIC SURGERY

## 2022-01-21 PROCEDURE — 73630 XR FOOT COMPLETE 3 VIEW LEFT: ICD-10-PCS | Mod: 26,LT,, | Performed by: RADIOLOGY

## 2022-01-21 PROCEDURE — 73630 X-RAY EXAM OF FOOT: CPT | Mod: TC,PN,LT

## 2022-01-21 PROCEDURE — 73630 X-RAY EXAM OF FOOT: CPT | Mod: 26,LT,, | Performed by: RADIOLOGY

## 2022-01-21 PROCEDURE — 1159F PR MEDICATION LIST DOCUMENTED IN MEDICAL RECORD: ICD-10-PCS | Mod: CPTII,S$GLB,, | Performed by: ORTHOPAEDIC SURGERY

## 2022-01-21 PROCEDURE — 99214 PR OFFICE/OUTPT VISIT, EST, LEVL IV, 30-39 MIN: ICD-10-PCS | Mod: 57,S$GLB,, | Performed by: ORTHOPAEDIC SURGERY

## 2022-01-21 NOTE — H&P
Chief Complaint:  Left foot pain     Referring Provider: No referring provider defined for this encounter.      HPI: Ms. Hayes is a 30-year-old lady who returned today with complaints of persistent pain in her left foot.  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  She also has swelling at the 2nd metatarsal head.   She has had approximately 9-10 months of left foot pain which began after she was wearing flip-flops.  She has been using her bone stimulator and wearing her hard sole shoe/Gavin shoe. Ambulation increases her symptoms.  She has taken NSAIDs without resolution of symptoms.             Past Medical History:   Diagnosis Date    Arthritis      Bilateral breast cancer      Breast cancer bilateral treated with mastectomy and chemo    * Diabetes    High cholesterol      Hypertension                  Past Surgical History:   Procedure Laterality Date    ABLATION OF DYSRHYTHMIC FOCUS      * BILATERAL BREAST RECONSTRUCTION WITH IMPLANT    CHOLECYSTECTOMY   2001    COLONOSCOPY       * ORIF LEFT WRIST    HYSTERECTOMY   10/1/2003    MASTECTOMY bilateral Bilateral 2004    MITRAL VALVE REPLACEMENT   1980    OOPHORECTOMY Left 2004   * TONSILLECTOMY        *    *    *      * TOTAL ABDOMINAL HYSTERECTOMY W/ BILATERAL SALPINGOOPHORECTOMY  FLUOROSCOPICALLY GUIDED STEROID INJECTION RIGHT HIP  RIGHT TOTAL HIP ARTHROPLASTY                    Review of patient's allergies indicates:   Allergen Reactions    Lisinopril         Other reaction(s): Angioedema      Social History                Occupational History       Tobacco Use    Smoking status: Former Smoker       Quit date: 1999       Years since quittin.8    Smokeless tobacco: Never Used   Substance and Sexual  Activity    Alcohol use: Yes    Drug use: No    Sexual activity: Yes                Family History   Problem Relation Age of Onset    Liver cancer Mother       67 y/o    Cancer Mother      Heart failure Father       81 y/o with dementia     No Known Problems Sister      Early death Brother       accident at 29 y/o      Previous Hospitalizations: Mastectomy.      ROS:  No new diagnosis/surgery/prescriptions since last office visit on 12/22/2021  Constitution: Negative for chills and fever.   HENT: Negative for congestion.   Eyes: Negative for blurred vision.   Cardiovascular: Negative for chest pain.   Respiratory: Negative for cough.   Endocrine: Negative for polydipsia.   Hematologic/Lymphatic: Negative for adenopathy.   Skin: Negative for flushing and itching.   Musculoskeletal: Negative for gout and muscle weakness.   Gastrointestinal: Negative for constipation, diarrhea and heartburn.   Genitourinary: Negative for nocturia.   Neurological: Negative for headaches and seizures.   Psychiatric/Behavioral: Negative for depression. The patient is not nervous/anxious.   Allergic/Immunologic: Negative for environmental allergies.         Objective:      Physical Exam:   General: Unique, AAOx3. No acute distress   HEENT: Normocephalic, PEARLA EOMI, Good Dentition   Neck: Supple, No JVD   Chest: Symetric, equal excursion on inspiration   Abdomen: Soft NTND   Vascular: Pulses intact and equal bilaterally. Capillary refill less than 3 seconds and equal bilaterally   Neurologic: Pinprick and soft touch intact and equal bilaterally   Integment: No ecchymosis, no errythema   Extremity: Ankle/foot:  Dorsiflexion/plantar flexion of the ankle equal bilaterally 20/30 degrees.  Nontender over the ATFL bilaterally.  Nontender over the deltoid ligament bilaterally.  Nontender with palpation Achilles insertion on the calcaneus bilaterally.  Achilles palpable throughout full distribution bilaterally.  Relatively nontender at  the Lisfranc interval bilaterally.  Squeeze test mildly positive left foot.  Windlass positive left foot.  Dorsal swelling  resolved 2nd metatarsal head left foot.  Nontender with palpation 2nd metatarsal neck/head left foot.  Nontender with motion 2nd and 3rd toes left foot..   Radiography:   Personally reviewed x-rays of the left foot completed on 1/21/2022 showed 2nd metatarsal head erosive changes. Previous MRI of the left foot completed on 09/15/2021 showed edema of the 2nd metatarsal head consistent with a Freiberg infarction and edema around the 2nd metatarsal head     Assessment:      Impression:   1. 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  left foot pain         Plan:      1. Discussed physical examination with the patient. Liv understands that she has a Freiberg infarction of the 2nd metatarsal head of her left foot..  Treatment alternatives and outcomes were discussed with the patient she understands she could continue with conservative management such as observation, activity modification, NSAIDs, physical therapy,  hard sole/Gavin shoe, bone stimulator, or she could proceed with resurfacing arthroplasty verses metatarsal shortening osteotomy of the 2nd metatarsal of her left foot.  She states she would like to proceed with surgery..  2.  Possible complications of surgery were discussed with the patient to include bleeding, infection, scarring, nerve/blood vessel/tendon damage, need for further surgery, failed surgery, failure to improve, possible persistent pain, possible fracture, possible dislocation, possible nonunion, possible malunion, possible hardware failure, possible hardware breakage, and possible amputation were discussed with the patient.  The patient was permitted to ask questions and all concerns were addressed to her satisfaction.  3.  Consent for resurfacing arthroplasty verses shortening osteotomy 2nd metatarsal, left foot..  4.  Tentatively schedule procedure for  surgery is currently scheduled for 02/10/22..  5.  Continue with NSAIDs as tolerated allowed by PCM.  6.  Continue with bone stimulator  7.  Continue with hard sole /Gavin shoe..  8.  May ambulate with heel weight-bearing.    9.  Follow up 10-12 days post op

## 2022-01-21 NOTE — PROGRESS NOTES
Chief Complaint:  Left foot pain      Referring Provider: No referring provider defined for this encounter.      HPI: Ms. Hayes is a 30-year-old lady who returned today with complaints of persistent pain in her left foot.  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  She also has swelling at the 2nd metatarsal head.   She has had approximately 9-10 months of left foot pain which began after she was wearing flip-flops.  She has been using her bone stimulator and wearing her hard sole shoe/Gavin shoe. Ambulation increases her symptoms.  She has taken NSAIDs without resolution of symptoms.             Past Medical History:   Diagnosis Date    Arthritis      Bilateral breast cancer      Breast cancer bilateral treated with mastectomy and chemo    * Diabetes    High cholesterol      Hypertension                  Past Surgical History:   Procedure Laterality Date    ABLATION OF DYSRHYTHMIC FOCUS      * BILATERAL BREAST RECONSTRUCTION WITH IMPLANT    CHOLECYSTECTOMY   2001    COLONOSCOPY       * ORIF LEFT WRIST    HYSTERECTOMY   10/1/2003    MASTECTOMY bilateral Bilateral 2004    MITRAL VALVE REPLACEMENT   1980    OOPHORECTOMY Left 2004   * TONSILLECTOMY        *    *    *      * TOTAL ABDOMINAL HYSTERECTOMY W/ BILATERAL SALPINGOOPHORECTOMY  FLUOROSCOPICALLY GUIDED STEROID INJECTION RIGHT HIP  RIGHT TOTAL HIP ARTHROPLASTY                    Review of patient's allergies indicates:   Allergen Reactions    Lisinopril         Other reaction(s): Angioedema      Social History                Occupational History       Tobacco Use    Smoking status: Former Smoker       Quit date: 1999       Years since quittin.8    Smokeless tobacco: Never Used   Substance and  Sexual Activity    Alcohol use: Yes    Drug use: No    Sexual activity: Yes                Family History   Problem Relation Age of Onset    Liver cancer Mother       69 y/o    Cancer Mother      Heart failure Father       81 y/o with dementia     No Known Problems Sister      Early death Brother       accident at 31 y/o      Previous Hospitalizations: Mastectomy.      ROS:  No new diagnosis/surgery/prescriptions since last office visit on 12/22/2021  Constitution: Negative for chills and fever.   HENT: Negative for congestion.   Eyes: Negative for blurred vision.   Cardiovascular: Negative for chest pain.   Respiratory: Negative for cough.   Endocrine: Negative for polydipsia.   Hematologic/Lymphatic: Negative for adenopathy.   Skin: Negative for flushing and itching.   Musculoskeletal: Negative for gout and muscle weakness.   Gastrointestinal: Negative for constipation, diarrhea and heartburn.   Genitourinary: Negative for nocturia.   Neurological: Negative for headaches and seizures.   Psychiatric/Behavioral: Negative for depression. The patient is not nervous/anxious.   Allergic/Immunologic: Negative for environmental allergies.         Objective:      Physical Exam:   General: Unique, AAOx3. No acute distress   HEENT: Normocephalic, PEARLA EOMI, Good Dentition   Neck: Supple, No JVD   Chest: Symetric, equal excursion on inspiration   Abdomen: Soft NTND   Vascular: Pulses intact and equal bilaterally. Capillary refill less than 3 seconds and equal bilaterally   Neurologic: Pinprick and soft touch intact and equal bilaterally   Integment: No ecchymosis, no errythema   Extremity: Ankle/foot:  Dorsiflexion/plantar flexion of the ankle equal bilaterally 20/30 degrees.  Nontender over the ATFL bilaterally.  Nontender over the deltoid ligament bilaterally.  Nontender with palpation Achilles insertion on the calcaneus bilaterally.  Achilles palpable throughout full distribution bilaterally.  Relatively  nontender at the Lisfranc interval bilaterally.  Squeeze test mildly positive left foot.  Windlass positive left foot.  Dorsal swelling  resolved 2nd metatarsal head left foot.  Nontender with palpation 2nd metatarsal neck/head left foot.  Nontender with motion 2nd and 3rd toes left foot..   Radiography:   Personally reviewed x-rays of the left foot completed on 1/21/2022 showed 2nd metatarsal head erosive changes. Previous MRI of the left foot completed on 09/15/2021 showed edema of the 2nd metatarsal head consistent with a Freiberg infarction and edema around the 2nd metatarsal head     Assessment:      Impression:   1. 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  left foot pain         Plan:      1. Discussed physical examination with the patient. Liv understands that she has a Freiberg infarction of the 2nd metatarsal head of her left foot..  Treatment alternatives and outcomes were discussed with the patient she understands she could continue with conservative management such as observation, activity modification, NSAIDs, physical therapy,  hard sole/Gavin shoe, bone stimulator, or she could proceed with resurfacing arthroplasty verses metatarsal shortening osteotomy of the 2nd metatarsal of her left foot.  She states she would like to proceed with surgery..  2.  Possible complications of surgery were discussed with the patient to include bleeding, infection, scarring, nerve/blood vessel/tendon damage, need for further surgery, failed surgery, failure to improve, possible persistent pain, possible fracture, possible dislocation, possible nonunion, possible malunion, possible hardware failure, possible hardware breakage, and possible amputation were discussed with the patient.  The patient was permitted to ask questions and all concerns were addressed to her satisfaction.  3.  Consent for resurfacing arthroplasty verses shortening osteotomy 2nd metatarsal, left foot..  4.  Tentatively schedule  procedure for surgery is currently scheduled for 02/10/22..  5.  Continue with NSAIDs as tolerated allowed by PCM.  6.  Continue with bone stimulator  7.  Continue with hard sole /Gavin shoe..  8.  May ambulate with heel weight-bearing.    9.  A prescription for the patient to obtain a knee scooter/ walker was given to her to have for postop use.    10.Follow up  10-12 days postop

## 2022-01-21 NOTE — H&P (VIEW-ONLY)
Chief Complaint:  Left foot pain     Referring Provider: No referring provider defined for this encounter.      HPI: Ms. Hayes is a 30-year-old lady who returned today with complaints of persistent pain in her left foot.  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  She also has swelling at the 2nd metatarsal head.   She has had approximately 9-10 months of left foot pain which began after she was wearing flip-flops.  She has been using her bone stimulator and wearing her hard sole shoe/Gavin shoe. Ambulation increases her symptoms.  She has taken NSAIDs without resolution of symptoms.             Past Medical History:   Diagnosis Date    Arthritis      Bilateral breast cancer      Breast cancer bilateral treated with mastectomy and chemo    * Diabetes    High cholesterol      Hypertension                  Past Surgical History:   Procedure Laterality Date    ABLATION OF DYSRHYTHMIC FOCUS      * BILATERAL BREAST RECONSTRUCTION WITH IMPLANT    CHOLECYSTECTOMY   2001    COLONOSCOPY       * ORIF LEFT WRIST    HYSTERECTOMY   10/1/2003    MASTECTOMY bilateral Bilateral 2004    MITRAL VALVE REPLACEMENT   1980    OOPHORECTOMY Left 2004   * TONSILLECTOMY        *    *    *      * TOTAL ABDOMINAL HYSTERECTOMY W/ BILATERAL SALPINGOOPHORECTOMY  FLUOROSCOPICALLY GUIDED STEROID INJECTION RIGHT HIP  RIGHT TOTAL HIP ARTHROPLASTY                    Review of patient's allergies indicates:   Allergen Reactions    Lisinopril         Other reaction(s): Angioedema      Social History                Occupational History       Tobacco Use    Smoking status: Former Smoker       Quit date: 1999       Years since quittin.8    Smokeless tobacco: Never Used   Substance and Sexual  Activity    Alcohol use: Yes    Drug use: No    Sexual activity: Yes                Family History   Problem Relation Age of Onset    Liver cancer Mother       69 y/o    Cancer Mother      Heart failure Father       79 y/o with dementia     No Known Problems Sister      Early death Brother       accident at 31 y/o      Previous Hospitalizations: Mastectomy.      ROS:  No new diagnosis/surgery/prescriptions since last office visit on 12/22/2021  Constitution: Negative for chills and fever.   HENT: Negative for congestion.   Eyes: Negative for blurred vision.   Cardiovascular: Negative for chest pain.   Respiratory: Negative for cough.   Endocrine: Negative for polydipsia.   Hematologic/Lymphatic: Negative for adenopathy.   Skin: Negative for flushing and itching.   Musculoskeletal: Negative for gout and muscle weakness.   Gastrointestinal: Negative for constipation, diarrhea and heartburn.   Genitourinary: Negative for nocturia.   Neurological: Negative for headaches and seizures.   Psychiatric/Behavioral: Negative for depression. The patient is not nervous/anxious.   Allergic/Immunologic: Negative for environmental allergies.         Objective:      Physical Exam:   General: Unique, AAOx3. No acute distress   HEENT: Normocephalic, PEARLA EOMI, Good Dentition   Neck: Supple, No JVD   Chest: Symetric, equal excursion on inspiration   Abdomen: Soft NTND   Vascular: Pulses intact and equal bilaterally. Capillary refill less than 3 seconds and equal bilaterally   Neurologic: Pinprick and soft touch intact and equal bilaterally   Integment: No ecchymosis, no errythema   Extremity: Ankle/foot:  Dorsiflexion/plantar flexion of the ankle equal bilaterally 20/30 degrees.  Nontender over the ATFL bilaterally.  Nontender over the deltoid ligament bilaterally.  Nontender with palpation Achilles insertion on the calcaneus bilaterally.  Achilles palpable throughout full distribution bilaterally.  Relatively nontender at  the Lisfranc interval bilaterally.  Squeeze test mildly positive left foot.  Windlass positive left foot.  Dorsal swelling  resolved 2nd metatarsal head left foot.  Nontender with palpation 2nd metatarsal neck/head left foot.  Nontender with motion 2nd and 3rd toes left foot..   Radiography:   Personally reviewed x-rays of the left foot completed on 1/21/2022 showed 2nd metatarsal head erosive changes. Previous MRI of the left foot completed on 09/15/2021 showed edema of the 2nd metatarsal head consistent with a Freiberg infarction and edema around the 2nd metatarsal head     Assessment:      Impression:   1. 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  left foot pain         Plan:      1. Discussed physical examination with the patient. Liv understands that she has a Freiberg infarction of the 2nd metatarsal head of her left foot..  Treatment alternatives and outcomes were discussed with the patient she understands she could continue with conservative management such as observation, activity modification, NSAIDs, physical therapy,  hard sole/Gavin shoe, bone stimulator, or she could proceed with resurfacing arthroplasty verses metatarsal shortening osteotomy of the 2nd metatarsal of her left foot.  She states she would like to proceed with surgery..  2.  Possible complications of surgery were discussed with the patient to include bleeding, infection, scarring, nerve/blood vessel/tendon damage, need for further surgery, failed surgery, failure to improve, possible persistent pain, possible fracture, possible dislocation, possible nonunion, possible malunion, possible hardware failure, possible hardware breakage, and possible amputation were discussed with the patient.  The patient was permitted to ask questions and all concerns were addressed to her satisfaction.  3.  Consent for resurfacing arthroplasty verses shortening osteotomy 2nd metatarsal, left foot..  4.  Tentatively schedule procedure for  surgery is currently scheduled for 02/10/22..  5.  Continue with NSAIDs as tolerated allowed by PCM.  6.  Continue with bone stimulator  7.  Continue with hard sole /Gavin shoe..  8.  May ambulate with heel weight-bearing.    9.  Follow up 10-12 days post op

## 2022-01-24 DIAGNOSIS — M79.672 LEFT FOOT PAIN: ICD-10-CM

## 2022-01-24 DIAGNOSIS — M92.72 FREIBERG'S INFRACTION, LEFT: Primary | ICD-10-CM

## 2022-01-27 ENCOUNTER — DOCUMENTATION ONLY (OUTPATIENT)
Dept: ORTHOPEDICS | Facility: CLINIC | Age: 62
End: 2022-01-27
Payer: COMMERCIAL

## 2022-02-03 ENCOUNTER — HOSPITAL ENCOUNTER (OUTPATIENT)
Dept: PREADMISSION TESTING | Facility: HOSPITAL | Age: 62
Discharge: HOME | End: 2022-02-03
Attending: ORTHOPAEDIC SURGERY
Payer: COMMERCIAL

## 2022-02-08 ENCOUNTER — LAB VISIT (OUTPATIENT)
Dept: FAMILY MEDICINE | Facility: CLINIC | Age: 62
End: 2022-02-08
Payer: COMMERCIAL

## 2022-02-08 DIAGNOSIS — Z01.818 PRE-OP TESTING: ICD-10-CM

## 2022-02-08 LAB
SARS-COV-2 RNA RESP QL NAA+PROBE: NOT DETECTED
SARS-COV-2- CYCLE NUMBER: NORMAL

## 2022-02-08 PROCEDURE — U0003 INFECTIOUS AGENT DETECTION BY NUCLEIC ACID (DNA OR RNA); SEVERE ACUTE RESPIRATORY SYNDROME CORONAVIRUS 2 (SARS-COV-2) (CORONAVIRUS DISEASE [COVID-19]), AMPLIFIED PROBE TECHNIQUE, MAKING USE OF HIGH THROUGHPUT TECHNOLOGIES AS DESCRIBED BY CMS-2020-01-R: HCPCS | Performed by: ORTHOPAEDIC SURGERY

## 2022-02-08 PROCEDURE — U0005 INFEC AGEN DETEC AMPLI PROBE: HCPCS | Performed by: ORTHOPAEDIC SURGERY

## 2022-02-08 NOTE — PROGRESS NOTES
Liv Garland presented to clinic for COVID-19 swab.   Liv Garland verified x2, name and .   Liv Garland instructed on what will be completed, asked if ever had COVID-19 swab.   Explained procedure to Liv Garland.   Specimen obtained.   No questions or concerns voiced further at this time.   Liv Garland left in satisfactory condition.

## 2022-02-10 ENCOUNTER — ANESTHESIA EVENT (OUTPATIENT)
Dept: SURGERY | Facility: HOSPITAL | Age: 62
End: 2022-02-10
Payer: COMMERCIAL

## 2022-02-10 ENCOUNTER — HOSPITAL ENCOUNTER (OUTPATIENT)
Facility: HOSPITAL | Age: 62
Discharge: HOME | End: 2022-02-10
Attending: ORTHOPAEDIC SURGERY | Admitting: ORTHOPAEDIC SURGERY
Payer: COMMERCIAL

## 2022-02-10 ENCOUNTER — ANESTHESIA (OUTPATIENT)
Dept: SURGERY | Facility: HOSPITAL | Age: 62
End: 2022-02-10
Payer: COMMERCIAL

## 2022-02-10 VITALS
DIASTOLIC BLOOD PRESSURE: 83 MMHG | HEIGHT: 64 IN | RESPIRATION RATE: 15 BRPM | OXYGEN SATURATION: 95 % | SYSTOLIC BLOOD PRESSURE: 137 MMHG | WEIGHT: 154 LBS | TEMPERATURE: 98 F | BODY MASS INDEX: 26.29 KG/M2 | HEART RATE: 92 BPM

## 2022-02-10 DIAGNOSIS — M92.72 FREIBERG'S INFRACTION, LEFT: Primary | ICD-10-CM

## 2022-02-10 PROCEDURE — 25000003 PHARM REV CODE 250: Performed by: ORTHOPAEDIC SURGERY

## 2022-02-10 PROCEDURE — 63600175 PHARM REV CODE 636 W HCPCS: Performed by: ANESTHESIOLOGY

## 2022-02-10 PROCEDURE — 25000003 PHARM REV CODE 250: Performed by: NURSE ANESTHETIST, CERTIFIED REGISTERED

## 2022-02-10 PROCEDURE — D9220A PRA ANESTHESIA: ICD-10-PCS | Mod: ,,, | Performed by: ANESTHESIOLOGY

## 2022-02-10 PROCEDURE — D9220A PRA ANESTHESIA: Mod: ,,, | Performed by: ANESTHESIOLOGY

## 2022-02-10 PROCEDURE — 36000709 HC OR TIME LEV III EA ADD 15 MIN: Performed by: ORTHOPAEDIC SURGERY

## 2022-02-10 PROCEDURE — 37000008 HC ANESTHESIA 1ST 15 MINUTES: Performed by: ORTHOPAEDIC SURGERY

## 2022-02-10 PROCEDURE — 28899 UNLISTED PX FOOT/TOES: CPT | Mod: ,,, | Performed by: ORTHOPAEDIC SURGERY

## 2022-02-10 PROCEDURE — 71000015 HC POSTOP RECOV 1ST HR: Performed by: ORTHOPAEDIC SURGERY

## 2022-02-10 PROCEDURE — 25000003 PHARM REV CODE 250

## 2022-02-10 PROCEDURE — 37000009 HC ANESTHESIA EA ADD 15 MINS: Performed by: ORTHOPAEDIC SURGERY

## 2022-02-10 PROCEDURE — 71000039 HC RECOVERY, EACH ADD'L HOUR: Performed by: ORTHOPAEDIC SURGERY

## 2022-02-10 PROCEDURE — 28899: ICD-10-PCS | Mod: ,,, | Performed by: ORTHOPAEDIC SURGERY

## 2022-02-10 PROCEDURE — 36000708 HC OR TIME LEV III 1ST 15 MIN: Performed by: ORTHOPAEDIC SURGERY

## 2022-02-10 PROCEDURE — 63600175 PHARM REV CODE 636 W HCPCS: Performed by: NURSE ANESTHETIST, CERTIFIED REGISTERED

## 2022-02-10 PROCEDURE — C1713 ANCHOR/SCREW BN/BN,TIS/BN: HCPCS | Performed by: ORTHOPAEDIC SURGERY

## 2022-02-10 PROCEDURE — 71000033 HC RECOVERY, INTIAL HOUR: Performed by: ORTHOPAEDIC SURGERY

## 2022-02-10 RX ORDER — ONDANSETRON 2 MG/ML
INJECTION INTRAMUSCULAR; INTRAVENOUS
Status: DISCONTINUED | OUTPATIENT
Start: 2022-02-10 | End: 2022-02-10

## 2022-02-10 RX ORDER — EPHEDRINE SULFATE 50 MG/ML
INJECTION, SOLUTION INTRAVENOUS
Status: DISCONTINUED | OUTPATIENT
Start: 2022-02-10 | End: 2022-02-10

## 2022-02-10 RX ORDER — HYDROMORPHONE HYDROCHLORIDE 2 MG/ML
INJECTION, SOLUTION INTRAMUSCULAR; INTRAVENOUS; SUBCUTANEOUS
Status: DISCONTINUED | OUTPATIENT
Start: 2022-02-10 | End: 2022-02-10

## 2022-02-10 RX ORDER — ONDANSETRON 2 MG/ML
4 INJECTION INTRAMUSCULAR; INTRAVENOUS DAILY PRN
Status: COMPLETED | OUTPATIENT
Start: 2022-02-10 | End: 2022-02-10

## 2022-02-10 RX ORDER — LIDOCAINE HYDROCHLORIDE 10 MG/ML
1 INJECTION, SOLUTION EPIDURAL; INFILTRATION; INTRACAUDAL; PERINEURAL ONCE
Status: DISCONTINUED | OUTPATIENT
Start: 2022-02-10 | End: 2022-02-10 | Stop reason: HOSPADM

## 2022-02-10 RX ORDER — MEPERIDINE HYDROCHLORIDE 50 MG/ML
INJECTION INTRAMUSCULAR; INTRAVENOUS; SUBCUTANEOUS
Status: DISCONTINUED | OUTPATIENT
Start: 2022-02-10 | End: 2022-02-10

## 2022-02-10 RX ORDER — SODIUM CHLORIDE, SODIUM LACTATE, POTASSIUM CHLORIDE, CALCIUM CHLORIDE 600; 310; 30; 20 MG/100ML; MG/100ML; MG/100ML; MG/100ML
INJECTION, SOLUTION INTRAVENOUS CONTINUOUS
Status: DISCONTINUED | OUTPATIENT
Start: 2022-02-10 | End: 2022-02-10 | Stop reason: HOSPADM

## 2022-02-10 RX ORDER — METOCLOPRAMIDE HYDROCHLORIDE 5 MG/ML
10 INJECTION INTRAMUSCULAR; INTRAVENOUS ONCE
Status: COMPLETED | OUTPATIENT
Start: 2022-02-10 | End: 2022-02-10

## 2022-02-10 RX ORDER — DIPHENHYDRAMINE HYDROCHLORIDE 50 MG/ML
12.5 INJECTION INTRAMUSCULAR; INTRAVENOUS
Status: DISCONTINUED | OUTPATIENT
Start: 2022-02-10 | End: 2022-02-10 | Stop reason: HOSPADM

## 2022-02-10 RX ORDER — FAMOTIDINE 10 MG/ML
20 INJECTION INTRAVENOUS ONCE
Status: COMPLETED | OUTPATIENT
Start: 2022-02-10 | End: 2022-02-10

## 2022-02-10 RX ORDER — CEFAZOLIN SODIUM 1 G/3ML
INJECTION, POWDER, FOR SOLUTION INTRAMUSCULAR; INTRAVENOUS
Status: DISCONTINUED | OUTPATIENT
Start: 2022-02-10 | End: 2022-02-10

## 2022-02-10 RX ORDER — MORPHINE SULFATE 4 MG/ML
2 INJECTION, SOLUTION INTRAMUSCULAR; INTRAVENOUS EVERY 5 MIN PRN
Status: DISCONTINUED | OUTPATIENT
Start: 2022-02-10 | End: 2022-02-10 | Stop reason: HOSPADM

## 2022-02-10 RX ORDER — LIDOCAINE HYDROCHLORIDE 20 MG/ML
INJECTION, SOLUTION EPIDURAL; INFILTRATION; INTRACAUDAL; PERINEURAL
Status: DISCONTINUED | OUTPATIENT
Start: 2022-02-10 | End: 2022-02-10

## 2022-02-10 RX ORDER — MIDAZOLAM HYDROCHLORIDE 1 MG/ML
INJECTION INTRAMUSCULAR; INTRAVENOUS
Status: DISCONTINUED | OUTPATIENT
Start: 2022-02-10 | End: 2022-02-10

## 2022-02-10 RX ORDER — ROCURONIUM BROMIDE 10 MG/ML
INJECTION, SOLUTION INTRAVENOUS
Status: DISCONTINUED | OUTPATIENT
Start: 2022-02-10 | End: 2022-02-10

## 2022-02-10 RX ORDER — SODIUM CHLORIDE, SODIUM LACTATE, POTASSIUM CHLORIDE, CALCIUM CHLORIDE 600; 310; 30; 20 MG/100ML; MG/100ML; MG/100ML; MG/100ML
125 INJECTION, SOLUTION INTRAVENOUS CONTINUOUS
Status: DISCONTINUED | OUTPATIENT
Start: 2022-02-10 | End: 2022-02-10 | Stop reason: HOSPADM

## 2022-02-10 RX ORDER — BUPIVACAINE HYDROCHLORIDE 5 MG/ML
INJECTION, SOLUTION EPIDURAL; INTRACAUDAL
Status: DISCONTINUED | OUTPATIENT
Start: 2022-02-10 | End: 2022-02-10 | Stop reason: HOSPADM

## 2022-02-10 RX ORDER — SCOLOPAMINE TRANSDERMAL SYSTEM 1 MG/1
1 PATCH, EXTENDED RELEASE TRANSDERMAL
Status: DISCONTINUED | OUTPATIENT
Start: 2022-02-10 | End: 2022-02-10 | Stop reason: HOSPADM

## 2022-02-10 RX ORDER — PROPOFOL 10 MG/ML
VIAL (ML) INTRAVENOUS
Status: DISCONTINUED | OUTPATIENT
Start: 2022-02-10 | End: 2022-02-10

## 2022-02-10 RX ORDER — FAMOTIDINE 10 MG/ML
INJECTION INTRAVENOUS
Status: COMPLETED
Start: 2022-02-10 | End: 2022-02-10

## 2022-02-10 RX ORDER — METOCLOPRAMIDE HYDROCHLORIDE 5 MG/ML
INJECTION INTRAMUSCULAR; INTRAVENOUS
Status: DISCONTINUED
Start: 2022-02-10 | End: 2022-02-10 | Stop reason: HOSPADM

## 2022-02-10 RX ORDER — SUCCINYLCHOLINE CHLORIDE 20 MG/ML
INJECTION INTRAMUSCULAR; INTRAVENOUS
Status: DISCONTINUED | OUTPATIENT
Start: 2022-02-10 | End: 2022-02-10

## 2022-02-10 RX ORDER — SCOLOPAMINE TRANSDERMAL SYSTEM 1 MG/1
PATCH, EXTENDED RELEASE TRANSDERMAL
Status: DISCONTINUED
Start: 2022-02-10 | End: 2022-02-10 | Stop reason: HOSPADM

## 2022-02-10 RX ORDER — LIDOCAINE HYDROCHLORIDE AND EPINEPHRINE 10; 10 MG/ML; UG/ML
INJECTION, SOLUTION INFILTRATION; PERINEURAL
Status: DISCONTINUED | OUTPATIENT
Start: 2022-02-10 | End: 2022-02-10 | Stop reason: HOSPADM

## 2022-02-10 RX ORDER — CEFAZOLIN SODIUM 2 G/50ML
SOLUTION INTRAVENOUS
Status: DISCONTINUED
Start: 2022-02-10 | End: 2022-02-10 | Stop reason: HOSPADM

## 2022-02-10 RX ADMIN — MIDAZOLAM HYDROCHLORIDE 5 MG: 1 INJECTION, SOLUTION INTRAMUSCULAR; INTRAVENOUS at 10:02

## 2022-02-10 RX ADMIN — ONDANSETRON 4 MG: 2 INJECTION INTRAMUSCULAR; INTRAVENOUS at 11:02

## 2022-02-10 RX ADMIN — MEPERIDINE HYDROCHLORIDE 50 MG: 50 INJECTION INTRAMUSCULAR; INTRAVENOUS; SUBCUTANEOUS at 10:02

## 2022-02-10 RX ADMIN — SCOPALAMINE 1 PATCH: 1 PATCH, EXTENDED RELEASE TRANSDERMAL at 02:02

## 2022-02-10 RX ADMIN — SODIUM CHLORIDE, POTASSIUM CHLORIDE, SODIUM LACTATE AND CALCIUM CHLORIDE: 600; 310; 30; 20 INJECTION, SOLUTION INTRAVENOUS at 08:02

## 2022-02-10 RX ADMIN — FAMOTIDINE 20 MG: 10 INJECTION, SOLUTION INTRAVENOUS at 08:02

## 2022-02-10 RX ADMIN — PROPOFOL 200 MG: 10 INJECTION, EMULSION INTRAVENOUS at 10:02

## 2022-02-10 RX ADMIN — ONDANSETRON HYDROCHLORIDE 4 MG: 2 SOLUTION INTRAMUSCULAR; INTRAVENOUS at 01:02

## 2022-02-10 RX ADMIN — HYDROMORPHONE HYDROCHLORIDE 1 MG: 2 INJECTION INTRAMUSCULAR; INTRAVENOUS; SUBCUTANEOUS at 12:02

## 2022-02-10 RX ADMIN — METOCLOPRAMIDE 10 MG: 5 INJECTION, SOLUTION INTRAMUSCULAR; INTRAVENOUS at 02:02

## 2022-02-10 RX ADMIN — EPHEDRINE SULFATE 10 MG: 50 INJECTION INTRAVENOUS at 11:02

## 2022-02-10 RX ADMIN — SODIUM CHLORIDE, POTASSIUM CHLORIDE, SODIUM LACTATE AND CALCIUM CHLORIDE: 600; 310; 30; 20 INJECTION, SOLUTION INTRAVENOUS at 11:02

## 2022-02-10 RX ADMIN — ROCURONIUM BROMIDE 25 MG: 10 INJECTION, SOLUTION INTRAVENOUS at 11:02

## 2022-02-10 RX ADMIN — FAMOTIDINE 20 MG: 10 INJECTION INTRAVENOUS at 08:02

## 2022-02-10 RX ADMIN — CEFAZOLIN 2 G: 330 INJECTION, POWDER, FOR SOLUTION INTRAMUSCULAR; INTRAVENOUS at 11:02

## 2022-02-10 RX ADMIN — SUCCINYLCHOLINE CHLORIDE 150 MG: 20 INJECTION, SOLUTION INTRAMUSCULAR; INTRAVENOUS at 10:02

## 2022-02-10 RX ADMIN — EPHEDRINE SULFATE 15 MG: 50 INJECTION INTRAVENOUS at 11:02

## 2022-02-10 RX ADMIN — SCOLOPAMINE TRANSDERMAL SYSTEM 1 PATCH: 1 PATCH, EXTENDED RELEASE TRANSDERMAL at 02:02

## 2022-02-10 RX ADMIN — EPHEDRINE SULFATE 25 MG: 50 INJECTION INTRAVENOUS at 12:02

## 2022-02-10 RX ADMIN — LIDOCAINE HYDROCHLORIDE 100 MG: 20 INJECTION, SOLUTION EPIDURAL; INFILTRATION; INTRACAUDAL; PERINEURAL at 10:02

## 2022-02-10 NOTE — PLAN OF CARE
"Drowsy, unable to hold eyes open. "I feel dizzy. I'm not hurting though. I need to throw up." HOB raised. Graduated cylinder provided. Vomited approximated 100 ml green, watery emesis. Cool rag provided. Will medicate with Zofran as ordered. "I always get sick after surgery."  "

## 2022-02-10 NOTE — DISCHARGE INSTRUCTIONS
[facility]   [phone]  [facility]      [phone]    Managing nausea    Some people have an upset stomach after surgery. This is often because of anesthesia, pain, or pain medicine, or the stress of surgery. These tips will help you handle nausea and eat healthy foods as you get better. If you were on a special food plan before surgery, ask your healthcare provider if you should follow it while you get better. These tips may help:  · Do not push yourself to eat. Your body will tell you when to eat and how much.  · Start off with clear liquids and soup. They are easier to digest.  · Next try semi-solid foods, such as mashed potatoes, applesauce, and gelatin, as you feel ready.  · Slowly move to solid foods. Dont eat fatty, rich, or spicy foods at first.  · Do not force yourself to have 3 large meals a day. Instead eat smaller amounts more often.  · Take pain medicines with a small amount of solid food, such as crackers or toast, to avoid nausea.

## 2022-02-10 NOTE — ANESTHESIA POSTPROCEDURE EVALUATION
Anesthesia Post Evaluation    Patient: Liv Garland    Procedure(s) Performed: Procedure(s) (LRB):  OSTEOTOMY, METATARSAL BONE RESURFACING ARTHROPLASTY VERSES SHORTENING OSTEOTOMY 2ND METATARSAL, LEFT FOOT. (Left)    Final Anesthesia Type: general      Patient location during evaluation: PACU  Patient participation: Yes- Able to Participate  Level of consciousness: awake and alert  Post-procedure vital signs: reviewed and stable  Pain management: adequate  Airway patency: patent    PONV status at discharge: No PONV  Anesthetic complications: no      Cardiovascular status: blood pressure returned to baseline  Respiratory status: unassisted  Hydration status: euvolemic  Follow-up not needed.          Vitals Value Taken Time   /87 02/10/22 1333   Temp 36.4 °C (97.6 °F) 02/10/22 1238   Pulse 110 02/10/22 1338   Resp 18 02/10/22 1338   SpO2 96 % 02/10/22 1338   Vitals shown include unvalidated device data.      No case tracking events are documented in the log.      Pain/Nat Score: Nat Score: 8 (2/10/2022  1:05 PM)

## 2022-02-10 NOTE — DISCHARGE SUMMARY
[facility]  Discharge Note  Short Stay    Procedure(s) (LRB):  OSTEOTOMY, METATARSAL BONE RESURFACING ARTHROPLASTY VERSES SHORTENING OSTEOTOMY 2ND METATARSAL, LEFT FOOT. (Left)    OUTCOME: Patient tolerated treatment/procedure well without complication and is now ready for discharge.    DISPOSITION: Home or Self Care    FINAL DIAGNOSIS:   Freiberg's infarction 2nd metatarsal head, left foot    FOLLOWUP: In clinic    DISCHARGE INSTRUCTIONS:    Discharge Procedure Orders   Diet Adult Regular     Keep surgical extremity elevated     Ice to affected area     Other restrictions (specify):   Order Comments: 1. Elevate and ice left foot.  2. Do not remove dressing, keep dressing clean and dry.  3. Walk with crutches or walker nonweightbearing to the left foot.     Notify your health care provider if you experience any of the following:  temperature >100.4     Leave dressing on - Keep it clean, dry, and intact until clinic visit   Order Comments: Do not remove dressing, keep dressing clean and dry.     Weight bearing restrictions (specify):   Order Comments: Walk with crutches nonweightbearing to the left lower extremity.        TIME SPENT ON DISCHARGE: 10 minutes

## 2022-02-10 NOTE — ANESTHESIA PREPROCEDURE EVALUATION
02/10/2022  Liv Garland is a 61 y.o., female.    Anesthesia Evaluation    I have reviewed the Patient Summary Reports.    I have reviewed the Nursing Notes. I have reviewed the NPO Status.   I have reviewed the Medications.     Review of Systems  Social:  Former Smoker    Hematology/Oncology:         -- Cancer in past history: Breast   Cardiovascular:   Hypertension Valvular problems/Murmurs hyperlipidemia    Renal/:  Renal/ Normal     Hepatic/GI:  Hepatic/GI Normal    Musculoskeletal:   Arthritis     Neurological:  Neurology Normal    Endocrine:  Endocrine Normal    Dermatological:  Skin Normal    Psych:  Psychiatric Normal           Physical Exam  General:  Well nourished    Airway/Jaw/Neck:  Airway Findings: Mouth Opening: Normal Tongue: Normal  General Airway Assessment: Adult  Mallampati: II  TM Distance: Normal, at least 6 cm       Chest/Lungs:  Chest/Lungs Findings: Clear to auscultation     Heart/Vascular:  Heart Findings: Rate: Normal  Rhythm: Regular Rhythm        Mental Status:  Mental Status Findings:  Cooperative, Alert and Oriented         Anesthesia Plan  Type of Anesthesia, risks & benefits discussed:  Anesthesia Type:  general    Patient's Preference:   Plan Factors:          Intra-op Monitoring Plan: standard ASA monitors  Intra-op Monitoring Plan Comments:   Post Op Pain Control Plan: IV/PO Opioids PRN  Post Op Pain Control Plan Comments:     Induction:   IV  Beta Blocker:  Patient is not currently on a Beta-Blocker (No further documentation required).       Informed Consent:  Anesthesia consent signed with patient.  ASA Score: 2     Day of Surgery Review of History & Physical: I have interviewed and examined the patient. I have reviewed the patient's H&P dated:            Ready For Surgery From Anesthesia Perspective.

## 2022-02-10 NOTE — OP NOTE
[facility]  Orthopedic Surgery    2/10/2022    Liv Garland  [id number]      PREOPERATIVE DIAGNOSIS:   1. Freiberg's infraction, left [M92.72]  2. Left foot pain [M79.672]    POSTOPERATIVE DIAGNOSIS:  Freiberg's infarction 2nd metatarsal head, left foot.    PROCEDURE:  1. Resurfacing implant hemiarthroplasty 2nd metatarsal head, left foot, with Arthrosurface 1 x 1.5 mm HemiCAP articular component and 7 x 12 taper post.  2. Short-leg posterior mold plaster splint, left foot.      SURGEON: Don Suárez D.O.    ASSISTANT: Orton Grinnell, CFA.    ANESTHESIA:  General.    BLOOD LOSS:  Less than 10 cc.     TOURNIQUET:  54 minutes.    DRAINS:  None.    PATHOLOGY:  Metatarsal head.    COMPLICATION:  None    INDICATIONS FOR PROCEDURE:   Ms. Hayes is a 30-year-old lady who has persistent pain in her left foot.  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  If she does not bear weight she does not have pain but when she ambulates she has pain in the 2nd metatarsal head of her left foot.  She also has swelling at the 2nd metatarsal head.   She has had approximately 9-10 months of left foot pain which began after she was wearing flip-flops.  She has been using her bone stimulator and wearing her hard sole shoe/Gavin shoe. Ambulation increases her symptoms.  She has taken NSAIDs without resolution of symptoms.  She elected to proceed with surgery after she failed conservative management and complications to include bleeding, infection, scarring, nerve/blood vessel/tendon damage, need for further surgery, failed surgery, failure to improve, stiffness, fracture, hardware failure, hardware breakage, and possible amputation were discussed.  She signed a consent.    PROCEDURE IN DETAIL:  The patient was brought to the operating room and was transferred to the operating bed where all bony prominences were well  padded.  General anesthesia was then administered by the Anesthesiology Department.  After general anesthesia was administered a tourniquet was applied to the upper part of the patient's left lower extremity.  The patient's left lower extremity was then prepped with chlorhexidine solution and draped in the normal sterile fashion.  After prepping and draping bony and soft tissue landmarks were palpated and a longitudinal incision site was drawn on the dorsum aspect of the patient's foot over the 2nd metatarsal MTP joint.  The patient's leg was then elevated, exsanguinated, and tourniquet was inflated.       Sharp incision was then made with a #15 followed by dissection to the level of the MTP joint of the 2nd metatarsal while protecting vital structures.  The joint was opened and fragmented cartilage was encounter.  This was removed with a rongeur.  Further synovitis and fragmented necrotic metatarsal head was removed with a rongeur.  The areas then copiously irrigated.  A sizing guide was then placed on the metatarsal head and then a guidewire was advanced across the sizing guide into the 2nd metatarsal from distal to proximal under fluoroscopic orthogonal views.  Once this was in the appropriate position the sizing guide was removed and a 2 step Reamer was utilized to ream the meta tarsal head.  After reaming the 2nd metatarsal head a tap was utilized to tap the 2nd metatarsal head.  After tapping the 2nd metatarsal head was copiously irrigated and then a tapered post was placed on the guidewire and seated within the 2nd metatarsal head at the appropriate depth.  A metatarsal head Reamer was then utilized to contour the metatarsal head.  After contouring the metatarsal head the guidewire was removed and the patient's 2nd metatarsal was copiously irrigated.  The taper was then dried with a taper drying device.  Metatarsal head trial was then placed and appropriate seating and coverage was observed.  The trial was  removed and a final metatarsal resurfacing hemiarthroplasty head was placed.  The patient's toe was put through full motion and good a tensioning of the tissues was noted.  Final fluoroscopic orthogonal views were taken showing appropriate placement of the resurfacing arthroplasty.  The toe was then copiously irrigated.        The tourniquet was then released and full hemostasis was ensured.  The patient's incision was then closed in layers with Vicryl suture with final closure with nylon suture in a vertical mattress type of fashion.  Her incision was then dressed with Adaptic, sterile gauze, sterile cast padding followed by a posterior mold plaster splint and Ace wrap.  The patient tolerated the procedures well without complication.

## 2022-02-10 NOTE — PLAN OF CARE
"Sitting up on side of bed. Vomited approximately 50 ml green, thick mucous type emesis. Cool rag and emesis basin provided. Dr. Lim to bedside. Offered pt PO Phenergan. "Phenergan makes me sick." MD discussed use of scopolamine patch. Pt agrees to try this. Patch placed behind right ear. Instructed pt and SO the patch stays in placed up to 3 days. V/u.  "

## 2022-02-10 NOTE — PLAN OF CARE
Has met unit/department guidelines for discharge from each phase of the post procedure continuum. Leaving floor per w/c with RN. RIKY x3. Resp even and unlabored room air. No distress noted. Encouraging PO fluids-not ready to take anything PO. Post-op dsg remains clean, dry and intact. Ice pack and graduated cylinder sent home with pt. All personal belongings returned to pt.  Instructed pts S.O. a dose of pain medication can be given when she feels she needs a dose. V/u.

## 2022-02-10 NOTE — TRANSFER OF CARE
"Anesthesia Transfer of Care Note    Patient: Liv Garland    Procedure(s) Performed: Procedure(s) (LRB):  OSTEOTOMY, METATARSAL BONE RESURFACING ARTHROPLASTY VERSES SHORTENING OSTEOTOMY 2ND METATARSAL, LEFT FOOT. (Left)    Patient location: PACU    Anesthesia Type: general    Transport from OR: Transported from OR on room air with adequate spontaneous ventilation    Post pain: adequate analgesia    Post assessment: no apparent anesthetic complications    Post vital signs: stable    Level of consciousness: sedated and responds to stimulation    Nausea/Vomiting: no nausea/vomiting    Complications: none    Transfer of care protocol was followed      Last vitals:   Visit Vitals  BP (!) 147/90 (BP Location: Right arm, Patient Position: Lying)   Pulse 70   Temp 36.7 °C (98.1 °F) (Oral)   Resp 13   Ht 5' 4" (1.626 m)   Wt 69.9 kg (154 lb)   LMP  (LMP Unknown)   SpO2 98%   BMI 26.43 kg/m²     "

## 2022-02-10 NOTE — PLAN OF CARE
"Still reporting nausea, no emesis and "dizziness". /80, heart rate 90. Notified Dr. Lim of pts statement. New orders received. Updated pt on new orders.  "

## 2022-02-13 ENCOUNTER — PATIENT MESSAGE (OUTPATIENT)
Dept: PREADMISSION TESTING | Facility: HOSPITAL | Age: 62
End: 2022-02-13
Payer: COMMERCIAL

## 2022-02-15 ENCOUNTER — TELEPHONE (OUTPATIENT)
Dept: ORTHOPEDICS | Facility: CLINIC | Age: 62
End: 2022-02-15
Payer: COMMERCIAL

## 2022-02-15 NOTE — TELEPHONE ENCOUNTER
Returned call. Instructed patient on rewrapping with an ace wrap.     ----- Message from Promise Villa MA sent at 2/15/2022 10:28 AM CST -----  Contact: pt  Ace bandage, not staying on   Can she take off  Call back

## 2022-02-21 ENCOUNTER — OFFICE VISIT (OUTPATIENT)
Dept: FAMILY MEDICINE | Facility: CLINIC | Age: 62
End: 2022-02-21
Payer: COMMERCIAL

## 2022-02-21 VITALS
DIASTOLIC BLOOD PRESSURE: 78 MMHG | OXYGEN SATURATION: 97 % | BODY MASS INDEX: 27.07 KG/M2 | HEIGHT: 64 IN | HEART RATE: 76 BPM | RESPIRATION RATE: 16 BRPM | SYSTOLIC BLOOD PRESSURE: 126 MMHG | WEIGHT: 158.56 LBS

## 2022-02-21 DIAGNOSIS — K21.9 GASTROESOPHAGEAL REFLUX DISEASE, UNSPECIFIED WHETHER ESOPHAGITIS PRESENT: Primary | ICD-10-CM

## 2022-02-21 PROCEDURE — 3008F PR BODY MASS INDEX (BMI) DOCUMENTED: ICD-10-PCS | Mod: CPTII,S$GLB,, | Performed by: FAMILY MEDICINE

## 2022-02-21 PROCEDURE — 3078F DIAST BP <80 MM HG: CPT | Mod: CPTII,S$GLB,, | Performed by: FAMILY MEDICINE

## 2022-02-21 PROCEDURE — 99999 PR PBB SHADOW E&M-EST. PATIENT-LVL III: ICD-10-PCS | Mod: PBBFAC,,, | Performed by: FAMILY MEDICINE

## 2022-02-21 PROCEDURE — 3074F PR MOST RECENT SYSTOLIC BLOOD PRESSURE < 130 MM HG: ICD-10-PCS | Mod: CPTII,S$GLB,, | Performed by: FAMILY MEDICINE

## 2022-02-21 PROCEDURE — 99214 OFFICE O/P EST MOD 30 MIN: CPT | Mod: S$GLB,,, | Performed by: FAMILY MEDICINE

## 2022-02-21 PROCEDURE — 99214 PR OFFICE/OUTPT VISIT, EST, LEVL IV, 30-39 MIN: ICD-10-PCS | Mod: S$GLB,,, | Performed by: FAMILY MEDICINE

## 2022-02-21 PROCEDURE — 3008F BODY MASS INDEX DOCD: CPT | Mod: CPTII,S$GLB,, | Performed by: FAMILY MEDICINE

## 2022-02-21 PROCEDURE — 3078F PR MOST RECENT DIASTOLIC BLOOD PRESSURE < 80 MM HG: ICD-10-PCS | Mod: CPTII,S$GLB,, | Performed by: FAMILY MEDICINE

## 2022-02-21 PROCEDURE — 3074F SYST BP LT 130 MM HG: CPT | Mod: CPTII,S$GLB,, | Performed by: FAMILY MEDICINE

## 2022-02-21 PROCEDURE — 99999 PR PBB SHADOW E&M-EST. PATIENT-LVL III: CPT | Mod: PBBFAC,,, | Performed by: FAMILY MEDICINE

## 2022-02-21 RX ORDER — PANTOPRAZOLE SODIUM 40 MG/1
40 TABLET, DELAYED RELEASE ORAL DAILY
Qty: 30 TABLET | Refills: 2 | Status: ON HOLD | OUTPATIENT
Start: 2022-02-21 | End: 2022-05-23 | Stop reason: HOSPADM

## 2022-02-21 NOTE — PROGRESS NOTES
[facility] - Clinic Note    Subjective      Ms. Garland is a 61 y.o. female who presents to clinic for reflux.     Patient reports reflux symptoms that started about 10 days ago after having surgery on a toe fracture.    Associated with belching and abdominal pain.  Denies emesis.   Tried OTC antacids with relief.    PMCHEMA Peck has a past medical history of Arthritis, Bilateral breast cancer, Breast cancer (2004), High cholesterol, Hypertension, and PONV (postoperative nausea and vomiting).   PSXH Liv has a past surgical history that includes Tonsillectomy (1974); Hysterectomy (10/1/2003); Mastectomy (Bilateral, 5/16/2004); Cholecystectomy (2/2001); Oophorectomy (Left, 5/18/2004); Ablation of dysrhythmic focus (2000); Total abdominal hysterectomy w/ bilateral salpingoophorectomy; Colonoscopy; Mitral valve replacement (1980); Injection of joint (Right, 11/25/2020); Injection of steroid (Right, 2/10/2021); Hip Arthroplasty (Right, 4/5/2021); and Osteotomy of metatarsal bone (Left, 2/10/2022).    Liv's family history includes Cancer in her mother; Early death in her brother; Heart failure in her father; Liver cancer in her mother; No Known Problems in her sister.   BLANCHE Peck reports that she quit smoking about 23 years ago. She has never used smokeless tobacco. She reports current alcohol use. She reports that she does not use drugs.   EDWIGE Peck is allergic to lisinopril.   MED Liv has a current medication list which includes the following prescription(s): losartan and pantoprazole.     Review of Systems   Constitutional: Negative for activity change, appetite change, chills, fatigue and fever.   Eyes: Negative for visual disturbance.   Respiratory: Negative for cough and shortness of breath.    Cardiovascular: Negative for chest pain, palpitations and leg swelling.   Gastrointestinal: Positive for abdominal pain and nausea. Negative for vomiting.   Skin: Negative for wound.   Neurological: Negative for  "dizziness and headaches.   Psychiatric/Behavioral: Negative for confusion.     Objective     /78 (BP Location: Right arm, Patient Position: Sitting, BP Method: Medium (Manual))   Pulse 76   Resp 16   Ht 5' 4" (1.626 m)   Wt 71.9 kg (158 lb 9 oz)   LMP  (LMP Unknown)   SpO2 97%   BMI 27.22 kg/m²     Physical Exam   Constitutional: She appears well-developed and well-nourished.  Non-toxic appearance. She does not appear ill. No distress.   HENT:   Head: Normocephalic and atraumatic.   Eyes: Right eye exhibits no discharge. Left eye exhibits no discharge.   Cardiovascular: Normal rate, regular rhythm, normal heart sounds and normal pulses. Exam reveals no gallop and no friction rub.   No murmur heard.  Pulmonary/Chest: Effort normal and breath sounds normal. No respiratory distress. She has no wheezes. She has no rhonchi. She has no rales.   Abdominal: Normal appearance.   Musculoskeletal:      Cervical back: Neck supple.      Right lower leg: No edema.      Left lower leg: No edema.   Lymphadenopathy:     She has no cervical adenopathy.   Neurological: She is alert.   Skin: Skin is warm and dry. Capillary refill takes less than 2 seconds. She is not diaphoretic.   Psychiatric: Her behavior is normal. Mood, judgment and thought content normal.   Vitals reviewed.     Assessment/Plan     Liv was seen today for gastroesophageal reflux.    Diagnoses and all orders for this visit:    Gastroesophageal reflux disease, unspecified whether esophagitis present  -     pantoprazole (PROTONIX) 40 MG tablet; Take 1 tablet (40 mg total) by mouth once daily.      Follow up in about 4 weeks (around 3/21/2022), or if symptoms worsen or fail to improve.    Future Appointments   Date Time Provider Department Center   3/30/2022 10:20 AM Vargas Paulino MD [facility]   4/1/2022  9:45 AM Don Suárez DO [facility] ORTHO [facility]       Vargas Paulino MD  Family Medicine  [facility]

## 2022-02-22 ENCOUNTER — OFFICE VISIT (OUTPATIENT)
Dept: ORTHOPEDICS | Facility: CLINIC | Age: 62
End: 2022-02-22
Payer: COMMERCIAL

## 2022-02-22 VITALS — HEIGHT: 64 IN | WEIGHT: 158.5 LBS | RESPIRATION RATE: 18 BRPM | BODY MASS INDEX: 27.06 KG/M2

## 2022-02-22 DIAGNOSIS — Z48.02 VISIT FOR SUTURE REMOVAL: ICD-10-CM

## 2022-02-22 DIAGNOSIS — Z98.890 H/O TOE SURGERY: Primary | ICD-10-CM

## 2022-02-22 PROCEDURE — 99024 POSTOP FOLLOW-UP VISIT: CPT | Mod: S$GLB,,, | Performed by: ORTHOPAEDIC SURGERY

## 2022-02-22 PROCEDURE — 99024 PR POST-OP FOLLOW-UP VISIT: ICD-10-PCS | Mod: S$GLB,,, | Performed by: ORTHOPAEDIC SURGERY

## 2022-02-22 PROCEDURE — 3008F BODY MASS INDEX DOCD: CPT | Mod: CPTII,S$GLB,, | Performed by: ORTHOPAEDIC SURGERY

## 2022-02-22 PROCEDURE — 99999 PR PBB SHADOW E&M-EST. PATIENT-LVL III: CPT | Mod: PBBFAC,,, | Performed by: ORTHOPAEDIC SURGERY

## 2022-02-22 PROCEDURE — 3008F PR BODY MASS INDEX (BMI) DOCUMENTED: ICD-10-PCS | Mod: CPTII,S$GLB,, | Performed by: ORTHOPAEDIC SURGERY

## 2022-02-22 PROCEDURE — 1159F PR MEDICATION LIST DOCUMENTED IN MEDICAL RECORD: ICD-10-PCS | Mod: CPTII,S$GLB,, | Performed by: ORTHOPAEDIC SURGERY

## 2022-02-22 PROCEDURE — 99999 PR PBB SHADOW E&M-EST. PATIENT-LVL III: ICD-10-PCS | Mod: PBBFAC,,, | Performed by: ORTHOPAEDIC SURGERY

## 2022-02-22 PROCEDURE — 1159F MED LIST DOCD IN RCRD: CPT | Mod: CPTII,S$GLB,, | Performed by: ORTHOPAEDIC SURGERY

## 2022-02-22 NOTE — PROGRESS NOTES
Subjective:      Patient ID: Liv Garland is a 61 y.o. female.    Chief Complaint: Post-op Evaluation and Follow-up of the Left Foot      HPI: Ms. Garland returned today for her 1st postop visit on a resurfacing hemiarthroplasty of the left 2nd toe MTP.  She stated she is pain-free today and is doing well.  Her date of surgery 02/10/2022.  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    ROS:  New diagnosis/surgery/prescriptions since last office visit on 01/21/2022:  Resurfacing hemiarthroplasty 2nd metatarsal head, left foot.  Constitution: Negative for chills and fever.   HENT: Negative for congestion.   Eyes: Negative for blurred vision.   Cardiovascular: Negative for chest pain.   Respiratory: Negative for cough.   Endocrine: Negative for polydipsia.   Hematologic/Lymphatic: Negative for adenopathy.   Skin: Negative for flushing and itching.   Musculoskeletal: Negative for gout and muscle weakness.   Gastrointestinal: Negative for constipation, diarrhea and heartburn.   Genitourinary: Negative for nocturia.   Neurological: Negative for headaches and seizures.   Psychiatric/Behavioral: Negative for depression. The patient is not nervous/anxious.   Allergic/Immunologic: Negative for environmental allergies.       Objective:      Physical Exam:   General: AAOx3.  No acute distress  Vascular:  Pulses intact and equal bilaterally.  Capillary refill less than 3 seconds and equal bilaterally  Neurologic:  Pinprick and soft touch intact and equal bilaterally  Integment:  Incision well approximated with sutures in place.  Extremity:  Foot:  Nontender with toe motion 2nd metatarsal phalangeal joint left foot.  Mild swelling 2nd MTP left foot.  it motion near equal bilaterally 2nd MTP both feet.  Radiography:  No new x-rays done today.      Assessment:       Impression:     1. Second metatarsal head resurfacing implant arthroplasty, left foot              Plan:       1.  Discussed physical examination with the patient. Liv understands that she had a 2nd MTP resurfacing implant arthroplasty of her left foot and appears to be doing well.  2. Remove sutures and place Steri-Strips.  3. May convert to a normal hard sole shoe.  4. May shower do not soak in a tub.  5. Any pain can be treated with over-the-counter medications dosed per box instructions.  6. Follow up in 1 month with an x-ray of the left foot.

## 2022-03-24 ENCOUNTER — PATIENT MESSAGE (OUTPATIENT)
Dept: FAMILY MEDICINE | Facility: CLINIC | Age: 62
End: 2022-03-24
Payer: COMMERCIAL

## 2022-03-24 ENCOUNTER — PATIENT MESSAGE (OUTPATIENT)
Dept: ADMINISTRATIVE | Facility: OTHER | Age: 62
End: 2022-03-24
Payer: COMMERCIAL

## 2022-03-24 DIAGNOSIS — Z11.4 SCREENING FOR HIV (HUMAN IMMUNODEFICIENCY VIRUS): ICD-10-CM

## 2022-03-24 DIAGNOSIS — E78.00 HYPERCHOLESTEROLEMIA: Primary | ICD-10-CM

## 2022-03-24 DIAGNOSIS — R73.9 HYPERGLYCEMIA: ICD-10-CM

## 2022-03-24 DIAGNOSIS — I10 ESSENTIAL HYPERTENSION: ICD-10-CM

## 2022-03-28 ENCOUNTER — PATIENT MESSAGE (OUTPATIENT)
Dept: FAMILY MEDICINE | Facility: CLINIC | Age: 62
End: 2022-03-28
Payer: COMMERCIAL

## 2022-03-29 ENCOUNTER — LAB VISIT (OUTPATIENT)
Dept: LAB | Facility: HOSPITAL | Age: 62
End: 2022-03-29
Attending: FAMILY MEDICINE
Payer: COMMERCIAL

## 2022-03-29 DIAGNOSIS — E78.00 HYPERCHOLESTEROLEMIA: ICD-10-CM

## 2022-03-29 DIAGNOSIS — Z11.4 SCREENING FOR HIV (HUMAN IMMUNODEFICIENCY VIRUS): ICD-10-CM

## 2022-03-29 DIAGNOSIS — R73.9 HYPERGLYCEMIA: ICD-10-CM

## 2022-03-29 DIAGNOSIS — I10 ESSENTIAL HYPERTENSION: ICD-10-CM

## 2022-03-29 LAB
ALBUMIN SERPL BCP-MCNC: 4 G/DL (ref 3.5–5.2)
ALP SERPL-CCNC: 66 U/L (ref 55–135)
ALT SERPL W/O P-5'-P-CCNC: 24 U/L (ref 10–44)
ANION GAP SERPL CALC-SCNC: 10 MMOL/L (ref 8–16)
AST SERPL-CCNC: 20 U/L (ref 10–40)
BASOPHILS # BLD AUTO: 0.05 K/UL (ref 0–0.2)
BASOPHILS NFR BLD: 1 % (ref 0–1.9)
BILIRUB SERPL-MCNC: 0.7 MG/DL (ref 0.1–1)
BUN SERPL-MCNC: 14 MG/DL (ref 8–23)
CALCIUM SERPL-MCNC: 9.4 MG/DL (ref 8.7–10.5)
CHLORIDE SERPL-SCNC: 106 MMOL/L (ref 95–110)
CHOLEST SERPL-MCNC: 245 MG/DL (ref 120–199)
CHOLEST/HDLC SERPL: 4.4 {RATIO} (ref 2–5)
CO2 SERPL-SCNC: 25 MMOL/L (ref 23–29)
CREAT SERPL-MCNC: 0.7 MG/DL (ref 0.5–1.4)
DIFFERENTIAL METHOD: ABNORMAL
EOSINOPHIL # BLD AUTO: 0.2 K/UL (ref 0–0.5)
EOSINOPHIL NFR BLD: 3.6 % (ref 0–8)
ERYTHROCYTE [DISTWIDTH] IN BLOOD BY AUTOMATED COUNT: 14.2 % (ref 11.5–14.5)
EST. GFR  (AFRICAN AMERICAN): >60 ML/MIN/1.73 M^2
EST. GFR  (NON AFRICAN AMERICAN): >60 ML/MIN/1.73 M^2
ESTIMATED AVG GLUCOSE: 120 MG/DL (ref 68–131)
GLUCOSE SERPL-MCNC: 124 MG/DL (ref 70–110)
HBA1C MFR BLD: 5.8 % (ref 4–5.6)
HCT VFR BLD AUTO: 39.9 % (ref 37–48.5)
HDLC SERPL-MCNC: 56 MG/DL (ref 40–75)
HDLC SERPL: 22.9 % (ref 20–50)
HGB BLD-MCNC: 13.6 G/DL (ref 12–16)
IMM GRANULOCYTES # BLD AUTO: 0.01 K/UL (ref 0–0.04)
IMM GRANULOCYTES NFR BLD AUTO: 0.2 % (ref 0–0.5)
LDLC SERPL CALC-MCNC: 135.2 MG/DL (ref 63–159)
LYMPHOCYTES # BLD AUTO: 2.3 K/UL (ref 1–4.8)
LYMPHOCYTES NFR BLD: 45 % (ref 18–48)
MCH RBC QN AUTO: 32 PG (ref 27–31)
MCHC RBC AUTO-ENTMCNC: 34.1 G/DL (ref 32–36)
MCV RBC AUTO: 94 FL (ref 82–98)
MONOCYTES # BLD AUTO: 0.4 K/UL (ref 0.3–1)
MONOCYTES NFR BLD: 8 % (ref 4–15)
NEUTROPHILS # BLD AUTO: 2.1 K/UL (ref 1.8–7.7)
NEUTROPHILS NFR BLD: 42.2 % (ref 38–73)
NONHDLC SERPL-MCNC: 189 MG/DL
NRBC BLD-RTO: 0 /100 WBC
PLATELET # BLD AUTO: 346 K/UL (ref 150–450)
PMV BLD AUTO: 10.1 FL (ref 9.2–12.9)
POTASSIUM SERPL-SCNC: 4.4 MMOL/L (ref 3.5–5.1)
PROT SERPL-MCNC: 7.1 G/DL (ref 6–8.4)
RBC # BLD AUTO: 4.25 M/UL (ref 4–5.4)
SODIUM SERPL-SCNC: 141 MMOL/L (ref 136–145)
TRIGL SERPL-MCNC: 269 MG/DL (ref 30–150)
TSH SERPL DL<=0.005 MIU/L-ACNC: 3.05 UIU/ML (ref 0.4–4)
WBC # BLD AUTO: 5 K/UL (ref 3.9–12.7)

## 2022-03-29 PROCEDURE — 84443 ASSAY THYROID STIM HORMONE: CPT | Performed by: FAMILY MEDICINE

## 2022-03-29 PROCEDURE — 85025 COMPLETE CBC W/AUTO DIFF WBC: CPT | Performed by: FAMILY MEDICINE

## 2022-03-29 PROCEDURE — 83036 HEMOGLOBIN GLYCOSYLATED A1C: CPT | Performed by: FAMILY MEDICINE

## 2022-03-29 PROCEDURE — 80061 LIPID PANEL: CPT | Performed by: FAMILY MEDICINE

## 2022-03-29 PROCEDURE — 80053 COMPREHEN METABOLIC PANEL: CPT | Performed by: FAMILY MEDICINE

## 2022-03-29 PROCEDURE — 36415 COLL VENOUS BLD VENIPUNCTURE: CPT | Performed by: FAMILY MEDICINE

## 2022-03-29 PROCEDURE — 87389 HIV-1 AG W/HIV-1&-2 AB AG IA: CPT | Performed by: FAMILY MEDICINE

## 2022-03-30 ENCOUNTER — OFFICE VISIT (OUTPATIENT)
Dept: FAMILY MEDICINE | Facility: CLINIC | Age: 62
End: 2022-03-30
Payer: COMMERCIAL

## 2022-03-30 VITALS
SYSTOLIC BLOOD PRESSURE: 136 MMHG | HEART RATE: 81 BPM | BODY MASS INDEX: 26.29 KG/M2 | OXYGEN SATURATION: 97 % | DIASTOLIC BLOOD PRESSURE: 84 MMHG | RESPIRATION RATE: 15 BRPM | WEIGHT: 154 LBS | HEIGHT: 64 IN

## 2022-03-30 DIAGNOSIS — I10 ESSENTIAL HYPERTENSION: ICD-10-CM

## 2022-03-30 DIAGNOSIS — E78.00 HYPERCHOLESTEROLEMIA: ICD-10-CM

## 2022-03-30 DIAGNOSIS — R73.03 PREDIABETES: ICD-10-CM

## 2022-03-30 DIAGNOSIS — K21.9 GASTROESOPHAGEAL REFLUX DISEASE, UNSPECIFIED WHETHER ESOPHAGITIS PRESENT: Primary | ICD-10-CM

## 2022-03-30 LAB — HIV 1+2 AB+HIV1 P24 AG SERPL QL IA: NEGATIVE

## 2022-03-30 PROCEDURE — 3075F PR MOST RECENT SYSTOLIC BLOOD PRESS GE 130-139MM HG: ICD-10-PCS | Mod: CPTII,S$GLB,, | Performed by: FAMILY MEDICINE

## 2022-03-30 PROCEDURE — 1159F MED LIST DOCD IN RCRD: CPT | Mod: CPTII,S$GLB,, | Performed by: FAMILY MEDICINE

## 2022-03-30 PROCEDURE — 99214 OFFICE O/P EST MOD 30 MIN: CPT | Mod: S$GLB,,, | Performed by: FAMILY MEDICINE

## 2022-03-30 PROCEDURE — 3075F SYST BP GE 130 - 139MM HG: CPT | Mod: CPTII,S$GLB,, | Performed by: FAMILY MEDICINE

## 2022-03-30 PROCEDURE — 4010F ACE/ARB THERAPY RXD/TAKEN: CPT | Mod: CPTII,S$GLB,, | Performed by: FAMILY MEDICINE

## 2022-03-30 PROCEDURE — 99999 PR PBB SHADOW E&M-EST. PATIENT-LVL IV: ICD-10-PCS | Mod: PBBFAC,,, | Performed by: FAMILY MEDICINE

## 2022-03-30 PROCEDURE — 3008F BODY MASS INDEX DOCD: CPT | Mod: CPTII,S$GLB,, | Performed by: FAMILY MEDICINE

## 2022-03-30 PROCEDURE — 3044F PR MOST RECENT HEMOGLOBIN A1C LEVEL <7.0%: ICD-10-PCS | Mod: CPTII,S$GLB,, | Performed by: FAMILY MEDICINE

## 2022-03-30 PROCEDURE — 3008F PR BODY MASS INDEX (BMI) DOCUMENTED: ICD-10-PCS | Mod: CPTII,S$GLB,, | Performed by: FAMILY MEDICINE

## 2022-03-30 PROCEDURE — 4010F PR ACE/ARB THEARPY RXD/TAKEN: ICD-10-PCS | Mod: CPTII,S$GLB,, | Performed by: FAMILY MEDICINE

## 2022-03-30 PROCEDURE — 99999 PR PBB SHADOW E&M-EST. PATIENT-LVL IV: CPT | Mod: PBBFAC,,, | Performed by: FAMILY MEDICINE

## 2022-03-30 PROCEDURE — 3079F DIAST BP 80-89 MM HG: CPT | Mod: CPTII,S$GLB,, | Performed by: FAMILY MEDICINE

## 2022-03-30 PROCEDURE — 99214 PR OFFICE/OUTPT VISIT, EST, LEVL IV, 30-39 MIN: ICD-10-PCS | Mod: S$GLB,,, | Performed by: FAMILY MEDICINE

## 2022-03-30 PROCEDURE — 3079F PR MOST RECENT DIASTOLIC BLOOD PRESSURE 80-89 MM HG: ICD-10-PCS | Mod: CPTII,S$GLB,, | Performed by: FAMILY MEDICINE

## 2022-03-30 PROCEDURE — 1159F PR MEDICATION LIST DOCUMENTED IN MEDICAL RECORD: ICD-10-PCS | Mod: CPTII,S$GLB,, | Performed by: FAMILY MEDICINE

## 2022-03-30 PROCEDURE — 3044F HG A1C LEVEL LT 7.0%: CPT | Mod: CPTII,S$GLB,, | Performed by: FAMILY MEDICINE

## 2022-03-30 NOTE — PROGRESS NOTES
[facility] - Clinic Note    Subjective      Ms. Garland is a 61 y.o. female who presents to clinic for a follow up.     Patient was seen 1 month ago for GERD.   Started on protonix. Took if for 2 weeks and then tried without it but continued to have symptoms.   She has restarted the medication and made dietary changes but symptoms continue.    Had labs done prior to appt.   A1C at 5.8    PMH Liv has a past medical history of Arthritis, Bilateral breast cancer, Breast cancer (2004), High cholesterol, Hypertension, and PONV (postoperative nausea and vomiting).   PSXH Liv has a past surgical history that includes Tonsillectomy (1974); Hysterectomy (10/1/2003); Mastectomy (Bilateral, 5/16/2004); Cholecystectomy (2/2001); Oophorectomy (Left, 5/18/2004); Ablation of dysrhythmic focus (2000); Total abdominal hysterectomy w/ bilateral salpingoophorectomy; Colonoscopy; Mitral valve replacement (1980); Injection of joint (Right, 11/25/2020); Injection of steroid (Right, 2/10/2021); Hip Arthroplasty (Right, 4/5/2021); and Osteotomy of metatarsal bone (Left, 2/10/2022).   RANDY Peck's family history includes Cancer in her mother; Early death in her brother; Heart failure in her father; Liver cancer in her mother; No Known Problems in her sister.   BLANCHE Peck reports that she quit smoking about 23 years ago. She has never used smokeless tobacco. She reports current alcohol use. She reports that she does not use drugs.   EDWIGE Peck is allergic to lisinopril.   MOO Peck has a current medication list which includes the following prescription(s): losartan and pantoprazole.     Review of Systems   Constitutional: Negative for activity change, appetite change, chills, fatigue and fever.   Eyes: Negative for visual disturbance.   Respiratory: Negative for cough and shortness of breath.    Cardiovascular: Negative for chest pain, palpitations and leg swelling.   Gastrointestinal: Negative for abdominal pain, nausea and  "vomiting.   Skin: Negative for wound.   Neurological: Negative for dizziness and headaches.   Psychiatric/Behavioral: Negative for confusion.     Objective     /84 (BP Location: Right arm, Patient Position: Sitting, BP Method: Medium (Manual))   Pulse 81   Resp 15   Ht 5' 4" (1.626 m)   Wt 69.9 kg (154 lb)   LMP  (LMP Unknown)   SpO2 97%   BMI 26.43 kg/m²     Physical Exam   Constitutional: She appears well-developed and well-nourished.  Non-toxic appearance. She does not appear ill. No distress.   HENT:   Head: Normocephalic and atraumatic.   Eyes: Right eye exhibits no discharge. Left eye exhibits no discharge.   Cardiovascular: Normal rate, regular rhythm, normal heart sounds and normal pulses. Exam reveals no gallop and no friction rub.   No murmur heard.  Pulmonary/Chest: Effort normal and breath sounds normal. No respiratory distress. She has no wheezes. She has no rhonchi. She has no rales.   Abdominal: Normal appearance.   Musculoskeletal:      Cervical back: Neck supple.      Right lower leg: No edema.      Left lower leg: No edema.   Lymphadenopathy:     She has no cervical adenopathy.   Neurological: She is alert.   Skin: Skin is warm and dry. Capillary refill takes less than 2 seconds. She is not diaphoretic.   Psychiatric: Her behavior is normal. Mood, judgment and thought content normal.   Vitals reviewed.     Assessment/Plan     Liv was seen today for follow-up.    Diagnoses and all orders for this visit:    Gastroesophageal reflux disease, unspecified whether esophagitis present  -     Ambulatory referral/consult to General Surgery; Future    Prediabetes  -counseled on lifestyle modifications    Hypercholesterolemia  -counseled on high fiber and low fat diet.    Essential hypertension  -well controlled. Continue current regimen.    Follow up in about 6 months (around 9/30/2022), or if symptoms worsen or fail to improve.    Future Appointments   Date Time Provider Department Center "   4/1/2022  9:45 AM Don Suárez DO [facility] ORTHO [facility]   5/3/2022 10:30 AM Francis Bower MD [facility]   9/30/2022 11:20 AM Vargas Paulino MD [facility]       Vargas Paulino MD  Family Medicine  [facility]

## 2022-03-31 ENCOUNTER — PATIENT MESSAGE (OUTPATIENT)
Dept: ORTHOPEDICS | Facility: CLINIC | Age: 62
End: 2022-03-31
Payer: COMMERCIAL

## 2022-03-31 DIAGNOSIS — Z98.890 H/O TOE SURGERY: Primary | ICD-10-CM

## 2022-04-01 ENCOUNTER — HOSPITAL ENCOUNTER (OUTPATIENT)
Dept: RADIOLOGY | Facility: HOSPITAL | Age: 62
Discharge: HOME | End: 2022-04-01
Attending: ORTHOPAEDIC SURGERY
Payer: COMMERCIAL

## 2022-04-01 DIAGNOSIS — Z98.890 H/O TOE SURGERY: ICD-10-CM

## 2022-04-01 PROCEDURE — 73630 X-RAY EXAM OF FOOT: CPT | Mod: 26,LT,, | Performed by: RADIOLOGY

## 2022-04-01 PROCEDURE — 73630 X-RAY EXAM OF FOOT: CPT | Mod: TC,PN,LT

## 2022-04-01 PROCEDURE — 73630 XR FOOT COMPLETE 3 VIEW LEFT: ICD-10-PCS | Mod: 26,LT,, | Performed by: RADIOLOGY

## 2022-04-20 ENCOUNTER — PATIENT OUTREACH (OUTPATIENT)
Dept: ADMINISTRATIVE | Facility: OTHER | Age: 62
End: 2022-04-20
Payer: COMMERCIAL

## 2022-04-26 ENCOUNTER — OFFICE VISIT (OUTPATIENT)
Dept: ORTHOPEDICS | Facility: CLINIC | Age: 62
End: 2022-04-26
Payer: COMMERCIAL

## 2022-04-26 VITALS — BODY MASS INDEX: 26.31 KG/M2 | RESPIRATION RATE: 18 BRPM | WEIGHT: 154.13 LBS | HEIGHT: 64 IN

## 2022-04-26 DIAGNOSIS — M25.511 ACUTE PAIN OF RIGHT SHOULDER: ICD-10-CM

## 2022-04-26 DIAGNOSIS — M75.81 ROTATOR CUFF TENDINITIS, RIGHT: Primary | ICD-10-CM

## 2022-04-26 DIAGNOSIS — M75.21 BICEPS TENDONITIS, RIGHT: ICD-10-CM

## 2022-04-26 DIAGNOSIS — Z98.890 H/O TOE SURGERY: ICD-10-CM

## 2022-04-26 PROCEDURE — 3044F PR MOST RECENT HEMOGLOBIN A1C LEVEL <7.0%: ICD-10-PCS | Mod: CPTII,S$GLB,, | Performed by: ORTHOPAEDIC SURGERY

## 2022-04-26 PROCEDURE — 1159F MED LIST DOCD IN RCRD: CPT | Mod: CPTII,S$GLB,, | Performed by: ORTHOPAEDIC SURGERY

## 2022-04-26 PROCEDURE — 4010F PR ACE/ARB THEARPY RXD/TAKEN: ICD-10-PCS | Mod: CPTII,S$GLB,, | Performed by: ORTHOPAEDIC SURGERY

## 2022-04-26 PROCEDURE — 99999 PR PBB SHADOW E&M-EST. PATIENT-LVL III: ICD-10-PCS | Mod: PBBFAC,,, | Performed by: ORTHOPAEDIC SURGERY

## 2022-04-26 PROCEDURE — 3008F PR BODY MASS INDEX (BMI) DOCUMENTED: ICD-10-PCS | Mod: CPTII,S$GLB,, | Performed by: ORTHOPAEDIC SURGERY

## 2022-04-26 PROCEDURE — 99213 OFFICE O/P EST LOW 20 MIN: CPT | Mod: 25,S$GLB,, | Performed by: ORTHOPAEDIC SURGERY

## 2022-04-26 PROCEDURE — 99213 PR OFFICE/OUTPT VISIT, EST, LEVL III, 20-29 MIN: ICD-10-PCS | Mod: 25,S$GLB,, | Performed by: ORTHOPAEDIC SURGERY

## 2022-04-26 PROCEDURE — 4010F ACE/ARB THERAPY RXD/TAKEN: CPT | Mod: CPTII,S$GLB,, | Performed by: ORTHOPAEDIC SURGERY

## 2022-04-26 PROCEDURE — 20610 DRAIN/INJ JOINT/BURSA W/O US: CPT | Mod: RT,S$GLB,, | Performed by: ORTHOPAEDIC SURGERY

## 2022-04-26 PROCEDURE — 20610 LARGE JOINT ASPIRATION/INJECTION: R GLENOHUMERAL: ICD-10-PCS | Mod: RT,S$GLB,, | Performed by: ORTHOPAEDIC SURGERY

## 2022-04-26 PROCEDURE — 3008F BODY MASS INDEX DOCD: CPT | Mod: CPTII,S$GLB,, | Performed by: ORTHOPAEDIC SURGERY

## 2022-04-26 PROCEDURE — 3044F HG A1C LEVEL LT 7.0%: CPT | Mod: CPTII,S$GLB,, | Performed by: ORTHOPAEDIC SURGERY

## 2022-04-26 PROCEDURE — 1159F PR MEDICATION LIST DOCUMENTED IN MEDICAL RECORD: ICD-10-PCS | Mod: CPTII,S$GLB,, | Performed by: ORTHOPAEDIC SURGERY

## 2022-04-26 PROCEDURE — 99999 PR PBB SHADOW E&M-EST. PATIENT-LVL III: CPT | Mod: PBBFAC,,, | Performed by: ORTHOPAEDIC SURGERY

## 2022-04-26 RX ORDER — TRIAMCINOLONE ACETONIDE 40 MG/ML
40 INJECTION, SUSPENSION INTRA-ARTICULAR; INTRAMUSCULAR
Status: DISCONTINUED | OUTPATIENT
Start: 2022-04-26 | End: 2022-04-26 | Stop reason: HOSPADM

## 2022-04-26 RX ADMIN — TRIAMCINOLONE ACETONIDE 40 MG: 40 INJECTION, SUSPENSION INTRA-ARTICULAR; INTRAMUSCULAR at 10:04

## 2022-04-26 NOTE — PROGRESS NOTES
Subjective:      Patient ID: Liv Garland is a 61 y.o. female.    Chief Complaint: Follow-up of the Left Foot      HPI: Ms. Garland is right-hand-dominant returns today with new complaints of right shoulder pain which began approximately 2-3 weeks ago after she was trimming bushes with trimming trini.  Now forward flexion in abduction increases her symptoms and nothing seems to improve them.  Her symptoms awaken her at night.  She has taken NSAIDs with minimal help.  She has not done physical therapy nor had injections.  She has not worn a brace on her shoulder.  She is also here for follow-up on a implant arthroplasty for a Freiberg's infarction of the 2nd toe of her left foot which was completed on 02/10/2022.  She stated that her toe feels good she has no pain in it.    ROS:  No new diagnosis/surgery/prescriptions since last office visit on 02/22/2022.  Constitution: Negative for chills and fever.   HENT: Negative for congestion.   Eyes: Negative for blurred vision.   Cardiovascular: Negative for chest pain.   Respiratory: Negative for cough.   Endocrine: Negative for polydipsia.   Hematologic/Lymphatic: Negative for adenopathy.   Skin: Negative for flushing and itching.   Musculoskeletal: Negative for gout and muscle weakness.   Gastrointestinal: Negative for constipation, diarrhea and heartburn.   Genitourinary: Negative for nocturia.   Neurological: Negative for headaches and seizures.   Psychiatric/Behavioral: Negative for depression. The patient is not nervous/anxious.   Allergic/Immunologic: Negative for environmental allergies.       Objective:      Physical Exam:   General: AAOx3.  No acute distress  Vascular:  Pulses intact and equal bilaterally.  Capillary refill less than 3 seconds and equal bilaterally  Neurologic:  Pinprick and soft touch intact and equal bilaterally  Integment:  No ecchymosis, no errythema.  Left foot 2nd toe incision well healed.  Extremity:  Shoulder:  Forward flexion/abduction  equal bilaterally 0/170°.  Internal rotation right shoulder L1, left shoulder T9.  Negative lift-off bilaterally.  Negative drop-arm bilaterally.  Full can negative both shoulders.  Empty can negative both shoulders.  Beaver/Neer positive right shoulder.  Cross-arm negative both shoulders.  Nontender the AC joint bilaterally.  Tender in the bicipital groove right shoulder.  Yergason's negative bilaterally.  Apprehension/relocation negative bilaterally.                      Foot:  Nontender with toe motion 2nd metatarsal phalangeal joint left foot.   No swelling 2nd MTP left foot.  Motion equal bilaterally 2nd MTP both feet.  Radiography:  Personally reviewed x-rays of the left foot completed on 04/01/2022 showed a well-seated well-aligned 2nd metatarsal head implant arthroplasty without signs of loosening.      Assessment:       Impression:     1. Rotator cuff tendinitis, right    2. Biceps tendonitis, right    3. Acute pain of right shoulder    4. H/O toe surgery          Plan:       1.  Discussed physical examination and radiographic findings with the patient. Liv understands that she has rotator cuff tendinitis along with biceps tendinitis of her right shoulder.  Treatment alternatives and outcomes were discussed with the patient she understands she could be treated conservatively with observation, activity modification, NSAIDs, bracing, physical therapy, injections, or she could consider surgical intervention such as arthroscopy.  With respect to her shoulder recommend she trial conservative management a little bit longer and if she fails further conservative care then consider surgical intervention.  Also discussed with the patient that her toe appears to be doing well and at this point she can return to full activities with respect to her toe.  2. Offered a steroid injection to the right shoulder, she elected to proceed.  3. Home exercises were reinforced a pamphlet on shoulder home exercise was given to  the patient.  4. May ambulate as tolerated return to full activities as tolerated with respect to the left foot.  5. Any pain can be treated with over-the-counter medications dosed per box instructions.  May take NSAIDs as tolerated allowed by PCM.  6. Follow up p.r.n..  X-ray right shoulder

## 2022-04-26 NOTE — PROCEDURES
Large Joint Aspiration/Injection: R glenohumeral    Date/Time: 4/26/2022 10:15 AM  Performed by: Don Suárez DO  Authorized by: Don Suárez, DO     Consent Done?:  Yes (Verbal)  Indications:  Diagnostic evaluation and pain  Site marked: the procedure site was marked    Timeout: prior to procedure the correct patient, procedure, and site was verified    Prep: patient was prepped and draped in usual sterile fashion      Details:  Needle Size:  22 G  Ultrasonic Guidance for needle placement?: No    Approach:  Posterior  Location:  Shoulder  Site:  R glenohumeral  Medications:  40 mg triamcinolone acetonide 40 mg/mL  Patient tolerance:  Patient tolerated the procedure well with no immediate complications

## 2022-05-03 ENCOUNTER — OFFICE VISIT (OUTPATIENT)
Dept: SURGERY | Facility: CLINIC | Age: 62
End: 2022-05-03
Payer: COMMERCIAL

## 2022-05-03 VITALS
BODY MASS INDEX: 26.46 KG/M2 | OXYGEN SATURATION: 98 % | WEIGHT: 155 LBS | DIASTOLIC BLOOD PRESSURE: 87 MMHG | HEIGHT: 64 IN | HEART RATE: 76 BPM | SYSTOLIC BLOOD PRESSURE: 164 MMHG

## 2022-05-03 DIAGNOSIS — K21.9 GASTROESOPHAGEAL REFLUX DISEASE, UNSPECIFIED WHETHER ESOPHAGITIS PRESENT: Primary | ICD-10-CM

## 2022-05-03 DIAGNOSIS — R13.10 DYSPHAGIA, UNSPECIFIED TYPE: ICD-10-CM

## 2022-05-03 DIAGNOSIS — Z01.818 PRE-OP TESTING: ICD-10-CM

## 2022-05-03 PROCEDURE — 4010F ACE/ARB THERAPY RXD/TAKEN: CPT | Mod: CPTII,S$GLB,, | Performed by: SURGERY

## 2022-05-03 PROCEDURE — 99999 PR PBB SHADOW E&M-EST. PATIENT-LVL V: CPT | Mod: PBBFAC,,, | Performed by: SURGERY

## 2022-05-03 PROCEDURE — 3079F PR MOST RECENT DIASTOLIC BLOOD PRESSURE 80-89 MM HG: ICD-10-PCS | Mod: CPTII,S$GLB,, | Performed by: SURGERY

## 2022-05-03 PROCEDURE — 3008F BODY MASS INDEX DOCD: CPT | Mod: CPTII,S$GLB,, | Performed by: SURGERY

## 2022-05-03 PROCEDURE — 3077F SYST BP >= 140 MM HG: CPT | Mod: CPTII,S$GLB,, | Performed by: SURGERY

## 2022-05-03 PROCEDURE — 99204 OFFICE O/P NEW MOD 45 MIN: CPT | Mod: S$GLB,,, | Performed by: SURGERY

## 2022-05-03 PROCEDURE — 3079F DIAST BP 80-89 MM HG: CPT | Mod: CPTII,S$GLB,, | Performed by: SURGERY

## 2022-05-03 PROCEDURE — 3008F PR BODY MASS INDEX (BMI) DOCUMENTED: ICD-10-PCS | Mod: CPTII,S$GLB,, | Performed by: SURGERY

## 2022-05-03 PROCEDURE — 3077F PR MOST RECENT SYSTOLIC BLOOD PRESSURE >= 140 MM HG: ICD-10-PCS | Mod: CPTII,S$GLB,, | Performed by: SURGERY

## 2022-05-03 PROCEDURE — 3044F PR MOST RECENT HEMOGLOBIN A1C LEVEL <7.0%: ICD-10-PCS | Mod: CPTII,S$GLB,, | Performed by: SURGERY

## 2022-05-03 PROCEDURE — 4010F PR ACE/ARB THEARPY RXD/TAKEN: ICD-10-PCS | Mod: CPTII,S$GLB,, | Performed by: SURGERY

## 2022-05-03 PROCEDURE — 99204 PR OFFICE/OUTPT VISIT, NEW, LEVL IV, 45-59 MIN: ICD-10-PCS | Mod: S$GLB,,, | Performed by: SURGERY

## 2022-05-03 PROCEDURE — 3044F HG A1C LEVEL LT 7.0%: CPT | Mod: CPTII,S$GLB,, | Performed by: SURGERY

## 2022-05-03 PROCEDURE — 99999 PR PBB SHADOW E&M-EST. PATIENT-LVL V: ICD-10-PCS | Mod: PBBFAC,,, | Performed by: SURGERY

## 2022-05-03 RX ORDER — SODIUM CHLORIDE 9 MG/ML
INJECTION, SOLUTION INTRAVENOUS CONTINUOUS
Status: CANCELLED | OUTPATIENT
Start: 2022-05-03

## 2022-05-03 RX ORDER — PANTOPRAZOLE SODIUM 40 MG/1
40 TABLET, DELAYED RELEASE ORAL DAILY
Qty: 30 TABLET | Refills: 11 | Status: SHIPPED | OUTPATIENT
Start: 2022-05-03 | End: 2022-10-11

## 2022-05-03 RX ORDER — FLUTICASONE PROPIONATE 50 MCG
SPRAY, SUSPENSION (ML) NASAL
COMMUNITY
Start: 2022-04-25 | End: 2024-03-04 | Stop reason: SDUPTHER

## 2022-05-03 NOTE — PROGRESS NOTES
Patient given pre-op instructions for EGD. Instructions given for all pre-op, winnie-op, and post-op appointments. Patient given blue folder with all written instructions.

## 2022-05-03 NOTE — H&P
"[facility] Surgery H&P Note    Subjective:       Patient ID: Liv Garland is a 61 y.o. female.    Chief Complaint:  Reflux disease worsening.  HPI:  Liv Garland is a 61 y.o. female history of breast cancer hypercholesterolemia hypertension presents today as a referral for evaluation of gastroesophageal reflux disease.  Patient said she has been on Protonix for some time.  She discontinued it given her evaluating online the problems associated with PPI therapy long-term.  She since discontinuation of proton pump inhibition has had dysphagia, distal esophageal phase as well as heartburn reflux worse at night.  No recent EGD.  Spicy foods make it worse.  History of cholecystectomy.  She presents today now for evaluation.    Past Medical History:   Diagnosis Date    Arthritis     Bilateral breast cancer     Breast cancer 2004    Dr. Dos Santos, bilat mast, no rt, tamoxifen stopped after 9 mos due to side effects    High cholesterol     Hypertension     PONV (postoperative nausea and vomiting)      Past Surgical History:   Procedure Laterality Date    ABLATION OF DYSRHYTHMIC FOCUS  2000    Dr. Melgoza    CHOLECYSTECTOMY  2/2001    COLONOSCOPY      repeat in 2020    HIP ARTHROPLASTY Right 4/5/2021    Procedure: ARTHROPLASTY, HIP;  Surgeon: Don Suárez DO;  Location: [facility] OR;  Service: Orthopedics;  Laterality: Right;  Equipment: Aamir Total Hip System  Vendor: [employer]  REQUIRES ASSISTANT: KOJO    HYSTERECTOMY  10/1/2003    INJECTION OF JOINT Right 11/25/2020    Procedure: Right Hip Intra-articular Steroid Injection Under Fluoro;  Surgeon: Rios Colon MD;  Location: [facility] OR;  Service: Pain Management;  Laterality: Right;    INJECTION OF STEROID Right 2/10/2021    Procedure: INJECTION, STEROID;  Surgeon: Don Suárez DO;  Location: [facility] OR;  Service: Orthopedics;  Laterality: Right;  Sedation  Equipment: 3cc syringes x2, 18ga 1.5" needle x2; 3.5" 20ga spinal needle; Kenalog 40; 1% " lidocaine without Epi; 0.25% marcaine without epi  C-Arm: Entire    MASTECTOMY Bilateral 2004    MITRAL VALVE REPLACEMENT  1980    OOPHORECTOMY Left 2004    OSTEOTOMY OF METATARSAL BONE Left 2/10/2022    Procedure: OSTEOTOMY, METATARSAL BONE RESURFACING ARTHROPLASTY VERSES SHORTENING OSTEOTOMY 2ND METATARSAL, LEFT FOOT.;  Surgeon: Don Suárez DO;  Location: [facility] OR;  Service: Orthopedics;  Laterality: Left;  THIS IS AN ORIF METARSAL CARD; MAKE NOTES TO CREATE A NEW CARD PLEASE    TONSILLECTOMY  1974    TOTAL ABDOMINAL HYSTERECTOMY W/ BILATERAL SALPINGOOPHORECTOMY       Family History   Problem Relation Age of Onset    Liver cancer Mother         69 y/o    Cancer Mother     Heart failure Father         79 y/o with dementia     No Known Problems Sister     Early death Brother         accident at 31 y/o     Social History     Socioeconomic History    Marital status: Single   Tobacco Use    Smoking status: Former Smoker     Quit date: 1999     Years since quittin.3    Smokeless tobacco: Never Used   Substance and Sexual Activity    Alcohol use: Yes    Drug use: No    Sexual activity: Yes     Partners: Male     Social Determinants of Health     Financial Resource Strain: Low Risk     Difficulty of Paying Living Expenses: Not hard at all   Food Insecurity: No Food Insecurity    Worried About Running Out of Food in the Last Year: Never true    Ran Out of Food in the Last Year: Never true   Transportation Needs: No Transportation Needs    Lack of Transportation (Medical): No    Lack of Transportation (Non-Medical): No   Physical Activity: Unknown    Days of Exercise per Week: 3 days   Stress: No Stress Concern Present    Feeling of Stress : Not at all   Social Connections: Unknown    Frequency of Communication with Friends and Family: More than three times a week    Frequency of Social Gatherings with Friends and Family: More than three times a week    Active Member of  Clubs or Organizations: Yes    Attends Club or Organization Meetings: More than 4 times per year    Marital Status:    Housing Stability: Low Risk     Unable to Pay for Housing in the Last Year: No    Number of Places Lived in the Last Year: 1    Unstable Housing in the Last Year: No       Current Outpatient Medications   Medication Sig Dispense Refill    fluticasone propionate (FLONASE) 50 mcg/actuation nasal spray by Each Nostril route.      losartan (COZAAR) 25 MG tablet TAKE 1/2 TABLET (12.5 MG TOTAL) BY MOUTH ONCE DAILY.  45 tablet 3    pantoprazole (PROTONIX) 40 MG tablet Take 1 tablet (40 mg total) by mouth once daily. 30 tablet 2    pantoprazole (PROTONIX) 40 MG tablet Take 1 tablet (40 mg total) by mouth once daily. Take in the morning before breakfast.  Wait 30 minutes before eating or drinking anything 30 tablet 11     No current facility-administered medications for this visit.     Review of patient's allergies indicates:   Allergen Reactions    Lisinopril      Other reaction(s): Angioedema       Review of Systems   Constitutional: Negative for activity change, appetite change, chills and fever.   HENT: Negative for congestion, dental problem and ear discharge.    Eyes: Negative for discharge and itching.   Respiratory: Negative for apnea, choking and chest tightness.    Cardiovascular: Negative for chest pain and leg swelling.   Gastrointestinal: Negative for abdominal distention, abdominal pain, anal bleeding, constipation, diarrhea and nausea.   Endocrine: Negative for cold intolerance and heat intolerance.   Genitourinary: Negative for difficulty urinating and dyspareunia.   Musculoskeletal: Negative for arthralgias and back pain.   Skin: Negative for color change and pallor.   Neurological: Negative for dizziness and facial asymmetry.   Hematological: Negative for adenopathy. Does not bruise/bleed easily.   Psychiatric/Behavioral: Negative for agitation and behavioral problems.      "  Objective:      Vitals:    05/03/22 1025   BP: (!) 164/87   Pulse: 76   SpO2: 98%   Weight: 70.3 kg (155 lb)   Height: 5' 4" (1.626 m)     Physical Exam  Constitutional:       General: She is not in acute distress.     Appearance: She is well-developed.   HENT:      Head: Normocephalic and atraumatic.   Eyes:      Pupils: Pupils are equal, round, and reactive to light.   Neck:      Thyroid: No thyromegaly.   Cardiovascular:      Rate and Rhythm: Normal rate and regular rhythm.   Pulmonary:      Effort: Pulmonary effort is normal.      Breath sounds: Normal breath sounds.   Abdominal:      General: Bowel sounds are normal. There is no distension.      Palpations: Abdomen is soft.      Tenderness: There is no abdominal tenderness.   Musculoskeletal:         General: No deformity. Normal range of motion.      Cervical back: Normal range of motion and neck supple.   Skin:     General: Skin is warm.      Capillary Refill: Capillary refill takes less than 2 seconds.      Findings: No erythema.   Neurological:      Mental Status: She is alert and oriented to person, place, and time.      Cranial Nerves: No cranial nerve deficit.   Psychiatric:         Behavior: Behavior normal.          Assessment:       1. Gastroesophageal reflux disease, unspecified whether esophagitis present    2. Pre-op testing    3. Dysphagia, unspecified type        Plan:   Gastroesophageal reflux disease, unspecified whether esophagitis present  -     Ambulatory referral/consult to General Surgery  -     Case Request Operating Room: ESOPHAGOGASTRODUODENOSCOPY (EGD)  -     Full code; Standing  -     Place in Outpatient; Standing  -     Vital signs; Standing  -     Insert peripheral IV; Standing  -     POCT glucose; Standing  -     Diet NPO; Standing  -     Place PORSCHE hose; Standing  -     Place sequential compression device; Standing  -     FL Esophagram Complete; Future; Expected date: 05/03/2022  -     pantoprazole (PROTONIX) 40 MG tablet; Take 1 " tablet (40 mg total) by mouth once daily. Take in the morning before breakfast.  Wait 30 minutes before eating or drinking anything  Dispense: 30 tablet; Refill: 11    Pre-op testing  -     CBC Auto Differential; Future; Expected date: 08/03/2022  -     Comprehensive Metabolic Panel; Future; Expected date: 08/03/2022  -     SCHEDULED EKG 12-LEAD (to Muse); Future; Expected date: 08/03/2022    Dysphagia, unspecified type  -     Case Request Operating Room: ESOPHAGOGASTRODUODENOSCOPY (EGD)  -     Full code; Standing  -     Place in Outpatient; Standing  -     Vital signs; Standing  -     Insert peripheral IV; Standing  -     POCT glucose; Standing  -     Diet NPO; Standing  -     Place PORSCHE hose; Standing  -     Place sequential compression device; Standing  -     FL Esophagram Complete; Future; Expected date: 05/03/2022  -     pantoprazole (PROTONIX) 40 MG tablet; Take 1 tablet (40 mg total) by mouth once daily. Take in the morning before breakfast.  Wait 30 minutes before eating or drinking anything  Dispense: 30 tablet; Refill: 11    Other orders  -     0.9%  NaCl infusion  -     IP VTE LOW RISK PATIENT; Standing        Medical Decision Making/Counseling:  Worsening gastroesophageal reflux disease.  Initiation of PPI inhibition is currently indicated.  Will recommend patient additionally undergo EGD as well as esophagram for further evaluation.  Possible need for dilatation.  Risk benefits of EGD have been discussed in detail the patient clinic today.  Risk of aspiration, perforation and bleeding have been discussed.  After informed discussion the patient clinic today, she voiced understanding risk benefits and desires to proceed the near future.    Patient instructed that best way to communicate with my office staff is for patient to get on the [facility] patient portal to expedite communication and communication issues that may occur.  Patient was given instructions on how to get on the portal.  I encouraged  patient to obtain portal access as well.  Ultimately it is up to the patient to obtain access.  Patient voiced understanding.

## 2022-05-11 ENCOUNTER — PATIENT MESSAGE (OUTPATIENT)
Dept: ADMINISTRATIVE | Facility: OTHER | Age: 62
End: 2022-05-11
Payer: COMMERCIAL

## 2022-05-20 ENCOUNTER — HOSPITAL ENCOUNTER (OUTPATIENT)
Dept: RADIOLOGY | Facility: HOSPITAL | Age: 62
Discharge: HOME | End: 2022-05-20
Attending: SURGERY
Payer: COMMERCIAL

## 2022-05-20 ENCOUNTER — HOSPITAL ENCOUNTER (OUTPATIENT)
Dept: CARDIOLOGY | Facility: HOSPITAL | Age: 62
Discharge: HOME | End: 2022-05-20
Attending: SURGERY
Payer: COMMERCIAL

## 2022-05-20 DIAGNOSIS — Z01.818 PRE-OP TESTING: ICD-10-CM

## 2022-05-20 DIAGNOSIS — R13.10 DYSPHAGIA, UNSPECIFIED TYPE: ICD-10-CM

## 2022-05-20 DIAGNOSIS — K21.9 GASTROESOPHAGEAL REFLUX DISEASE, UNSPECIFIED WHETHER ESOPHAGITIS PRESENT: ICD-10-CM

## 2022-05-20 PROCEDURE — 74220 X-RAY XM ESOPHAGUS 1CNTRST: CPT | Mod: TC

## 2022-05-20 PROCEDURE — 93010 EKG 12-LEAD: ICD-10-PCS | Mod: ,,, | Performed by: INTERNAL MEDICINE

## 2022-05-20 PROCEDURE — 93005 ELECTROCARDIOGRAM TRACING: CPT

## 2022-05-20 PROCEDURE — 74220 FL ESOPHAGRAM COMPLETE: ICD-10-PCS | Mod: 26,,, | Performed by: RADIOLOGY

## 2022-05-20 PROCEDURE — 74220 X-RAY XM ESOPHAGUS 1CNTRST: CPT | Mod: 26,,, | Performed by: RADIOLOGY

## 2022-05-20 PROCEDURE — 93010 ELECTROCARDIOGRAM REPORT: CPT | Mod: ,,, | Performed by: INTERNAL MEDICINE

## 2022-05-23 ENCOUNTER — HOSPITAL ENCOUNTER (OUTPATIENT)
Facility: HOSPITAL | Age: 62
Discharge: HOME | End: 2022-05-23
Attending: SURGERY | Admitting: SURGERY
Payer: COMMERCIAL

## 2022-05-23 ENCOUNTER — ANESTHESIA (OUTPATIENT)
Dept: SURGERY | Facility: HOSPITAL | Age: 62
End: 2022-05-23
Payer: COMMERCIAL

## 2022-05-23 ENCOUNTER — ANESTHESIA EVENT (OUTPATIENT)
Dept: SURGERY | Facility: HOSPITAL | Age: 62
End: 2022-05-23
Payer: COMMERCIAL

## 2022-05-23 VITALS
RESPIRATION RATE: 18 BRPM | SYSTOLIC BLOOD PRESSURE: 151 MMHG | DIASTOLIC BLOOD PRESSURE: 90 MMHG | OXYGEN SATURATION: 97 % | TEMPERATURE: 98 F | WEIGHT: 154 LBS | BODY MASS INDEX: 26.29 KG/M2 | HEART RATE: 61 BPM | HEIGHT: 64 IN

## 2022-05-23 DIAGNOSIS — R13.10 DYSPHAGIA, UNSPECIFIED TYPE: ICD-10-CM

## 2022-05-23 DIAGNOSIS — K21.9 GASTROESOPHAGEAL REFLUX DISEASE, UNSPECIFIED WHETHER ESOPHAGITIS PRESENT: ICD-10-CM

## 2022-05-23 PROCEDURE — 43239 EGD BIOPSY SINGLE/MULTIPLE: CPT | Mod: 59 | Performed by: SURGERY

## 2022-05-23 PROCEDURE — 63600175 PHARM REV CODE 636 W HCPCS

## 2022-05-23 PROCEDURE — D9220A PRA ANESTHESIA: Mod: CRNA,,, | Performed by: NURSE ANESTHETIST, CERTIFIED REGISTERED

## 2022-05-23 PROCEDURE — 37000009 HC ANESTHESIA EA ADD 15 MINS: Performed by: SURGERY

## 2022-05-23 PROCEDURE — 27201423 OPTIME MED/SURG SUP & DEVICES STERILE SUPPLY: Performed by: SURGERY

## 2022-05-23 PROCEDURE — 43239 EGD BIOPSY SINGLE/MULTIPLE: CPT | Mod: 59,,, | Performed by: SURGERY

## 2022-05-23 PROCEDURE — 37000008 HC ANESTHESIA 1ST 15 MINUTES: Performed by: SURGERY

## 2022-05-23 PROCEDURE — 88305 TISSUE EXAM BY PATHOLOGIST: CPT | Mod: 26,,, | Performed by: PATHOLOGY

## 2022-05-23 PROCEDURE — D9220A PRA ANESTHESIA: Mod: ANES,,, | Performed by: ANESTHESIOLOGY

## 2022-05-23 PROCEDURE — D9220A PRA ANESTHESIA: ICD-10-PCS | Mod: ANES,,, | Performed by: ANESTHESIOLOGY

## 2022-05-23 PROCEDURE — 25000003 PHARM REV CODE 250: Performed by: NURSE ANESTHETIST, CERTIFIED REGISTERED

## 2022-05-23 PROCEDURE — 88305 TISSUE EXAM BY PATHOLOGIST: CPT | Mod: 59 | Performed by: PATHOLOGY

## 2022-05-23 PROCEDURE — 88305 TISSUE EXAM BY PATHOLOGIST: ICD-10-PCS | Mod: 26,,, | Performed by: PATHOLOGY

## 2022-05-23 PROCEDURE — 63600175 PHARM REV CODE 636 W HCPCS: Performed by: NURSE ANESTHETIST, CERTIFIED REGISTERED

## 2022-05-23 PROCEDURE — D9220A PRA ANESTHESIA: ICD-10-PCS | Mod: CRNA,,, | Performed by: NURSE ANESTHETIST, CERTIFIED REGISTERED

## 2022-05-23 PROCEDURE — 43450 PR DILATE ESOPHAGUS: ICD-10-PCS | Mod: ,,, | Performed by: SURGERY

## 2022-05-23 PROCEDURE — 63600175 PHARM REV CODE 636 W HCPCS: Performed by: ANESTHESIOLOGY

## 2022-05-23 PROCEDURE — 43239 PR EGD, FLEX, W/BIOPSY, SGL/MULTI: ICD-10-PCS | Mod: 59,,, | Performed by: SURGERY

## 2022-05-23 PROCEDURE — 43450 DILATE ESOPHAGUS 1/MULT PASS: CPT | Mod: ,,, | Performed by: SURGERY

## 2022-05-23 PROCEDURE — 43450 DILATE ESOPHAGUS 1/MULT PASS: CPT | Performed by: SURGERY

## 2022-05-23 RX ORDER — PROPOFOL 10 MG/ML
VIAL (ML) INTRAVENOUS
Status: DISCONTINUED | OUTPATIENT
Start: 2022-05-23 | End: 2022-05-23

## 2022-05-23 RX ORDER — LIDOCAINE HYDROCHLORIDE 20 MG/ML
INJECTION, SOLUTION EPIDURAL; INFILTRATION; INTRACAUDAL; PERINEURAL
Status: DISCONTINUED | OUTPATIENT
Start: 2022-05-23 | End: 2022-05-23

## 2022-05-23 RX ORDER — SODIUM CHLORIDE 9 MG/ML
INJECTION, SOLUTION INTRAVENOUS CONTINUOUS
Status: DISCONTINUED | OUTPATIENT
Start: 2022-05-23 | End: 2022-05-23 | Stop reason: HOSPADM

## 2022-05-23 RX ORDER — FAMOTIDINE 20 MG/1
20 TABLET, FILM COATED ORAL 2 TIMES DAILY
Qty: 60 TABLET | Refills: 11 | Status: SHIPPED | OUTPATIENT
Start: 2022-05-23 | End: 2022-10-11

## 2022-05-23 RX ORDER — ONDANSETRON 2 MG/ML
INJECTION INTRAMUSCULAR; INTRAVENOUS
Status: COMPLETED
Start: 2022-05-23 | End: 2022-05-23

## 2022-05-23 RX ORDER — ONDANSETRON 2 MG/ML
4 INJECTION INTRAMUSCULAR; INTRAVENOUS ONCE
Status: COMPLETED | OUTPATIENT
Start: 2022-05-23 | End: 2022-05-23

## 2022-05-23 RX ORDER — LIDOCAINE HYDROCHLORIDE 10 MG/ML
1 INJECTION, SOLUTION EPIDURAL; INFILTRATION; INTRACAUDAL; PERINEURAL ONCE
Status: DISCONTINUED | OUTPATIENT
Start: 2022-05-23 | End: 2022-05-23 | Stop reason: HOSPADM

## 2022-05-23 RX ORDER — SUCRALFATE 1 G/1
1 TABLET ORAL 4 TIMES DAILY
Qty: 120 TABLET | Refills: 0 | Status: SHIPPED | OUTPATIENT
Start: 2022-05-23 | End: 2022-10-11

## 2022-05-23 RX ORDER — SODIUM CHLORIDE, SODIUM LACTATE, POTASSIUM CHLORIDE, CALCIUM CHLORIDE 600; 310; 30; 20 MG/100ML; MG/100ML; MG/100ML; MG/100ML
INJECTION, SOLUTION INTRAVENOUS CONTINUOUS
Status: DISCONTINUED | OUTPATIENT
Start: 2022-05-23 | End: 2022-05-23 | Stop reason: HOSPADM

## 2022-05-23 RX ADMIN — PROPOFOL 50 MG: 10 INJECTION, EMULSION INTRAVENOUS at 11:05

## 2022-05-23 RX ADMIN — ONDANSETRON 4 MG: 2 INJECTION INTRAMUSCULAR; INTRAVENOUS at 09:05

## 2022-05-23 RX ADMIN — LIDOCAINE HYDROCHLORIDE 80 MG: 20 INJECTION, SOLUTION EPIDURAL; INFILTRATION; INTRACAUDAL; PERINEURAL at 10:05

## 2022-05-23 RX ADMIN — PROPOFOL 100 MG: 10 INJECTION, EMULSION INTRAVENOUS at 10:05

## 2022-05-23 RX ADMIN — SODIUM CHLORIDE, POTASSIUM CHLORIDE, SODIUM LACTATE AND CALCIUM CHLORIDE: 600; 310; 30; 20 INJECTION, SOLUTION INTRAVENOUS at 10:05

## 2022-05-23 NOTE — PROGRESS NOTES
Pt awake and alert, able to dress herself, discharged to home with instructions for care. Pt escorted to POV via wheel chair.

## 2022-05-23 NOTE — DISCHARGE SUMMARY
Discharge Note        SUMMARY     Admit Date: 5/23/2022    Attending Physician: Francis Bower MD     Discharge Physician: Francis Bower MD    Discharge Date: 5/23/2022 11:12 AM      Hospital Course: Patient tolerated procedure well.     Disposition: Home or Self Care    Patient Instructions:   Current Discharge Medication List      START taking these medications    Details   famotidine (PEPCID) 20 MG tablet Take 1 tablet (20 mg total) by mouth 2 (two) times daily.  Qty: 60 tablet, Refills: 11      sucralfate (CARAFATE) 1 gram tablet Take 1 tablet (1 g total) by mouth 4 (four) times daily.  Qty: 120 tablet, Refills: 0         CONTINUE these medications which have NOT CHANGED    Details   fluticasone propionate (FLONASE) 50 mcg/actuation nasal spray by Each Nostril route.      losartan (COZAAR) 25 MG tablet TAKE 1/2 TABLET (12.5 MG TOTAL) BY MOUTH ONCE DAILY.   Qty: 45 tablet, Refills: 3      pantoprazole (PROTONIX) 40 MG tablet Take 1 tablet (40 mg total) by mouth once daily. Take in the morning before breakfast.  Wait 30 minutes before eating or drinking anything  Qty: 30 tablet, Refills: 11    Associated Diagnoses: Gastroesophageal reflux disease, unspecified whether esophagitis present; Dysphagia, unspecified type             Discharge Procedure Orders (must include Diet, Follow-up, Activity):   Discharge Procedure Orders (must include Diet, Follow-up, Activity)   Diet general     Call MD for:  temperature >100.4     Call MD for:  persistent nausea and vomiting     Call MD for:  severe uncontrolled pain     Call MD for:  difficulty breathing, headache or visual disturbances     Call MD for:  redness, tenderness, or signs of infection (pain, swelling, redness, odor or green/yellow discharge around incision site)     Call MD for:  persistent dizziness or light-headedness        Follow Up:  Follow up as scheduled.  Resume routine diet.  Activity as tolerated.    No driving day of procedure.

## 2022-05-23 NOTE — ANESTHESIA PREPROCEDURE EVALUATION
05/23/2022  Liv Garland is a 61 y.o., female.      Pre-op Assessment    I have reviewed the Patient Summary Reports.     I have reviewed the Nursing Notes.    I have reviewed the Medications.     Review of Systems  Anesthesia Hx:  No problems with previous Anesthesia PONV Neg history of prior surgery. Denies Family Hx of Anesthesia complications.   Denies Personal Hx of Anesthesia complications.   Social:  Smoker, Former Smoker    Hematology/Oncology:  Hematology Normal   Oncology Normal     EENT/Dental:EENT/Dental Normal   Cardiovascular:   Hypertension, well controlled    Pulmonary:  Pulmonary Normal    Renal/:  Renal/ Normal     Hepatic/GI:  Hepatic/GI Normal    Musculoskeletal:   Arthritis     Neurological:  Neurology Normal    Endocrine:  Endocrine Normal    Dermatological:  Skin Normal    Psych:  Psychiatric Normal           Physical Exam  General: Alert    Airway:  Mallampati: II   Mouth Opening: Normal  TM Distance: Normal  Neck ROM: Normal ROM    Dental:  Intact    Chest/Lungs:  Clear to auscultation, Normal Respiratory Rate    Heart:  Rate: Normal    Abdomen:  Normal        Anesthesia Plan  Type of Anesthesia, risks & benefits discussed:    Anesthesia Type: Gen ETT  Post Op Pain Control Plan: multimodal analgesia  Airway Plan: Direct, Post-Induction  Informed Consent: Informed consent signed with the Patient and all parties understand the risks and agree with anesthesia plan.  All questions answered. Patient consented to blood products? No  ASA Score: 2  Day of Surgery Review of History & Physical: H&P Update referred to the surgeon/provider.    Ready For Surgery From Anesthesia Perspective.     .

## 2022-05-23 NOTE — TRANSFER OF CARE
"Anesthesia Transfer of Care Note    Patient: Liv Garland    Procedure(s) Performed: Procedure(s) (LRB):  ESOPHAGOGASTRODUODENOSCOPY (EGD) (N/A)  DILATION, ESOPHAGUS (N/A)    Patient location: PACU    Anesthesia Type: general    Transport from OR: Transported from OR on room air with adequate spontaneous ventilation    Post pain: adequate analgesia    Post assessment: no apparent anesthetic complications and tolerated procedure well    Post vital signs: stable    Level of consciousness: awake, alert and oriented    Nausea/Vomiting: no nausea/vomiting    Complications: none    Transfer of care protocol was followed      Last vitals:   Visit Vitals  BP (!) 152/91   Pulse 62   Temp 36.8 °C (98.2 °F)   Resp 12   Ht 5' 4" (1.626 m)   Wt 69.9 kg (154 lb)   LMP  (LMP Unknown)   SpO2 96%   Breastfeeding No   BMI 26.43 kg/m²     "

## 2022-05-23 NOTE — H&P
"[facility] Surgery H&P Note    Subjective:       Patient ID: Liv Garland is a 61 y.o. female.    Chief Complaint:  Reflux disease worsening.  HPI:  Liv Garland is a 61 y.o. female history of breast cancer hypercholesterolemia hypertension presents today as a referral for evaluation of gastroesophageal reflux disease.  Patient said she has been on Protonix for some time.  She discontinued it given her evaluating online the problems associated with PPI therapy long-term.  She since discontinuation of proton pump inhibition has had dysphagia, distal esophageal phase as well as heartburn reflux worse at night.  No recent EGD.  Spicy foods make it worse.  History of cholecystectomy.  She presents today now for evaluation.    Past Medical History:   Diagnosis Date    Arthritis     Bilateral breast cancer     Breast cancer 2004    Dr. Dos Santos, bilat mast, no rt, tamoxifen stopped after 9 mos due to side effects    High cholesterol     Hypertension     PONV (postoperative nausea and vomiting)      Past Surgical History:   Procedure Laterality Date    ABLATION OF DYSRHYTHMIC FOCUS  2000    Dr. Melgoza    CHOLECYSTECTOMY  2/2001    COLONOSCOPY      repeat in 2020    HIP ARTHROPLASTY Right 4/5/2021    Procedure: ARTHROPLASTY, HIP;  Surgeon: Don Suárez DO;  Location: [facility] OR;  Service: Orthopedics;  Laterality: Right;  Equipment: Aamir Total Hip System  Vendor: [employer]  REQUIRES ASSISTANT: KOJO    HYSTERECTOMY  10/1/2003    INJECTION OF JOINT Right 11/25/2020    Procedure: Right Hip Intra-articular Steroid Injection Under Fluoro;  Surgeon: Rios Colon MD;  Location: [facility] OR;  Service: Pain Management;  Laterality: Right;    INJECTION OF STEROID Right 2/10/2021    Procedure: INJECTION, STEROID;  Surgeon: Don Suárez DO;  Location: [facility] OR;  Service: Orthopedics;  Laterality: Right;  Sedation  Equipment: 3cc syringes x2, 18ga 1.5" needle x2; 3.5" 20ga spinal needle; Kenalog 40; 1% " lidocaine without Epi; 0.25% marcaine without epi  C-Arm: Entire    MASTECTOMY Bilateral 2004    MITRAL VALVE REPLACEMENT  1980    OOPHORECTOMY Left 2004    OSTEOTOMY OF METATARSAL BONE Left 2/10/2022    Procedure: OSTEOTOMY, METATARSAL BONE RESURFACING ARTHROPLASTY VERSES SHORTENING OSTEOTOMY 2ND METATARSAL, LEFT FOOT.;  Surgeon: Don Suárez DO;  Location: [facility] OR;  Service: Orthopedics;  Laterality: Left;  THIS IS AN ORIF METARSAL CARD; MAKE NOTES TO CREATE A NEW CARD PLEASE    TONSILLECTOMY  1974    TOTAL ABDOMINAL HYSTERECTOMY W/ BILATERAL SALPINGOOPHORECTOMY       Family History   Problem Relation Age of Onset    Liver cancer Mother         67 y/o    Cancer Mother     Heart failure Father         79 y/o with dementia     No Known Problems Sister     Early death Brother         accident at 29 y/o     Social History     Socioeconomic History    Marital status: Single   Tobacco Use    Smoking status: Former Smoker     Quit date: 1999     Years since quittin.4    Smokeless tobacco: Never Used   Substance and Sexual Activity    Alcohol use: Yes    Drug use: No    Sexual activity: Yes     Partners: Male     Social Determinants of Health     Financial Resource Strain: Low Risk     Difficulty of Paying Living Expenses: Not hard at all   Food Insecurity: No Food Insecurity    Worried About Running Out of Food in the Last Year: Never true    Ran Out of Food in the Last Year: Never true   Transportation Needs: No Transportation Needs    Lack of Transportation (Medical): No    Lack of Transportation (Non-Medical): No   Physical Activity: Unknown    Days of Exercise per Week: 3 days   Stress: No Stress Concern Present    Feeling of Stress : Not at all   Social Connections: Unknown    Frequency of Communication with Friends and Family: More than three times a week    Frequency of Social Gatherings with Friends and Family: More than three times a week    Active Member of  Clubs or Organizations: Yes    Attends Club or Organization Meetings: More than 4 times per year    Marital Status:    Housing Stability: Low Risk     Unable to Pay for Housing in the Last Year: No    Number of Places Lived in the Last Year: 1    Unstable Housing in the Last Year: No       No current facility-administered medications for this encounter.     Review of patient's allergies indicates:   Allergen Reactions    Lisinopril      Other reaction(s): Angioedema       Review of Systems   Constitutional: Negative for activity change, appetite change, chills and fever.   HENT: Negative for congestion, dental problem and ear discharge.    Eyes: Negative for discharge and itching.   Respiratory: Negative for apnea, choking and chest tightness.    Cardiovascular: Negative for chest pain and leg swelling.   Gastrointestinal: Negative for abdominal distention, abdominal pain, anal bleeding, constipation, diarrhea and nausea.   Endocrine: Negative for cold intolerance and heat intolerance.   Genitourinary: Negative for difficulty urinating and dyspareunia.   Musculoskeletal: Negative for arthralgias and back pain.   Skin: Negative for color change and pallor.   Neurological: Negative for dizziness and facial asymmetry.   Hematological: Negative for adenopathy. Does not bruise/bleed easily.   Psychiatric/Behavioral: Negative for agitation and behavioral problems.       Objective:      There were no vitals filed for this visit.  Physical Exam  Constitutional:       General: She is not in acute distress.     Appearance: She is well-developed.   HENT:      Head: Normocephalic and atraumatic.   Eyes:      Pupils: Pupils are equal, round, and reactive to light.   Neck:      Thyroid: No thyromegaly.   Cardiovascular:      Rate and Rhythm: Normal rate and regular rhythm.   Pulmonary:      Effort: Pulmonary effort is normal.      Breath sounds: Normal breath sounds.   Abdominal:      General: Bowel sounds are  normal. There is no distension.      Palpations: Abdomen is soft.      Tenderness: There is no abdominal tenderness.   Musculoskeletal:         General: No deformity. Normal range of motion.      Cervical back: Normal range of motion and neck supple.   Skin:     General: Skin is warm.      Capillary Refill: Capillary refill takes less than 2 seconds.      Findings: No erythema.   Neurological:      Mental Status: She is alert and oriented to person, place, and time.      Cranial Nerves: No cranial nerve deficit.   Psychiatric:         Behavior: Behavior normal.            Medical Decision Making/Counseling:  Worsening gastroesophageal reflux disease.  Initiation of PPI inhibition is currently indicated.  Will recommend patient additionally undergo EGD as well as esophagram for further evaluation.  Possible need for dilatation.  Risk benefits of EGD have been discussed in detail the patient clinic today.  Risk of aspiration, perforation and bleeding have been discussed.  After informed discussion the patient clinic today, she voiced understanding risk benefits and desires to proceed the near future.    Patient instructed that best way to communicate with my office staff is for patient to get on the [facility] patient portal to expedite communication and communication issues that may occur.  Patient was given instructions on how to get on the portal.  I encouraged patient to obtain portal access as well.  Ultimately it is up to the patient to obtain access.  Patient voiced understanding.

## 2022-05-23 NOTE — ANESTHESIA POSTPROCEDURE EVALUATION
Anesthesia Post Evaluation    Patient: Liv Garland    Procedure(s) Performed: Procedure(s) (LRB):  ESOPHAGOGASTRODUODENOSCOPY (EGD) (N/A)  DILATION, ESOPHAGUS (N/A)    Final Anesthesia Type: general      Patient location during evaluation: PACU  Patient participation: Yes- Able to Participate  Level of consciousness: awake and awake and alert  Post-procedure vital signs: reviewed and stable  Pain management: adequate  Airway patency: patent    PONV status at discharge: No PONV  Anesthetic complications: no      Cardiovascular status: blood pressure returned to baseline  Respiratory status: unassisted and spontaneous ventilation  Hydration status: euvolemic  Follow-up not needed.          Vitals Value Taken Time   /90 05/23/22 1147   Temp 36 05/23/22 1200   Pulse 61 05/23/22 1146   Resp 18 05/23/22 1146   SpO2 97 % 05/23/22 1146   Vitals shown include unvalidated device data.      Event Time   Out of Recovery 11:33:02         Pain/Nat Score: Nat Score: 10 (5/23/2022 11:42 AM)  Modified Nat Score: 17 (5/23/2022 11:21 AM)

## 2022-05-23 NOTE — [employer] PATIENT INSTRUCTIONS
Discharge Summary/Instructions after an Endoscopic Procedure  Patient Name: Liv Garland  Patient MRN: [id number]  Patient YOB: 1960  Monday, May 23, 2022  Francis Bower MD  RESTRICTIONS:  During your procedure today, you received medications for sedation.  These   medications may affect your judgment, balance and coordination.  Therefore,   for 24 hours, you have the following restrictions:   - DO NOT drive a car, operate machinery, make legal/financial decisions,   sign important papers or drink alcohol.    ACTIVITY:  Today: no heavy lifting, straining or running due to procedural   sedation/anesthesia.  The following day: return to full activity including work.  DIET:  Eat and drink normally unless instructed otherwise.     TREATMENT FOR COMMON SIDE EFFECTS:  - Mild abdominal pain, nausea, belching, bloating or excessive gas:  rest,   eat lightly and use a heating pad.  - Sore Throat: treat with throat lozenges and/or gargle with warm salt   water.  - Because air was used during the procedure, expelling large amounts of air   from your rectum or belching is normal.  - If a bowel prep was taken, you may not have a bowel movement for 1-3 days.    This is normal.  SYMPTOMS TO WATCH FOR AND REPORT TO YOUR PHYSICIAN:  1. Abdominal pain or bloating, other than gas cramps.  2. Chest pain.  3. Back pain.  4. Signs of infection such as: chills or fever occurring within 24 hours   after the procedure.  5. Rectal bleeding, which would show as bright red, maroon, or black stools.   (A tablespoon of blood from the rectum is not serious, especially if   hemorrhoids are present.)  6. Vomiting.  7. Weakness or dizziness.  GO DIRECTLY TO THE NEAREST EMERGENCY ROOM IF YOU HAVE ANY OF THE FOLLOWING:      Difficulty breathing              Chills and/or fever over 101 F   Persistent vomiting and/or vomiting blood   Severe abdominal pain   Severe chest pain   Black, tarry stools   Bleeding- more than one  tablespoon   Any other symptom or condition that you feel may need urgent attention  Your doctor recommends these additional instructions:  If any biopsies were taken, your doctors clinic will contact you in 1 to 2   weeks with any results.  - Discharge patient to home (ambulatory).   - Resume previous diet.   - Use Pepcid (famotidine) 20 mg PO BID.   - Use sucralfate tablets 1 gram PO QID.   - Await pathology results.   -Consider reglan for bile reflux gastritis if no improvement with additional   care as detailed above.  - Return to my office in 1 week.  For questions, problems or results please call your physician - Francis Bower MD at Work:  [phone].  [facility] EMERGENCY ROOM PHONE NUMBER: [phone]  IF A COMPLICATION OR EMERGENCY SITUATION ARISES AND YOU ARE UNABLE TO REACH   YOUR PHYSICIAN - GO DIRECTLY TO THE EMERGENCY ROOM.  MD Francis Contreras MD  5/23/2022 11:15:58 AM  This report has been verified and signed electronically.  Dear patient,  As a result of recent federal legislation (The Federal Cures Act), you may   receive lab or pathology results from your procedure in your [facility]   account before your physician is able to contact you. Your physician or   their representative will relay the results to you with their   recommendations at their soonest availability.  Thank you,  [employer]

## 2022-05-24 RX ORDER — ONDANSETRON 2 MG/ML
4 INJECTION INTRAMUSCULAR; INTRAVENOUS DAILY PRN
Status: DISCONTINUED | OUTPATIENT
Start: 2022-05-24 | End: 2023-11-30

## 2022-05-24 RX ORDER — SODIUM CHLORIDE, SODIUM LACTATE, POTASSIUM CHLORIDE, CALCIUM CHLORIDE 600; 310; 30; 20 MG/100ML; MG/100ML; MG/100ML; MG/100ML
125 INJECTION, SOLUTION INTRAVENOUS CONTINUOUS
Status: DISCONTINUED | OUTPATIENT
Start: 2022-05-24 | End: 2023-11-30

## 2022-05-27 LAB
FINAL PATHOLOGIC DIAGNOSIS: NORMAL
GROSS: NORMAL
Lab: NORMAL

## 2022-06-07 ENCOUNTER — OFFICE VISIT (OUTPATIENT)
Dept: SURGERY | Facility: CLINIC | Age: 62
End: 2022-06-07
Payer: COMMERCIAL

## 2022-06-07 VITALS
BODY MASS INDEX: 26.63 KG/M2 | HEIGHT: 64 IN | SYSTOLIC BLOOD PRESSURE: 155 MMHG | DIASTOLIC BLOOD PRESSURE: 90 MMHG | WEIGHT: 156 LBS | OXYGEN SATURATION: 95 % | HEART RATE: 78 BPM

## 2022-06-07 DIAGNOSIS — K21.9 GASTROESOPHAGEAL REFLUX DISEASE, UNSPECIFIED WHETHER ESOPHAGITIS PRESENT: Primary | ICD-10-CM

## 2022-06-07 PROCEDURE — 1159F PR MEDICATION LIST DOCUMENTED IN MEDICAL RECORD: ICD-10-PCS | Mod: CPTII,S$GLB,, | Performed by: SURGERY

## 2022-06-07 PROCEDURE — 3008F BODY MASS INDEX DOCD: CPT | Mod: CPTII,S$GLB,, | Performed by: SURGERY

## 2022-06-07 PROCEDURE — 99999 PR PBB SHADOW E&M-EST. PATIENT-LVL III: ICD-10-PCS | Mod: PBBFAC,,, | Performed by: SURGERY

## 2022-06-07 PROCEDURE — 3080F PR MOST RECENT DIASTOLIC BLOOD PRESSURE >= 90 MM HG: ICD-10-PCS | Mod: CPTII,S$GLB,, | Performed by: SURGERY

## 2022-06-07 PROCEDURE — 99999 PR PBB SHADOW E&M-EST. PATIENT-LVL III: CPT | Mod: PBBFAC,,, | Performed by: SURGERY

## 2022-06-07 PROCEDURE — 3077F PR MOST RECENT SYSTOLIC BLOOD PRESSURE >= 140 MM HG: ICD-10-PCS | Mod: CPTII,S$GLB,, | Performed by: SURGERY

## 2022-06-07 PROCEDURE — 4010F PR ACE/ARB THEARPY RXD/TAKEN: ICD-10-PCS | Mod: CPTII,S$GLB,, | Performed by: SURGERY

## 2022-06-07 PROCEDURE — 3008F PR BODY MASS INDEX (BMI) DOCUMENTED: ICD-10-PCS | Mod: CPTII,S$GLB,, | Performed by: SURGERY

## 2022-06-07 PROCEDURE — 4010F ACE/ARB THERAPY RXD/TAKEN: CPT | Mod: CPTII,S$GLB,, | Performed by: SURGERY

## 2022-06-07 PROCEDURE — 3044F PR MOST RECENT HEMOGLOBIN A1C LEVEL <7.0%: ICD-10-PCS | Mod: CPTII,S$GLB,, | Performed by: SURGERY

## 2022-06-07 PROCEDURE — 1159F MED LIST DOCD IN RCRD: CPT | Mod: CPTII,S$GLB,, | Performed by: SURGERY

## 2022-06-07 PROCEDURE — 99214 OFFICE O/P EST MOD 30 MIN: CPT | Mod: S$GLB,,, | Performed by: SURGERY

## 2022-06-07 PROCEDURE — 99214 PR OFFICE/OUTPT VISIT, EST, LEVL IV, 30-39 MIN: ICD-10-PCS | Mod: S$GLB,,, | Performed by: SURGERY

## 2022-06-07 PROCEDURE — 3044F HG A1C LEVEL LT 7.0%: CPT | Mod: CPTII,S$GLB,, | Performed by: SURGERY

## 2022-06-07 PROCEDURE — 3077F SYST BP >= 140 MM HG: CPT | Mod: CPTII,S$GLB,, | Performed by: SURGERY

## 2022-06-07 PROCEDURE — 3080F DIAST BP >= 90 MM HG: CPT | Mod: CPTII,S$GLB,, | Performed by: SURGERY

## 2022-06-07 NOTE — PROGRESS NOTES
"[facility] General Surgery  Follow-up    Subjective:     Chief Complaint:  Follow-up EGD gastroesophageal reflux disease.    HPI:  Liv Garland is a 62 y.o. female presents today for follow-up examination of EGD.  Patient underwent EGD for evaluation of gastroesophageal reflux disease worsening.  EGD showed evidence of gastritis.  Biopsies performed.  Biopsy show no evidence of H pylori species.  Patient since EGD has been placed on Pepcid as well as Carafate to go along with Protonix she was previously on.  She has had resolution of gastroesophageal reflux disease symptoms.  She presents today for follow-up examination.    Review of Systems   Constitutional: Negative for activity change, appetite change, chills and fever.   HENT: Negative for congestion, dental problem and ear discharge.    Eyes: Negative for discharge and itching.   Respiratory: Negative for apnea, choking and chest tightness.    Cardiovascular: Negative for chest pain and leg swelling.   Gastrointestinal: Negative for abdominal distention, abdominal pain, anal bleeding, constipation, diarrhea and nausea.   Endocrine: Negative for cold intolerance and heat intolerance.   Genitourinary: Negative for difficulty urinating and dyspareunia.   Musculoskeletal: Negative for arthralgias and back pain.   Skin: Negative for color change and pallor.   Neurological: Negative for dizziness and facial asymmetry.   Hematological: Negative for adenopathy. Does not bruise/bleed easily.   Psychiatric/Behavioral: Negative for agitation and behavioral problems.       Objective:      Vitals:    06/07/22 1047   BP: (!) 155/90   Pulse: 78   SpO2: 95%   Weight: 70.8 kg (156 lb)   Height: 5' 4" (1.626 m)     Physical Exam  Constitutional:       General: She is not in acute distress.     Appearance: She is well-developed.   HENT:      Head: Normocephalic and atraumatic.   Eyes:      Pupils: Pupils are equal, round, and reactive to light.   Neck:      Thyroid: No " thyromegaly.   Cardiovascular:      Rate and Rhythm: Normal rate and regular rhythm.   Pulmonary:      Effort: Pulmonary effort is normal.      Breath sounds: Normal breath sounds.   Abdominal:      General: Bowel sounds are normal. There is no distension.      Palpations: Abdomen is soft.      Tenderness: There is no abdominal tenderness.   Musculoskeletal:         General: No deformity. Normal range of motion.      Cervical back: Normal range of motion and neck supple.   Skin:     General: Skin is warm.      Capillary Refill: Capillary refill takes less than 2 seconds.      Findings: No erythema.   Neurological:      Mental Status: She is alert and oriented to person, place, and time.      Cranial Nerves: No cranial nerve deficit.   Psychiatric:         Behavior: Behavior normal.       EGD reviewed.  Pathology reviewed.     Assessment:       1. Gastroesophageal reflux disease, unspecified whether esophagitis present        Plan:   Gastroesophageal reflux disease, unspecified whether esophagitis present        Medical Decision Making/Counseling:  Gastroesophageal reflux disease.  Continue Carafate until prescription runs out this month.  Discontinue Carafate at that time.  Continue Protonix and Pepcid for the next month.  Follow-up surgery clinic 6-8 weeks.  At that time will decrease Pepcid treatment as long as symptoms are continued to be improved.    Follow up:  6-8 weeks.    Patient instructed that best way to communicate with my office staff is for patient to get on the [facility] epic patient portal to expedite communication and communication issues that may occur.  Patient was given instructions on how to get on the portal.  I encouraged patient to obtain portal access as well.  Ultimately it is up to the patient to obtain access.  Patient voiced understanding.

## 2022-07-19 ENCOUNTER — OFFICE VISIT (OUTPATIENT)
Dept: SURGERY | Facility: CLINIC | Age: 62
End: 2022-07-19
Payer: COMMERCIAL

## 2022-07-19 VITALS
SYSTOLIC BLOOD PRESSURE: 138 MMHG | HEART RATE: 85 BPM | OXYGEN SATURATION: 95 % | WEIGHT: 155 LBS | BODY MASS INDEX: 26.46 KG/M2 | HEIGHT: 64 IN | DIASTOLIC BLOOD PRESSURE: 83 MMHG

## 2022-07-19 DIAGNOSIS — K29.70 GASTRITIS, PRESENCE OF BLEEDING UNSPECIFIED, UNSPECIFIED CHRONICITY, UNSPECIFIED GASTRITIS TYPE: Primary | ICD-10-CM

## 2022-07-19 PROCEDURE — 99214 OFFICE O/P EST MOD 30 MIN: CPT | Mod: S$GLB,,, | Performed by: SURGERY

## 2022-07-19 PROCEDURE — 3075F PR MOST RECENT SYSTOLIC BLOOD PRESS GE 130-139MM HG: ICD-10-PCS | Mod: CPTII,S$GLB,, | Performed by: SURGERY

## 2022-07-19 PROCEDURE — 3044F PR MOST RECENT HEMOGLOBIN A1C LEVEL <7.0%: ICD-10-PCS | Mod: CPTII,S$GLB,, | Performed by: SURGERY

## 2022-07-19 PROCEDURE — 3008F BODY MASS INDEX DOCD: CPT | Mod: CPTII,S$GLB,, | Performed by: SURGERY

## 2022-07-19 PROCEDURE — 99214 PR OFFICE/OUTPT VISIT, EST, LEVL IV, 30-39 MIN: ICD-10-PCS | Mod: S$GLB,,, | Performed by: SURGERY

## 2022-07-19 PROCEDURE — 3079F DIAST BP 80-89 MM HG: CPT | Mod: CPTII,S$GLB,, | Performed by: SURGERY

## 2022-07-19 PROCEDURE — 3044F HG A1C LEVEL LT 7.0%: CPT | Mod: CPTII,S$GLB,, | Performed by: SURGERY

## 2022-07-19 PROCEDURE — 3075F SYST BP GE 130 - 139MM HG: CPT | Mod: CPTII,S$GLB,, | Performed by: SURGERY

## 2022-07-19 PROCEDURE — 99999 PR PBB SHADOW E&M-EST. PATIENT-LVL III: CPT | Mod: PBBFAC,,, | Performed by: SURGERY

## 2022-07-19 PROCEDURE — 99999 PR PBB SHADOW E&M-EST. PATIENT-LVL III: ICD-10-PCS | Mod: PBBFAC,,, | Performed by: SURGERY

## 2022-07-19 PROCEDURE — 4010F ACE/ARB THERAPY RXD/TAKEN: CPT | Mod: CPTII,S$GLB,, | Performed by: SURGERY

## 2022-07-19 PROCEDURE — 4010F PR ACE/ARB THEARPY RXD/TAKEN: ICD-10-PCS | Mod: CPTII,S$GLB,, | Performed by: SURGERY

## 2022-07-19 PROCEDURE — 3079F PR MOST RECENT DIASTOLIC BLOOD PRESSURE 80-89 MM HG: ICD-10-PCS | Mod: CPTII,S$GLB,, | Performed by: SURGERY

## 2022-07-19 PROCEDURE — 3008F PR BODY MASS INDEX (BMI) DOCUMENTED: ICD-10-PCS | Mod: CPTII,S$GLB,, | Performed by: SURGERY

## 2022-07-19 NOTE — PROGRESS NOTES
"[facility] General Surgery  Follow-up    Subjective:     Chief Complaint:  Follow-up gastritis.    HPI:  Liv Garland is a 62 y.o. female presents today for follow-up exam  after treated for gastritis.  Patient since last visit has done well.  She is discontinue Carafate.  Continued on Protonix.  She has decreased Pepcid to once a day.  Resolution resolved heartburn indigestion reflux disease and gastritis.  She feels great today.  No other new issues or complaints.    Review of Systems   Constitutional: Negative for activity change, appetite change, chills and fever.   HENT: Negative for congestion, dental problem and ear discharge.    Eyes: Negative for discharge and itching.   Respiratory: Negative for apnea, choking and chest tightness.    Cardiovascular: Negative for chest pain and leg swelling.   Gastrointestinal: Negative for abdominal distention, abdominal pain, anal bleeding, constipation, diarrhea and nausea.   Endocrine: Negative for cold intolerance and heat intolerance.   Genitourinary: Negative for difficulty urinating and dyspareunia.   Musculoskeletal: Negative for arthralgias and back pain.   Skin: Negative for color change and pallor.   Neurological: Negative for dizziness and facial asymmetry.   Hematological: Negative for adenopathy. Does not bruise/bleed easily.   Psychiatric/Behavioral: Negative for agitation and behavioral problems.       Objective:      Vitals:    07/19/22 1125   BP: 138/83   Pulse: 85   SpO2: 95%   Weight: 70.3 kg (155 lb)   Height: 5' 4" (1.626 m)     Physical Exam  Constitutional:       General: She is not in acute distress.     Appearance: She is well-developed.   HENT:      Head: Normocephalic and atraumatic.   Eyes:      Pupils: Pupils are equal, round, and reactive to light.   Neck:      Thyroid: No thyromegaly.   Cardiovascular:      Rate and Rhythm: Normal rate and regular rhythm.   Pulmonary:      Effort: Pulmonary effort is normal.      Breath sounds: Normal " breath sounds.   Abdominal:      General: Bowel sounds are normal. There is no distension.      Palpations: Abdomen is soft.      Tenderness: There is no abdominal tenderness.   Musculoskeletal:         General: No deformity. Normal range of motion.      Cervical back: Normal range of motion and neck supple.   Skin:     General: Skin is warm.      Capillary Refill: Capillary refill takes less than 2 seconds.      Findings: No erythema.   Neurological:      Mental Status: She is alert and oriented to person, place, and time.      Cranial Nerves: No cranial nerve deficit.   Psychiatric:         Behavior: Behavior normal.          Assessment:       1. Gastritis, presence of bleeding unspecified, unspecified chronicity, unspecified gastritis type        Plan:   Gastritis, presence of bleeding unspecified, unspecified chronicity, unspecified gastritis type        Medical Decision Making/Counseling:  Gastritis.  Improving.  Continue Protonix once a day.  Discontinue Pepcid.  Utilize Pepcid as needed.  Follow-up surgery clinic as needed.    Follow up:  As needed.    Patient instructed that best way to communicate with my office staff is for patient to get on the [facility] epic patient portal to expedite communication and communication issues that may occur.  Patient was given instructions on how to get on the portal.  I encouraged patient to obtain portal access as well.  Ultimately it is up to the patient to obtain access.  Patient voiced understanding.

## 2022-08-09 ENCOUNTER — PATIENT MESSAGE (OUTPATIENT)
Dept: ADMINISTRATIVE | Facility: OTHER | Age: 62
End: 2022-08-09
Payer: COMMERCIAL

## 2022-08-24 DIAGNOSIS — M25.552 LEFT HIP PAIN: Primary | ICD-10-CM

## 2022-09-02 ENCOUNTER — OFFICE VISIT (OUTPATIENT)
Dept: ORTHOPEDICS | Facility: CLINIC | Age: 62
End: 2022-09-02
Payer: COMMERCIAL

## 2022-09-02 ENCOUNTER — HOSPITAL ENCOUNTER (OUTPATIENT)
Dept: RADIOLOGY | Facility: HOSPITAL | Age: 62
Discharge: HOME | End: 2022-09-02
Attending: ORTHOPAEDIC SURGERY
Payer: COMMERCIAL

## 2022-09-02 VITALS — RESPIRATION RATE: 16 BRPM | WEIGHT: 155 LBS | HEIGHT: 64 IN | BODY MASS INDEX: 26.46 KG/M2

## 2022-09-02 DIAGNOSIS — M87.052 ASEPTIC NECROSIS OF BONE OF LEFT HIP: Primary | ICD-10-CM

## 2022-09-02 DIAGNOSIS — Z01.818 PRE-OP TESTING: ICD-10-CM

## 2022-09-02 DIAGNOSIS — M25.552 LEFT HIP PAIN: ICD-10-CM

## 2022-09-02 PROCEDURE — 73502 XR PELVIS 3 VIEW INC HIP 2 VIEW LEFT: ICD-10-PCS | Mod: 26,LT,, | Performed by: RADIOLOGY

## 2022-09-02 PROCEDURE — 73502 X-RAY EXAM HIP UNI 2-3 VIEWS: CPT | Mod: 26,LT,, | Performed by: RADIOLOGY

## 2022-09-02 PROCEDURE — 3044F HG A1C LEVEL LT 7.0%: CPT | Mod: CPTII,S$GLB,, | Performed by: ORTHOPAEDIC SURGERY

## 2022-09-02 PROCEDURE — 99214 PR OFFICE/OUTPT VISIT, EST, LEVL IV, 30-39 MIN: ICD-10-PCS | Mod: 57,S$GLB,, | Performed by: ORTHOPAEDIC SURGERY

## 2022-09-02 PROCEDURE — 1159F MED LIST DOCD IN RCRD: CPT | Mod: CPTII,S$GLB,, | Performed by: ORTHOPAEDIC SURGERY

## 2022-09-02 PROCEDURE — 73502 X-RAY EXAM HIP UNI 2-3 VIEWS: CPT | Mod: TC,PN,LT

## 2022-09-02 PROCEDURE — 3008F BODY MASS INDEX DOCD: CPT | Mod: CPTII,S$GLB,, | Performed by: ORTHOPAEDIC SURGERY

## 2022-09-02 PROCEDURE — 1159F PR MEDICATION LIST DOCUMENTED IN MEDICAL RECORD: ICD-10-PCS | Mod: CPTII,S$GLB,, | Performed by: ORTHOPAEDIC SURGERY

## 2022-09-02 PROCEDURE — 99214 OFFICE O/P EST MOD 30 MIN: CPT | Mod: 57,S$GLB,, | Performed by: ORTHOPAEDIC SURGERY

## 2022-09-02 PROCEDURE — 99999 PR PBB SHADOW E&M-EST. PATIENT-LVL III: CPT | Mod: PBBFAC,,, | Performed by: ORTHOPAEDIC SURGERY

## 2022-09-02 PROCEDURE — 3044F PR MOST RECENT HEMOGLOBIN A1C LEVEL <7.0%: ICD-10-PCS | Mod: CPTII,S$GLB,, | Performed by: ORTHOPAEDIC SURGERY

## 2022-09-02 PROCEDURE — 4010F PR ACE/ARB THEARPY RXD/TAKEN: ICD-10-PCS | Mod: CPTII,S$GLB,, | Performed by: ORTHOPAEDIC SURGERY

## 2022-09-02 PROCEDURE — 4010F ACE/ARB THERAPY RXD/TAKEN: CPT | Mod: CPTII,S$GLB,, | Performed by: ORTHOPAEDIC SURGERY

## 2022-09-02 PROCEDURE — 99999 PR PBB SHADOW E&M-EST. PATIENT-LVL III: ICD-10-PCS | Mod: PBBFAC,,, | Performed by: ORTHOPAEDIC SURGERY

## 2022-09-02 PROCEDURE — 3008F PR BODY MASS INDEX (BMI) DOCUMENTED: ICD-10-PCS | Mod: CPTII,S$GLB,, | Performed by: ORTHOPAEDIC SURGERY

## 2022-09-02 NOTE — H&P
Chief Complaint: Pain of the Right Hip      Referring Provider: No referring provider defined for this encounter.      HPI: Ms. Hayes is a 62-year-old lady who returned today with new complaints of left hip pain.  She stated it started to exacerbate approximately 1 month ago and she became concerned because she does not want to get to the point that she did with her right hip.  She stated, Sometimes when I stand up it gives way and when I bend over I have a hard time getting up .  The pain radiates from her hip into her groin.  When she stands for extended periods or walks she has increased pain but when she sits and rests it improves.  She gets a catching feeling in her left.  Nothing seems to improve her symptoms. She has taken NSAIDs without help. She had a total hip arthroplasty for similar symptoms of her right hip which has resolved her pain  She can ambulate 1-1/2 miles and climb approximately 2 stairs..  She does not use an ambulatory assistive device.             Past Medical History:   Diagnosis Date    Arthritis      Bilateral breast cancer      Breast cancer bilateral treated with mastectomy and chemo 2004   * Diabetes    High cholesterol      Hypertension                  Past Surgical History:   Procedure Laterality Date    ABLATION OF DYSRHYTHMIC FOCUS   2000   * BILATERAL BREAST RECONSTRUCTION WITH IMPLANT    CHOLECYSTECTOMY   2/2001    COLONOSCOPY       * ORIF LEFT WRIST    HYSTERECTOMY   10/1/2003    MASTECTOMY bilateral Bilateral 5/16/2004    MITRAL VALVE REPLACEMENT   1980    OOPHORECTOMY Left 5/18/2004   * TONSILLECTOMY   1974   *  *  *  *  *  TOTAL ABDOMINAL HYSTERECTOMY W/ BILATERAL SALPINGOOPHORECTOMY  FLUOROSCOPICALLY GUIDED STEROID INJECTION RIGHT HIP  RIGHT TOTAL HIP ARTHROPLASTY  RESURFACING IMPLANT ARTHROPLASTY, 2ND TOE LEFT FOOT                    Review of patient's allergies indicates:   Allergen Reactions    Lisinopril         Other reaction(s): Angioedema      Social History                 Occupational History       Tobacco Use    Smoking status: Former Smoker       Quit date: 1999       Years since quittin.8    Smokeless tobacco: Never Used   Substance and Sexual Activity    Alcohol use: Yes    Drug use: No    Sexual activity: Yes                Family History   Problem Relation Age of Onset    Liver cancer Mother       69 y/o    Cancer Mother      Heart failure Father       79 y/o with dementia     No Known Problems Sister      Early death Brother       accident at 31 y/o      Previous Hospitalizations: Mastectomy.      ROS:  No new diagnosis/surgery/prescriptions since last office visit on 2022  Constitution: Negative for chills and fever.   HENT: Negative for congestion.   Eyes: Negative for blurred vision.   Cardiovascular: Negative for chest pain.   Respiratory: Negative for cough.   Endocrine: Negative for polydipsia.   Hematologic/Lymphatic: Negative for adenopathy.   Skin: Negative for flushing and itching.   Musculoskeletal: Negative for gout and muscle weakness.   Gastrointestinal: Negative for constipation, diarrhea and heartburn.   Genitourinary: Negative for nocturia.   Neurological: Negative for headaches and seizures.   Psychiatric/Behavioral: Negative for depression. The patient is not nervous/anxious.   Allergic/Immunologic: Negative for environmental allergies.         Objective:      Physical Exam:   General: Unique, AAOx3. No acute distress   HEENT: Normocephalic, PEARLA EOMI, Good Dentition   Neck: Supple, No JVD   Chest: Symetric, equal excursion on inspiration   Abdomen: Soft NTND   Vascular: Pulses intact and equal bilaterally. Capillary refill less than 3 seconds and equal bilaterally   Neurologic: Pinprick and soft touch intact and equal bilaterally   Integment: No ecchymosis, no errythema   Extremity: Pelvis/hip: Forward flexion/backward flexion equal bilaterally greater than 95°/15°. Internal/external rotation decreased left hip  compared to the right.  Minimal tenderness with hip motion. Zak/fabere/logroll/push-pull mildly positive left hip.  Minimal tenderness over the greater trochanter left hip. Yael's negative bilaterally.  Tender with groin palpation left hip.   Radiography:  Previous x-rays which include AP pelvis and left hip completed on 09/02/2022 showed advanced arthritic changes of the left hip with femoral head osteophytes and cyst in the left femoral head consistent with stage IV aseptic necrosis.  There is also a well-seated well-aligned total hip arthroplasty of the right hip.      Assessment:      Impression:   1. Stage IV AVN, left hip   2. Left hip pain.         Plan:      1. Discussed physical examination and radiographic evaluate with the patient. Liv understands that she has left hip pain and AVN of her left hip similar to what she had on her right hip.  Treatment alternatives and outcomes were discussed with the patient she understands she could continue with conservative management such as observation, activity modification, NSAIDs, physical therapy, hip  bracing, injections, or she could proceed with total hip arthroplasty.   Since she had a good results with right total hip arthroplasty she would prefer to proceed with surgery on her left hip...  2.  Possible complications of surgery were discussed with the patient to include bleeding, infection, scarring, nerve/blood vessel/tendon damage, need for further surgery, failed surgery, failure to improve, possible persistent pain, possible fracture, possible dislocation, possible leg length discrepancy, and possible malrotation were rediscussed with the patient..  The patient was permitted to ask questions and all concerns were addressed to her satisfaction.  3.  Reconsent for left total hip arthroplasty..  4.  Tentatively schedule procedure for surgery is currently scheduled for 09/02/2022.  5.  Continue with NSAIDs as tolerated allowed by PCM.  6.   Continue with home exercises to include straight leg raises.  7.   Follow up after surgery

## 2022-09-02 NOTE — H&P (VIEW-ONLY)
Chief Complaint: Pain of the Right Hip      Referring Provider: No referring provider defined for this encounter.      HPI: Ms. Hayes is a 62-year-old lady who returned today with new complaints of left hip pain.  She stated it started to exacerbate approximately 1 month ago and she became concerned because she does not want to get to the point that she did with her right hip.  She stated, Sometimes when I stand up it gives way and when I bend over I have a hard time getting up .  The pain radiates from her hip into her groin.  When she stands for extended periods or walks she has increased pain but when she sits and rests it improves.  She gets a catching feeling in her left.  Nothing seems to improve her symptoms. She has taken NSAIDs without help. She had a total hip arthroplasty for similar symptoms of her right hip which has resolved her pain  She can ambulate 1-1/2 miles and climb approximately 2 stairs..  She does not use an ambulatory assistive device.             Past Medical History:   Diagnosis Date    Arthritis      Bilateral breast cancer      Breast cancer bilateral treated with mastectomy and chemo 2004   * Diabetes    High cholesterol      Hypertension                  Past Surgical History:   Procedure Laterality Date    ABLATION OF DYSRHYTHMIC FOCUS   2000   * BILATERAL BREAST RECONSTRUCTION WITH IMPLANT    CHOLECYSTECTOMY   2/2001    COLONOSCOPY       * ORIF LEFT WRIST    HYSTERECTOMY   10/1/2003    MASTECTOMY bilateral Bilateral 5/16/2004    MITRAL VALVE REPLACEMENT   1980    OOPHORECTOMY Left 5/18/2004   * TONSILLECTOMY   1974   *  *  *  *  *  TOTAL ABDOMINAL HYSTERECTOMY W/ BILATERAL SALPINGOOPHORECTOMY  FLUOROSCOPICALLY GUIDED STEROID INJECTION RIGHT HIP  RIGHT TOTAL HIP ARTHROPLASTY  RESURFACING IMPLANT ARTHROPLASTY, 2ND TOE LEFT FOOT                    Review of patient's allergies indicates:   Allergen Reactions    Lisinopril         Other reaction(s): Angioedema      Social History                 Occupational History       Tobacco Use    Smoking status: Former Smoker       Quit date: 1999       Years since quittin.8    Smokeless tobacco: Never Used   Substance and Sexual Activity    Alcohol use: Yes    Drug use: No    Sexual activity: Yes                Family History   Problem Relation Age of Onset    Liver cancer Mother       67 y/o    Cancer Mother      Heart failure Father       79 y/o with dementia     No Known Problems Sister      Early death Brother       accident at 31 y/o      Previous Hospitalizations: Mastectomy.      ROS:  No new diagnosis/surgery/prescriptions since last office visit on 2022  Constitution: Negative for chills and fever.   HENT: Negative for congestion.   Eyes: Negative for blurred vision.   Cardiovascular: Negative for chest pain.   Respiratory: Negative for cough.   Endocrine: Negative for polydipsia.   Hematologic/Lymphatic: Negative for adenopathy.   Skin: Negative for flushing and itching.   Musculoskeletal: Negative for gout and muscle weakness.   Gastrointestinal: Negative for constipation, diarrhea and heartburn.   Genitourinary: Negative for nocturia.   Neurological: Negative for headaches and seizures.   Psychiatric/Behavioral: Negative for depression. The patient is not nervous/anxious.   Allergic/Immunologic: Negative for environmental allergies.         Objective:      Physical Exam:   General: Unique, AAOx3. No acute distress   HEENT: Normocephalic, PEARLA EOMI, Good Dentition   Neck: Supple, No JVD   Chest: Symetric, equal excursion on inspiration   Abdomen: Soft NTND   Vascular: Pulses intact and equal bilaterally. Capillary refill less than 3 seconds and equal bilaterally   Neurologic: Pinprick and soft touch intact and equal bilaterally   Integment: No ecchymosis, no errythema   Extremity: Pelvis/hip: Forward flexion/backward flexion equal bilaterally greater than 95°/15°. Internal/external rotation decreased left hip  compared to the right.  Minimal tenderness with hip motion. Zak/fabere/logroll/push-pull mildly positive left hip.  Minimal tenderness over the greater trochanter left hip. Yael's negative bilaterally.  Tender with groin palpation left hip.   Radiography:  Previous x-rays which include AP pelvis and left hip completed on 09/02/2022 showed advanced arthritic changes of the left hip with femoral head osteophytes and cyst in the left femoral head consistent with stage IV aseptic necrosis.  There is also a well-seated well-aligned total hip arthroplasty of the right hip.      Assessment:      Impression:   1. Stage IV AVN, left hip   2. Left hip pain.         Plan:      1. Discussed physical examination and radiographic evaluate with the patient. Liv understands that she has left hip pain and AVN of her left hip similar to what she had on her right hip.  Treatment alternatives and outcomes were discussed with the patient she understands she could continue with conservative management such as observation, activity modification, NSAIDs, physical therapy, hip  bracing, injections, or she could proceed with total hip arthroplasty.   Since she had a good results with right total hip arthroplasty she would prefer to proceed with surgery on her left hip...  2.  Possible complications of surgery were discussed with the patient to include bleeding, infection, scarring, nerve/blood vessel/tendon damage, need for further surgery, failed surgery, failure to improve, possible persistent pain, possible fracture, possible dislocation, possible leg length discrepancy, and possible malrotation were rediscussed with the patient..  The patient was permitted to ask questions and all concerns were addressed to her satisfaction.  3.  Reconsent for left total hip arthroplasty..  4.  Tentatively schedule procedure for surgery is currently scheduled for 09/02/2022.  5.  Continue with NSAIDs as tolerated allowed by PCM.  6.   Continue with home exercises to include straight leg raises.  7.   Follow up after surgery

## 2022-09-02 NOTE — PROGRESS NOTES
Chief Complaint: Pain of the Right Hip      Referring Provider: No referring provider defined for this encounter.      HPI: Ms. Hayes is a 62-year-old lady who returned today with new complaints of left hip pain.  She stated it started to exacerbate approximately 1 month ago and she became concerned because she does not want to get to the point that she did with her right hip.  She stated, Sometimes when I stand up it gives way and when I bend over I have a hard time getting up .  The pain radiates from her hip into her groin.  When she stands for extended periods or walks she has increased pain but when she sits and rests it improves.  She gets a catching feeling in her left.  Nothing seems to improve her symptoms. She has taken NSAIDs without help. She had a total hip arthroplasty for similar symptoms of her right hip which has resolved her pain  She can ambulate 1-1/2 miles and climb approximately 2 stairs..  She does not use an ambulatory assistive device.             Past Medical History:   Diagnosis Date    Arthritis      Bilateral breast cancer      Breast cancer bilateral treated with mastectomy and chemo 2004   * Diabetes    High cholesterol      Hypertension                  Past Surgical History:   Procedure Laterality Date    ABLATION OF DYSRHYTHMIC FOCUS   2000   * BILATERAL BREAST RECONSTRUCTION WITH IMPLANT    CHOLECYSTECTOMY   2/2001    COLONOSCOPY       * ORIF LEFT WRIST    HYSTERECTOMY   10/1/2003    MASTECTOMY bilateral Bilateral 5/16/2004    MITRAL VALVE REPLACEMENT   1980    OOPHORECTOMY Left 5/18/2004   * TONSILLECTOMY   1974   *  *  *  *  *  TOTAL ABDOMINAL HYSTERECTOMY W/ BILATERAL SALPINGOOPHORECTOMY  FLUOROSCOPICALLY GUIDED STEROID INJECTION RIGHT HIP  RIGHT TOTAL HIP ARTHROPLASTY  RESURFACING IMPLANT ARTHROPLASTY, 2ND TOE LEFT FOOT                    Review of patient's allergies indicates:   Allergen Reactions    Lisinopril         Other reaction(s): Angioedema      Social History                 Occupational History       Tobacco Use    Smoking status: Former Smoker       Quit date: 1999       Years since quittin.8    Smokeless tobacco: Never Used   Substance and Sexual Activity    Alcohol use: Yes    Drug use: No    Sexual activity: Yes                Family History   Problem Relation Age of Onset    Liver cancer Mother       67 y/o    Cancer Mother      Heart failure Father       81 y/o with dementia     No Known Problems Sister      Early death Brother       accident at 29 y/o      Previous Hospitalizations: Mastectomy.      ROS:  No new diagnosis/surgery/prescriptions since last office visit on 2022  Constitution: Negative for chills and fever.   HENT: Negative for congestion.   Eyes: Negative for blurred vision.   Cardiovascular: Negative for chest pain.   Respiratory: Negative for cough.   Endocrine: Negative for polydipsia.   Hematologic/Lymphatic: Negative for adenopathy.   Skin: Negative for flushing and itching.   Musculoskeletal: Negative for gout and muscle weakness.   Gastrointestinal: Negative for constipation, diarrhea and heartburn.   Genitourinary: Negative for nocturia.   Neurological: Negative for headaches and seizures.   Psychiatric/Behavioral: Negative for depression. The patient is not nervous/anxious.   Allergic/Immunologic: Negative for environmental allergies.         Objective:      Physical Exam:   General: Unique, AAOx3. No acute distress   HEENT: Normocephalic, PEARLA EOMI, Good Dentition   Neck: Supple, No JVD   Chest: Symetric, equal excursion on inspiration   Abdomen: Soft NTND   Vascular: Pulses intact and equal bilaterally. Capillary refill less than 3 seconds and equal bilaterally   Neurologic: Pinprick and soft touch intact and equal bilaterally   Integment: No ecchymosis, no errythema   Extremity: Pelvis/hip: Forward flexion/backward flexion equal bilaterally greater than 95°/15°. Internal/external rotation decreased left hip  compared to the right.  Minimal tenderness with hip motion. Zak/fabere/logroll/push-pull mildly positive left hip.  Minimal tenderness over the greater trochanter left hip. Yael's negative bilaterally.  Tender with groin palpation left hip.   Radiography:  Previous x-rays which include AP pelvis and left hip completed on 09/02/2022 showed advanced arthritic changes of the left hip with femoral head osteophytes and cyst in the left femoral head consistent with stage IV aseptic necrosis.  There is also a well-seated well-aligned total hip arthroplasty of the right hip.      Assessment:      Impression:   1. Stage IV AVN, left hip   2. Left hip pain.         Plan:      1. Discussed physical examination and radiographic evaluate with the patient. Liv understands that she has left hip pain and AVN of her left hip similar to what she had on her right hip.  Treatment alternatives and outcomes were discussed with the patient she understands she could continue with conservative management such as observation, activity modification, NSAIDs, physical therapy, hip  bracing, injections, or she could proceed with total hip arthroplasty.   Since she had a good results with right total hip arthroplasty she would prefer to proceed with surgery on her left hip...  2.  Possible complications of surgery were discussed with the patient to include bleeding, infection, scarring, nerve/blood vessel/tendon damage, need for further surgery, failed surgery, failure to improve, possible persistent pain, possible fracture, possible dislocation, possible leg length discrepancy, and possible malrotation were rediscussed with the patient..  The patient was permitted to ask questions and all concerns were addressed to her satisfaction.  3.  Reconsent for left total hip arthroplasty..  4.  Tentatively schedule procedure for surgery is currently scheduled for 09/02/2022.  5.  Continue with NSAIDs as tolerated allowed by PCM.  6.   Continue with home exercises to include straight leg raises.  7.   Follow up after surgery

## 2022-09-16 ENCOUNTER — ANESTHESIA EVENT (OUTPATIENT)
Dept: SURGERY | Facility: HOSPITAL | Age: 62
End: 2022-09-16
Payer: COMMERCIAL

## 2022-09-16 ENCOUNTER — HOSPITAL ENCOUNTER (OUTPATIENT)
Dept: PREADMISSION TESTING | Facility: HOSPITAL | Age: 62
Discharge: HOME | End: 2022-09-16
Attending: ORTHOPAEDIC SURGERY
Payer: COMMERCIAL

## 2022-09-16 DIAGNOSIS — M92.72 FREIBERG'S INFRACTION, LEFT: Primary | ICD-10-CM

## 2022-09-16 DIAGNOSIS — M16.11 PRIMARY OSTEOARTHRITIS OF RIGHT HIP: ICD-10-CM

## 2022-09-16 PROCEDURE — 99900103 DSU ONLY-NO CHARGE-INITIAL HR (STAT)

## 2022-09-16 NOTE — PRE ADMISSION SCREENING
4JOINT CAMP ASSESSMENT    Name Liv Garland   MRN [id number]    Age/Sex 62 y.o. female    Surgeon Dr. Don Suárez   Joint Camp Date 9/16/2022   Surgery Date 9/26/2022   Procedure Left Hip Arthroplasty   Insurance Payor: [employer] / Plan: [employer] [facility] [employer] [place] / Product Type: Commercial /    Care Team Patient Care Team:  Vargas Paulino MD as PCP - General (Family Medicine)  Sydni Perez LPN as Care Coordinator (Family Medicine)  Don Suárez DO as Consulting Physician (Orthopedic Surgery)    Pharmacy   [facility] - [facility], [place] - [address]  [address] [place] [address]  Phone: [phone] Fax: [phone]     AM-PAC Score   23   Risk Assessment Score 4     Past Medical History:   Diagnosis Date    Arthritis     Bilateral breast cancer     Breast cancer 2004    Dr. Dos Santos, bilat mast, no rt, tamoxifen stopped after 9 mos due to side effects    High cholesterol     Hypertension     PONV (postoperative nausea and vomiting)        Past Surgical History:   Procedure Laterality Date    ABLATION OF DYSRHYTHMIC FOCUS  2000    Dr. Melgoza    CHOLECYSTECTOMY  2/2001    COLONOSCOPY      repeat in 2020    ESOPHAGEAL DILATION N/A 5/23/2022    Procedure: DILATION, ESOPHAGUS;  Surgeon: Francis Bower MD;  Location: [facility] ENDO;  Service: General;  Laterality: N/A;    ESOPHAGOGASTRODUODENOSCOPY N/A 5/23/2022    Procedure: ESOPHAGOGASTRODUODENOSCOPY (EGD);  Surgeon: Francis Bower MD;  Location: [facility] ENDO;  Service: General;  Laterality: N/A;    HIP ARTHROPLASTY Right 4/5/2021    Procedure: ARTHROPLASTY, HIP;  Surgeon: Don Suárez DO;  Location: [facility] OR;  Service: Orthopedics;  Laterality: Right;  Equipment: Aamir Total Hip System  Vendor: Aamir  REQUIRES ASSISTANT: KOJO    HYSTERECTOMY  10/1/2003    INJECTION OF JOINT Right 11/25/2020    Procedure: Right Hip Intra-articular Steroid Injection Under Fluoro;  Surgeon:  "Rios Colon MD;  Location: [facility] OR;  Service: Pain Management;  Laterality: Right;    INJECTION OF STEROID Right 2/10/2021    Procedure: INJECTION, STEROID;  Surgeon: Don Suárez DO;  Location: [facility] OR;  Service: Orthopedics;  Laterality: Right;  Sedation  Equipment: 3cc syringes x2, 18ga 1.5" needle x2; 3.5" 20ga spinal needle; Kenalog 40; 1% lidocaine without Epi; 0.25% marcaine without epi  C-Arm: Entire    MASTECTOMY Bilateral 5/16/2004    MITRAL VALVE REPLACEMENT  1980    OOPHORECTOMY Left 5/18/2004    OSTEOTOMY OF METATARSAL BONE Left 2/10/2022    Procedure: OSTEOTOMY, METATARSAL BONE RESURFACING ARTHROPLASTY VERSES SHORTENING OSTEOTOMY 2ND METATARSAL, LEFT FOOT.;  Surgeon: Don Suárez DO;  Location: [facility] OR;  Service: Orthopedics;  Laterality: Left;  THIS IS AN ORIF METARSAL CARD; MAKE NOTES TO CREATE A NEW CARD PLEASE    TONSILLECTOMY  1974    TOTAL ABDOMINAL HYSTERECTOMY W/ BILATERAL SALPINGOOPHORECTOMY           Home Enviroment     Living Arrangement: Lives alone  Home Environment: 1-story house/ trailer, walk-in shower  Home Safety Concerns: Pets in the home: cats (1).    DISCHARGE CAREGIVER/SUPPORT SYSTEM     Identified post-op caregiver: Patient has lives alone and children / family / friends to help, ex- & friend .  Ex- stayed 1 night with patient for last surgery and then she functioned on her own. She also has renters next door to assist if needed. Patient's caregiver(s) will be able to provide physical assistance. Patient will have someone to assist overnight.      Caregiver present at pre-op interview:  No      PRE-OPERATIVE FUNCTIONAL STATUS     Employment: Employed full time    Pre-op Functional Status: Patient is independent with mobility/ambulation, transfers, ADL's, IADL's.    Use of assistive device for ambulation: straight cane  ADL: self care  ADL Limitations: difficulty with walking  Medical Restrictions: Unstable ambulation and Decreased range of motions in " extremities    POTENTIAL BARRIERS TO DISCHARGE/POTENTIAL POST-OP COMPLICATIONS     Patient with hx of HTN, postoperative nausea & vomiting. Patient had right hip arthroplasty on 04/05/2021 without complication.     DISCHARGE PLAN     Expected LOS of 1 days or less for joint replacement discussed with patient.     Patient in agreement with discharge plan: Yes    Discharge to: Home with 24 hour assistance     HH:  None per insurance coverage.      OP PT: [facility]     Home DME: rolling walker, single point cane, bedside commode, and assistive device kit    Needed DME at D/C: none     Rx: Per Dr. Suárez at discharge     Meds to Beds: Yes  Patient expected to discharge on Xarelto (Rivaroxaban) for DVT prophylaxis.

## 2022-09-16 NOTE — PRE ADMISSION SCREENING
"               CJR Risk Assessment Scale    Patient Name: Liv Garland  YOB: 1960  MRN: [id number]            RIsk Factor Measure Recommendation Patient Data Scale/Score   BMI >40 Reconsider surgery, weight loss   Estimated body mass index is 26.6 kg/m² as calculated from the following:    Height as of 9/2/22: 5' 4" (1.626 m).    Weight as of 9/2/22: 70.3 kg (154 lb 15.7 oz).   [] 0 = 1 - 24.9  [x] 1 = 25-29.9  [] 2 = 30-34.9  [] 3 = 35-39.9  [] 4 = 40-44.9  [] 5 = 45-99.9   Hemoglobin AIC (if applicable) >9 Delay surgery until DM under control  Refer for:  Nutrition Therapy  Exercise   Medication    Lab Results   Component Value Date    LABA1C 6.1 (H) 09/21/2016    HGBA1C 5.8 (H) 03/29/2022       Lab Results   Component Value Date     (H) 09/16/2022      [] 0 = 4.0-5.6  [x] 1 = 5.7-6.4  [] 2 = 6.5-6.9  [] 3 = 7.0-7.9  [] 4 = 8.0-8.9  [] 5 = 9.0-12   Hemoglobin (Anemia) <9 Delay surgery   Correct anemia Lab Results   Component Value Date    HGB 13.4 09/16/2022    [] 20 - <9.0                    Albumin <3 Delay surgery &Workup Lab Results   Component Value Date    ALBUMIN 4.3 05/20/2022    [] 20 - <3.0   Smoking Cessation >4 Weeks Delay Surgery  Refer to OP Cessation Class    Former Smoker  Quit: 1999 [] 20 - current smoker                                _____ PPD                    Hx of MI, PE, Arrhythmia, CVA, DVT <30 Days Delay Surgery    N/A [] 20      Infection Variable Delay surgery and re-evaluate   N/A [] 20 - recent/current infection     Depression (PHQ) >10 out of 27 Delay Surgery and re-evaluate  Medication  Counseling              [x] 0     []1     []2     []3      []4      [] 5                    (1-4)      (5-9)  (10-14)  (15-19)   (20-27)     Memory Impairment & Memory loss (Mini-Cog Screening Tool) Advanced dementia and/or Parkinson's Reconsider surgery     [x] 0     []1     []2     []3     []4     [] 5     Physical Conditioning (Modified AM-PAC Per Physical Therapy at Joint " Camp) Unable to ambulate on day of surgery Delay surgery and re-evaluate  Pre-Rehabilitation   (PT evaluation)       [x]  0   []4       []8     []12        []16     []20       (<20%)   (<40%)   (<60%)   (<80% )    (>80%)     Home Environment/Caregiver support  (Per /Navigator Interview)    Availability of basic services and/or approprate assistance during post-operative period Delay surgery and re-evaluate  Safe home environment  Health   1 week post-surgery  Transportation  availability  Ability to obtain DME/Medications post-op    [x] 0     []1     []2     []3     []4     [] 5  [] 0     []1     [x]2     []3     []4     [] 5  [x] 0     []1     []2     []3     []4     [] 5  [x] 0     []1     []2     []3     []4     [] 5         MD Contact: Dr. Suárez Comments:  Total Score:  4

## 2022-09-16 NOTE — PRE ADMISSION SCREENING
Patient Name: Liv Garland  YOB: 1960   MRN: [id number]     [facility]   Basic Mobility Inpatient Short Form 6 Clicks         How much difficulty does the patient currently have  Unable  A Lot  A Little  None      1. Turning over in bed (including adjusting bedclothes, sheets and blankets)?     1 []    2 []    3 [x]    4 []        2. Sitting down on and standing up from a chair with arms (e.g., wheelchair, bedside commode, etc.)     1 []  2 []  3 []     4 [x]      3. Moving from lying on back to sitting on the side of the bed?     1 []  2 []  3 []    4 [x]    How much help from another person does the patient currently need  Total  A Lot  A Little  None      4. Moving to and from a bed to a chair (including a wheelchair)?    1 []  2 []  3 []    4 [x]      5. Need to walk in hospital room?    1 []  2 []  3 []    4 [x]      6. Climbing 3-5 steps with a railing?    1 []  2 []  3 []    4 [x]       Raw Score:       23           CMS 0-100% Score:    11.20        %   Standardized Score:     56.93          CMS Modifier:        CI                                  [facility]   Basic Mobility Inpatient Short Form 6 Clicks Score Conversion Table*         *Use this form to convert -PAC Basic Mobility Inpatient Raw Scores.   [facility] Inpatient Basic Mobility Short Form Scoring Example   1. Add the number values associated with the response to each item. For example, items totals yield a Raw Score of 21.   2. Match the raw score to the t-Scale scores (t-Scale score = 50.25, SE = 4.69).   3. Find the associated CMS % (CMS % = 28.97%).   4. Locate the correct CMS Functional Modifier Code, or G Code (G code = CJ)     NOTE: Each -PAC Short Form has a separate conversion table. Make sure that you use the correct conversion table.       Instruction Manual - page 45 contains conversion table

## 2022-09-16 NOTE — ANESTHESIA PREPROCEDURE EVALUATION
09/16/2022  Liv Garland is a 62 y.o., female.      Pre-op Assessment    I have reviewed the Patient Summary Reports.     I have reviewed the Nursing Notes.    I have reviewed the Medications.     Review of Systems  Anesthesia Hx:  No problems with previous Anesthesia Hx of Anesthetic complications PONV Neg history of prior surgery. Denies Family Hx of Anesthesia complications.   Denies Personal Hx of Anesthesia complications.   Social:  Non-Smoker    Hematology/Oncology:  Hematology Normal   Oncology Normal     EENT/Dental:EENT/Dental Normal   Cardiovascular:   Hypertension MVR   Pulmonary:  Pulmonary Normal    Renal/:  Renal/ Normal     Hepatic/GI:   GERD, well controlled    Musculoskeletal:  Musculoskeletal Normal    Neurological:  Neurology Normal    Endocrine:  Endocrine Normal    Dermatological:  Skin Normal    Psych:  Psychiatric Normal           Physical Exam  General: Alert    Airway:  Mallampati: III   Mouth Opening: Normal  TM Distance: Normal  Neck ROM: Normal ROM    Dental:  Intact    Chest/Lungs:  Clear to auscultation, Normal Respiratory Rate    Heart:  Rate: Normal    Abdomen:  Normal        Anesthesia Plan  Type of Anesthesia, risks & benefits discussed:    Anesthesia Type: Gen ETT  Post Op Pain Control Plan: multimodal analgesia  Airway Plan: Direct, Post-Induction  Informed Consent: Informed consent signed with the Patient and all parties understand the risks and agree with anesthesia plan.  All questions answered. Patient consented to blood products? No  ASA Score: 2  Day of Surgery Review of History & Physical: H&P Update referred to the surgeon/provider.    Ready For Surgery From Anesthesia Perspective.     .

## 2022-09-19 ENCOUNTER — TELEPHONE (OUTPATIENT)
Dept: ORTHOPEDICS | Facility: CLINIC | Age: 62
End: 2022-09-19

## 2022-09-19 ENCOUNTER — PATIENT MESSAGE (OUTPATIENT)
Dept: SURGERY | Facility: HOSPITAL | Age: 62
End: 2022-09-19

## 2022-09-20 ENCOUNTER — TELEPHONE (OUTPATIENT)
Dept: ORTHOPEDICS | Facility: CLINIC | Age: 62
End: 2022-09-20

## 2022-09-23 DIAGNOSIS — Z96.642 S/P TOTAL LEFT HIP ARTHROPLASTY: Primary | ICD-10-CM

## 2022-09-23 DIAGNOSIS — M87.052 ASEPTIC NECROSIS OF BONE OF LEFT HIP: ICD-10-CM

## 2022-09-25 ENCOUNTER — LAB VISIT (OUTPATIENT)
Dept: LAB | Facility: HOSPITAL | Age: 62
End: 2022-09-25
Attending: ORTHOPAEDIC SURGERY
Payer: COMMERCIAL

## 2022-09-25 DIAGNOSIS — Z01.818 PRE-OP TESTING: ICD-10-CM

## 2022-09-25 LAB
ABO + RH BLD: NORMAL
BLD GP AB SCN CELLS X3 SERPL QL: NORMAL

## 2022-09-25 PROCEDURE — 86901 BLOOD TYPING SEROLOGIC RH(D): CPT | Performed by: ORTHOPAEDIC SURGERY

## 2022-09-25 PROCEDURE — 36415 COLL VENOUS BLD VENIPUNCTURE: CPT | Performed by: ORTHOPAEDIC SURGERY

## 2022-09-26 ENCOUNTER — HOSPITAL ENCOUNTER (OUTPATIENT)
Facility: HOSPITAL | Age: 62
Discharge: HOME | End: 2022-09-27
Attending: ORTHOPAEDIC SURGERY | Admitting: STUDENT IN AN ORGANIZED HEALTH CARE EDUCATION/TRAINING PROGRAM
Payer: COMMERCIAL

## 2022-09-26 ENCOUNTER — ANESTHESIA (OUTPATIENT)
Dept: SURGERY | Facility: HOSPITAL | Age: 62
End: 2022-09-26
Payer: COMMERCIAL

## 2022-09-26 DIAGNOSIS — Z96.642 S/P TOTAL LEFT HIP ARTHROPLASTY: Primary | ICD-10-CM

## 2022-09-26 DIAGNOSIS — Z96.642 STATUS POST TOTAL REPLACEMENT OF LEFT HIP: Primary | ICD-10-CM

## 2022-09-26 PROBLEM — M87.052 ASEPTIC NECROSIS OF BONE OF LEFT HIP: Status: RESOLVED | Noted: 2021-04-05 | Resolved: 2022-09-26

## 2022-09-26 PROBLEM — K21.9 GERD (GASTROESOPHAGEAL REFLUX DISEASE): Status: ACTIVE | Noted: 2022-09-26

## 2022-09-26 PROBLEM — Z96.641 STATUS POST TOTAL REPLACEMENT OF RIGHT HIP: Status: ACTIVE | Noted: 2022-09-26

## 2022-09-26 LAB
HCT VFR BLD AUTO: 34.4 % (ref 37–48.5)
HGB BLD-MCNC: 11.6 G/DL (ref 12–16)
POCT GLUCOSE: 113 MG/DL (ref 70–110)
POCT GLUCOSE: 141 MG/DL (ref 70–110)

## 2022-09-26 PROCEDURE — 94799 UNLISTED PULMONARY SVC/PX: CPT

## 2022-09-26 PROCEDURE — 63600175 PHARM REV CODE 636 W HCPCS: Performed by: ORTHOPAEDIC SURGERY

## 2022-09-26 PROCEDURE — D9220A PRA ANESTHESIA: Mod: CRNA,,, | Performed by: NURSE ANESTHETIST, CERTIFIED REGISTERED

## 2022-09-26 PROCEDURE — 71000033 HC RECOVERY, INTIAL HOUR: Performed by: ORTHOPAEDIC SURGERY

## 2022-09-26 PROCEDURE — 71000039 HC RECOVERY, EACH ADD'L HOUR: Performed by: ORTHOPAEDIC SURGERY

## 2022-09-26 PROCEDURE — 63600175 PHARM REV CODE 636 W HCPCS: Performed by: NURSE ANESTHETIST, CERTIFIED REGISTERED

## 2022-09-26 PROCEDURE — 63600175 PHARM REV CODE 636 W HCPCS: Performed by: STUDENT IN AN ORGANIZED HEALTH CARE EDUCATION/TRAINING PROGRAM

## 2022-09-26 PROCEDURE — 37000008 HC ANESTHESIA 1ST 15 MINUTES: Performed by: ORTHOPAEDIC SURGERY

## 2022-09-26 PROCEDURE — D9220A PRA ANESTHESIA: ICD-10-PCS | Mod: ANES,,, | Performed by: ANESTHESIOLOGY

## 2022-09-26 PROCEDURE — 25000003 PHARM REV CODE 250

## 2022-09-26 PROCEDURE — 85018 HEMOGLOBIN: CPT | Performed by: ORTHOPAEDIC SURGERY

## 2022-09-26 PROCEDURE — D9220A PRA ANESTHESIA: ICD-10-PCS | Mod: CRNA,,, | Performed by: NURSE ANESTHETIST, CERTIFIED REGISTERED

## 2022-09-26 PROCEDURE — 25000003 PHARM REV CODE 250: Performed by: NURSE ANESTHETIST, CERTIFIED REGISTERED

## 2022-09-26 PROCEDURE — C1776 JOINT DEVICE (IMPLANTABLE): HCPCS | Performed by: ORTHOPAEDIC SURGERY

## 2022-09-26 PROCEDURE — 85014 HEMATOCRIT: CPT | Performed by: ORTHOPAEDIC SURGERY

## 2022-09-26 PROCEDURE — 27130 TOTAL HIP ARTHROPLASTY: CPT | Mod: LT,,, | Performed by: ORTHOPAEDIC SURGERY

## 2022-09-26 PROCEDURE — 27130 PR TOTAL HIP ARTHROPLASTY: ICD-10-PCS | Mod: LT,,, | Performed by: ORTHOPAEDIC SURGERY

## 2022-09-26 PROCEDURE — C1889 IMPLANT/INSERT DEVICE, NOC: HCPCS | Performed by: ORTHOPAEDIC SURGERY

## 2022-09-26 PROCEDURE — 36000711: Performed by: ORTHOPAEDIC SURGERY

## 2022-09-26 PROCEDURE — 63600175 PHARM REV CODE 636 W HCPCS: Performed by: ANESTHESIOLOGY

## 2022-09-26 PROCEDURE — 94761 N-INVAS EAR/PLS OXIMETRY MLT: CPT

## 2022-09-26 PROCEDURE — 25000003 PHARM REV CODE 250: Performed by: ORTHOPAEDIC SURGERY

## 2022-09-26 PROCEDURE — 27201423 OPTIME MED/SURG SUP & DEVICES STERILE SUPPLY: Performed by: ORTHOPAEDIC SURGERY

## 2022-09-26 PROCEDURE — 99900035 HC TECH TIME PER 15 MIN (STAT)

## 2022-09-26 PROCEDURE — 37000009 HC ANESTHESIA EA ADD 15 MINS: Performed by: ORTHOPAEDIC SURGERY

## 2022-09-26 PROCEDURE — D9220A PRA ANESTHESIA: Mod: ANES,,, | Performed by: ANESTHESIOLOGY

## 2022-09-26 PROCEDURE — 36000710: Performed by: ORTHOPAEDIC SURGERY

## 2022-09-26 PROCEDURE — 63600175 PHARM REV CODE 636 W HCPCS

## 2022-09-26 PROCEDURE — 36415 COLL VENOUS BLD VENIPUNCTURE: CPT | Performed by: ORTHOPAEDIC SURGERY

## 2022-09-26 DEVICE — LINER CONTNUM NEUT LG JJ 36X54: Type: IMPLANTABLE DEVICE | Site: HIP | Status: FUNCTIONAL

## 2022-09-26 DEVICE — HEAD FEM COCR 12/14 +0 36MM: Type: IMPLANTABLE DEVICE | Site: HIP | Status: FUNCTIONAL

## 2022-09-26 RX ORDER — PROCHLORPERAZINE EDISYLATE 5 MG/ML
10 INJECTION INTRAMUSCULAR; INTRAVENOUS EVERY 6 HOURS PRN
Status: DISCONTINUED | OUTPATIENT
Start: 2022-09-26 | End: 2022-09-27 | Stop reason: HOSPADM

## 2022-09-26 RX ORDER — MIDAZOLAM HYDROCHLORIDE 1 MG/ML
INJECTION INTRAMUSCULAR; INTRAVENOUS
Status: DISCONTINUED | OUTPATIENT
Start: 2022-09-26 | End: 2022-09-26

## 2022-09-26 RX ORDER — ACETAMINOPHEN 10 MG/ML
1000 INJECTION, SOLUTION INTRAVENOUS ONCE
Status: COMPLETED | OUTPATIENT
Start: 2022-09-26 | End: 2022-09-26

## 2022-09-26 RX ORDER — HYDROMORPHONE HYDROCHLORIDE 2 MG/ML
INJECTION, SOLUTION INTRAMUSCULAR; INTRAVENOUS; SUBCUTANEOUS
Status: DISCONTINUED | OUTPATIENT
Start: 2022-09-26 | End: 2022-09-26

## 2022-09-26 RX ORDER — SODIUM CHLORIDE, SODIUM LACTATE, POTASSIUM CHLORIDE, CALCIUM CHLORIDE 600; 310; 30; 20 MG/100ML; MG/100ML; MG/100ML; MG/100ML
125 INJECTION, SOLUTION INTRAVENOUS CONTINUOUS
Status: DISCONTINUED | OUTPATIENT
Start: 2022-09-26 | End: 2022-09-26

## 2022-09-26 RX ORDER — ONDANSETRON 2 MG/ML
4 INJECTION INTRAMUSCULAR; INTRAVENOUS EVERY 8 HOURS PRN
Status: DISCONTINUED | OUTPATIENT
Start: 2022-09-26 | End: 2022-09-27 | Stop reason: HOSPADM

## 2022-09-26 RX ORDER — FLUTICASONE PROPIONATE 50 MCG
1 SPRAY, SUSPENSION (ML) NASAL DAILY
Status: DISCONTINUED | OUTPATIENT
Start: 2022-09-27 | End: 2022-09-27 | Stop reason: HOSPADM

## 2022-09-26 RX ORDER — CEFAZOLIN SODIUM 2 G/50ML
SOLUTION INTRAVENOUS
Status: DISCONTINUED | OUTPATIENT
Start: 2022-09-26 | End: 2022-09-27 | Stop reason: HOSPADM

## 2022-09-26 RX ORDER — TRANEXAMIC ACID 100 MG/ML
INJECTION, SOLUTION INTRAVENOUS
Status: DISCONTINUED | OUTPATIENT
Start: 2022-09-26 | End: 2022-09-26

## 2022-09-26 RX ORDER — PANTOPRAZOLE SODIUM 40 MG/1
40 TABLET, DELAYED RELEASE ORAL DAILY
Status: DISCONTINUED | OUTPATIENT
Start: 2022-09-26 | End: 2022-09-26

## 2022-09-26 RX ORDER — FENTANYL CITRATE 50 UG/ML
INJECTION, SOLUTION INTRAMUSCULAR; INTRAVENOUS
Status: DISCONTINUED | OUTPATIENT
Start: 2022-09-26 | End: 2022-09-26

## 2022-09-26 RX ORDER — LIDOCAINE HYDROCHLORIDE 20 MG/ML
INJECTION, SOLUTION EPIDURAL; INFILTRATION; INTRACAUDAL; PERINEURAL
Status: DISCONTINUED | OUTPATIENT
Start: 2022-09-26 | End: 2022-09-26

## 2022-09-26 RX ORDER — HYDROMORPHONE HYDROCHLORIDE 2 MG/ML
INJECTION, SOLUTION INTRAMUSCULAR; INTRAVENOUS; SUBCUTANEOUS
Status: COMPLETED
Start: 2022-09-26 | End: 2022-09-26

## 2022-09-26 RX ORDER — HYDROMORPHONE HYDROCHLORIDE 2 MG/ML
0.25 INJECTION, SOLUTION INTRAMUSCULAR; INTRAVENOUS; SUBCUTANEOUS EVERY 10 MIN PRN
Status: DISCONTINUED | OUTPATIENT
Start: 2022-09-26 | End: 2022-09-27

## 2022-09-26 RX ORDER — FAMOTIDINE 20 MG/1
20 TABLET, FILM COATED ORAL 2 TIMES DAILY
Status: DISCONTINUED | OUTPATIENT
Start: 2022-09-26 | End: 2022-09-27 | Stop reason: HOSPADM

## 2022-09-26 RX ORDER — ACETAMINOPHEN 325 MG/1
650 TABLET ORAL EVERY 4 HOURS PRN
Status: DISCONTINUED | OUTPATIENT
Start: 2022-09-26 | End: 2022-09-27 | Stop reason: HOSPADM

## 2022-09-26 RX ORDER — HYDROMORPHONE HYDROCHLORIDE 2 MG/ML
0.25 INJECTION, SOLUTION INTRAMUSCULAR; INTRAVENOUS; SUBCUTANEOUS
Status: DISCONTINUED | OUTPATIENT
Start: 2022-09-26 | End: 2022-09-26

## 2022-09-26 RX ORDER — MORPHINE SULFATE 4 MG/ML
2 INJECTION, SOLUTION INTRAMUSCULAR; INTRAVENOUS EVERY 5 MIN PRN
Status: DISCONTINUED | OUTPATIENT
Start: 2022-09-26 | End: 2022-09-26

## 2022-09-26 RX ORDER — SCOLOPAMINE TRANSDERMAL SYSTEM 1 MG/1
PATCH, EXTENDED RELEASE TRANSDERMAL
Status: COMPLETED
Start: 2022-09-26 | End: 2022-09-26

## 2022-09-26 RX ORDER — EPHEDRINE SULFATE 50 MG/ML
INJECTION, SOLUTION INTRAVENOUS
Status: DISCONTINUED | OUTPATIENT
Start: 2022-09-26 | End: 2022-09-26

## 2022-09-26 RX ORDER — OXYCODONE AND ACETAMINOPHEN 10; 325 MG/1; MG/1
1 TABLET ORAL EVERY 4 HOURS PRN
Status: DISCONTINUED | OUTPATIENT
Start: 2022-09-26 | End: 2022-09-27 | Stop reason: HOSPADM

## 2022-09-26 RX ORDER — SUCRALFATE 1 G/10ML
1 SUSPENSION ORAL EVERY 6 HOURS
Status: DISCONTINUED | OUTPATIENT
Start: 2022-09-26 | End: 2022-09-26

## 2022-09-26 RX ORDER — ONDANSETRON 2 MG/ML
4 INJECTION INTRAMUSCULAR; INTRAVENOUS ONCE
Status: COMPLETED | OUTPATIENT
Start: 2022-09-26 | End: 2022-09-26

## 2022-09-26 RX ORDER — CEFAZOLIN SODIUM 1 G/3ML
INJECTION, POWDER, FOR SOLUTION INTRAMUSCULAR; INTRAVENOUS
Status: DISCONTINUED | OUTPATIENT
Start: 2022-09-26 | End: 2022-09-26

## 2022-09-26 RX ORDER — PROPOFOL 10 MG/ML
VIAL (ML) INTRAVENOUS
Status: DISCONTINUED | OUTPATIENT
Start: 2022-09-26 | End: 2022-09-26

## 2022-09-26 RX ORDER — SUCRALFATE 1 G/1
1 TABLET ORAL 4 TIMES DAILY
Status: DISCONTINUED | OUTPATIENT
Start: 2022-09-26 | End: 2022-09-26

## 2022-09-26 RX ORDER — CEFAZOLIN SODIUM 2 G/50ML
SOLUTION INTRAVENOUS
Status: DISPENSED
Start: 2022-09-26 | End: 2022-09-26

## 2022-09-26 RX ORDER — LOSARTAN POTASSIUM 25 MG/1
25 TABLET ORAL DAILY
Status: DISCONTINUED | OUTPATIENT
Start: 2022-09-26 | End: 2022-09-27 | Stop reason: HOSPADM

## 2022-09-26 RX ORDER — ONDANSETRON 2 MG/ML
4 INJECTION INTRAMUSCULAR; INTRAVENOUS DAILY PRN
Status: DISCONTINUED | OUTPATIENT
Start: 2022-09-26 | End: 2022-09-26 | Stop reason: HOSPADM

## 2022-09-26 RX ORDER — SODIUM CHLORIDE, SODIUM LACTATE, POTASSIUM CHLORIDE, CALCIUM CHLORIDE 600; 310; 30; 20 MG/100ML; MG/100ML; MG/100ML; MG/100ML
INJECTION, SOLUTION INTRAVENOUS CONTINUOUS
Status: DISCONTINUED | OUTPATIENT
Start: 2022-09-26 | End: 2022-09-26

## 2022-09-26 RX ORDER — MAG HYDROX/ALUMINUM HYD/SIMETH 200-200-20
30 SUSPENSION, ORAL (FINAL DOSE FORM) ORAL
Status: DISCONTINUED | OUTPATIENT
Start: 2022-09-26 | End: 2022-09-27 | Stop reason: HOSPADM

## 2022-09-26 RX ORDER — ONDANSETRON 2 MG/ML
INJECTION INTRAMUSCULAR; INTRAVENOUS
Status: DISCONTINUED | OUTPATIENT
Start: 2022-09-26 | End: 2022-09-26

## 2022-09-26 RX ORDER — MORPHINE SULFATE 4 MG/ML
6 INJECTION, SOLUTION INTRAMUSCULAR; INTRAVENOUS
Status: DISCONTINUED | OUTPATIENT
Start: 2022-09-26 | End: 2022-09-27

## 2022-09-26 RX ORDER — OXYCODONE AND ACETAMINOPHEN 5; 325 MG/1; MG/1
1 TABLET ORAL
Status: DISCONTINUED | OUTPATIENT
Start: 2022-09-26 | End: 2022-09-26 | Stop reason: HOSPADM

## 2022-09-26 RX ORDER — LIDOCAINE HYDROCHLORIDE 10 MG/ML
1 INJECTION, SOLUTION EPIDURAL; INFILTRATION; INTRACAUDAL; PERINEURAL ONCE
Status: DISCONTINUED | OUTPATIENT
Start: 2022-09-26 | End: 2022-09-26 | Stop reason: HOSPADM

## 2022-09-26 RX ORDER — ROCURONIUM BROMIDE 10 MG/ML
INJECTION, SOLUTION INTRAVENOUS
Status: DISCONTINUED | OUTPATIENT
Start: 2022-09-26 | End: 2022-09-26

## 2022-09-26 RX ADMIN — HYDROMORPHONE HYDROCHLORIDE 1 MG: 2 INJECTION INTRAMUSCULAR; INTRAVENOUS; SUBCUTANEOUS at 11:09

## 2022-09-26 RX ADMIN — HYDROMORPHONE HYDROCHLORIDE 0.25 MG: 2 INJECTION, SOLUTION INTRAMUSCULAR; INTRAVENOUS; SUBCUTANEOUS at 02:09

## 2022-09-26 RX ADMIN — PROPOFOL 200 MG: 10 INJECTION, EMULSION INTRAVENOUS at 10:09

## 2022-09-26 RX ADMIN — ONDANSETRON HYDROCHLORIDE 4 MG: 2 SOLUTION INTRAMUSCULAR; INTRAVENOUS at 07:09

## 2022-09-26 RX ADMIN — CEFAZOLIN SODIUM: 2 SOLUTION INTRAVENOUS at 07:09

## 2022-09-26 RX ADMIN — PROCHLORPERAZINE EDISYLATE 10 MG: 5 INJECTION INTRAMUSCULAR; INTRAVENOUS at 05:09

## 2022-09-26 RX ADMIN — LIDOCAINE HYDROCHLORIDE 40 MG: 20 INJECTION, SOLUTION EPIDURAL; INFILTRATION; INTRACAUDAL; PERINEURAL at 10:09

## 2022-09-26 RX ADMIN — ROCURONIUM BROMIDE 20 MG: 10 INJECTION, SOLUTION INTRAVENOUS at 11:09

## 2022-09-26 RX ADMIN — SCOPALAMINE 1 PATCH: 1 PATCH, EXTENDED RELEASE TRANSDERMAL at 07:09

## 2022-09-26 RX ADMIN — ACETAMINOPHEN 650 MG: 325 TABLET ORAL at 07:09

## 2022-09-26 RX ADMIN — HYDROMORPHONE HYDROCHLORIDE 0.25 MG: 2 INJECTION INTRAMUSCULAR; INTRAVENOUS; SUBCUTANEOUS at 02:09

## 2022-09-26 RX ADMIN — SODIUM CHLORIDE, POTASSIUM CHLORIDE, SODIUM LACTATE AND CALCIUM CHLORIDE: 600; 310; 30; 20 INJECTION, SOLUTION INTRAVENOUS at 10:09

## 2022-09-26 RX ADMIN — MORPHINE SULFATE 2 MG: 4 INJECTION INTRAVENOUS at 01:09

## 2022-09-26 RX ADMIN — ACETAMINOPHEN 1000 MG: 10 INJECTION INTRAVENOUS at 01:09

## 2022-09-26 RX ADMIN — MORPHINE SULFATE 6 MG: 4 INJECTION INTRAVENOUS at 03:09

## 2022-09-26 RX ADMIN — ONDANSETRON HYDROCHLORIDE 4 MG: 2 SOLUTION INTRAMUSCULAR; INTRAVENOUS at 01:09

## 2022-09-26 RX ADMIN — ROCURONIUM BROMIDE 30 MG: 10 INJECTION, SOLUTION INTRAVENOUS at 10:09

## 2022-09-26 RX ADMIN — EPHEDRINE SULFATE 25 MG: 50 INJECTION INTRAVENOUS at 12:09

## 2022-09-26 RX ADMIN — CEFAZOLIN 2 G: 330 INJECTION, POWDER, FOR SOLUTION INTRAMUSCULAR; INTRAVENOUS at 10:09

## 2022-09-26 RX ADMIN — HYDROMORPHONE HYDROCHLORIDE 1 MG: 2 INJECTION INTRAMUSCULAR; INTRAVENOUS; SUBCUTANEOUS at 12:09

## 2022-09-26 RX ADMIN — ONDANSETRON 4 MG: 2 INJECTION INTRAMUSCULAR; INTRAVENOUS at 11:09

## 2022-09-26 RX ADMIN — MIDAZOLAM HYDROCHLORIDE 5 MG: 1 INJECTION, SOLUTION INTRAMUSCULAR; INTRAVENOUS at 10:09

## 2022-09-26 RX ADMIN — TRANEXAMIC ACID 1000 MG: 100 INJECTION, SOLUTION INTRAVENOUS at 11:09

## 2022-09-26 RX ADMIN — ACETAMINOPHEN 650 MG: 325 TABLET ORAL at 11:09

## 2022-09-26 RX ADMIN — ONDANSETRON HYDROCHLORIDE 4 MG: 2 SOLUTION INTRAMUSCULAR; INTRAVENOUS at 04:09

## 2022-09-26 RX ADMIN — SODIUM CHLORIDE, SODIUM LACTATE, POTASSIUM CHLORIDE, AND CALCIUM CHLORIDE: .6; .31; .03; .02 INJECTION, SOLUTION INTRAVENOUS at 07:09

## 2022-09-26 RX ADMIN — SODIUM CHLORIDE, POTASSIUM CHLORIDE, SODIUM LACTATE AND CALCIUM CHLORIDE: 600; 310; 30; 20 INJECTION, SOLUTION INTRAVENOUS at 11:09

## 2022-09-26 RX ADMIN — FENTANYL CITRATE 100 MCG: 50 INJECTION, SOLUTION INTRAMUSCULAR; INTRAVENOUS at 10:09

## 2022-09-26 RX ADMIN — SODIUM CHLORIDE, SODIUM LACTATE, POTASSIUM CHLORIDE, AND CALCIUM CHLORIDE 125 ML/HR: .6; .31; .03; .02 INJECTION, SOLUTION INTRAVENOUS at 01:09

## 2022-09-26 NOTE — H&P
[facility] Medicine  History & Physical    Patient Name: Liv Garland  MRN: [id number]  Patient Class: OP- Outpatient Recovery  Admission Date: 9/26/2022  Attending Physician: Anatoliy Urbina MD   Primary Care Provider: Vargas Paulino MD         Patient information was obtained from patient and past medical records.     Subjective:     Principal Problem:Status post total replacement of left hip    Chief Complaint: No chief complaint on file.       HPI: Arthritis, Bilateral breast cancer, Breast cancer (2004), High cholesterol, Hypertension admitted following Left total hip arthroplasty. She tolerated the procedure well without complication. Patient's pain was difficult to control post-operatively. Otherwise patient doing well.      Past Medical History:   Diagnosis Date    Arthritis     Bilateral breast cancer     Breast cancer 2004    Dr. Dos Santos, bilat mast, no rt, tamoxifen stopped after 9 mos due to side effects    Diabetes mellitus     High cholesterol     Hypertension     PONV (postoperative nausea and vomiting)        Past Surgical History:   Procedure Laterality Date    ABLATION OF DYSRHYTHMIC FOCUS  2000    Dr. Melgoza    CHOLECYSTECTOMY  2/2001    COLONOSCOPY      repeat in 2020    ESOPHAGEAL DILATION N/A 5/23/2022    Procedure: DILATION, ESOPHAGUS;  Surgeon: Francis Bower MD;  Location: [facility] ENDO;  Service: General;  Laterality: N/A;    ESOPHAGOGASTRODUODENOSCOPY N/A 5/23/2022    Procedure: ESOPHAGOGASTRODUODENOSCOPY (EGD);  Surgeon: Francis Bower MD;  Location: [facility] ENDO;  Service: General;  Laterality: N/A;    HIP ARTHROPLASTY Right 4/5/2021    Procedure: ARTHROPLASTY, HIP;  Surgeon: Don Suárez DO;  Location: [facility] OR;  Service: Orthopedics;  Laterality: Right;  Equipment: Aamir Total Hip System  Vendor: [employer]ir  REQUIRES ASSISTANT: KOJO    HYSTERECTOMY  10/1/2003    INJECTION OF JOINT Right 11/25/2020    Procedure: Right Hip  "Intra-articular Steroid Injection Under Fluoro;  Surgeon: Rios Colon MD;  Location: [facility] OR;  Service: Pain Management;  Laterality: Right;    INJECTION OF STEROID Right 2/10/2021    Procedure: INJECTION, STEROID;  Surgeon: Don Suárez DO;  Location: [facility] OR;  Service: Orthopedics;  Laterality: Right;  Sedation  Equipment: 3cc syringes x2, 18ga 1.5" needle x2; 3.5" 20ga spinal needle; Kenalog 40; 1% lidocaine without Epi; 0.25% marcaine without epi  C-Arm: Entire    MASTECTOMY Bilateral 5/16/2004    MITRAL VALVE REPLACEMENT  1980    OOPHORECTOMY Left 5/18/2004    OSTEOTOMY OF METATARSAL BONE Left 2/10/2022    Procedure: OSTEOTOMY, METATARSAL BONE RESURFACING ARTHROPLASTY VERSES SHORTENING OSTEOTOMY 2ND METATARSAL, LEFT FOOT.;  Surgeon: Don Suárez DO;  Location: [facility] OR;  Service: Orthopedics;  Laterality: Left;  THIS IS AN ORIF METARSAL CARD; MAKE NOTES TO CREATE A NEW CARD PLEASE    TONSILLECTOMY  1974    TOTAL ABDOMINAL HYSTERECTOMY W/ BILATERAL SALPINGOOPHORECTOMY         Review of patient's allergies indicates:   Allergen Reactions    Lisinopril      Other reaction(s): Angioedema       Current Facility-Administered Medications on File Prior to Encounter   Medication    lactated ringers infusion    ondansetron injection 4 mg     Current Outpatient Medications on File Prior to Encounter   Medication Sig    fluticasone propionate (FLONASE) 50 mcg/actuation nasal spray by Each Nostril route.    losartan (COZAAR) 25 MG tablet TAKE 1/2 TABLET (12.5 MG TOTAL) BY MOUTH ONCE DAILY.    famotidine (PEPCID) 20 MG tablet Take 1 tablet (20 mg total) by mouth 2 (two) times daily.    pantoprazole (PROTONIX) 40 MG tablet Take 1 tablet (40 mg total) by mouth once daily. Take in the morning before breakfast.  Wait 30 minutes before eating or drinking anything (Patient not taking: Reported on 7/19/2022)    sucralfate (CARAFATE) 1 gram tablet Take 1 tablet (1 g total) by mouth 4 (four) times daily. "     Family History       Problem Relation (Age of Onset)    Cancer Mother    Early death Brother    Heart failure Father    Liver cancer Mother    No Known Problems Sister          Tobacco Use    Smoking status: Former     Types: Cigarettes     Quit date: 1999     Years since quittin.7    Smokeless tobacco: Never   Substance and Sexual Activity    Alcohol use: Yes    Drug use: No    Sexual activity: Yes     Partners: Male     Review of Systems   All other systems reviewed and are negative.  Objective:     Vital Signs (Most Recent):  Temp: 97.7 °F (36.5 °C) (22 1252)  Pulse: 78 (22 1449)  Resp: 18 (22 1449)  BP: (!) 151/79 (22 1449)  SpO2: 95 % (22 1449)   Vital Signs (24h Range):  Temp:  [97.7 °F (36.5 °C)-97.9 °F (36.6 °C)] 97.7 °F (36.5 °C)  Pulse:  [62-99] 78  Resp:  [9-24] 18  SpO2:  [91 %-98 %] 95 %  BP: (119-163)/(75-92) 151/79     Weight: 70.3 kg (155 lb)  Body mass index is 26.61 kg/m².    Physical Exam  Vitals reviewed.   Constitutional:       General: She is not in acute distress.     Appearance: Normal appearance.   HENT:      Head: Normocephalic and atraumatic.      Nose: Nose normal.   Eyes:      Extraocular Movements: Extraocular movements intact.      Pupils: Pupils are equal, round, and reactive to light.   Cardiovascular:      Rate and Rhythm: Normal rate.   Pulmonary:      Effort: Pulmonary effort is normal. No respiratory distress.   Abdominal:      General: There is no distension.      Palpations: Abdomen is soft.   Skin:     General: Skin is warm and dry.   Neurological:      General: No focal deficit present.      Mental Status: She is alert and oriented to person, place, and time.   Psychiatric:         Mood and Affect: Mood normal.         Behavior: Behavior normal.         CRANIAL NERVES     CN III, IV, VI   Pupils are equal, round, and reactive to light.     Significant Labs: All pertinent labs within the past 24 hours have been  reviewed.    Significant Imaging: I have reviewed all pertinent imaging results/findings within the past 24 hours.    Assessment/Plan:     * Status post total replacement of left hip       Left total hip arthroplasty with Aamir Continuum 54 mm acetabular shell, acetabular poly, 3 bone screws, size 36 +0 femoral head, and Avenir size 1 femoral stem.  PRN pain and nausea medications  PT/OT  Xarelto  Home health at discharge to be determined by ortho      GERD (gastroesophageal reflux disease)  Continue home medications      Hypercholesterolemia  On no medications for this      Essential hypertension  Continue home medications        VTE Risk Mitigation (From admission, onward)         Ordered     rivaroxaban tablet 10 mg  With dinner         09/26/22 1458     Place sequential compression device  Until discontinued         09/26/22 1458     Place PORSCHE hose  Until discontinued         09/26/22 1458                   Anatoliy Urbina MD  Department of Hospital Medicine   [facility] - Intensive Care

## 2022-09-26 NOTE — HPI
Arthritis, Bilateral breast cancer, Breast cancer (2004), High cholesterol, Hypertension admitted following Left total hip arthroplasty. She tolerated the procedure well without complication. Patient's pain was difficult to control post-operatively. Otherwise patient doing well.

## 2022-09-26 NOTE — ANESTHESIA POSTPROCEDURE EVALUATION
Anesthesia Post Evaluation    Patient: Liv Garland    Procedure(s) Performed: Procedure(s) (LRB):  ARTHROPLASTY, HIP (Left)    Final Anesthesia Type: general      Patient location during evaluation: PACU  Patient participation: Yes- Able to Participate  Level of consciousness: awake and awake and alert  Post-procedure vital signs: reviewed and stable  Pain management: adequate  Airway patency: patent    PONV status at discharge: No PONV  Anesthetic complications: no      Cardiovascular status: blood pressure returned to baseline  Respiratory status: unassisted and spontaneous ventilation  Hydration status: euvolemic  Follow-up not needed.          Vitals Value Taken Time   /98 09/26/22 1402   Temp 36.5 °C (97.7 °F) 09/26/22 1252   Pulse 90 09/26/22 1406   Resp 7 09/26/22 1406   SpO2 97 % 09/26/22 1406   Vitals shown include unvalidated device data.      No case tracking events are documented in the log.      Pain/Nat Score: Pain Rating Prior to Med Admin: 10 (9/26/2022  1:53 PM)  Pain Rating Post Med Admin: 10 (9/26/2022  1:46 PM)  Nat Score: 8 (9/26/2022  1:37 PM)

## 2022-09-26 NOTE — TRANSFER OF CARE
"Anesthesia Transfer of Care Note    Patient: Liv Garland    Procedure(s) Performed: Procedure(s) (LRB):  ARTHROPLASTY, HIP (Left)    Patient location: PACU    Anesthesia Type: general    Transport from OR: Transported from OR on room air with adequate spontaneous ventilation    Post pain: adequate analgesia    Post assessment: no apparent anesthetic complications    Post vital signs: stable    Level of consciousness: sedated and responds to stimulation    Nausea/Vomiting: no nausea/vomiting    Complications: none    Transfer of care protocol was followed      Last vitals:   Visit Vitals  BP (!) 163/92 (BP Location: Right arm, Patient Position: Lying)   Pulse 71   Temp 36.6 °C (97.9 °F) (Oral)   Resp 16   Ht 5' 4" (1.626 m)   Wt 70.3 kg (155 lb)   LMP  (LMP Unknown)   SpO2 95%   Breastfeeding No   BMI 26.61 kg/m²     "

## 2022-09-26 NOTE — SUBJECTIVE & OBJECTIVE
"Past Medical History:   Diagnosis Date    Arthritis     Bilateral breast cancer     Breast cancer 2004    Dr. Dos Santos, bilat mast, no rt, tamoxifen stopped after 9 mos due to side effects    Diabetes mellitus     High cholesterol     Hypertension     PONV (postoperative nausea and vomiting)        Past Surgical History:   Procedure Laterality Date    ABLATION OF DYSRHYTHMIC FOCUS  2000    Dr. Melgoza    CHOLECYSTECTOMY  2/2001    COLONOSCOPY      repeat in 2020    ESOPHAGEAL DILATION N/A 5/23/2022    Procedure: DILATION, ESOPHAGUS;  Surgeon: Francis Bower MD;  Location: [facility];  Service: General;  Laterality: N/A;    ESOPHAGOGASTRODUODENOSCOPY N/A 5/23/2022    Procedure: ESOPHAGOGASTRODUODENOSCOPY (EGD);  Surgeon: Francis Bower MD;  Location: [facility] ENDO;  Service: General;  Laterality: N/A;    HIP ARTHROPLASTY Right 4/5/2021    Procedure: ARTHROPLASTY, HIP;  Surgeon: Don Suárez DO;  Location: [facility];  Service: Orthopedics;  Laterality: Right;  Equipment: Aamir Total Hip System  Vendor: [employer]  REQUIRES ASSISTANT: KOJO    HYSTERECTOMY  10/1/2003    INJECTION OF JOINT Right 11/25/2020    Procedure: Right Hip Intra-articular Steroid Injection Under Fluoro;  Surgeon: Rios Colon MD;  Location: [facility] OR;  Service: Pain Management;  Laterality: Right;    INJECTION OF STEROID Right 2/10/2021    Procedure: INJECTION, STEROID;  Surgeon: Don Suárez DO;  Location: [facility] OR;  Service: Orthopedics;  Laterality: Right;  Sedation  Equipment: 3cc syringes x2, 18ga 1.5" needle x2; 3.5" 20ga spinal needle; Kenalog 40; 1% lidocaine without Epi; 0.25% marcaine without epi  C-Arm: Entire    MASTECTOMY Bilateral 5/16/2004    MITRAL VALVE REPLACEMENT  1980    OOPHORECTOMY Left 5/18/2004    OSTEOTOMY OF METATARSAL BONE Left 2/10/2022    Procedure: OSTEOTOMY, METATARSAL BONE RESURFACING ARTHROPLASTY VERSES SHORTENING OSTEOTOMY 2ND METATARSAL, LEFT FOOT.;  Surgeon: Don Suárez DO;  Location: [facility]" OR;  Service: Orthopedics;  Laterality: Left;  THIS IS AN [facility] METARSAL CARD; MAKE NOTES TO CREATE A NEW CARD PLEASE    TONSILLECTOMY  1974    TOTAL ABDOMINAL HYSTERECTOMY W/ BILATERAL SALPINGOOPHORECTOMY         Review of patient's allergies indicates:   Allergen Reactions    Lisinopril      Other reaction(s): Angioedema       Current Facility-Administered Medications on File Prior to Encounter   Medication    lactated ringers infusion    ondansetron injection 4 mg     Current Outpatient Medications on File Prior to Encounter   Medication Sig    fluticasone propionate (FLONASE) 50 mcg/actuation nasal spray by Each Nostril route.    losartan (COZAAR) 25 MG tablet TAKE 1/2 TABLET (12.5 MG TOTAL) BY MOUTH ONCE DAILY.    famotidine (PEPCID) 20 MG tablet Take 1 tablet (20 mg total) by mouth 2 (two) times daily.    pantoprazole (PROTONIX) 40 MG tablet Take 1 tablet (40 mg total) by mouth once daily. Take in the morning before breakfast.  Wait 30 minutes before eating or drinking anything (Patient not taking: Reported on 2022)    sucralfate (CARAFATE) 1 gram tablet Take 1 tablet (1 g total) by mouth 4 (four) times daily.     Family History       Problem Relation (Age of Onset)    Cancer Mother    Early death Brother    Heart failure Father    Liver cancer Mother    No Known Problems Sister          Tobacco Use    Smoking status: Former     Types: Cigarettes     Quit date: 1999     Years since quittin.7    Smokeless tobacco: Never   Substance and Sexual Activity    Alcohol use: Yes    Drug use: No    Sexual activity: Yes     Partners: Male     Review of Systems   All other systems reviewed and are negative.  Objective:     Vital Signs (Most Recent):  Temp: 97.7 °F (36.5 °C) (22 1252)  Pulse: 78 (22 1449)  Resp: 18 (22 1449)  BP: (!) 151/79 (22 1449)  SpO2: 95 % (22 1449)   Vital Signs (24h Range):  Temp:  [97.7 °F (36.5 °C)-97.9 °F (36.6 °C)] 97.7 °F (36.5 °C)  Pulse:  [62-99]  78  Resp:  [9-24] 18  SpO2:  [91 %-98 %] 95 %  BP: (119-163)/(75-92) 151/79     Weight: 70.3 kg (155 lb)  Body mass index is 26.61 kg/m².    Physical Exam  Vitals reviewed.   Constitutional:       General: She is not in acute distress.     Appearance: Normal appearance.   HENT:      Head: Normocephalic and atraumatic.      Nose: Nose normal.   Eyes:      Extraocular Movements: Extraocular movements intact.      Pupils: Pupils are equal, round, and reactive to light.   Cardiovascular:      Rate and Rhythm: Normal rate.   Pulmonary:      Effort: Pulmonary effort is normal. No respiratory distress.   Abdominal:      General: There is no distension.      Palpations: Abdomen is soft.   Skin:     General: Skin is warm and dry.   Neurological:      General: No focal deficit present.      Mental Status: She is alert and oriented to person, place, and time.   Psychiatric:         Mood and Affect: Mood normal.         Behavior: Behavior normal.         CRANIAL NERVES     CN III, IV, VI   Pupils are equal, round, and reactive to light.     Significant Labs: All pertinent labs within the past 24 hours have been reviewed.    Significant Imaging: I have reviewed all pertinent imaging results/findings within the past 24 hours.

## 2022-09-26 NOTE — PT/OT/SLP EVAL
Patient reporting feeling lightheaded and nauseated upon PT arrival this afternoon, RN notified and administered zofran. Hip pain 8/10. Continued nausea with vomiting after medication, PT evaluation unable to be completed at this time. PT will follow up in the am for evaluation and mobilization.

## 2022-09-26 NOTE — OP NOTE
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    PROCEDURE:  Left total hip arthroplasty with Aamir Continuum 54 mm acetabular shell, acetabular poly, 3 bone screws, size 36 +0 femoral head, and Avenir size 1 femoral stem.    SURGEON: Don Suárez D.O.    ASSISTANT: Orton Grinnell, CFA    ANESTHESIA:  General.    BLOOD LOSS:  Less than 50 cc.    TOURNIQUET:  Non applicable.    DRAINS:  None.    PATHOLOGY:  Femoral head, bursa, and reamings.    COMPLICATION:  None.    INDICATIONS FOR PROCEDURE:   Ms. Hayes is a 62-year-old lady who has complaints of left hip pain.  She stated it started to exacerbate approximately 1 month ago and she became concerned because she does not want to get to the point that she did with her right hip.  She stated, Sometimes when I stand up it gives way and when I bend over I have a hard time getting up .  The pain radiates from her hip into her groin.  When she stands for extended periods or walks she has increased pain but when she sits and rests it improves.  She gets a catching feeling in her left.  Nothing seems to improve her symptoms. She has taken NSAIDs without help. She had a total hip arthroplasty for similar symptoms of her right hip which has resolved her pain  She can ambulate 1-1/2 miles and climb approximately 2 stairs..  She does not use an ambulatory assistive device.   She elected to proceed with surgery after she failed conservative management and complications to include bleeding, infection, scarring, nerve/blood vessel/tendon damage, need for further surgery, failed surgery, failure to improve, stiffness, skin slough, leg length discrepancy, fracture, malrotation, and possible dislocation were discussed.  She signed a consent.    PROCEDURE IN DETAIL:   The patient was brought to the operating  room and was transferred to the operating bed where all bony prominences were well padded.  General anesthesia was then administered by the Anesthesiology Department.  After general anesthesia was administered the patient was placed in the right lateral recumbent position and holders were placed.  The patient's left hip was then prepped with chlorhexidine solution and draped in the normal sterile fashion.  After prepping and draping bony and soft tissue landmarks were identified and a curvilinear longitudinal incision was drawn over the patient's hip.       Sharp incision was then made with a #10 blade followed by dissection to the level of the IT band.  A rent was placed in the IT band with Bovie electrocautery and then advanced proximally and distally with Merino scissors.  A Charnley retractor was then placed and the patient's hip was internally rotated presenting the short external rotators.  The short external rotators were then tagged with FiberWire suture and then released with Bovie electrocautery.  The capsule was then released in a T-fashion and tagged with FiberWire suture.  The patient's hip was then surgically dislocated presenting the femoral neck and head.  A femoral neck osteotomy was then marked with a femoral neck guide and then an osteotomy was completed with an oscillating saw.  The head was placed on the back table and measured.       The acetabulum was then presented and retractors were placed.  The labrum was removed from the acetabulum and then the acetabulum was reamed with progressive reamers until good bleeding bone was obtained.  A trial acetabulum was placed with good fit.  The trial acetabulum was removed and the patient's hip was copiously irrigated.  A final acetabular shell was placed.  It was seated with a seating device.  After seating the acetabular shell drill holes were placed followed by placement of screws after measuring.  A final acetabular liner was then placed in seated.          Attention was then placed on the proximal femur which it was presented and retractors were placed.  A box osteotome was then utilized to open the proximal femur followed by hand reaming of the canal.  Progressive broaches were then placed lateralizing the prosthesis once the appropriate size broach was seated with good fit a neck and head were placed and then the patient's hip was reduced and put through full motion.  Leg lengths were checked.  Progressive femoral heads were placed until appropriate tensioning of the tissues and leg length was obtained.  The patient's hip was then dislocated and the trials were removed.  The canal was then copiously irrigated and then a final permanent femoral stem was seated.  After seating the final femoral stem progressive neck sizes were placed again until an appropriate size neck size was chosen and then a final neck was placed and seated.  The patient's hip was then reduced put through motion good fit with good tensioning of the tissues and leg lengths was noted.       The hip was then copiously irrigated and then drill holes were placed in the greater trochanter and then a short external rotators and capsules were reapproximated to the greater trochanter.  Further repair of the quadratus was completed.  The hip was again copiously irrigated and then the IT band was reapproximated to itself with Vicryl suture followed by a layered closure with Vicryl suture and final plastic closure.       The patient's incision was then dressed with Mastisol, Steri-Strips, Adaptic and Primapore dressing.  A hip abduction pillow was then placed between her legs and then she was rotated into the supine position and transferred from the operating room to the recovery room in stable condition.  She tolerated the procedure well without complication.

## 2022-09-26 NOTE — PLAN OF CARE
to bedside to collected blood specimen. IVF stopped. Tech states she will come back to collect specimen.

## 2022-09-26 NOTE — ASSESSMENT & PLAN NOTE
PRN pain and nausea medications  PT/OT  Xarelto  Home health at discharge to be determined by ortho

## 2022-09-26 NOTE — PLAN OF CARE
Transferred to ICU6  per hospital bed. Resting in bed eyes closed. Awakens easily. Resp even and unlabored on room air. No distress noted. IV site and post-op dsg remains without noted problems to site. Personal belongings sent to pts room when transferred. SR up x2. Bed in lowest position with brakes on. Door open. Call light placed within reach.

## 2022-09-26 NOTE — PLAN OF CARE
No pain relief from PRN Morphine dosages as ordered. Rates pain as 10/10 on numerical pain scale. Notified Dr. Stewart. New orders received. Will medicate as ordered according to newest order. See MAR and orders.

## 2022-09-26 NOTE — PLAN OF CARE
09/26/22 1445   Discharge Assessment   Assessment Type Discharge Planning Assessment   Patient to room; eyes closed having pain. Nurse at bedside. Will see tomorrow.

## 2022-09-27 VITALS
HEART RATE: 67 BPM | WEIGHT: 155 LBS | SYSTOLIC BLOOD PRESSURE: 143 MMHG | TEMPERATURE: 98 F | RESPIRATION RATE: 16 BRPM | OXYGEN SATURATION: 93 % | HEIGHT: 64 IN | DIASTOLIC BLOOD PRESSURE: 74 MMHG | BODY MASS INDEX: 26.46 KG/M2

## 2022-09-27 LAB
HCT VFR BLD AUTO: 31.5 % (ref 37–48.5)
HGB BLD-MCNC: 10.9 G/DL (ref 12–16)

## 2022-09-27 PROCEDURE — 85014 HEMATOCRIT: CPT | Performed by: ORTHOPAEDIC SURGERY

## 2022-09-27 PROCEDURE — 25000003 PHARM REV CODE 250: Performed by: FAMILY MEDICINE

## 2022-09-27 PROCEDURE — 97530 THERAPEUTIC ACTIVITIES: CPT

## 2022-09-27 PROCEDURE — 97166 OT EVAL MOD COMPLEX 45 MIN: CPT

## 2022-09-27 PROCEDURE — 25000003 PHARM REV CODE 250: Performed by: ORTHOPAEDIC SURGERY

## 2022-09-27 PROCEDURE — 63600175 PHARM REV CODE 636 W HCPCS: Performed by: ORTHOPAEDIC SURGERY

## 2022-09-27 PROCEDURE — 97535 SELF CARE MNGMENT TRAINING: CPT

## 2022-09-27 PROCEDURE — 85018 HEMOGLOBIN: CPT | Performed by: ORTHOPAEDIC SURGERY

## 2022-09-27 PROCEDURE — 63600175 PHARM REV CODE 636 W HCPCS: Performed by: STUDENT IN AN ORGANIZED HEALTH CARE EDUCATION/TRAINING PROGRAM

## 2022-09-27 RX ORDER — TRAMADOL HYDROCHLORIDE 50 MG/1
50 TABLET ORAL EVERY 6 HOURS PRN
Status: DISCONTINUED | OUTPATIENT
Start: 2022-09-27 | End: 2022-09-27 | Stop reason: HOSPADM

## 2022-09-27 RX ORDER — ONDANSETRON 4 MG/1
4 TABLET, FILM COATED ORAL EVERY 6 HOURS PRN
Qty: 30 TABLET | Refills: 0 | Status: SHIPPED | OUTPATIENT
Start: 2022-09-27 | End: 2022-10-11

## 2022-09-27 RX ADMIN — OXYCODONE HYDROCHLORIDE AND ACETAMINOPHEN 1 TABLET: 10; 325 TABLET ORAL at 05:09

## 2022-09-27 RX ADMIN — ONDANSETRON HYDROCHLORIDE 4 MG: 2 SOLUTION INTRAMUSCULAR; INTRAVENOUS at 07:09

## 2022-09-27 RX ADMIN — ACETAMINOPHEN 650 MG: 325 TABLET ORAL at 03:09

## 2022-09-27 RX ADMIN — FAMOTIDINE 20 MG: 20 TABLET ORAL at 08:09

## 2022-09-27 RX ADMIN — PROCHLORPERAZINE EDISYLATE 10 MG: 5 INJECTION INTRAMUSCULAR; INTRAVENOUS at 08:09

## 2022-09-27 RX ADMIN — LOSARTAN POTASSIUM 12.5 MG: 25 TABLET, FILM COATED ORAL at 08:09

## 2022-09-27 RX ADMIN — CEFAZOLIN SODIUM 2000 MG: 2 SOLUTION INTRAVENOUS at 03:09

## 2022-09-27 RX ADMIN — TRAMADOL HYDROCHLORIDE 50 MG: 50 TABLET, COATED ORAL at 10:09

## 2022-09-27 NOTE — PT/OT/SLP EVAL
Occupational Therapy   Evaluation    Name: Liv Garland  MRN: [id number]  Admitting Diagnosis:  Status post total replacement of left hip  Recent Surgery: Procedure(s) (LRB):  ARTHROPLASTY, HIP (Left) 1 Day Post-Op    Recommendations:     Discharge Recommendations:  Outpatient PT  Discharge Equipment Recommendations:   None  Barriers to discharge:   None    Assessment:    Arthritis, Bilateral breast cancer, Breast cancer (2004), High cholesterol, Hypertension admitted following Left total hip arthroplasty. She tolerated the procedure well without complication. Patient's pain was difficult to control post-operatively. Otherwise patient doing well.    Rehab Prognosis: Good       Plan:     Patient to be seen   to address the above listed problems via    Plan of Care Expires:  When patient is discharged from hospital.  Patient will be treated 3-5x per week.  Plan of Care Reviewed with:  Patient     Subjective       Occupational Profile:  Living Environment: Patient lives alone in a single story home.  Previous level of function: Independent   Equipment Used at Home:   Rolling walker, bedside commode, and hip kit  Assistance upon Discharge: Patient lives alone.    Pain/Comfort:   5/10 left hip      Objective:     Communicated with: OT spoke with patient's nurse brady to entering patient's room for evaluation.    General Precautions: Standard,     Orthopedic Precautions:    Braces:    Respiratory Status: Room air    Occupational Performance:    Bed Mobility:    Patient requires SBA with all aspects of bed mobility.    Functional Mobility/Transfers:  Patient is modified independent with functional transfers and when ambulating 150 feet with rolling walker.    Activities of Daily Living:  Patient is independent with UE dressing and grooming.  Patient requires Carlos with LE dressing, toileting, and bathing.    Cognitive/Visual Perceptual:  Patient is alert and oriented x4.      Treatment & Education:  Patient tolerated  "evaluation well on this date with OT.  Patient is modified independent with functional transfers and when ambulating 150 feet with rolling walker.  Patient was educated on LE dressing and hip kit.        GOALS:   Patient will require SBA with LE dressing.  Patient will be modified independent with toileting.  Patient will be modified independent with bathing.      History:     Past Medical History:   Diagnosis Date    Arthritis     Bilateral breast cancer     Breast cancer 2004    Dr. Dos Santos, bilat mast, no rt, tamoxifen stopped after 9 mos due to side effects    Diabetes mellitus     High cholesterol     Hypertension     PONV (postoperative nausea and vomiting)          Past Surgical History:   Procedure Laterality Date    ABLATION OF DYSRHYTHMIC FOCUS  2000    Dr. Melgoza    CHOLECYSTECTOMY  2/2001    COLONOSCOPY      repeat in 2020    ESOPHAGEAL DILATION N/A 5/23/2022    Procedure: DILATION, ESOPHAGUS;  Surgeon: Francis Bower MD;  Location: [facility] ENDO;  Service: General;  Laterality: N/A;    ESOPHAGOGASTRODUODENOSCOPY N/A 5/23/2022    Procedure: ESOPHAGOGASTRODUODENOSCOPY (EGD);  Surgeon: Francis Bower MD;  Location: [facility] ENDO;  Service: General;  Laterality: N/A;    HIP ARTHROPLASTY Right 4/5/2021    Procedure: ARTHROPLASTY, HIP;  Surgeon: Don Suárez DO;  Location: [facility] OR;  Service: Orthopedics;  Laterality: Right;  Equipment: Aamir Total Hip System  Vendor: [employer]  REQUIRES ASSISTANT: KOJO    HYSTERECTOMY  10/1/2003    INJECTION OF JOINT Right 11/25/2020    Procedure: Right Hip Intra-articular Steroid Injection Under Fluoro;  Surgeon: Rios Colon MD;  Location: [facility] OR;  Service: Pain Management;  Laterality: Right;    INJECTION OF STEROID Right 2/10/2021    Procedure: INJECTION, STEROID;  Surgeon: Don Suárez DO;  Location: [facility] OR;  Service: Orthopedics;  Laterality: Right;  Sedation  Equipment: 3cc syringes x2, 18ga 1.5" needle x2; 3.5" 20ga spinal needle; Kenalog 40; 1% " lidocaine without Epi; 0.25% marcaine without epi  C-Arm: Entire    MASTECTOMY Bilateral 5/16/2004    MITRAL VALVE REPLACEMENT  1980    OOPHORECTOMY Left 5/18/2004    OSTEOTOMY OF METATARSAL BONE Left 2/10/2022    Procedure: OSTEOTOMY, METATARSAL BONE RESURFACING ARTHROPLASTY VERSES SHORTENING OSTEOTOMY 2ND METATARSAL, LEFT FOOT.;  Surgeon: Don Suárez DO;  Location: [facility] OR;  Service: Orthopedics;  Laterality: Left;  THIS IS AN [facility]IF METARSAL CARD; MAKE NOTES TO CREATE A NEW CARD PLEASE    TONSILLECTOMY  1974    TOTAL ABDOMINAL HYSTERECTOMY W/ BILATERAL SALPINGOOPHORECTOMY         Time Tracking:     OT Date of Treatment:  9/27/2022  OT Start Time:  6:30  OT Stop Time:  7:01  OT Total Time (min):  31 minutes     Bao Emerson, OTR/L    9/27/2022

## 2022-09-27 NOTE — PROGRESS NOTES
S: Ms. Garland was seen and examined.  She had a left total hip arthroplasty completed yesterday on 09/26/2022.  She states she is doing well.  She ambulated greater than 150 ft in the hallway with physical therapy this morning.    O:  Vital signs stable reviewed.    Neurovascularly intact.    Incision clean dry and intact.    Dressing clean dry and intact without blood tinging.    Good hip motion with minimal pain.    Calves soft.    Homans negative.  Radiography:  X-rays which include AP pelvis and left hip completed on 09/26/2022 showed a well-seated well-aligned left total hip arthroplasty without signs of loosening there is also a right total hip arthroplasty which appears to be well-seated well-aligned.    Laboratory:  Hemoglobin 10.9; hematocrit 31.5     A:  Left total hip arthroplasty POD #1    P:  1. Continue with physical therapy ambulate with walker with weight-bearing at tolerance to the right lower extremity.  2. Continue with occupational therapy with ADLs.  3. Continue with DVT prophylaxis.    4. Change dressing to a silver alginate dressing.  5. May shower do not soak in a tub.    6. Start doing straight leg raises.  7. Patient is orthopedically stable for discharge home may discharge home today when okay with hospitalist.  8. Orthopedic discharge instructions:  A. Strict posterior approach hip precautions do not flex left hip past 90° do not internally rotate left hip   B:  Ambulate with a walker and assistance with weight-bearing at tolerance to the left lower extremity.    C:  May shower do not soak in a tub the dressing is a sealed waterproof dressing may allow water to run over dressing.    D:  Do not remove dressing.    E:  Follow-up in the office in approximately 10-12 days.

## 2022-09-27 NOTE — PLAN OF CARE
09/27/22 0947   Discharge Assessment   Assessment Type Discharge Planning Assessment   Confirmed/corrected address, phone number and insurance Yes   Confirmed Demographics Correct on Facesheet   Source of Information patient   When was your last doctors appointment?   (couple of weeks ago)   Communicated MARGO with patient/caregiver Yes   Reason For Admission hip replacement   Lives With alone   Do you expect to return to your current living situation? Yes   Do you have help at home or someone to help you manage your care at home? No   Prior to hospitilization cognitive status: Alert/Oriented   Current cognitive status: Alert/Oriented   Walking or Climbing Stairs Difficulty ambulation difficulty, requires equipment   Mobility Management uses a cane at times but has a rolling walker she will use post surgery   Dressing/Bathing Difficulty bathing difficulty, requires equipment   Dressing/Bathing Management has a shower chair   Home Accessibility wheelchair accessible   Home Layout Able to live on 1st floor   Equipment Currently Used at Home walker, rolling;cane, straight;raised toilet;shower chair   Readmission within 30 days? No   Patient currently being followed by outpatient case management? Yes   If yes, name of outpatient case management following: insurance company assigned oupatient case management   Do you currently have service(s) that help you manage your care at home? No   Do you take prescription medications? Yes   Do you have prescription coverage? Yes   Coverage [employer]   Do you have any problems affording any of your prescribed medications? No   Is the patient taking medications as prescribed? yes   Who is going to help you get home at discharge? Nelson Garland ex- [phone]   How do you get to doctors appointments? family or friend will provide;car, drives self   Are you on dialysis? No   Do you take coumadin? No   Discharge Plan A Home Health   Discharge Plan B Home with family   DME  Needed Upon Discharge  none   Discharge Plan discussed with: Patient   Discharge Barriers Identified None   Physical Activity   On average, how many days per week do you engage in moderate to strenuous exercise (like a brisk walk)? 7 days   On average, how many minutes do you engage in exercise at this level? 40 min   Financial Resource Strain   How hard is it for you to pay for the very basics like food, housing, medical care, and heating? Not hard   Housing Stability   In the last 12 months, was there a time when you were not able to pay the mortgage or rent on time? N   In the last 12 months, how many places have you lived? 1   In the last 12 months, was there a time when you did not have a steady place to sleep or slept in a shelter (including now)? N   Transportation Needs   In the past 12 months, has lack of transportation kept you from medical appointments or from getting medications? no   In the past 12 months, has lack of transportation kept you from meetings, work, or from getting things needed for daily living? No   Food Insecurity   Within the past 12 months, you worried that your food would run out before you got the money to buy more. Never true   Within the past 12 months, the food you bought just didn't last and you didn't have money to get more. Never true   Stress   Do you feel stress - tense, restless, nervous, or anxious, or unable to sleep at night because your mind is troubled all the time - these days? Not at all   Social Connections   In a typical week, how many times do you talk on the phone with family, friends, or neighbors? More than 3   How often do you get together with friends or relatives? Three times   How often do you attend Denominational or Mandaen services? Never   Do you belong to any clubs or organizations such as Denominational groups, unions, fraternal or athletic groups, or school groups? Yes   How often do you attend meetings of the clubs or organizations you belong to? More than 4   Are  you , , , , never , or living with a partner?    Alcohol Use   Q1: How often do you have a drink containing alcohol? 4 or more ti   Q2: How many drinks containing alcohol do you have on a typical day when you are drinking? 1 or 2   Q3: How often do you have six or more drinks on one occasion? Never   Patient awake & oriented lives at home alone. Her ex- Nelson will stay with her & her friend Anson will assist with getting her to appointments. She had home health last year after her other hip replacement & would like to use them again. She used [facility] last time & they are still in her network. States she has all her equipment from last time; she has a rolling walker which is at bedside, cane, raised toilet seat & shower chair. Denies any other needs at this time. Pain medication is making her vomit. Nurse notified.

## 2022-09-27 NOTE — PT/OT/SLP EVAL
Physical Therapy Evaluation    Patient Name:  Liv Garland   MRN:  [id number]    Recommendations:     Discharge Recommendations:  home, home health PT   Discharge Equipment Recommendations: none (patient has necessary equipment at home)   Barriers to discharge: None    Assessment:     Liv Garland is a 62 y.o. female admitted with a medical diagnosis of Status post total replacement of left hip.  She presents with the following impairments/functional limitations:  weakness, impaired endurance, impaired functional mobility, impaired self care skills, decreased lower extremity function, pain, decreased ROM, edema, orthopedic precautions, impaired joint extensibility.    Rehab Prognosis: Good; patient would benefit from acute skilled PT services to address these deficits and reach maximum level of function.    Recent Surgery: Procedure(s) (LRB):  ARTHROPLASTY, HIP (Left) 1 Day Post-Op    Plan:     During this hospitalization, patient to be seen BID to address the identified rehab impairments via   and progress toward the following goals:    Plan of Care Expires:   (upon discharge from facility)    Subjective     Chief Complaint: s/p left JEANINE  Patient/Family Comments/goals: Patient stating she is doing better today although she is still nauseated. She walked with OT this morning and sat up in chair for about 45 minutes.  Pain/Comfort:  Pain Rating 1:  (8/10 left hip)    Patients cultural, spiritual, Voodoo conflicts given the current situation: no    Living Environment:  Patient lives alone in a SS home with no steps to enter. Her ex  will be staying with her at discharge to provide assistance.  Prior to admission, patients level of function was mod I.  Equipment used at home: walker, rolling, hip kit.  DME owned (not currently used): rolling walker and hip kit .  Upon discharge, patient will have assistance from her ex .    Objective:     Communicated with RN prior to session.  Patient found  supine with cryotherapy, peripheral IV  upon PT entry to room.    General Precautions: Standard, fall   Orthopedic Precautions:LLE weight bearing as tolerated, LLE posterior precautions   Braces:  (abd pillow)  Respiratory Status: Room air    Exams:  Cognitive Exam:  Patient is oriented to Person, Place, Time, and Situation  RLE ROM: WFL  RLE Strength: WFL  LLE ROM: limitations as would be expected following surgical procedure performed and hip precautions   LLE Strength: limitations as would be expected following surgical procedure performed and hip precautions     Functional Mobility:  Not assessed at this time secondary to patient participating in OT ambulating 150' with RW and sitting up in chair prior to PT arrival this morning.    Treatment and Education:  Patient performed LLE x 10 reps to include active ankle pumps and QS's, active assisted heel slides, hip abd, and SLR. Patient educated in WBAT status of LLE and posterior hip precautions to include Do not flex left hip past 90° do not internally rotate left hip. Additional education provide, call light use, importance of OOB activity and functional mobility to negate the negative effects of prolonged bed rest during this hospitalization, safe transfers/ambulation and discharge planning recommendations/options.     AM-PAC 6 CLICK MOBILITY  Total Score:      Patient left supine with all lines intact, call button in reach, and RN notified.    GOALS:   Patient to be mod I with bed mobility  Patient to perform transfers with SBA using RW  Patient to ambulate >150' with SBA using RW  Patient to demonstrate understanding of posterior hip precautions via return demo and verbal recall      History:     Past Medical History:   Diagnosis Date    Arthritis     Bilateral breast cancer     Breast cancer 2004    Dr. Dos Santos, bilat mast, no rt, tamoxifen stopped after 9 mos due to side effects    Diabetes mellitus     High cholesterol     Hypertension     PONV (postoperative  "nausea and vomiting)        Past Surgical History:   Procedure Laterality Date    ABLATION OF DYSRHYTHMIC FOCUS  2000    Dr. Melgoza    CHOLECYSTECTOMY  2/2001    COLONOSCOPY      repeat in 2020    ESOPHAGEAL DILATION N/A 5/23/2022    Procedure: DILATION, ESOPHAGUS;  Surgeon: Francis Bower MD;  Location: [facility] ENDO;  Service: General;  Laterality: N/A;    ESOPHAGOGASTRODUODENOSCOPY N/A 5/23/2022    Procedure: ESOPHAGOGASTRODUODENOSCOPY (EGD);  Surgeon: Francis Bower MD;  Location: [facility] ENDO;  Service: General;  Laterality: N/A;    HIP ARTHROPLASTY Right 4/5/2021    Procedure: ARTHROPLASTY, HIP;  Surgeon: Don Suárez DO;  Location: [facility] OR;  Service: Orthopedics;  Laterality: Right;  Equipment: [employer]ir Total Hip System  Vendor: [employer]  REQUIRES ASSISTANT: KOJO    HYSTERECTOMY  10/1/2003    INJECTION OF JOINT Right 11/25/2020    Procedure: Right Hip Intra-articular Steroid Injection Under Fluoro;  Surgeon: Rios Colon MD;  Location: [facility] OR;  Service: Pain Management;  Laterality: Right;    INJECTION OF STEROID Right 2/10/2021    Procedure: INJECTION, STEROID;  Surgeon: Don Suárez DO;  Location: [facility] OR;  Service: Orthopedics;  Laterality: Right;  Sedation  Equipment: 3cc syringes x2, 18ga 1.5" needle x2; 3.5" 20ga spinal needle; Kenalog 40; 1% lidocaine without Epi; 0.25% marcaine without epi  C-Arm: Entire    MASTECTOMY Bilateral 5/16/2004    MITRAL VALVE REPLACEMENT  1980    OOPHORECTOMY Left 5/18/2004    OSTEOTOMY OF METATARSAL BONE Left 2/10/2022    Procedure: OSTEOTOMY, METATARSAL BONE RESURFACING ARTHROPLASTY VERSES SHORTENING OSTEOTOMY 2ND METATARSAL, LEFT FOOT.;  Surgeon: Don Suárez DO;  Location: [facility] OR;  Service: Orthopedics;  Laterality: Left;  THIS IS AN ORIF METARSAL CARD; MAKE NOTES TO CREATE A NEW CARD PLEASE    TONSILLECTOMY  1974    TOTAL ABDOMINAL HYSTERECTOMY W/ BILATERAL SALPINGOOPHORECTOMY         Time Tracking:     PT Received On: 09/27/22  PT Start " Time: 0926     PT Stop Time: 0943  PT Total Time (min): 17 min     Billable Minutes: Evaluation 17 min      09/27/2022

## 2022-09-27 NOTE — HOSPITAL COURSE
Ms Garland was admitted through same-day surgery on 09/26/2022 and taken to the operative suite where a left total hip arthroplasty was completed.  For a full account of surgery please see the operative report.  Postoperatively the patient was transferred to the floor and admitted to the hospitalist service.  Immediate postop DVT prophylaxis was instituted with SCDs, Philip velazquez, Xarelto therapy and early motion.  Physical therapy was consulted to ambulate the patient with a walker and assistance with weight-bearing at tolerance.  Occupational therapy was consulted to teach the patient ADLs.   was consulted to help arrange for outpatient physical therapy.  The patient continued to progress well and are 1st postop day her dressing was changed to a silver alginate dressing and she was permitted to shower.  She remained in stable condition able to ambulate greater than 150 ft she was discharged home with instructions to follow up in approximately 10-12 days.

## 2022-09-27 NOTE — PLAN OF CARE
Problem: Adult Inpatient Plan of Care  Goal: Plan of Care Review  Outcome: Ongoing, Progressing     Problem: Adult Inpatient Plan of Care  Goal: Patient-Specific Goal (Individualized)  Outcome: Ongoing, Progressing     Problem: Adult Inpatient Plan of Care  Goal: Absence of Hospital-Acquired Illness or Injury  Outcome: Ongoing, Progressing     Problem: Adult Inpatient Plan of Care  Goal: Optimal Comfort and Wellbeing  Outcome: Ongoing, Progressing     Problem: Adult Inpatient Plan of Care  Goal: Readiness for Transition of Care  Outcome: Ongoing, Progressing     Problem: Infection  Goal: Absence of Infection Signs and Symptoms  Outcome: Ongoing, Progressing     Problem: Skin Injury Risk Increased  Goal: Skin Health and Integrity  Outcome: Ongoing, Progressing     Problem: Fall Injury Risk  Goal: Absence of Fall and Fall-Related Injury  Outcome: Ongoing, Progressing     Problem: Bleeding (Hip Arthroplasty)  Goal: Absence of Bleeding  Outcome: Ongoing, Progressing     Problem: Bowel Motility Impaired (Hip Arthroplasty)  Goal: Effective Bowel Elimination  Outcome: Ongoing, Progressing     Problem: Neurovascular Compromise (Hip Arthroplasty)  Goal: Intact Neurovascular Status  Outcome: Ongoing, Progressing     Problem: Pain (Hip Arthroplasty)  Goal: Acceptable Pain Control  Outcome: Ongoing, Progressing     Problem: Postoperative Nausea and Vomiting (Hip Arthroplasty)  Goal: Nausea and Vomiting Relief  Outcome: Ongoing, Progressing     Problem: Respiratory Compromise (Hip Arthroplasty)  Goal: Effective Oxygenation and Ventilation  Outcome: Ongoing, Progressing     Problem: Functional Ability Impaired (Hip Arthroplasty)  Goal: Optimal Functional Ability  Outcome: Ongoing, Not Progressing

## 2022-09-27 NOTE — PLAN OF CARE
Problem: Adult Inpatient Plan of Care  Goal: Plan of Care Review  Outcome: Met     Problem: Adult Inpatient Plan of Care  Goal: Absence of Hospital-Acquired Illness or Injury  Outcome: Met     Problem: Adult Inpatient Plan of Care  Goal: Optimal Comfort and Wellbeing  Outcome: Met     Problem: Infection  Goal: Absence of Infection Signs and Symptoms  Outcome: Met     Problem: Skin Injury Risk Increased  Goal: Skin Health and Integrity  Outcome: Met     Problem: Fall Injury Risk  Goal: Absence of Fall and Fall-Related Injury  Outcome: Met     Problem: Pain (Hip Arthroplasty)  Goal: Acceptable Pain Control  Outcome: Met     Problem: Postoperative Nausea and Vomiting (Hip Arthroplasty)  Goal: Nausea and Vomiting Relief  Outcome: Met

## 2022-09-27 NOTE — PLAN OF CARE
09/27/22 0950   Final Note   Assessment Type Final Discharge Note   Anticipated Discharge Disposition Home-Health   What phone number can be called within the next 1-3 days to see how you are doing after discharge? [phone]   Hospital Resources/Appts/Education Provided Appointments scheduled and added to AVS   Post-Acute Status   Post-Acute Authorization Home Health   Home Health Status Set-up Complete/Auth obtained   Discharge Delays None known at this time   Patient notified that [facility] will be able to see her tomorrow & someone will call her this afternoon with a time. Her ex- Nelson will bring her home.  Explained to her if her blood thinner is too expensive Dr Suárez said she could take aspirin 325mg twice a day. States she took a blood thinner last time & it wasn't too expensive. Denies any other needs at this time.

## 2022-09-30 ENCOUNTER — PATIENT MESSAGE (OUTPATIENT)
Dept: ORTHOPEDICS | Facility: CLINIC | Age: 62
End: 2022-09-30

## 2022-10-07 ENCOUNTER — OFFICE VISIT (OUTPATIENT)
Dept: ORTHOPEDICS | Facility: CLINIC | Age: 62
End: 2022-10-07
Payer: COMMERCIAL

## 2022-10-07 VITALS — BODY MASS INDEX: 26.46 KG/M2 | RESPIRATION RATE: 18 BRPM | HEIGHT: 64 IN | WEIGHT: 155 LBS

## 2022-10-07 DIAGNOSIS — Z96.642 S/P TOTAL LEFT HIP ARTHROPLASTY: Primary | ICD-10-CM

## 2022-10-07 PROCEDURE — 3008F PR BODY MASS INDEX (BMI) DOCUMENTED: ICD-10-PCS | Mod: CPTII,S$GLB,, | Performed by: ORTHOPAEDIC SURGERY

## 2022-10-07 PROCEDURE — 4010F PR ACE/ARB THEARPY RXD/TAKEN: ICD-10-PCS | Mod: CPTII,S$GLB,, | Performed by: ORTHOPAEDIC SURGERY

## 2022-10-07 PROCEDURE — 99999 PR PBB SHADOW E&M-EST. PATIENT-LVL III: ICD-10-PCS | Mod: PBBFAC,,, | Performed by: ORTHOPAEDIC SURGERY

## 2022-10-07 PROCEDURE — 1159F MED LIST DOCD IN RCRD: CPT | Mod: CPTII,S$GLB,, | Performed by: ORTHOPAEDIC SURGERY

## 2022-10-07 PROCEDURE — 3008F BODY MASS INDEX DOCD: CPT | Mod: CPTII,S$GLB,, | Performed by: ORTHOPAEDIC SURGERY

## 2022-10-07 PROCEDURE — 4010F ACE/ARB THERAPY RXD/TAKEN: CPT | Mod: CPTII,S$GLB,, | Performed by: ORTHOPAEDIC SURGERY

## 2022-10-07 PROCEDURE — 3044F PR MOST RECENT HEMOGLOBIN A1C LEVEL <7.0%: ICD-10-PCS | Mod: CPTII,S$GLB,, | Performed by: ORTHOPAEDIC SURGERY

## 2022-10-07 PROCEDURE — 3044F HG A1C LEVEL LT 7.0%: CPT | Mod: CPTII,S$GLB,, | Performed by: ORTHOPAEDIC SURGERY

## 2022-10-07 PROCEDURE — 99024 PR POST-OP FOLLOW-UP VISIT: ICD-10-PCS | Mod: S$GLB,,, | Performed by: ORTHOPAEDIC SURGERY

## 2022-10-07 PROCEDURE — 1159F PR MEDICATION LIST DOCUMENTED IN MEDICAL RECORD: ICD-10-PCS | Mod: CPTII,S$GLB,, | Performed by: ORTHOPAEDIC SURGERY

## 2022-10-07 PROCEDURE — 99024 POSTOP FOLLOW-UP VISIT: CPT | Mod: S$GLB,,, | Performed by: ORTHOPAEDIC SURGERY

## 2022-10-07 PROCEDURE — 99999 PR PBB SHADOW E&M-EST. PATIENT-LVL III: CPT | Mod: PBBFAC,,, | Performed by: ORTHOPAEDIC SURGERY

## 2022-10-07 NOTE — PROGRESS NOTES
Subjective:      Patient ID: Liv Garland is a 62 y.o. female.    Chief Complaint: Post-op Evaluation of the Left Hip      HPI:  Ms. Garland returns today for her 1st postop visit on a left total hip arthroplasty.  Her date of surgery 09/26/2022.  She states she is doing well and is ambulating well.  She has been going to physical therapy.    ROS:  New diagnosis/surgery/prescriptions since last office visit on 09/02/2022: Left total hip arthroplasty  Constitution: Negative for chills and fever.   HENT: Negative for congestion.   Eyes: Negative for blurred vision.   Cardiovascular: Negative for chest pain.   Respiratory: Negative for cough.   Endocrine: Negative for polydipsia.   Hematologic/Lymphatic: Negative for adenopathy.   Skin: Negative for flushing and itching.   Musculoskeletal: Negative for gout and muscle weakness.   Gastrointestinal: Negative for constipation, diarrhea and heartburn.   Genitourinary: Negative for nocturia.   Neurological: Negative for headaches and seizures.   Psychiatric/Behavioral: Negative for depression. The patient is not nervous/anxious.   Allergic/Immunologic: Negative for environmental allergies.       Objective:      Physical Exam:   General: AAOx3.  No acute distress  Vascular:  Pulses intact and equal bilaterally.  Capillary refill less than 3 seconds and equal bilaterally  Neurologic:  Pinprick and soft touch intact and equal bilaterally  Integment:  Incision well approximated with Steri-Strips in place.  Extremity:  Hip:  Flexion/extension with good motion.  Internal/external rotation within allowable with good motion.  Nontender with hip motion.  Ambulates with a mild antalgic gait.  No swelling.  Calf soft.  Homans negative.  Radiography:  No new x-rays done today.      Assessment:       Impression:     1. S/P total left hip arthroplasty          Plan:       1.  Discussed physical examination with the patient. Liv understands that she had a left total hip arthroplasty  and appears to be doing well.    2. Continue with physical therapy.    3. Continue with hip precautions.    4. Any pain can be treated with over-the-counter medications dosed per box instructions.  5. Continue doing straight leg raises.  6. Follow up in 1 month with an x-ray of the left hip

## 2022-10-11 ENCOUNTER — OFFICE VISIT (OUTPATIENT)
Dept: FAMILY MEDICINE | Facility: CLINIC | Age: 62
End: 2022-10-11
Payer: COMMERCIAL

## 2022-10-11 VITALS
BODY MASS INDEX: 26.02 KG/M2 | HEIGHT: 64 IN | SYSTOLIC BLOOD PRESSURE: 138 MMHG | DIASTOLIC BLOOD PRESSURE: 86 MMHG | WEIGHT: 152.38 LBS | RESPIRATION RATE: 15 BRPM | HEART RATE: 78 BPM | OXYGEN SATURATION: 98 %

## 2022-10-11 DIAGNOSIS — I10 ESSENTIAL HYPERTENSION: ICD-10-CM

## 2022-10-11 DIAGNOSIS — Z96.642 STATUS POST TOTAL REPLACEMENT OF LEFT HIP: Primary | ICD-10-CM

## 2022-10-11 PROCEDURE — 99999 PR PBB SHADOW E&M-EST. PATIENT-LVL III: CPT | Mod: PBBFAC,,, | Performed by: FAMILY MEDICINE

## 2022-10-11 PROCEDURE — 4010F PR ACE/ARB THEARPY RXD/TAKEN: ICD-10-PCS | Mod: CPTII,S$GLB,, | Performed by: FAMILY MEDICINE

## 2022-10-11 PROCEDURE — 1160F RVW MEDS BY RX/DR IN RCRD: CPT | Mod: CPTII,S$GLB,, | Performed by: FAMILY MEDICINE

## 2022-10-11 PROCEDURE — 3075F SYST BP GE 130 - 139MM HG: CPT | Mod: CPTII,S$GLB,, | Performed by: FAMILY MEDICINE

## 2022-10-11 PROCEDURE — 99999 PR PBB SHADOW E&M-EST. PATIENT-LVL III: ICD-10-PCS | Mod: PBBFAC,,, | Performed by: FAMILY MEDICINE

## 2022-10-11 PROCEDURE — 99214 OFFICE O/P EST MOD 30 MIN: CPT | Mod: S$GLB,,, | Performed by: FAMILY MEDICINE

## 2022-10-11 PROCEDURE — 99214 PR OFFICE/OUTPT VISIT, EST, LEVL IV, 30-39 MIN: ICD-10-PCS | Mod: S$GLB,,, | Performed by: FAMILY MEDICINE

## 2022-10-11 PROCEDURE — 3079F DIAST BP 80-89 MM HG: CPT | Mod: CPTII,S$GLB,, | Performed by: FAMILY MEDICINE

## 2022-10-11 PROCEDURE — 3008F BODY MASS INDEX DOCD: CPT | Mod: CPTII,S$GLB,, | Performed by: FAMILY MEDICINE

## 2022-10-11 PROCEDURE — 1159F PR MEDICATION LIST DOCUMENTED IN MEDICAL RECORD: ICD-10-PCS | Mod: CPTII,S$GLB,, | Performed by: FAMILY MEDICINE

## 2022-10-11 PROCEDURE — 3075F PR MOST RECENT SYSTOLIC BLOOD PRESS GE 130-139MM HG: ICD-10-PCS | Mod: CPTII,S$GLB,, | Performed by: FAMILY MEDICINE

## 2022-10-11 PROCEDURE — 1159F MED LIST DOCD IN RCRD: CPT | Mod: CPTII,S$GLB,, | Performed by: FAMILY MEDICINE

## 2022-10-11 PROCEDURE — 3008F PR BODY MASS INDEX (BMI) DOCUMENTED: ICD-10-PCS | Mod: CPTII,S$GLB,, | Performed by: FAMILY MEDICINE

## 2022-10-11 PROCEDURE — 3044F HG A1C LEVEL LT 7.0%: CPT | Mod: CPTII,S$GLB,, | Performed by: FAMILY MEDICINE

## 2022-10-11 PROCEDURE — 4010F ACE/ARB THERAPY RXD/TAKEN: CPT | Mod: CPTII,S$GLB,, | Performed by: FAMILY MEDICINE

## 2022-10-11 PROCEDURE — 1160F PR REVIEW ALL MEDS BY PRESCRIBER/CLIN PHARMACIST DOCUMENTED: ICD-10-PCS | Mod: CPTII,S$GLB,, | Performed by: FAMILY MEDICINE

## 2022-10-11 PROCEDURE — 3079F PR MOST RECENT DIASTOLIC BLOOD PRESSURE 80-89 MM HG: ICD-10-PCS | Mod: CPTII,S$GLB,, | Performed by: FAMILY MEDICINE

## 2022-10-11 PROCEDURE — 3044F PR MOST RECENT HEMOGLOBIN A1C LEVEL <7.0%: ICD-10-PCS | Mod: CPTII,S$GLB,, | Performed by: FAMILY MEDICINE

## 2022-10-11 NOTE — PROGRESS NOTES
[facility] - Clinic Note    Subjective      Ms. Garland is a 62 y.o. female who presents to clinic for a follow up.    Had left total hip arthroplasty about 3 weeks ago.   Ambulating with a cane.  Doing home PT twice a week  Overall feeling well.     HTN: on losartan  Well controlled.     PMH Liv has a past medical history of Arthritis, Bilateral breast cancer, Breast cancer (2004), Diabetes mellitus, High cholesterol, Hypertension, and PONV (postoperative nausea and vomiting).   PSXH Liv has a past surgical history that includes Tonsillectomy (1974); Hysterectomy (10/1/2003); Mastectomy (Bilateral, 5/16/2004); Cholecystectomy (2/2001); Oophorectomy (Left, 5/18/2004); Ablation of dysrhythmic focus (2000); Total abdominal hysterectomy w/ bilateral salpingoophorectomy; Colonoscopy; Mitral valve replacement (1980); Injection of joint (Right, 11/25/2020); Injection of steroid (Right, 2/10/2021); Hip Arthroplasty (Right, 4/5/2021); Osteotomy of metatarsal bone (Left, 2/10/2022); Esophagogastroduodenoscopy (N/A, 5/23/2022); Esophageal dilation (N/A, 5/23/2022); and Hip Arthroplasty (Left, 9/26/2022).   RANDY Peck's family history includes Cancer in her mother; Early death in her brother; Heart failure in her father; Liver cancer in her mother; No Known Problems in her sister.   BLANCHE Peck reports that she quit smoking about 23 years ago. Her smoking use included cigarettes. She has never used smokeless tobacco. She reports current alcohol use. She reports that she does not use drugs.   EDWIGE Peck is allergic to lisinopril.   MED Liv has a current medication list which includes the following prescription(s): fluticasone propionate and losartan, and the following Facility-Administered Medications: lactated ringers and ondansetron.     Review of Systems   Constitutional:  Negative for activity change, appetite change, chills, fatigue and fever.   Eyes:  Negative for visual disturbance.   Respiratory:  Negative for  "cough and shortness of breath.    Cardiovascular:  Negative for chest pain, palpitations and leg swelling.   Gastrointestinal:  Negative for abdominal pain, nausea and vomiting.   Skin:  Negative for wound.   Neurological:  Negative for dizziness and headaches.   Psychiatric/Behavioral:  Negative for confusion.    Objective     /86 (BP Location: Left arm, Patient Position: Sitting, BP Method: Medium (Manual))   Pulse 78   Resp 15   Ht 5' 4" (1.626 m)   Wt 69.1 kg (152 lb 6 oz)   LMP  (LMP Unknown)   SpO2 98%   BMI 26.16 kg/m²     Physical Exam   Constitutional: normal appearance. She appears well-developed and well-nourished.  Non-toxic appearance. No distress. She does not appear ill.   HENT:   Head: Normocephalic and atraumatic.   Eyes: Right eye exhibits no discharge. Left eye exhibits no discharge.   Cardiovascular: Normal rate, regular rhythm, normal heart sounds and normal pulses. Exam reveals no gallop and no friction rub.   No murmur heard.Pulmonary:      Effort: Pulmonary effort is normal. No respiratory distress.      Breath sounds: Normal breath sounds. No wheezing, rhonchi or rales.     Abdominal: Normal appearance.   Musculoskeletal:      Cervical back: Neck supple.   Lymphadenopathy:     She has no cervical adenopathy.   Neurological: She is alert.   Skin: Skin is warm and dry. Capillary refill takes less than 2 seconds. She is not diaphoretic.   Psychiatric: Her behavior is normal. Mood, judgment and thought content normal.   Vitals reviewed.   Assessment/Plan     Liv was seen today for follow-up.    Diagnoses and all orders for this visit:    Status post total replacement of left hip    Essential hypertension      --chronic conditions stable. Continue current regimen.  -labs at follow up visit    Follow up in about 6 months (around 4/11/2023), or if symptoms worsen or fail to improve.    Future Appointments   Date Time Provider Department Center   4/11/2023 10:20 AM Vargas BLANCO" MD [facility] located within [facility] Semaj Paulino MD  Family Medicine  [facility]

## 2022-10-27 ENCOUNTER — PATIENT MESSAGE (OUTPATIENT)
Dept: ORTHOPEDICS | Facility: CLINIC | Age: 62
End: 2022-10-27

## 2022-10-30 ENCOUNTER — PATIENT MESSAGE (OUTPATIENT)
Dept: FAMILY MEDICINE | Facility: CLINIC | Age: 62
End: 2022-10-30

## 2022-10-31 DIAGNOSIS — Z96.642 S/P TOTAL LEFT HIP ARTHROPLASTY: Primary | ICD-10-CM

## 2022-11-09 ENCOUNTER — HOSPITAL ENCOUNTER (OUTPATIENT)
Dept: RADIOLOGY | Facility: HOSPITAL | Age: 62
Discharge: HOME | End: 2022-11-09
Attending: ORTHOPAEDIC SURGERY
Payer: COMMERCIAL

## 2022-11-09 ENCOUNTER — OFFICE VISIT (OUTPATIENT)
Dept: ORTHOPEDICS | Facility: CLINIC | Age: 62
End: 2022-11-09
Payer: COMMERCIAL

## 2022-11-09 VITALS — RESPIRATION RATE: 16 BRPM | HEIGHT: 64 IN | BODY MASS INDEX: 26 KG/M2 | WEIGHT: 152.31 LBS

## 2022-11-09 DIAGNOSIS — Z96.642 S/P TOTAL LEFT HIP ARTHROPLASTY: Primary | ICD-10-CM

## 2022-11-09 DIAGNOSIS — Z96.642 S/P TOTAL LEFT HIP ARTHROPLASTY: ICD-10-CM

## 2022-11-09 PROCEDURE — 1159F PR MEDICATION LIST DOCUMENTED IN MEDICAL RECORD: ICD-10-PCS | Mod: CPTII,S$GLB,, | Performed by: ORTHOPAEDIC SURGERY

## 2022-11-09 PROCEDURE — 3008F BODY MASS INDEX DOCD: CPT | Mod: CPTII,S$GLB,, | Performed by: ORTHOPAEDIC SURGERY

## 2022-11-09 PROCEDURE — 73502 X-RAY EXAM HIP UNI 2-3 VIEWS: CPT | Mod: 26,LT,, | Performed by: RADIOLOGY

## 2022-11-09 PROCEDURE — 99024 POSTOP FOLLOW-UP VISIT: CPT | Mod: S$GLB,,, | Performed by: ORTHOPAEDIC SURGERY

## 2022-11-09 PROCEDURE — 4010F ACE/ARB THERAPY RXD/TAKEN: CPT | Mod: CPTII,S$GLB,, | Performed by: ORTHOPAEDIC SURGERY

## 2022-11-09 PROCEDURE — 99024 PR POST-OP FOLLOW-UP VISIT: ICD-10-PCS | Mod: S$GLB,,, | Performed by: ORTHOPAEDIC SURGERY

## 2022-11-09 PROCEDURE — 4010F PR ACE/ARB THEARPY RXD/TAKEN: ICD-10-PCS | Mod: CPTII,S$GLB,, | Performed by: ORTHOPAEDIC SURGERY

## 2022-11-09 PROCEDURE — 3044F HG A1C LEVEL LT 7.0%: CPT | Mod: CPTII,S$GLB,, | Performed by: ORTHOPAEDIC SURGERY

## 2022-11-09 PROCEDURE — 1159F MED LIST DOCD IN RCRD: CPT | Mod: CPTII,S$GLB,, | Performed by: ORTHOPAEDIC SURGERY

## 2022-11-09 PROCEDURE — 73502 X-RAY EXAM HIP UNI 2-3 VIEWS: CPT | Mod: TC,LT

## 2022-11-09 PROCEDURE — 99999 PR PBB SHADOW E&M-EST. PATIENT-LVL III: ICD-10-PCS | Mod: PBBFAC,,, | Performed by: ORTHOPAEDIC SURGERY

## 2022-11-09 PROCEDURE — 3008F PR BODY MASS INDEX (BMI) DOCUMENTED: ICD-10-PCS | Mod: CPTII,S$GLB,, | Performed by: ORTHOPAEDIC SURGERY

## 2022-11-09 PROCEDURE — 73502 XR PELVIS 3 VIEW INC HIP 2 VIEW LEFT: ICD-10-PCS | Mod: 26,LT,, | Performed by: RADIOLOGY

## 2022-11-09 PROCEDURE — 99999 PR PBB SHADOW E&M-EST. PATIENT-LVL III: CPT | Mod: PBBFAC,,, | Performed by: ORTHOPAEDIC SURGERY

## 2022-11-09 PROCEDURE — 3044F PR MOST RECENT HEMOGLOBIN A1C LEVEL <7.0%: ICD-10-PCS | Mod: CPTII,S$GLB,, | Performed by: ORTHOPAEDIC SURGERY

## 2022-11-09 NOTE — PROGRESS NOTES
Patient ID: Liv Garland is a 62 y.o. female.     Chief Complaint: Post-op Evaluation of the Left Hip        HPI:  Ms. Garland returned today for a 6 week follow-up visit on a left total hip arthroplasty.  Her date of surgery 09/26/2022.  She states she is doing well and is ambulating well.  She has been going to physical therapy.  She has also been going to the gym and working with a .  She stated that her hip feels good her only issues is she has some mild pain over her greater trochanter.     ROS:  No new diagnosis/surgery/prescriptions since last office visit on 10/07/2022.  Constitution: Negative for chills and fever.   HENT: Negative for congestion.   Eyes: Negative for blurred vision.   Cardiovascular: Negative for chest pain.   Respiratory: Negative for cough.   Endocrine: Negative for polydipsia.   Hematologic/Lymphatic: Negative for adenopathy.   Skin: Negative for flushing and itching.   Musculoskeletal: Negative for gout and muscle weakness.   Gastrointestinal: Negative for constipation, diarrhea and heartburn.   Genitourinary: Negative for nocturia.   Neurological: Negative for headaches and seizures.   Psychiatric/Behavioral: Negative for depression. The patient is not nervous/anxious.   Allergic/Immunologic: Negative for environmental allergies.       Objective:   Physical Exam:   General: AAOx3.  No acute distress  Vascular:  Pulses intact and equal bilaterally.  Capillary refill less than 3 seconds and equal bilaterally  Neurologic:  Pinprick and soft touch intact and equal bilaterally  Integment:  Incision well approximated and well healed.  Extremity:  Hip:  Flexion/extension with good motion.  Internal/external rotation within allowable with good motion.  Nontender with hip motion.  Ambulates without an antalgic gait  No swelling.  Calf soft.  Homans negative.  Mild tenderness with palpation greater trochanter left hip.  Radiography:  Personally reviewed x-rays which include  AP pelvis and left hip completed on 10/09/2022 which showed a well-seated well-aligned total hip arthroplasty without signs of loosening on both hips.      Assessment:       Impression:      1. S/P total left hip arthroplasty           Plan:       1.  Discussed physical examination and radiographic evaluation with the patient. Liv understands that she is advancing well.  2. Continue with physical therapy.    3. Continue with hip precautions.    4. Any pain can be treated with over-the-counter medications dosed per box instructions.  5. Continue doing straight leg raises.  6. Follow up in 6 weeks with an x-ray of the left hip

## 2022-11-14 ENCOUNTER — DOCUMENTATION ONLY (OUTPATIENT)
Dept: ORTHOPEDICS | Facility: CLINIC | Age: 62
End: 2022-11-14

## 2022-11-14 NOTE — PROGRESS NOTES
Barrington' plan of care has been reviewed and signed by provider. The plan of care was successfully faxed to [phone].    Batched to medical records.

## 2022-11-16 ENCOUNTER — PATIENT MESSAGE (OUTPATIENT)
Dept: FAMILY MEDICINE | Facility: CLINIC | Age: 62
End: 2022-11-16

## 2023-02-11 ENCOUNTER — PATIENT MESSAGE (OUTPATIENT)
Dept: ADMINISTRATIVE | Facility: OTHER | Age: 63
End: 2023-02-11

## 2023-02-27 ENCOUNTER — LAB VISIT (OUTPATIENT)
Dept: LAB | Facility: HOSPITAL | Age: 63
End: 2023-02-27
Attending: FAMILY MEDICINE

## 2023-02-27 ENCOUNTER — PATIENT MESSAGE (OUTPATIENT)
Dept: FAMILY MEDICINE | Facility: CLINIC | Age: 63
End: 2023-02-27

## 2023-02-27 DIAGNOSIS — R35.0 URINARY FREQUENCY: Primary | ICD-10-CM

## 2023-02-27 DIAGNOSIS — R35.0 URINARY FREQUENCY: ICD-10-CM

## 2023-02-27 LAB
BACTERIA #/AREA URNS HPF: ABNORMAL /HPF
BILIRUB UR QL STRIP: NEGATIVE
CLARITY UR: CLEAR
COLOR UR: YELLOW
GLUCOSE UR QL STRIP: NEGATIVE
HGB UR QL STRIP: ABNORMAL
HYALINE CASTS #/AREA URNS LPF: 0 /LPF
KETONES UR QL STRIP: ABNORMAL
LEUKOCYTE ESTERASE UR QL STRIP: ABNORMAL
MICROSCOPIC COMMENT: ABNORMAL
NITRITE UR QL STRIP: NEGATIVE
PH UR STRIP: 7 [PH] (ref 5–8)
PROT UR QL STRIP: ABNORMAL
RBC #/AREA URNS HPF: 20 /HPF (ref 0–4)
SP GR UR STRIP: 1.02 (ref 1–1.03)
URN SPEC COLLECT METH UR: ABNORMAL
UROBILINOGEN UR STRIP-ACNC: 1 EU/DL
WBC #/AREA URNS HPF: 60 /HPF (ref 0–5)

## 2023-02-27 PROCEDURE — 87086 URINE CULTURE/COLONY COUNT: CPT | Performed by: FAMILY MEDICINE

## 2023-02-27 PROCEDURE — 81000 URINALYSIS NONAUTO W/SCOPE: CPT | Performed by: FAMILY MEDICINE

## 2023-02-27 RX ORDER — NITROFURANTOIN 25; 75 MG/1; MG/1
100 CAPSULE ORAL 2 TIMES DAILY
Qty: 14 CAPSULE | Refills: 0 | Status: SHIPPED | OUTPATIENT
Start: 2023-02-27 | End: 2023-03-06

## 2023-03-01 LAB — BACTERIA UR CULT: NORMAL

## 2023-03-06 ENCOUNTER — OFFICE VISIT (OUTPATIENT)
Dept: FAMILY MEDICINE | Facility: CLINIC | Age: 63
End: 2023-03-06

## 2023-03-06 VITALS
WEIGHT: 155.13 LBS | HEART RATE: 77 BPM | OXYGEN SATURATION: 98 % | SYSTOLIC BLOOD PRESSURE: 136 MMHG | HEIGHT: 64 IN | RESPIRATION RATE: 15 BRPM | DIASTOLIC BLOOD PRESSURE: 78 MMHG | BODY MASS INDEX: 26.49 KG/M2

## 2023-03-06 DIAGNOSIS — N30.01 ACUTE CYSTITIS WITH HEMATURIA: ICD-10-CM

## 2023-03-06 DIAGNOSIS — R39.89 URINE DISCOLORATION: Primary | ICD-10-CM

## 2023-03-06 LAB
BILIRUB UR QL STRIP: NEGATIVE
CLARITY UR: CLEAR
COLOR UR: YELLOW
GLUCOSE UR QL STRIP: NEGATIVE
HGB UR QL STRIP: ABNORMAL
KETONES UR QL STRIP: NEGATIVE
LEUKOCYTE ESTERASE UR QL STRIP: NEGATIVE
NITRITE UR QL STRIP: NEGATIVE
PH UR STRIP: 6 [PH] (ref 5–8)
PROT UR QL STRIP: NEGATIVE
SP GR UR STRIP: 1.02 (ref 1–1.03)
URN SPEC COLLECT METH UR: ABNORMAL
UROBILINOGEN UR STRIP-ACNC: NEGATIVE EU/DL

## 2023-03-06 PROCEDURE — 99214 OFFICE O/P EST MOD 30 MIN: CPT | Mod: S$PBB,,, | Performed by: FAMILY MEDICINE

## 2023-03-06 PROCEDURE — 99213 OFFICE O/P EST LOW 20 MIN: CPT | Mod: PBBFAC | Performed by: FAMILY MEDICINE

## 2023-03-06 PROCEDURE — 99999 PR PBB SHADOW E&M-EST. PATIENT-LVL III: CPT | Mod: PBBFAC,,, | Performed by: FAMILY MEDICINE

## 2023-03-06 PROCEDURE — 99214 PR OFFICE/OUTPT VISIT, EST, LEVL IV, 30-39 MIN: ICD-10-PCS | Mod: S$PBB,,, | Performed by: FAMILY MEDICINE

## 2023-03-06 PROCEDURE — 81003 URINALYSIS AUTO W/O SCOPE: CPT | Performed by: FAMILY MEDICINE

## 2023-03-06 PROCEDURE — 99999 PR PBB SHADOW E&M-EST. PATIENT-LVL III: ICD-10-PCS | Mod: PBBFAC,,, | Performed by: FAMILY MEDICINE

## 2023-03-06 NOTE — PROGRESS NOTES
[facility] - Clinic Note    Subjective      Ms. Galrand is a 62 y.o. female who presents to clinic with concerns of a UTI.     Patient reports that she was having symptoms of a UTI last week. Urinary pressure and frequency.   UA was obtained and revealed a UTI. Started on macrobid. Urine culture revealed no growth.   She reports that she took her labs pill of macrobid this morning.   Still having discolored urine but all other symptoms resolved.     PMH Liv has a past medical history of Arthritis, Bilateral breast cancer, Breast cancer (2004), Diabetes mellitus, High cholesterol, Hypertension, and PONV (postoperative nausea and vomiting).   PSXH Liv has a past surgical history that includes Tonsillectomy (1974); Hysterectomy (10/1/2003); Mastectomy (Bilateral, 5/16/2004); Cholecystectomy (2/2001); Oophorectomy (Left, 5/18/2004); Ablation of dysrhythmic focus (2000); Total abdominal hysterectomy w/ bilateral salpingoophorectomy; Colonoscopy; Mitral valve replacement (1980); Injection of joint (Right, 11/25/2020); Injection of steroid (Right, 2/10/2021); Hip Arthroplasty (Right, 4/5/2021); Osteotomy of metatarsal bone (Left, 2/10/2022); Esophagogastroduodenoscopy (N/A, 5/23/2022); Esophageal dilation (N/A, 5/23/2022); and Hip Arthroplasty (Left, 9/26/2022).   RANDY Peck's family history includes Cancer in her mother; Early death in her brother; Heart failure in her father; Liver cancer in her mother; No Known Problems in her sister.   BLANCHE Peck reports that she quit smoking about 24 years ago. Her smoking use included cigarettes. She has never used smokeless tobacco. She reports current alcohol use. She reports that she does not use drugs.   EDWIGE Peck is allergic to lisinopril.   MOO Peck has a current medication list which includes the following prescription(s): fluticasone propionate and losartan, and the following Facility-Administered Medications: lactated ringers and ondansetron.     Review of Systems  "  Constitutional:  Negative for activity change, appetite change, chills, fatigue and fever.   Eyes:  Negative for visual disturbance.   Respiratory:  Negative for cough and shortness of breath.    Cardiovascular:  Negative for chest pain, palpitations and leg swelling.   Gastrointestinal:  Negative for abdominal pain, nausea and vomiting.   Genitourinary:  Negative for decreased urine volume, difficulty urinating, dysuria, flank pain, frequency, urgency, vaginal bleeding, vaginal discharge and vaginal pain.   Skin:  Negative for wound.   Neurological:  Negative for dizziness and headaches.   Psychiatric/Behavioral:  Negative for confusion.    Objective     /78 (BP Location: Right arm, Patient Position: Sitting, BP Method: Medium (Manual))   Pulse 77   Resp 15   Ht 5' 4" (1.626 m)   Wt 70.4 kg (155 lb 2 oz)   LMP  (LMP Unknown)   SpO2 98%   BMI 26.63 kg/m²     Physical Exam   Constitutional: normal appearance. She appears well-developed and well-nourished.  Non-toxic appearance. No distress. She does not appear ill.   HENT:   Head: Normocephalic and atraumatic.   Eyes: Right eye exhibits no discharge. Left eye exhibits no discharge.   Cardiovascular: Normal rate, regular rhythm, normal heart sounds and normal pulses. Exam reveals no gallop and no friction rub.   No murmur heard.Pulmonary:      Effort: Pulmonary effort is normal. No respiratory distress.      Breath sounds: Normal breath sounds. No wheezing, rhonchi or rales.     Abdominal: Normal appearance.   Musculoskeletal:      Cervical back: Neck supple.   Lymphadenopathy:     She has no cervical adenopathy.   Neurological: She is alert.   Skin: Skin is warm and dry. Capillary refill takes less than 2 seconds. She is not diaphoretic.   Psychiatric: Her behavior is normal. Mood, judgment and thought content normal.   Vitals reviewed.   Assessment/Plan     Liv was seen today for urinary tract infection.    Diagnoses and all orders for this " visit:    Urine discoloration  -     Urinalysis, Reflex to Urine Culture; Future  -     Urinalysis, Reflex to Urine Culture    Acute cystitis with hematuria  -     Urinalysis, Reflex to Urine Culture; Future  -     Urinalysis, Reflex to Urine Culture      -recheck UA     Follow up in about 3 months (around 6/6/2023), or if symptoms worsen or fail to improve.    Future Appointments   Date Time Provider Department Center   3/9/2023 11:00 AM Vargas Paulino MD [facility]       Vargas Paulino MD  Family Medicine  [facility]

## 2023-03-27 ENCOUNTER — HOSPITAL ENCOUNTER (OUTPATIENT)
Dept: RADIOLOGY | Facility: HOSPITAL | Age: 63
Discharge: HOME | End: 2023-03-27
Attending: INTERNAL MEDICINE

## 2023-03-27 DIAGNOSIS — M25.559 HIP PAIN: ICD-10-CM

## 2023-03-27 DIAGNOSIS — M79.89 LEG SWELLING: ICD-10-CM

## 2023-03-27 PROCEDURE — 73521 X-RAY EXAM HIPS BI 2 VIEWS: CPT | Mod: 26,,, | Performed by: RADIOLOGY

## 2023-03-27 PROCEDURE — 73521 X-RAY EXAM HIPS BI 2 VIEWS: CPT | Mod: TC

## 2023-03-27 PROCEDURE — 73521 XR HIPS BILATERAL 2 VIEW INCL AP PELVIS: ICD-10-PCS | Mod: 26,,, | Performed by: RADIOLOGY

## 2023-06-27 ENCOUNTER — OFFICE VISIT (OUTPATIENT)
Dept: FAMILY MEDICINE | Facility: CLINIC | Age: 63
End: 2023-06-27
Payer: MEDICARE

## 2023-06-27 VITALS
SYSTOLIC BLOOD PRESSURE: 132 MMHG | BODY MASS INDEX: 26.67 KG/M2 | DIASTOLIC BLOOD PRESSURE: 82 MMHG | HEIGHT: 64 IN | RESPIRATION RATE: 15 BRPM | HEART RATE: 64 BPM | OXYGEN SATURATION: 98 % | WEIGHT: 156.25 LBS

## 2023-06-27 DIAGNOSIS — Z00.00 ANNUAL PHYSICAL EXAM: Primary | ICD-10-CM

## 2023-06-27 DIAGNOSIS — R73.03 PREDIABETES: ICD-10-CM

## 2023-06-27 DIAGNOSIS — Z78.0 ASYMPTOMATIC MENOPAUSAL STATE: ICD-10-CM

## 2023-06-27 DIAGNOSIS — I10 ESSENTIAL HYPERTENSION: ICD-10-CM

## 2023-06-27 DIAGNOSIS — Z85.3 HISTORY OF BREAST CANCER: ICD-10-CM

## 2023-06-27 DIAGNOSIS — E78.00 HYPERCHOLESTEROLEMIA: ICD-10-CM

## 2023-06-27 PROCEDURE — 99999 PR PBB SHADOW E&M-EST. PATIENT-LVL III: ICD-10-PCS | Mod: PBBFAC,,, | Performed by: FAMILY MEDICINE

## 2023-06-27 PROCEDURE — 3008F PR BODY MASS INDEX (BMI) DOCUMENTED: ICD-10-PCS | Mod: CPTII,S$GLB,, | Performed by: FAMILY MEDICINE

## 2023-06-27 PROCEDURE — 99999 PR PBB SHADOW E&M-EST. PATIENT-LVL III: CPT | Mod: PBBFAC,,, | Performed by: FAMILY MEDICINE

## 2023-06-27 PROCEDURE — 3075F PR MOST RECENT SYSTOLIC BLOOD PRESS GE 130-139MM HG: ICD-10-PCS | Mod: CPTII,S$GLB,, | Performed by: FAMILY MEDICINE

## 2023-06-27 PROCEDURE — 4010F PR ACE/ARB THEARPY RXD/TAKEN: ICD-10-PCS | Mod: CPTII,S$GLB,, | Performed by: FAMILY MEDICINE

## 2023-06-27 PROCEDURE — 1101F PT FALLS ASSESS-DOCD LE1/YR: CPT | Mod: CPTII,S$GLB,, | Performed by: FAMILY MEDICINE

## 2023-06-27 PROCEDURE — 1157F PR ADVANCE CARE PLAN OR EQUIV PRESENT IN MEDICAL RECORD: ICD-10-PCS | Mod: CPTII,S$GLB,, | Performed by: FAMILY MEDICINE

## 2023-06-27 PROCEDURE — 3079F DIAST BP 80-89 MM HG: CPT | Mod: CPTII,S$GLB,, | Performed by: FAMILY MEDICINE

## 2023-06-27 PROCEDURE — 99397 PR PREVENTIVE VISIT,EST,65 & OVER: ICD-10-PCS | Mod: S$GLB,,, | Performed by: FAMILY MEDICINE

## 2023-06-27 PROCEDURE — 1101F PR PT FALLS ASSESS DOC 0-1 FALLS W/OUT INJ PAST YR: ICD-10-PCS | Mod: CPTII,S$GLB,, | Performed by: FAMILY MEDICINE

## 2023-06-27 PROCEDURE — 1159F PR MEDICATION LIST DOCUMENTED IN MEDICAL RECORD: ICD-10-PCS | Mod: CPTII,S$GLB,, | Performed by: FAMILY MEDICINE

## 2023-06-27 PROCEDURE — 3079F PR MOST RECENT DIASTOLIC BLOOD PRESSURE 80-89 MM HG: ICD-10-PCS | Mod: CPTII,S$GLB,, | Performed by: FAMILY MEDICINE

## 2023-06-27 PROCEDURE — 99397 PER PM REEVAL EST PAT 65+ YR: CPT | Mod: S$GLB,,, | Performed by: FAMILY MEDICINE

## 2023-06-27 PROCEDURE — 3008F BODY MASS INDEX DOCD: CPT | Mod: CPTII,S$GLB,, | Performed by: FAMILY MEDICINE

## 2023-06-27 PROCEDURE — 1157F ADVNC CARE PLAN IN RCRD: CPT | Mod: CPTII,S$GLB,, | Performed by: FAMILY MEDICINE

## 2023-06-27 PROCEDURE — 3075F SYST BP GE 130 - 139MM HG: CPT | Mod: CPTII,S$GLB,, | Performed by: FAMILY MEDICINE

## 2023-06-27 PROCEDURE — 1159F MED LIST DOCD IN RCRD: CPT | Mod: CPTII,S$GLB,, | Performed by: FAMILY MEDICINE

## 2023-06-27 PROCEDURE — 4010F ACE/ARB THERAPY RXD/TAKEN: CPT | Mod: CPTII,S$GLB,, | Performed by: FAMILY MEDICINE

## 2023-06-27 PROCEDURE — 3288F FALL RISK ASSESSMENT DOCD: CPT | Mod: CPTII,S$GLB,, | Performed by: FAMILY MEDICINE

## 2023-06-27 PROCEDURE — 3288F PR FALLS RISK ASSESSMENT DOCUMENTED: ICD-10-PCS | Mod: CPTII,S$GLB,, | Performed by: FAMILY MEDICINE

## 2023-06-27 NOTE — PROGRESS NOTES
[facility] - Clinic Note    Subjective      Ms. Garland is a 63 y.o. female who presents to clinic for an annual.       Overall doing well.   Due for dexa and annual labs    HTN: on losartan  Well controlled.        PMH Liv has a past medical history of Arthritis, Bilateral breast cancer, Breast cancer (2004), Diabetes mellitus, High cholesterol, Hypertension, and PONV (postoperative nausea and vomiting).   PSXH Liv has a past surgical history that includes Tonsillectomy (1974); Hysterectomy (10/1/2003); Mastectomy (Bilateral, 5/16/2004); Cholecystectomy (2/2001); Oophorectomy (Left, 5/18/2004); Ablation of dysrhythmic focus (2000); Total abdominal hysterectomy w/ bilateral salpingoophorectomy; Colonoscopy; Mitral valve replacement (1980); Injection of joint (Right, 11/25/2020); Injection of steroid (Right, 2/10/2021); Hip Arthroplasty (Right, 4/5/2021); Osteotomy of metatarsal bone (Left, 2/10/2022); Esophagogastroduodenoscopy (N/A, 5/23/2022); Esophageal dilation (N/A, 5/23/2022); and Hip Arthroplasty (Left, 9/26/2022).   RANDY Peck's family history includes Cancer in her mother; Early death in her brother; Heart failure in her father; Liver cancer in her mother; No Known Problems in her sister.   BLANCHE Peck reports that she quit smoking about 24 years ago. Her smoking use included cigarettes. She has never used smokeless tobacco. She reports current alcohol use. She reports that she does not use drugs.   EDWIGE Peck is allergic to lisinopril.   MED Liv has a current medication list which includes the following prescription(s): fluticasone propionate and losartan, and the following Facility-Administered Medications: lactated ringers and ondansetron.     Review of Systems   Constitutional:  Negative for activity change, appetite change, chills, fatigue and fever.   Eyes:  Negative for visual disturbance.   Respiratory:  Negative for cough and shortness of breath.    Cardiovascular:  Negative for chest  "pain, palpitations and leg swelling.   Gastrointestinal:  Negative for abdominal pain, nausea and vomiting.   Skin:  Negative for wound.   Neurological:  Negative for dizziness and headaches.   Psychiatric/Behavioral:  Negative for confusion.    Objective     /82 (BP Location: Right arm, Patient Position: Sitting, BP Method: Medium (Manual))   Pulse 64   Resp 15   Ht 5' 4" (1.626 m)   Wt 70.9 kg (156 lb 4 oz)   LMP  (LMP Unknown)   SpO2 98%   BMI 26.82 kg/m²     Physical Exam   Constitutional: normal appearance. She appears well-developed and well-nourished.  Non-toxic appearance. No distress. She does not appear ill.   HENT:   Head: Normocephalic and atraumatic.   Eyes: Right eye exhibits no discharge. Left eye exhibits no discharge.   Cardiovascular: Normal rate, regular rhythm, normal heart sounds and normal pulses. Exam reveals no gallop and no friction rub.   No murmur heard.Pulmonary:      Effort: Pulmonary effort is normal. No respiratory distress.      Breath sounds: Normal breath sounds. No wheezing, rhonchi or rales.     Abdominal: Normal appearance.   Musculoskeletal:      Cervical back: Neck supple.   Lymphadenopathy:     She has no cervical adenopathy.   Neurological: She is alert.   Skin: Skin is warm and dry. Capillary refill takes less than 2 seconds. She is not diaphoretic.   Psychiatric: Her behavior is normal. Mood, judgment and thought content normal.   Vitals reviewed.   Assessment/Plan     Liv was seen today for for an annual.     Diagnoses and all orders for this visit:    Annual physical exam    Essential hypertension  -     Lipid panel; Future  -     CBC auto differential; Future  -     Comprehensive metabolic panel; Future  -     TSH; Future    Prediabetes  -     Hemoglobin A1c; Future    Asymptomatic menopausal state  -     DXA Bone Density Axial Skeleton 1 or more sites; Future    Hypercholesterolemia  -     Lipid panel; Future    History of breast cancer  -diagnosed in " 2004. Had a bilateral mastectomy.     --chronic conditions stable. Continue current regimen.  -Obtain labs and adjust regimen as indicated      Follow up in about 1 year (around 6/27/2024), or if symptoms worsen or fail to improve.    Future Appointments   Date Time Provider Department Center   7/7/2023  8:00 AM [facility] DEXA1 [facility] DEXA [facility]   7/7/2023  8:30 AM [facility] XR2 [facility] XRAY [facility]   7/7/2023  8:45 AM [facility] XR2 [facility] XRAY [facility]   8/21/2023 10:00 AM Vargas Paulino MD [facility]       Vargas Paulino MD  Family Medicine  [facility]

## 2023-06-28 ENCOUNTER — LAB VISIT (OUTPATIENT)
Dept: LAB | Facility: HOSPITAL | Age: 63
End: 2023-06-28
Attending: FAMILY MEDICINE
Payer: MEDICARE

## 2023-06-28 DIAGNOSIS — E78.00 HYPERCHOLESTEROLEMIA: ICD-10-CM

## 2023-06-28 DIAGNOSIS — R73.03 PREDIABETES: ICD-10-CM

## 2023-06-28 DIAGNOSIS — I10 ESSENTIAL HYPERTENSION: ICD-10-CM

## 2023-06-28 LAB
ALBUMIN SERPL BCP-MCNC: 4.4 G/DL (ref 3.5–5.2)
ALP SERPL-CCNC: 55 U/L (ref 55–135)
ALT SERPL W/O P-5'-P-CCNC: 30 U/L (ref 10–44)
ANION GAP SERPL CALC-SCNC: 13 MMOL/L (ref 8–16)
AST SERPL-CCNC: 22 U/L (ref 10–40)
BASOPHILS # BLD AUTO: 0.04 K/UL (ref 0–0.2)
BASOPHILS NFR BLD: 0.6 % (ref 0–1.9)
BILIRUB SERPL-MCNC: 0.5 MG/DL (ref 0.1–1)
BUN SERPL-MCNC: 13 MG/DL (ref 8–23)
CALCIUM SERPL-MCNC: 9.4 MG/DL (ref 8.7–10.5)
CHLORIDE SERPL-SCNC: 108 MMOL/L (ref 95–110)
CHOLEST SERPL-MCNC: 242 MG/DL (ref 120–199)
CHOLEST/HDLC SERPL: 4.7 {RATIO} (ref 2–5)
CO2 SERPL-SCNC: 23 MMOL/L (ref 23–29)
CREAT SERPL-MCNC: 0.7 MG/DL (ref 0.5–1.4)
DIFFERENTIAL METHOD: ABNORMAL
EOSINOPHIL # BLD AUTO: 0.3 K/UL (ref 0–0.5)
EOSINOPHIL NFR BLD: 4.1 % (ref 0–8)
ERYTHROCYTE [DISTWIDTH] IN BLOOD BY AUTOMATED COUNT: 13.2 % (ref 11.5–14.5)
EST. GFR  (NO RACE VARIABLE): >60 ML/MIN/1.73 M^2
ESTIMATED AVG GLUCOSE: 117 MG/DL (ref 68–131)
GLUCOSE SERPL-MCNC: 98 MG/DL (ref 70–110)
HBA1C MFR BLD: 5.7 % (ref 4–5.6)
HCT VFR BLD AUTO: 41.2 % (ref 37–48.5)
HDLC SERPL-MCNC: 52 MG/DL (ref 40–75)
HDLC SERPL: 21.5 % (ref 20–50)
HGB BLD-MCNC: 14.3 G/DL (ref 12–16)
IMM GRANULOCYTES # BLD AUTO: 0.03 K/UL (ref 0–0.04)
IMM GRANULOCYTES NFR BLD AUTO: 0.5 % (ref 0–0.5)
LDLC SERPL CALC-MCNC: 153.8 MG/DL (ref 63–159)
LYMPHOCYTES # BLD AUTO: 3.1 K/UL (ref 1–4.8)
LYMPHOCYTES NFR BLD: 47.1 % (ref 18–48)
MCH RBC QN AUTO: 31.4 PG (ref 27–31)
MCHC RBC AUTO-ENTMCNC: 34.7 G/DL (ref 32–36)
MCV RBC AUTO: 91 FL (ref 82–98)
MONOCYTES # BLD AUTO: 0.5 K/UL (ref 0.3–1)
MONOCYTES NFR BLD: 7.9 % (ref 4–15)
NEUTROPHILS # BLD AUTO: 2.6 K/UL (ref 1.8–7.7)
NEUTROPHILS NFR BLD: 39.8 % (ref 38–73)
NONHDLC SERPL-MCNC: 190 MG/DL
NRBC BLD-RTO: 0 /100 WBC
PLATELET # BLD AUTO: 321 K/UL (ref 150–450)
PMV BLD AUTO: 9.4 FL (ref 9.2–12.9)
POTASSIUM SERPL-SCNC: 4 MMOL/L (ref 3.5–5.1)
PROT SERPL-MCNC: 7.4 G/DL (ref 6–8.4)
RBC # BLD AUTO: 4.55 M/UL (ref 4–5.4)
SODIUM SERPL-SCNC: 144 MMOL/L (ref 136–145)
TRIGL SERPL-MCNC: 181 MG/DL (ref 30–150)
TSH SERPL DL<=0.005 MIU/L-ACNC: 1.67 UIU/ML (ref 0.4–4)
WBC # BLD AUTO: 6.6 K/UL (ref 3.9–12.7)

## 2023-06-28 PROCEDURE — 85025 COMPLETE CBC W/AUTO DIFF WBC: CPT | Performed by: FAMILY MEDICINE

## 2023-06-28 PROCEDURE — 36415 COLL VENOUS BLD VENIPUNCTURE: CPT | Performed by: FAMILY MEDICINE

## 2023-06-28 PROCEDURE — 83036 HEMOGLOBIN GLYCOSYLATED A1C: CPT | Performed by: FAMILY MEDICINE

## 2023-06-28 PROCEDURE — 80061 LIPID PANEL: CPT | Performed by: FAMILY MEDICINE

## 2023-06-28 PROCEDURE — 84443 ASSAY THYROID STIM HORMONE: CPT | Performed by: FAMILY MEDICINE

## 2023-06-28 PROCEDURE — 80053 COMPREHEN METABOLIC PANEL: CPT | Performed by: FAMILY MEDICINE

## 2023-07-07 ENCOUNTER — HOSPITAL ENCOUNTER (OUTPATIENT)
Dept: RADIOLOGY | Facility: HOSPITAL | Age: 63
Discharge: HOME | End: 2023-07-07
Attending: INTERNAL MEDICINE
Payer: MEDICARE

## 2023-07-07 ENCOUNTER — HOSPITAL ENCOUNTER (OUTPATIENT)
Dept: RADIOLOGY | Facility: HOSPITAL | Age: 63
Discharge: HOME | End: 2023-07-07
Attending: FAMILY MEDICINE
Payer: MEDICARE

## 2023-07-07 DIAGNOSIS — M25.559 PAIN IN HIP: ICD-10-CM

## 2023-07-07 DIAGNOSIS — M79.89 HAND SWELLING: ICD-10-CM

## 2023-07-07 DIAGNOSIS — Z78.0 ASYMPTOMATIC MENOPAUSAL STATE: ICD-10-CM

## 2023-07-07 PROCEDURE — 72170 XR PELVIS ROUTINE AP: ICD-10-PCS | Mod: 26,,, | Performed by: RADIOLOGY

## 2023-07-07 PROCEDURE — 72170 X-RAY EXAM OF PELVIS: CPT | Mod: 26,,, | Performed by: RADIOLOGY

## 2023-07-07 PROCEDURE — 73100 X-RAY EXAM OF WRIST: CPT | Mod: 26,LT,, | Performed by: RADIOLOGY

## 2023-07-07 PROCEDURE — 73100 XR WRIST 2 VIEW LEFT: ICD-10-PCS | Mod: 26,LT,, | Performed by: RADIOLOGY

## 2023-07-07 PROCEDURE — 73100 X-RAY EXAM OF WRIST: CPT | Mod: TC,LT

## 2023-07-07 PROCEDURE — 72170 X-RAY EXAM OF PELVIS: CPT | Mod: TC

## 2023-07-07 PROCEDURE — 77080 DXA BONE DENSITY AXIAL SKELETON 1 OR MORE SITES: ICD-10-PCS | Mod: 26,,, | Performed by: RADIOLOGY

## 2023-07-07 PROCEDURE — 77080 DXA BONE DENSITY AXIAL: CPT | Mod: TC

## 2023-07-07 PROCEDURE — 77080 DXA BONE DENSITY AXIAL: CPT | Mod: 26,,, | Performed by: RADIOLOGY

## 2023-08-18 DIAGNOSIS — Z96.642 S/P TOTAL LEFT HIP ARTHROPLASTY: Primary | ICD-10-CM

## 2023-08-23 ENCOUNTER — TELEPHONE (OUTPATIENT)
Dept: FAMILY MEDICINE | Facility: CLINIC | Age: 63
End: 2023-08-23
Payer: MEDICARE

## 2023-08-23 DIAGNOSIS — K21.9 GASTROESOPHAGEAL REFLUX DISEASE, UNSPECIFIED WHETHER ESOPHAGITIS PRESENT: Primary | ICD-10-CM

## 2023-08-23 RX ORDER — PANTOPRAZOLE SODIUM 40 MG/1
40 TABLET, DELAYED RELEASE ORAL DAILY
Qty: 90 TABLET | Refills: 3 | Status: SHIPPED | OUTPATIENT
Start: 2023-08-23 | End: 2023-09-21

## 2023-08-23 NOTE — TELEPHONE ENCOUNTER
----- Message from Francisco Javier Freed MA sent at 8/23/2023  1:26 PM CDT -----  Regarding: medication refill  As Dr. Bower is no longer practicing here at [facility]. Can Dr. Paulion take over refilling this medication for the pt?  Please advise   Thank You,   Francisco Javier Caruso  General Surgery    ----- Message -----  From: Mindi Akins  Sent: 8/23/2023  10:54 AM CDT  To: [facility]anco Staff    Type:  RX Refill Request    Who Called: Pt   Refill or New Rx:Rx  RX Name and Strength:pantoprazole (PROTONIX) 40 MG tablet   How is the patient currently taking it? (ex. 1XDay):1x   Is this a 30 day or 90 day RX:30  Preferred Pharmacy with phone number:[facility], [place] - [address]   Phone: [phone]  Fax:  [phone]  Would the patient rather a call back or a response via [facility]? Call back  Best Call Back Number:[phone]  Additional Information:

## 2023-08-23 NOTE — TELEPHONE ENCOUNTER
Dr. Salas, are you willing to take over RX pantoprazole (PROTONIX)40 mg  as Dr. Bower is no longer with [facility].

## 2023-08-24 RX ORDER — LOSARTAN POTASSIUM 25 MG/1
TABLET ORAL
Qty: 45 TABLET | Refills: 3 | Status: SHIPPED | OUTPATIENT
Start: 2023-08-24 | End: 2023-10-14 | Stop reason: SDUPTHER

## 2023-08-29 ENCOUNTER — HOSPITAL ENCOUNTER (OUTPATIENT)
Dept: RADIOLOGY | Facility: HOSPITAL | Age: 63
Discharge: HOME | End: 2023-08-29
Attending: ORTHOPAEDIC SURGERY
Payer: MEDICARE

## 2023-08-29 ENCOUNTER — OFFICE VISIT (OUTPATIENT)
Dept: ORTHOPEDICS | Facility: CLINIC | Age: 63
End: 2023-08-29
Payer: MEDICARE

## 2023-08-29 VITALS — HEIGHT: 64 IN | WEIGHT: 156 LBS | BODY MASS INDEX: 26.63 KG/M2

## 2023-08-29 DIAGNOSIS — Z96.642 S/P TOTAL LEFT HIP ARTHROPLASTY: ICD-10-CM

## 2023-08-29 DIAGNOSIS — M24.852 SNAPPING HIP, LEFT: Primary | ICD-10-CM

## 2023-08-29 PROCEDURE — 99999 PR PBB SHADOW E&M-EST. PATIENT-LVL II: ICD-10-PCS | Mod: PBBFAC,,, | Performed by: ORTHOPAEDIC SURGERY

## 2023-08-29 PROCEDURE — 99999 PR PBB SHADOW E&M-EST. PATIENT-LVL II: CPT | Mod: PBBFAC,,, | Performed by: ORTHOPAEDIC SURGERY

## 2023-08-29 PROCEDURE — 3288F PR FALLS RISK ASSESSMENT DOCUMENTED: ICD-10-PCS | Mod: CPTII,S$GLB,, | Performed by: ORTHOPAEDIC SURGERY

## 2023-08-29 PROCEDURE — 1157F ADVNC CARE PLAN IN RCRD: CPT | Mod: CPTII,S$GLB,, | Performed by: ORTHOPAEDIC SURGERY

## 2023-08-29 PROCEDURE — 3044F HG A1C LEVEL LT 7.0%: CPT | Mod: CPTII,S$GLB,, | Performed by: ORTHOPAEDIC SURGERY

## 2023-08-29 PROCEDURE — 1157F PR ADVANCE CARE PLAN OR EQUIV PRESENT IN MEDICAL RECORD: ICD-10-PCS | Mod: CPTII,S$GLB,, | Performed by: ORTHOPAEDIC SURGERY

## 2023-08-29 PROCEDURE — 4010F ACE/ARB THERAPY RXD/TAKEN: CPT | Mod: CPTII,S$GLB,, | Performed by: ORTHOPAEDIC SURGERY

## 2023-08-29 PROCEDURE — 73502 X-RAY EXAM HIP UNI 2-3 VIEWS: CPT | Mod: 26,LT,, | Performed by: RADIOLOGY

## 2023-08-29 PROCEDURE — 1159F MED LIST DOCD IN RCRD: CPT | Mod: CPTII,S$GLB,, | Performed by: ORTHOPAEDIC SURGERY

## 2023-08-29 PROCEDURE — 3008F PR BODY MASS INDEX (BMI) DOCUMENTED: ICD-10-PCS | Mod: CPTII,S$GLB,, | Performed by: ORTHOPAEDIC SURGERY

## 2023-08-29 PROCEDURE — 99213 PR OFFICE/OUTPT VISIT, EST, LEVL III, 20-29 MIN: ICD-10-PCS | Mod: S$GLB,,, | Performed by: ORTHOPAEDIC SURGERY

## 2023-08-29 PROCEDURE — 1159F PR MEDICATION LIST DOCUMENTED IN MEDICAL RECORD: ICD-10-PCS | Mod: CPTII,S$GLB,, | Performed by: ORTHOPAEDIC SURGERY

## 2023-08-29 PROCEDURE — 73502 XR PELVIS 3 VIEW INC HIP 2 VIEW LEFT: ICD-10-PCS | Mod: 26,LT,, | Performed by: RADIOLOGY

## 2023-08-29 PROCEDURE — 99213 OFFICE O/P EST LOW 20 MIN: CPT | Mod: S$GLB,,, | Performed by: ORTHOPAEDIC SURGERY

## 2023-08-29 PROCEDURE — 4010F PR ACE/ARB THEARPY RXD/TAKEN: ICD-10-PCS | Mod: CPTII,S$GLB,, | Performed by: ORTHOPAEDIC SURGERY

## 2023-08-29 PROCEDURE — 1101F PT FALLS ASSESS-DOCD LE1/YR: CPT | Mod: CPTII,S$GLB,, | Performed by: ORTHOPAEDIC SURGERY

## 2023-08-29 PROCEDURE — 3288F FALL RISK ASSESSMENT DOCD: CPT | Mod: CPTII,S$GLB,, | Performed by: ORTHOPAEDIC SURGERY

## 2023-08-29 PROCEDURE — 3008F BODY MASS INDEX DOCD: CPT | Mod: CPTII,S$GLB,, | Performed by: ORTHOPAEDIC SURGERY

## 2023-08-29 PROCEDURE — 3044F PR MOST RECENT HEMOGLOBIN A1C LEVEL <7.0%: ICD-10-PCS | Mod: CPTII,S$GLB,, | Performed by: ORTHOPAEDIC SURGERY

## 2023-08-29 PROCEDURE — 73502 X-RAY EXAM HIP UNI 2-3 VIEWS: CPT | Mod: TC,PN,LT

## 2023-08-29 PROCEDURE — 1101F PR PT FALLS ASSESS DOC 0-1 FALLS W/OUT INJ PAST YR: ICD-10-PCS | Mod: CPTII,S$GLB,, | Performed by: ORTHOPAEDIC SURGERY

## 2023-08-29 NOTE — PROGRESS NOTES
Subjective:      Patient ID: Liv Garland is a 63 y.o. female.    Chief Complaint: Injury of the Left Hip      HPI: Ms. Garland returns today for re-evaluation of her left hip.  She stated about 1 month ago a [place] puppy knocked her down falling onto her left hip.  She has noticed she is gotten some popping in her hip but denied pain.  She had a left total hip arthroplasty completed on 09/26/2022 and was doing well until this most recent incident.  She denied pain in her hip  She just became concerned because of the popping she was worried she might be dislocating her hip.    ROS:   No new diagnosis/surgery/prescriptions since last office visit on 11/09/2022.  Constitution: Negative for chills and fever.   HENT: Negative for congestion.    Eyes: Negative for blurred vision.   Cardiovascular: Negative for chest pain.   Respiratory: Negative for cough.    Endocrine: Negative for polydipsia.   Hematologic/Lymphatic: Negative for adenopathy.   Skin: Negative for flushing and itching.   Musculoskeletal: Negative for gout and muscle weakness.   Gastrointestinal: Negative for constipation, diarrhea and heartburn.   Genitourinary: Negative for nocturia.   Neurological: Negative for headaches and seizures.   Psychiatric/Behavioral: Negative for depression. The patient is not nervous/anxious.    Allergic/Immunologic: Negative for environmental allergies.       Objective:      Physical Exam:   General: AAOx3.  No acute distress  Vascular:  Pulses intact and equal bilaterally.  Capillary refill less than 3 seconds and equal bilaterally  Neurologic:  Pinprick and soft touch intact and equal bilaterally  Integment:  No ecchymosis, no errythema  Extremity:  Hip:  Flexion/extension equal bilaterally greater than 95°/15°.  Internal/external rotation equal bilaterally.  Nontender with hip motion.  Zak/fabere/logroll/push-pull negative both hips.  Nontender over the greater trochanter bilaterally.  Small defect palpable  in the posterior IT band of the left hip.  Yael's negative bilaterally.  Nontender with groin palpation bilaterally.  Radiography:  Personally reviewed x-rays which include AP pelvis and left hip completed on 08/29/2023 which showed well-seated well-aligned total hip arthroplasties without signs of loosening no fracture or dislocation.      Assessment:       Impression:     1. Snapping hip, left    2. S/P total left hip arthroplasty          Plan:       1.  Discussed physical examination and radiographic findings with the patient. 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  She understands that her total hips look good and her functioning well.  Treatment alternatives and outcomes were discussed with the patient she understands she could continue to be treated conservatively with observation, activity modification, NSAIDs, bracing, physical therapy, injections, or she could consider surgical intervention such as IT band repair.  Since she is asymptomatic except for an occasional pop/snap of her hip recommend she continue conservative management if she becomes symptomatic she can have a repair of the IT band.  2. Any pain can be treated with over-the-counter medications dosed per box instructions.    3. Can return to full activities without where he.    4. Follow up p.r.n..

## 2023-09-18 ENCOUNTER — PATIENT MESSAGE (OUTPATIENT)
Dept: ADMINISTRATIVE | Facility: OTHER | Age: 63
End: 2023-09-18
Payer: MEDICARE

## 2023-09-21 ENCOUNTER — OFFICE VISIT (OUTPATIENT)
Dept: FAMILY MEDICINE | Facility: CLINIC | Age: 63
End: 2023-09-21
Payer: MEDICARE

## 2023-09-21 ENCOUNTER — LAB VISIT (OUTPATIENT)
Dept: LAB | Facility: HOSPITAL | Age: 63
End: 2023-09-21
Attending: FAMILY MEDICINE
Payer: MEDICARE

## 2023-09-21 VITALS
DIASTOLIC BLOOD PRESSURE: 80 MMHG | SYSTOLIC BLOOD PRESSURE: 134 MMHG | BODY MASS INDEX: 26.49 KG/M2 | HEIGHT: 64 IN | OXYGEN SATURATION: 98 % | RESPIRATION RATE: 14 BRPM | WEIGHT: 155.19 LBS | HEART RATE: 58 BPM

## 2023-09-21 DIAGNOSIS — R07.89 CHEST PRESSURE: ICD-10-CM

## 2023-09-21 DIAGNOSIS — R07.89 CHEST PRESSURE: Primary | ICD-10-CM

## 2023-09-21 LAB
ALBUMIN SERPL BCP-MCNC: 4.4 G/DL (ref 3.5–5.2)
ALP SERPL-CCNC: 54 U/L (ref 55–135)
ALT SERPL W/O P-5'-P-CCNC: 27 U/L (ref 10–44)
ANION GAP SERPL CALC-SCNC: 11 MMOL/L (ref 8–16)
AST SERPL-CCNC: 20 U/L (ref 10–40)
BASOPHILS # BLD AUTO: 0.04 K/UL (ref 0–0.2)
BASOPHILS NFR BLD: 0.7 % (ref 0–1.9)
BILIRUB SERPL-MCNC: 0.5 MG/DL (ref 0.1–1)
BNP SERPL-MCNC: 94 PG/ML (ref 0–99)
BUN SERPL-MCNC: 8 MG/DL (ref 8–23)
CALCIUM SERPL-MCNC: 9.5 MG/DL (ref 8.7–10.5)
CHLORIDE SERPL-SCNC: 105 MMOL/L (ref 95–110)
CO2 SERPL-SCNC: 24 MMOL/L (ref 23–29)
CREAT SERPL-MCNC: 0.8 MG/DL (ref 0.5–1.4)
DIFFERENTIAL METHOD: ABNORMAL
EOSINOPHIL # BLD AUTO: 0.2 K/UL (ref 0–0.5)
EOSINOPHIL NFR BLD: 4 % (ref 0–8)
ERYTHROCYTE [DISTWIDTH] IN BLOOD BY AUTOMATED COUNT: 14.1 % (ref 11.5–14.5)
EST. GFR  (NO RACE VARIABLE): >60 ML/MIN/1.73 M^2
GLUCOSE SERPL-MCNC: 135 MG/DL (ref 70–110)
HCT VFR BLD AUTO: 41.1 % (ref 37–48.5)
HGB BLD-MCNC: 13.8 G/DL (ref 12–16)
IMM GRANULOCYTES # BLD AUTO: 0.01 K/UL (ref 0–0.04)
IMM GRANULOCYTES NFR BLD AUTO: 0.2 % (ref 0–0.5)
LYMPHOCYTES # BLD AUTO: 2.6 K/UL (ref 1–4.8)
LYMPHOCYTES NFR BLD: 47.7 % (ref 18–48)
MCH RBC QN AUTO: 31.4 PG (ref 27–31)
MCHC RBC AUTO-ENTMCNC: 33.6 G/DL (ref 32–36)
MCV RBC AUTO: 94 FL (ref 82–98)
MONOCYTES # BLD AUTO: 0.4 K/UL (ref 0.3–1)
MONOCYTES NFR BLD: 7.1 % (ref 4–15)
NEUTROPHILS # BLD AUTO: 2.2 K/UL (ref 1.8–7.7)
NEUTROPHILS NFR BLD: 40.3 % (ref 38–73)
NRBC BLD-RTO: 0 /100 WBC
PLATELET # BLD AUTO: 348 K/UL (ref 150–450)
PMV BLD AUTO: 9.4 FL (ref 9.2–12.9)
POTASSIUM SERPL-SCNC: 3.4 MMOL/L (ref 3.5–5.1)
PROT SERPL-MCNC: 7.4 G/DL (ref 6–8.4)
RBC # BLD AUTO: 4.39 M/UL (ref 4–5.4)
SODIUM SERPL-SCNC: 140 MMOL/L (ref 136–145)
TROPONIN I SERPL DL<=0.01 NG/ML-MCNC: 0.01 NG/ML (ref 0–0.03)
WBC # BLD AUTO: 5.47 K/UL (ref 3.9–12.7)

## 2023-09-21 PROCEDURE — 1101F PT FALLS ASSESS-DOCD LE1/YR: CPT | Mod: CPTII,S$GLB,, | Performed by: FAMILY MEDICINE

## 2023-09-21 PROCEDURE — 85025 COMPLETE CBC W/AUTO DIFF WBC: CPT | Performed by: FAMILY MEDICINE

## 2023-09-21 PROCEDURE — 3008F PR BODY MASS INDEX (BMI) DOCUMENTED: ICD-10-PCS | Mod: CPTII,S$GLB,, | Performed by: FAMILY MEDICINE

## 2023-09-21 PROCEDURE — 36415 COLL VENOUS BLD VENIPUNCTURE: CPT | Performed by: FAMILY MEDICINE

## 2023-09-21 PROCEDURE — 3288F FALL RISK ASSESSMENT DOCD: CPT | Mod: CPTII,S$GLB,, | Performed by: FAMILY MEDICINE

## 2023-09-21 PROCEDURE — 99999 PR PBB SHADOW E&M-EST. PATIENT-LVL III: CPT | Mod: PBBFAC,,, | Performed by: FAMILY MEDICINE

## 2023-09-21 PROCEDURE — 80053 COMPREHEN METABOLIC PANEL: CPT | Performed by: FAMILY MEDICINE

## 2023-09-21 PROCEDURE — 84484 ASSAY OF TROPONIN QUANT: CPT | Performed by: FAMILY MEDICINE

## 2023-09-21 PROCEDURE — 3008F BODY MASS INDEX DOCD: CPT | Mod: CPTII,S$GLB,, | Performed by: FAMILY MEDICINE

## 2023-09-21 PROCEDURE — 1157F ADVNC CARE PLAN IN RCRD: CPT | Mod: CPTII,S$GLB,, | Performed by: FAMILY MEDICINE

## 2023-09-21 PROCEDURE — 3288F PR FALLS RISK ASSESSMENT DOCUMENTED: ICD-10-PCS | Mod: CPTII,S$GLB,, | Performed by: FAMILY MEDICINE

## 2023-09-21 PROCEDURE — 3079F PR MOST RECENT DIASTOLIC BLOOD PRESSURE 80-89 MM HG: ICD-10-PCS | Mod: CPTII,S$GLB,, | Performed by: FAMILY MEDICINE

## 2023-09-21 PROCEDURE — 1159F PR MEDICATION LIST DOCUMENTED IN MEDICAL RECORD: ICD-10-PCS | Mod: CPTII,S$GLB,, | Performed by: FAMILY MEDICINE

## 2023-09-21 PROCEDURE — 3044F HG A1C LEVEL LT 7.0%: CPT | Mod: CPTII,S$GLB,, | Performed by: FAMILY MEDICINE

## 2023-09-21 PROCEDURE — 99214 PR OFFICE/OUTPT VISIT, EST, LEVL IV, 30-39 MIN: ICD-10-PCS | Mod: S$GLB,,, | Performed by: FAMILY MEDICINE

## 2023-09-21 PROCEDURE — 3075F PR MOST RECENT SYSTOLIC BLOOD PRESS GE 130-139MM HG: ICD-10-PCS | Mod: CPTII,S$GLB,, | Performed by: FAMILY MEDICINE

## 2023-09-21 PROCEDURE — 99214 OFFICE O/P EST MOD 30 MIN: CPT | Mod: S$GLB,,, | Performed by: FAMILY MEDICINE

## 2023-09-21 PROCEDURE — 4010F ACE/ARB THERAPY RXD/TAKEN: CPT | Mod: CPTII,S$GLB,, | Performed by: FAMILY MEDICINE

## 2023-09-21 PROCEDURE — 3079F DIAST BP 80-89 MM HG: CPT | Mod: CPTII,S$GLB,, | Performed by: FAMILY MEDICINE

## 2023-09-21 PROCEDURE — 3075F SYST BP GE 130 - 139MM HG: CPT | Mod: CPTII,S$GLB,, | Performed by: FAMILY MEDICINE

## 2023-09-21 PROCEDURE — 83880 ASSAY OF NATRIURETIC PEPTIDE: CPT | Performed by: FAMILY MEDICINE

## 2023-09-21 PROCEDURE — 1157F PR ADVANCE CARE PLAN OR EQUIV PRESENT IN MEDICAL RECORD: ICD-10-PCS | Mod: CPTII,S$GLB,, | Performed by: FAMILY MEDICINE

## 2023-09-21 PROCEDURE — 4010F PR ACE/ARB THEARPY RXD/TAKEN: ICD-10-PCS | Mod: CPTII,S$GLB,, | Performed by: FAMILY MEDICINE

## 2023-09-21 PROCEDURE — 3044F PR MOST RECENT HEMOGLOBIN A1C LEVEL <7.0%: ICD-10-PCS | Mod: CPTII,S$GLB,, | Performed by: FAMILY MEDICINE

## 2023-09-21 PROCEDURE — 99999 PR PBB SHADOW E&M-EST. PATIENT-LVL III: ICD-10-PCS | Mod: PBBFAC,,, | Performed by: FAMILY MEDICINE

## 2023-09-21 PROCEDURE — 1101F PR PT FALLS ASSESS DOC 0-1 FALLS W/OUT INJ PAST YR: ICD-10-PCS | Mod: CPTII,S$GLB,, | Performed by: FAMILY MEDICINE

## 2023-09-21 PROCEDURE — 1159F MED LIST DOCD IN RCRD: CPT | Mod: CPTII,S$GLB,, | Performed by: FAMILY MEDICINE

## 2023-09-21 NOTE — PROGRESS NOTES
[facility] - Clinic Note    Subjective      Ms. Garland is a 63 y.o. female who presents to clinic with complaints of chest pressure.     Patient reports chest pressure for the past week.   Locates the chest pressure in there mid sternal region with no radiation  Intermittent and can occur at rest and with exertion  Describes it as a heaviness.   Will occassionally have a cough  She also reports that her heart beat feels like it is trying to beat but is not  She has a history of MVP but had surgery done in 1980.   H/o HTN and on losartan and BP is well controlled.   States that she did take amoxicillin for 4 days a few days ago from her dentist.   Denies shortness of breath.    PMH Liv has a past medical history of Arthritis, Bilateral breast cancer, Breast cancer (2004), Diabetes mellitus, High cholesterol, Hypertension, and PONV (postoperative nausea and vomiting).   PSXH Liv has a past surgical history that includes Tonsillectomy (1974); Hysterectomy (10/1/2003); Mastectomy (Bilateral, 5/16/2004); Cholecystectomy (2/2001); Oophorectomy (Left, 5/18/2004); Ablation of dysrhythmic focus (2000); Total abdominal hysterectomy w/ bilateral salpingoophorectomy; Colonoscopy; Mitral valve replacement (1980); Injection of joint (Right, 11/25/2020); Injection of steroid (Right, 2/10/2021); Hip Arthroplasty (Right, 4/5/2021); Osteotomy of metatarsal bone (Left, 2/10/2022); Esophagogastroduodenoscopy (N/A, 5/23/2022); Esophageal dilation (N/A, 5/23/2022); and Hip Arthroplasty (Left, 9/26/2022).   RANDY Peck's family history includes Cancer in her mother; Early death in her brother; Heart failure in her father; Liver cancer in her mother; No Known Problems in her sister.   BLANCHE Peck reports that she quit smoking about 24 years ago. Her smoking use included cigarettes. She has never used smokeless tobacco. She reports current alcohol use. She reports that she does not use drugs.   EDWIGE Peck is allergic to lisinopril.  "  MOO Peck has a current medication list which includes the following prescription(s): fluticasone propionate, losartan, and pantoprazole, and the following Facility-Administered Medications: lactated ringers and ondansetron.     Review of Systems   Constitutional:  Negative for activity change, appetite change, chills, fatigue and fever.   HENT:  Negative for congestion, ear discharge, ear pain, postnasal drip, rhinorrhea, sinus pressure, sinus pain and sore throat.    Eyes:  Negative for visual disturbance.   Respiratory:  Positive for cough. Negative for shortness of breath.    Cardiovascular:  Positive for chest pain. Negative for palpitations and leg swelling.   Gastrointestinal:  Negative for abdominal pain, nausea and vomiting.   Skin:  Negative for wound.   Neurological:  Negative for dizziness and headaches.   Psychiatric/Behavioral:  Negative for confusion.      Objective     /80 (BP Location: Left arm, Patient Position: Sitting, BP Method: Medium (Manual))   Pulse (!) 58   Resp 14   Ht 5' 4" (1.626 m)   Wt 70.4 kg (155 lb 3.2 oz)   LMP  (LMP Unknown)   SpO2 98%   BMI 26.64 kg/m²     Physical Exam   Constitutional: normal appearance. She appears well-developed and well-nourished.  Non-toxic appearance. No distress. She does not appear ill.   HENT:   Head: Normocephalic and atraumatic.   Eyes: Right eye exhibits no discharge. Left eye exhibits no discharge.   Cardiovascular: Normal rate, regular rhythm, normal heart sounds and normal pulses. Exam reveals no gallop and no friction rub.   No murmur heard.Pulmonary:      Effort: Pulmonary effort is normal. No respiratory distress.      Breath sounds: Normal breath sounds. No wheezing, rhonchi or rales.     Abdominal: Normal appearance.   Musculoskeletal:      Cervical back: Neck supple.   Lymphadenopathy:     She has no cervical adenopathy.   Neurological: She is alert.   Skin: Skin is warm and dry. Capillary refill takes less than 2 seconds. " She is not diaphoretic.   Psychiatric: Her behavior is normal. Mood, judgment and thought content normal.   Vitals reviewed.     Assessment/Plan     Liv was seen today for chest pressure.    Diagnoses and all orders for this visit:    Chest pressure  -     Stress Echo Which stress agent will be used? Treadmill Exercise; Color Flow Doppler? No; Future  -     Holter monitor - 48 hour; Future  -     Brain natriuretic peptide; Future  -     Troponin I; Future  -     CBC auto differential; Future  -     Comprehensive metabolic panel; Future          Future Appointments   Date Time Provider Department Center   9/21/2023  9:00 AM [facility], LABORATORY [facility] LAB [facility] [place]       Vargas Paulino MD  Family Medicine  [facility]

## 2023-09-23 ENCOUNTER — PATIENT MESSAGE (OUTPATIENT)
Dept: OTHER | Facility: OTHER | Age: 63
End: 2023-09-23
Payer: MEDICARE

## 2023-10-05 DIAGNOSIS — R07.89 CHEST PRESSURE: Primary | ICD-10-CM

## 2023-10-06 ENCOUNTER — HOSPITAL ENCOUNTER (OUTPATIENT)
Dept: CARDIOLOGY | Facility: HOSPITAL | Age: 63
Discharge: HOME | End: 2023-10-06
Attending: FAMILY MEDICINE
Payer: MEDICARE

## 2023-10-06 DIAGNOSIS — R07.89 CHEST PRESSURE: ICD-10-CM

## 2023-10-06 PROCEDURE — 93227 XTRNL ECG REC<48 HR R&I: CPT | Mod: ,,, | Performed by: INTERNAL MEDICINE

## 2023-10-06 PROCEDURE — 93227 HOLTER MONITOR - 48 HOUR (CUPID ONLY): ICD-10-PCS | Mod: ,,, | Performed by: INTERNAL MEDICINE

## 2023-10-06 PROCEDURE — 93226 XTRNL ECG REC<48 HR SCAN A/R: CPT

## 2023-10-10 LAB
OHS CV EVENT MONITOR DAY: 0
OHS CV HOLTER LENGTH DECIMAL HOURS: 47.98
OHS CV HOLTER LENGTH HOURS: 47
OHS CV HOLTER LENGTH MINUTES: 59
OHS CV HOLTER SINUS AVERAGE HR: 78
OHS CV HOLTER SINUS MAX HR: 119
OHS CV HOLTER SINUS MIN HR: 47

## 2023-10-17 RX ORDER — LOSARTAN POTASSIUM 25 MG/1
25 TABLET ORAL DAILY
Qty: 90 TABLET | Refills: 3 | Status: SHIPPED | OUTPATIENT
Start: 2023-10-17 | End: 2023-11-30

## 2023-11-22 ENCOUNTER — HOSPITAL ENCOUNTER (OUTPATIENT)
Dept: CARDIOLOGY | Facility: HOSPITAL | Age: 63
Discharge: HOME | End: 2023-11-22
Attending: FAMILY MEDICINE
Payer: MEDICARE

## 2023-11-22 VITALS
HEIGHT: 64 IN | HEART RATE: 73 BPM | SYSTOLIC BLOOD PRESSURE: 176 MMHG | DIASTOLIC BLOOD PRESSURE: 85 MMHG | WEIGHT: 155 LBS | BODY MASS INDEX: 26.46 KG/M2

## 2023-11-22 DIAGNOSIS — R07.89 CHEST PRESSURE: ICD-10-CM

## 2023-11-22 LAB
AORTIC ROOT ANNULUS: 3.35 CM
AORTIC VALVE CUSP SEPERATION: 1.81 CM
ASCENDING AORTA: 2.3 CM
BSA FOR ECHO PROCEDURE: 1.78 M2
CV ECHO LV RWT: 0.49 CM
CV STRESS BASE HR: 88 BPM
DIASTOLIC BLOOD PRESSURE: 86 MMHG
DOP CALC LVOT AREA: 3.2 CM2
DOP CALC LVOT DIAMETER: 2.03 CM
ECHO LV POSTERIOR WALL: 1.05 CM (ref 0.6–1.1)
EJECTION FRACTION: 61 %
FRACTIONAL SHORTENING: 32 % (ref 28–44)
INTERVENTRICULAR SEPTUM: 1.04 CM (ref 0.6–1.1)
LA MAJOR: 4.24 CM
LA MINOR: 3.24 CM
LEFT ATRIUM SIZE: 4.26 CM
LEFT ATRIUM VOLUME INDEX MOD: 12.7 ML/M2
LEFT ATRIUM VOLUME MOD: 22.4 CM3
LEFT INTERNAL DIMENSION IN SYSTOLE: 2.89 CM (ref 2.1–4)
LEFT VENTRICLE DIASTOLIC VOLUME INDEX: 46.23 ML/M2
LEFT VENTRICLE DIASTOLIC VOLUME: 81.37 ML
LEFT VENTRICLE MASS INDEX: 85 G/M2
LEFT VENTRICLE SYSTOLIC VOLUME INDEX: 18.1 ML/M2
LEFT VENTRICLE SYSTOLIC VOLUME: 31.88 ML
LEFT VENTRICULAR INTERNAL DIMENSION IN DIASTOLE: 4.26 CM (ref 3.5–6)
LEFT VENTRICULAR MASS: 149.31 G
OHS CV CPX 1 MINUTE RECOVERY HEART RATE: 133 BPM
OHS CV CPX 85 PERCENT MAX PREDICTED HEART RATE MALE: 132
OHS CV CPX ESTIMATED METS: 10
OHS CV CPX MAX PREDICTED HEART RATE: 155
OHS CV CPX PATIENT IS FEMALE: 1
OHS CV CPX PATIENT IS MALE: 0
OHS CV CPX PEAK DIASTOLIC BLOOD PRESSURE: 93 MMHG
OHS CV CPX PEAK HEAR RATE: 150 BPM
OHS CV CPX PEAK RATE PRESSURE PRODUCT: NORMAL
OHS CV CPX PEAK SYSTOLIC BLOOD PRESSURE: 214 MMHG
OHS CV CPX PERCENT MAX PREDICTED HEART RATE ACHIEVED: 101
OHS CV CPX RATE PRESSURE PRODUCT PRESENTING: NORMAL
RA MAJOR: 3.77 CM
RA WIDTH: 2.22 CM
RIGHT VENTRICULAR END-DIASTOLIC DIMENSION: 3.03 CM
RIGHT VENTRICULAR LENGTH IN DIASTOLE (APICAL 4-CHAMBER VIEW): 4.97 CM
RV MID DIAMA: 1.6 CM
STJ: 2.76 CM
STRESS ECHO POST EXERCISE DUR MIN: 6 MINUTES
STRESS ECHO POST EXERCISE DUR SEC: 30 SECONDS
STRESS ST DEPRESSION: 0.5 MM
SYSTOLIC BLOOD PRESSURE: 146 MMHG
Z-SCORE OF LEFT VENTRICULAR DIMENSION IN END DIASTOLE: -1.33
Z-SCORE OF LEFT VENTRICULAR DIMENSION IN END SYSTOLE: -0.32

## 2023-11-22 PROCEDURE — 25500020 PHARM REV CODE 255: Performed by: INTERNAL MEDICINE

## 2023-11-22 PROCEDURE — 93351 STRESS TTE COMPLETE: CPT

## 2023-11-22 PROCEDURE — 93352 ADMIN ECG CONTRAST AGENT: CPT | Mod: ,,, | Performed by: INTERNAL MEDICINE

## 2023-11-22 PROCEDURE — 93351 STRESS TTE COMPLETE: CPT | Mod: 26,,, | Performed by: INTERNAL MEDICINE

## 2023-11-22 PROCEDURE — 93351 STRESS ECHO (CUPID ONLY): ICD-10-PCS | Mod: 26,,, | Performed by: INTERNAL MEDICINE

## 2023-11-22 PROCEDURE — 93352 STRESS ECHO (CUPID ONLY): ICD-10-PCS | Mod: ,,, | Performed by: INTERNAL MEDICINE

## 2023-11-22 RX ADMIN — SULFUR HEXAFLUORIDE 5 ML: KIT at 08:11

## 2023-11-22 NOTE — NURSING NOTE
Treadmill exercise complete. Exceeded target heart rate. Mild shortness of breath noted. 2 on rosario scale. Vs returned to baseline. Pt tolerated well.

## 2023-11-22 NOTE — NURSING NOTE
2 patient identifier name and date of birth confirmed as stated by patient and matched with armband. Informed consent obtained. Dr Kahn here.

## 2023-11-30 ENCOUNTER — OFFICE VISIT (OUTPATIENT)
Dept: FAMILY MEDICINE | Facility: CLINIC | Age: 63
End: 2023-11-30
Payer: MEDICARE

## 2023-11-30 VITALS
WEIGHT: 160.19 LBS | OXYGEN SATURATION: 97 % | RESPIRATION RATE: 18 BRPM | HEIGHT: 64 IN | BODY MASS INDEX: 27.35 KG/M2 | SYSTOLIC BLOOD PRESSURE: 136 MMHG | DIASTOLIC BLOOD PRESSURE: 84 MMHG | HEART RATE: 65 BPM

## 2023-11-30 DIAGNOSIS — G47.00 INSOMNIA, UNSPECIFIED TYPE: ICD-10-CM

## 2023-11-30 DIAGNOSIS — Z23 ENCOUNTER FOR VACCINATION: ICD-10-CM

## 2023-11-30 DIAGNOSIS — I10 ESSENTIAL HYPERTENSION: Primary | ICD-10-CM

## 2023-11-30 PROCEDURE — 1160F RVW MEDS BY RX/DR IN RCRD: CPT | Mod: CPTII,S$GLB,, | Performed by: STUDENT IN AN ORGANIZED HEALTH CARE EDUCATION/TRAINING PROGRAM

## 2023-11-30 PROCEDURE — 1159F MED LIST DOCD IN RCRD: CPT | Mod: CPTII,S$GLB,, | Performed by: STUDENT IN AN ORGANIZED HEALTH CARE EDUCATION/TRAINING PROGRAM

## 2023-11-30 PROCEDURE — 1160F PR REVIEW ALL MEDS BY PRESCRIBER/CLIN PHARMACIST DOCUMENTED: ICD-10-PCS | Mod: CPTII,S$GLB,, | Performed by: STUDENT IN AN ORGANIZED HEALTH CARE EDUCATION/TRAINING PROGRAM

## 2023-11-30 PROCEDURE — 90677 PNEUMOCOCCAL CONJUGATE VACCINE 20-VALENT: ICD-10-PCS | Mod: S$GLB,,, | Performed by: STUDENT IN AN ORGANIZED HEALTH CARE EDUCATION/TRAINING PROGRAM

## 2023-11-30 PROCEDURE — 90677 PCV20 VACCINE IM: CPT | Mod: S$GLB,,, | Performed by: STUDENT IN AN ORGANIZED HEALTH CARE EDUCATION/TRAINING PROGRAM

## 2023-11-30 PROCEDURE — 1159F PR MEDICATION LIST DOCUMENTED IN MEDICAL RECORD: ICD-10-PCS | Mod: CPTII,S$GLB,, | Performed by: STUDENT IN AN ORGANIZED HEALTH CARE EDUCATION/TRAINING PROGRAM

## 2023-11-30 PROCEDURE — 3075F PR MOST RECENT SYSTOLIC BLOOD PRESS GE 130-139MM HG: ICD-10-PCS | Mod: CPTII,S$GLB,, | Performed by: STUDENT IN AN ORGANIZED HEALTH CARE EDUCATION/TRAINING PROGRAM

## 2023-11-30 PROCEDURE — 1101F PT FALLS ASSESS-DOCD LE1/YR: CPT | Mod: CPTII,S$GLB,, | Performed by: STUDENT IN AN ORGANIZED HEALTH CARE EDUCATION/TRAINING PROGRAM

## 2023-11-30 PROCEDURE — 99214 OFFICE O/P EST MOD 30 MIN: CPT | Mod: S$GLB,,, | Performed by: STUDENT IN AN ORGANIZED HEALTH CARE EDUCATION/TRAINING PROGRAM

## 2023-11-30 PROCEDURE — 99214 PR OFFICE/OUTPT VISIT, EST, LEVL IV, 30-39 MIN: ICD-10-PCS | Mod: S$GLB,,, | Performed by: STUDENT IN AN ORGANIZED HEALTH CARE EDUCATION/TRAINING PROGRAM

## 2023-11-30 PROCEDURE — 3075F SYST BP GE 130 - 139MM HG: CPT | Mod: CPTII,S$GLB,, | Performed by: STUDENT IN AN ORGANIZED HEALTH CARE EDUCATION/TRAINING PROGRAM

## 2023-11-30 PROCEDURE — 3288F FALL RISK ASSESSMENT DOCD: CPT | Mod: CPTII,S$GLB,, | Performed by: STUDENT IN AN ORGANIZED HEALTH CARE EDUCATION/TRAINING PROGRAM

## 2023-11-30 PROCEDURE — G0009 ADMIN PNEUMOCOCCAL VACCINE: HCPCS | Mod: S$GLB,,, | Performed by: STUDENT IN AN ORGANIZED HEALTH CARE EDUCATION/TRAINING PROGRAM

## 2023-11-30 PROCEDURE — 3044F PR MOST RECENT HEMOGLOBIN A1C LEVEL <7.0%: ICD-10-PCS | Mod: CPTII,S$GLB,, | Performed by: STUDENT IN AN ORGANIZED HEALTH CARE EDUCATION/TRAINING PROGRAM

## 2023-11-30 PROCEDURE — 4010F PR ACE/ARB THEARPY RXD/TAKEN: ICD-10-PCS | Mod: CPTII,S$GLB,, | Performed by: STUDENT IN AN ORGANIZED HEALTH CARE EDUCATION/TRAINING PROGRAM

## 2023-11-30 PROCEDURE — 3079F DIAST BP 80-89 MM HG: CPT | Mod: CPTII,S$GLB,, | Performed by: STUDENT IN AN ORGANIZED HEALTH CARE EDUCATION/TRAINING PROGRAM

## 2023-11-30 PROCEDURE — G0009 PNEUMOCOCCAL CONJUGATE VACCINE 20-VALENT: ICD-10-PCS | Mod: S$GLB,,, | Performed by: STUDENT IN AN ORGANIZED HEALTH CARE EDUCATION/TRAINING PROGRAM

## 2023-11-30 PROCEDURE — 1157F PR ADVANCE CARE PLAN OR EQUIV PRESENT IN MEDICAL RECORD: ICD-10-PCS | Mod: CPTII,S$GLB,, | Performed by: STUDENT IN AN ORGANIZED HEALTH CARE EDUCATION/TRAINING PROGRAM

## 2023-11-30 PROCEDURE — 99999 PR PBB SHADOW E&M-EST. PATIENT-LVL III: ICD-10-PCS | Mod: PBBFAC,,, | Performed by: STUDENT IN AN ORGANIZED HEALTH CARE EDUCATION/TRAINING PROGRAM

## 2023-11-30 PROCEDURE — 3008F PR BODY MASS INDEX (BMI) DOCUMENTED: ICD-10-PCS | Mod: CPTII,S$GLB,, | Performed by: STUDENT IN AN ORGANIZED HEALTH CARE EDUCATION/TRAINING PROGRAM

## 2023-11-30 PROCEDURE — 3288F PR FALLS RISK ASSESSMENT DOCUMENTED: ICD-10-PCS | Mod: CPTII,S$GLB,, | Performed by: STUDENT IN AN ORGANIZED HEALTH CARE EDUCATION/TRAINING PROGRAM

## 2023-11-30 PROCEDURE — 4010F ACE/ARB THERAPY RXD/TAKEN: CPT | Mod: CPTII,S$GLB,, | Performed by: STUDENT IN AN ORGANIZED HEALTH CARE EDUCATION/TRAINING PROGRAM

## 2023-11-30 PROCEDURE — 99999 PR PBB SHADOW E&M-EST. PATIENT-LVL III: CPT | Mod: PBBFAC,,, | Performed by: STUDENT IN AN ORGANIZED HEALTH CARE EDUCATION/TRAINING PROGRAM

## 2023-11-30 PROCEDURE — 3008F BODY MASS INDEX DOCD: CPT | Mod: CPTII,S$GLB,, | Performed by: STUDENT IN AN ORGANIZED HEALTH CARE EDUCATION/TRAINING PROGRAM

## 2023-11-30 PROCEDURE — 1101F PR PT FALLS ASSESS DOC 0-1 FALLS W/OUT INJ PAST YR: ICD-10-PCS | Mod: CPTII,S$GLB,, | Performed by: STUDENT IN AN ORGANIZED HEALTH CARE EDUCATION/TRAINING PROGRAM

## 2023-11-30 PROCEDURE — 3079F PR MOST RECENT DIASTOLIC BLOOD PRESSURE 80-89 MM HG: ICD-10-PCS | Mod: CPTII,S$GLB,, | Performed by: STUDENT IN AN ORGANIZED HEALTH CARE EDUCATION/TRAINING PROGRAM

## 2023-11-30 PROCEDURE — 1157F ADVNC CARE PLAN IN RCRD: CPT | Mod: CPTII,S$GLB,, | Performed by: STUDENT IN AN ORGANIZED HEALTH CARE EDUCATION/TRAINING PROGRAM

## 2023-11-30 PROCEDURE — 3044F HG A1C LEVEL LT 7.0%: CPT | Mod: CPTII,S$GLB,, | Performed by: STUDENT IN AN ORGANIZED HEALTH CARE EDUCATION/TRAINING PROGRAM

## 2023-11-30 RX ORDER — LOSARTAN POTASSIUM 50 MG/1
50 TABLET ORAL DAILY
Qty: 30 TABLET | Refills: 11 | Status: SHIPPED | OUTPATIENT
Start: 2023-11-30 | End: 2023-12-05 | Stop reason: DRUGHIGH

## 2023-11-30 RX ORDER — TRAZODONE HYDROCHLORIDE 50 MG/1
50 TABLET ORAL NIGHTLY
Qty: 30 TABLET | Refills: 11 | Status: SHIPPED | OUTPATIENT
Start: 2023-11-30 | End: 2024-01-23

## 2023-11-30 NOTE — PROGRESS NOTES
[facility] Note    Subjective:    The HPI and pertinent ROS is included in the Diagnostic Impression Remarks section at the end of the note. Please see below for further details. Chief complaint is at end of note.     Liv is a pleasant intelligent patient who is here for evaluation.     Modified Medications    No medications on file       Data reviewed 274}  Previous medical records reviewed and summarized in plan section at end of note.      If you are due for any health screening(s) below please notify me so we can arrange them to be ordered and scheduled. Most healthy patients at your age complete them, but you are free to accept or refuse. If you can't do it, I'll definitely understand. If you can, I'd certainly appreciate it!     All of your core healthy metrics are met.      The following portions of the patient's history were reviewed and updated as appropriate: allergies, current medications, past family history, past medical history, past social history, past surgical history and problem list.    She  has a past medical history of Arthritis, Bilateral breast cancer, Breast cancer (2004), Diabetes mellitus, High cholesterol, Hypertension, and PONV (postoperative nausea and vomiting).  She  has a past surgical history that includes Tonsillectomy (1974); Hysterectomy (10/1/2003); Mastectomy (Bilateral, 5/16/2004); Cholecystectomy (2/2001); Oophorectomy (Left, 5/18/2004); Ablation of dysrhythmic focus (2000); Total abdominal hysterectomy w/ bilateral salpingoophorectomy; Colonoscopy; Mitral valve replacement (1980); Injection of joint (Right, 11/25/2020); Injection of steroid (Right, 2/10/2021); Hip Arthroplasty (Right, 4/5/2021); Osteotomy of metatarsal bone (Left, 2/10/2022); Esophagogastroduodenoscopy (N/A, 5/23/2022); Esophageal dilation (N/A, 5/23/2022); and Hip Arthroplasty (Left, 9/26/2022).    She  reports that she quit smoking about 24 years ago. Her smoking use included  "cigarettes. She has never used smokeless tobacco. She reports current alcohol use. She reports that she does not use drugs.  She family history includes Cancer in her mother; Early death in her brother; Heart failure in her father; Liver cancer in her mother; No Known Problems in her sister.    Review of patient's allergies indicates:   Allergen Reactions    Lisinopril      Other reaction(s): Angioedema       Tobacco Use: Medium Risk (11/30/2023)    Patient History     Smoking Tobacco Use: Former     Smokeless Tobacco Use: Never     Passive Exposure: Not on file     Physical Examination  General appearance: alert, cooperative, no distress  Neck: no thyromegaly, no neck stiffness  Lungs: clear to auscultation, no wheezes, rales or rhonchi, symmetric air entry  Heart: normal rate, regular rhythm, normal S1, S2, no murmurs, rubs, clicks or gallops  Abdomen: soft, nontender, nondistended, no rigidity, rebound, or guarding.   Back: no point tenderness over spine  Extremities: peripheral pulses normal, no unilateral leg swelling or calf tenderness   Neurological:alert, oriented, normal speech, no new focal findings or movement disorder noted from baseline    BP Readings from Last 3 Encounters:   11/30/23 136/84   11/22/23 (!) 176/85   09/21/23 134/80     Wt Readings from Last 3 Encounters:   11/30/23 72.7 kg (160 lb 3.2 oz)   11/22/23 70.3 kg (155 lb)   09/21/23 70.4 kg (155 lb 3.2 oz)     /84 (BP Location: Right arm, Patient Position: Sitting, BP Method: Large (Manual))   Pulse 65   Resp 18   Ht 5' 4" (1.626 m)   Wt 72.7 kg (160 lb 3.2 oz)   LMP  (LMP Unknown)   SpO2 97%   BMI 27.50 kg/m²    274}  Laboratory: I have reviewed old labs below:    274}    Lab Results   Component Value Date    WBC 5.47 09/21/2023    HGB 13.8 09/21/2023    HCT 41.1 09/21/2023    MCV 94 09/21/2023     09/21/2023     09/21/2023    K 3.4 (L) 09/21/2023     09/21/2023    CALCIUM 9.5 09/21/2023    CO2 24 09/21/2023 " "    (H) 09/21/2023    BUN 8 09/21/2023    CREATININE 0.8 09/21/2023    EGFRNORACEVR >60.0 09/21/2023    ANIONGAP 11 09/21/2023    PROT 7.4 09/21/2023    ALBUMIN 4.4 09/21/2023    BILITOT 0.5 09/21/2023    ALKPHOS 54 (L) 09/21/2023    ALT 27 09/21/2023    AST 20 09/21/2023    INR 1.0 09/16/2022    CHOL 242 (H) 06/28/2023    TRIG 181 (H) 06/28/2023    HDL 52 06/28/2023    LDLCALC 153.8 06/28/2023    TSH 1.674 06/28/2023    HGBA1C 5.7 (H) 06/28/2023      Lab reviewed by me: Particular labs of significance that I will monitor, workup, or treat to improve are mentioned below in diagnostic impression remarks.    Imaging/EKG: I have reviewed the pertinent results and my findings are noted in remarks.  274}    CC:   Chief Complaint   Patient presents with    [facility]     Echo that was just completed wants to go over results.         274}    Assessment/Plan  Liv Garland is a 63 y.o. female who presents to clinic with:  1. Essential hypertension    2. Insomnia, unspecified type    3. Encounter for vaccination       274}  Diagnostic Impression Remarks + HPI     Documentation entered by me for this encounter may have been done in part using speech-recognition technology. Although I have made an effort to ensure accuracy, "sound like" errors may exist and should be interpreted in context.     RSV done at [email or web address] [facility] revealed elevated BP at home ranging from 150-170 SBP. Will increase losartan    This is the extent of this pleasant patient's concerns at this present time. She did not feel chest pain upon exertion, dyspnea, nausea, vomiting, diaphoresis, or syncope. No pleuritic chest pain, unilateral leg swelling, calf tenderness, or calf pain. Negative for unintentional weight loss night sweats, hematuria, and fevers. Liv will return to clinic in a few months for further workup and reassessment or sooner as needed. She was instructed to call the clinic or go to the emergency department " or urgent care immediately if her symptoms do not improve, worsens, or if any new symptoms develop. As we discussed that symptoms could worsen over the next 24 hours she was advised that if any increased swelling, pain, or numbness arise to go immediately to the ED. Patient knows to call any time if an emergency arises. Shared decision making occurred and she verbalized understanding in agreement with this plan. I discussed imaging findings, diagnosis, possibilities, treatment options, medications, risks, and benefits. She had many questions regarding the options and long-term effects. All questions were answered. She expressed understanding after counseling regarding the diagnosis and recommendations. She was capable and demonstrated competence with understanding of these options. Shared decision making was performed resulting in her choosing the current treatment plan. Patient handout was given with instructions and recommendations. Advised the patient that if they become pregnant to alert us immediately to assess for medication changes. Having a healthy weight can decrease the risk of 13 cancers and is an important goal. I also discussed the importance of close follow up to discuss labs, change or modify her medications if needed, monitor side effects, and further evaluation of medical problems.     Additional workup planned: see labs ordered below.    See below for labs and meds ordered with associated diagnosis      1. Essential hypertension  - losartan (COZAAR) 50 MG tablet; Take 1 tablet (50 mg total) by mouth once daily.  Dispense: 30 tablet; Refill: 11    2. Insomnia, unspecified type  - traZODone (DESYREL) 50 MG tablet; Take 1 tablet (50 mg total) by mouth every evening.  Dispense: 30 tablet; Refill: 11    3. Encounter for vaccination  - (In Office Administered) Pneumococcal Conjugate Vaccine (20 Valent) (IM) (Preferred)      Jayashree Brand MD   274}    If you are due for any health screening(s) below  please notify me so we can arrange them to be ordered and scheduled. Most healthy patients at your age complete them, but you are free to accept or refuse.     If you can't do it, I'll definitely understand. If you can, I'd certainly appreciate it!   All of your core healthy metrics are met.

## 2024-01-23 ENCOUNTER — OFFICE VISIT (OUTPATIENT)
Dept: FAMILY MEDICINE | Facility: CLINIC | Age: 64
End: 2024-01-23
Payer: MEDICARE

## 2024-01-23 ENCOUNTER — LAB VISIT (OUTPATIENT)
Dept: LAB | Facility: HOSPITAL | Age: 64
End: 2024-01-23
Attending: STUDENT IN AN ORGANIZED HEALTH CARE EDUCATION/TRAINING PROGRAM
Payer: MEDICARE

## 2024-01-23 VITALS
BODY MASS INDEX: 27.31 KG/M2 | OXYGEN SATURATION: 98 % | HEIGHT: 64 IN | DIASTOLIC BLOOD PRESSURE: 84 MMHG | SYSTOLIC BLOOD PRESSURE: 144 MMHG | HEART RATE: 81 BPM | WEIGHT: 160 LBS | RESPIRATION RATE: 18 BRPM

## 2024-01-23 DIAGNOSIS — E11.65 TYPE 2 DIABETES MELLITUS WITH HYPERGLYCEMIA, WITHOUT LONG-TERM CURRENT USE OF INSULIN: ICD-10-CM

## 2024-01-23 DIAGNOSIS — I10 ESSENTIAL HYPERTENSION: ICD-10-CM

## 2024-01-23 DIAGNOSIS — E11.65 TYPE 2 DIABETES MELLITUS WITH HYPERGLYCEMIA, WITHOUT LONG-TERM CURRENT USE OF INSULIN: Primary | ICD-10-CM

## 2024-01-23 LAB
ESTIMATED AVG GLUCOSE: 117 MG/DL (ref 68–131)
HBA1C MFR BLD: 5.7 % (ref 4–5.6)

## 2024-01-23 PROCEDURE — 1160F RVW MEDS BY RX/DR IN RCRD: CPT | Mod: CPTII,S$GLB,, | Performed by: STUDENT IN AN ORGANIZED HEALTH CARE EDUCATION/TRAINING PROGRAM

## 2024-01-23 PROCEDURE — 1126F AMNT PAIN NOTED NONE PRSNT: CPT | Mod: CPTII,S$GLB,, | Performed by: STUDENT IN AN ORGANIZED HEALTH CARE EDUCATION/TRAINING PROGRAM

## 2024-01-23 PROCEDURE — 99999 PR PBB SHADOW E&M-EST. PATIENT-LVL III: CPT | Mod: PBBFAC,,, | Performed by: STUDENT IN AN ORGANIZED HEALTH CARE EDUCATION/TRAINING PROGRAM

## 2024-01-23 PROCEDURE — 3288F FALL RISK ASSESSMENT DOCD: CPT | Mod: CPTII,S$GLB,, | Performed by: STUDENT IN AN ORGANIZED HEALTH CARE EDUCATION/TRAINING PROGRAM

## 2024-01-23 PROCEDURE — 3079F DIAST BP 80-89 MM HG: CPT | Mod: CPTII,S$GLB,, | Performed by: STUDENT IN AN ORGANIZED HEALTH CARE EDUCATION/TRAINING PROGRAM

## 2024-01-23 PROCEDURE — 1101F PT FALLS ASSESS-DOCD LE1/YR: CPT | Mod: CPTII,S$GLB,, | Performed by: STUDENT IN AN ORGANIZED HEALTH CARE EDUCATION/TRAINING PROGRAM

## 2024-01-23 PROCEDURE — 3044F HG A1C LEVEL LT 7.0%: CPT | Mod: CPTII,S$GLB,, | Performed by: STUDENT IN AN ORGANIZED HEALTH CARE EDUCATION/TRAINING PROGRAM

## 2024-01-23 PROCEDURE — 83036 HEMOGLOBIN GLYCOSYLATED A1C: CPT | Performed by: STUDENT IN AN ORGANIZED HEALTH CARE EDUCATION/TRAINING PROGRAM

## 2024-01-23 PROCEDURE — 3077F SYST BP >= 140 MM HG: CPT | Mod: CPTII,S$GLB,, | Performed by: STUDENT IN AN ORGANIZED HEALTH CARE EDUCATION/TRAINING PROGRAM

## 2024-01-23 PROCEDURE — 36415 COLL VENOUS BLD VENIPUNCTURE: CPT | Performed by: STUDENT IN AN ORGANIZED HEALTH CARE EDUCATION/TRAINING PROGRAM

## 2024-01-23 PROCEDURE — 1157F ADVNC CARE PLAN IN RCRD: CPT | Mod: CPTII,S$GLB,, | Performed by: STUDENT IN AN ORGANIZED HEALTH CARE EDUCATION/TRAINING PROGRAM

## 2024-01-23 PROCEDURE — 1159F MED LIST DOCD IN RCRD: CPT | Mod: CPTII,S$GLB,, | Performed by: STUDENT IN AN ORGANIZED HEALTH CARE EDUCATION/TRAINING PROGRAM

## 2024-01-23 PROCEDURE — 3008F BODY MASS INDEX DOCD: CPT | Mod: CPTII,S$GLB,, | Performed by: STUDENT IN AN ORGANIZED HEALTH CARE EDUCATION/TRAINING PROGRAM

## 2024-01-23 PROCEDURE — 99214 OFFICE O/P EST MOD 30 MIN: CPT | Mod: S$GLB,,, | Performed by: STUDENT IN AN ORGANIZED HEALTH CARE EDUCATION/TRAINING PROGRAM

## 2024-01-23 RX ORDER — SEMAGLUTIDE 0.68 MG/ML
0.25 INJECTION, SOLUTION SUBCUTANEOUS
Qty: 4 EACH | Refills: 0 | Status: SHIPPED | OUTPATIENT
Start: 2024-01-23 | End: 2024-02-15 | Stop reason: SDUPTHER

## 2024-01-23 RX ORDER — PANTOPRAZOLE SODIUM 40 MG/1
40 TABLET, DELAYED RELEASE ORAL 2 TIMES DAILY
COMMUNITY
Start: 2024-01-10 | End: 2024-04-17

## 2024-01-23 RX ORDER — SEMAGLUTIDE 0.68 MG/ML
0.25 INJECTION, SOLUTION SUBCUTANEOUS
Qty: 4 EACH | Refills: 0 | Status: SHIPPED | OUTPATIENT
Start: 2024-01-23 | End: 2024-01-23 | Stop reason: SDUPTHER

## 2024-01-23 RX ORDER — HYDROCHLOROTHIAZIDE 25 MG/1
25 TABLET ORAL DAILY
Qty: 30 TABLET | Refills: 11 | Status: SHIPPED | OUTPATIENT
Start: 2024-01-23 | End: 2024-02-23

## 2024-01-23 NOTE — TELEPHONE ENCOUNTER
----- Message from Yasmin Tirado sent at 1/23/2024  1:36 PM CST -----  [employer] [phone] ext # [phone] fax # [phone] The is need by Dr. Brand's staff for the Oxempic medication it has to be called into [facility].     If you have any questions please call Ms. Peck at [phone].    Yasmin

## 2024-01-23 NOTE — PROGRESS NOTES
[facility] Note    Subjective:    The HPI and pertinent ROS is included in the Diagnostic Impression Remarks section at the end of the note. Please see below for further details. Chief complaint is at end of note.     Liv is a pleasant intelligent patient who is here for evaluation.     Modified Medications    No medications on file       Data reviewed 274}  Previous medical records reviewed and summarized in plan section at end of note.      If you are due for any health screening(s) below please notify me so we can arrange them to be ordered and scheduled. Most healthy patients at your age complete them, but you are free to accept or refuse. If you can't do it, I'll definitely understand. If you can, I'd certainly appreciate it!     Tests to Keep You Healthy    Colon Cancer Screening: Met on 7/23/2021  Last Blood Pressure <= 139/89 (1/23/2024): NO      The following portions of the patient's history were reviewed and updated as appropriate: allergies, current medications, past family history, past medical history, past social history, past surgical history and problem list.    She  has a past medical history of Arthritis, Bilateral breast cancer, Breast cancer (2004), Diabetes mellitus, High cholesterol, Hypertension, and PONV (postoperative nausea and vomiting).  She  has a past surgical history that includes Tonsillectomy (1974); Hysterectomy (10/1/2003); Mastectomy (Bilateral, 5/16/2004); Cholecystectomy (2/2001); Oophorectomy (Left, 5/18/2004); Ablation of dysrhythmic focus (2000); Total abdominal hysterectomy w/ bilateral salpingoophorectomy; Colonoscopy; Mitral valve replacement (1980); Injection of joint (Right, 11/25/2020); Injection of steroid (Right, 2/10/2021); Hip Arthroplasty (Right, 4/5/2021); Osteotomy of metatarsal bone (Left, 2/10/2022); Esophagogastroduodenoscopy (N/A, 5/23/2022); Esophageal dilation (N/A, 5/23/2022); and Hip Arthroplasty (Left, 9/26/2022).    She  reports  "that she quit smoking about 25 years ago. Her smoking use included cigarettes. She has never used smokeless tobacco. She reports current alcohol use. She reports that she does not use drugs.  She family history includes Cancer in her mother; Early death in her brother; Heart failure in her father; Liver cancer in her mother; No Known Problems in her sister.    Review of patient's allergies indicates:   Allergen Reactions    Lisinopril      Other reaction(s): Angioedema       Tobacco Use: Medium Risk (1/23/2024)    Patient History     Smoking Tobacco Use: Former     Smokeless Tobacco Use: Never     Passive Exposure: Not on file     Physical Examination  General appearance: alert, cooperative, no distress  Neck: no thyromegaly, no neck stiffness  Lungs: clear to auscultation, no wheezes, rales or rhonchi, symmetric air entry  Heart: normal rate, regular rhythm, normal S1, S2, no murmurs, rubs, clicks or gallops  Abdomen: soft, nontender, nondistended, no rigidity, rebound, or guarding.   Back: no point tenderness over spine  Extremities: peripheral pulses normal, no unilateral leg swelling or calf tenderness   Neurological:alert, oriented, normal speech, no new focal findings or movement disorder noted from baseline    BP Readings from Last 3 Encounters:   01/23/24 (!) 144/84   11/30/23 136/84   11/22/23 (!) 176/85     Wt Readings from Last 3 Encounters:   01/23/24 72.6 kg (160 lb)   11/30/23 72.7 kg (160 lb 3.2 oz)   11/22/23 70.3 kg (155 lb)     BP (!) 144/84 (BP Location: Right arm, Patient Position: Sitting, BP Method: Large (Manual))   Pulse 81   Resp 18   Ht 5' 4" (1.626 m)   Wt 72.6 kg (160 lb)   LMP  (LMP Unknown)   SpO2 98%   BMI 27.46 kg/m²    274}  Laboratory: I have reviewed old labs below:    274}    Lab Results   Component Value Date    WBC 5.47 09/21/2023    HGB 13.8 09/21/2023    HCT 41.1 09/21/2023    MCV 94 09/21/2023     09/21/2023     09/21/2023    K 3.4 (L) 09/21/2023     " "09/21/2023    CALCIUM 9.5 09/21/2023    CO2 24 09/21/2023     (H) 09/21/2023    BUN 8 09/21/2023    CREATININE 0.8 09/21/2023    EGFRNORACEVR >60.0 09/21/2023    ANIONGAP 11 09/21/2023    PROT 7.4 09/21/2023    ALBUMIN 4.4 09/21/2023    BILITOT 0.5 09/21/2023    ALKPHOS 54 (L) 09/21/2023    ALT 27 09/21/2023    AST 20 09/21/2023    INR 1.0 09/16/2022    CHOL 242 (H) 06/28/2023    TRIG 181 (H) 06/28/2023    HDL 52 06/28/2023    LDLCALC 153.8 06/28/2023    TSH 1.674 06/28/2023    HGBA1C 5.7 (H) 01/23/2024      Lab reviewed by me: Particular labs of significance that I will monitor, workup, or treat to improve are mentioned below in diagnostic impression remarks.    Imaging/EKG: I have reviewed the pertinent results and my findings are noted in remarks.  274}    CC:   Chief Complaint   Patient presents with    [facility]    Hypertension        274}    Assessment/Plan  Liv Garland is a 63 y.o. female who presents to clinic with:  1. Type 2 diabetes mellitus with hyperglycemia, without long-term current use of insulin    2. Essential hypertension       274}  Diagnostic Impression Remarks + HPI     Documentation entered by me for this encounter may have been done in part using speech-recognition technology. Although I have made an effort to ensure accuracy, "sound like" errors may exist and should be interpreted in context.     DM: previously dx'ed w/ DM. Has been controlled. Was taken off of metformin a few years ago. Patient would like to start back on DM medication. Will recheck A1c   HTN: will start HCTZ to better control HTN. Uncontrolled.    This is the extent of this pleasant patient's concerns at this present time. She did not feel chest pain upon exertion, dyspnea, nausea, vomiting, diaphoresis, or syncope. No pleuritic chest pain, unilateral leg swelling, calf tenderness, or calf pain. Negative for unintentional weight loss night sweats, hematuria, and fevers. Liv will return to clinic in a few " months for further workup and reassessment or sooner as needed. She was instructed to call the clinic or go to the emergency department or urgent care immediately if her symptoms do not improve, worsens, or if any new symptoms develop. As we discussed that symptoms could worsen over the next 24 hours she was advised that if any increased swelling, pain, or numbness arise to go immediately to the ED. Patient knows to call any time if an emergency arises. Shared decision making occurred and she verbalized understanding in agreement with this plan. I discussed imaging findings, diagnosis, possibilities, treatment options, medications, risks, and benefits. She had many questions regarding the options and long-term effects. All questions were answered. She expressed understanding after counseling regarding the diagnosis and recommendations. She was capable and demonstrated competence with understanding of these options. Shared decision making was performed resulting in her choosing the current treatment plan. Patient handout was given with instructions and recommendations. Advised the patient that if they become pregnant to alert us immediately to assess for medication changes. Having a healthy weight can decrease the risk of 13 cancers and is an important goal. I also discussed the importance of close follow up to discuss labs, change or modify her medications if needed, monitor side effects, and further evaluation of medical problems.     Additional workup planned: see labs ordered below.    See below for labs and meds ordered with associated diagnosis      1. Type 2 diabetes mellitus with hyperglycemia, without long-term current use of insulin  - semaglutide (OZEMPIC) 0.25 mg or 0.5 mg (2 mg/3 mL) pen injector; Inject 0.25 mg into the skin every 7 days.  Dispense: 4 each; Refill: 0  - Hemoglobin A1C; Future    2. Essential hypertension  - hydroCHLOROthiazide (HYDRODIURIL) 25 MG tablet; Take 1 tablet (25 mg total) by  mouth once daily.  Dispense: 30 tablet; Refill: 11      Jayashree Brand MD   274}    If you are due for any health screening(s) below please notify me so we can arrange them to be ordered and scheduled. Most healthy patients at your age complete them, but you are free to accept or refuse.     If you can't do it, I'll definitely understand. If you can, I'd certainly appreciate it!   Tests to Keep You Healthy    Colon Cancer Screening: Met on 7/23/2021  Last Blood Pressure <= 139/89 (1/23/2024): NO

## 2024-01-24 ENCOUNTER — PATIENT MESSAGE (OUTPATIENT)
Dept: FAMILY MEDICINE | Facility: CLINIC | Age: 64
End: 2024-01-24
Payer: MEDICARE

## 2024-02-11 ENCOUNTER — PATIENT MESSAGE (OUTPATIENT)
Dept: ADMINISTRATIVE | Facility: OTHER | Age: 64
End: 2024-02-11
Payer: MEDICARE

## 2024-02-15 DIAGNOSIS — E11.65 TYPE 2 DIABETES MELLITUS WITH HYPERGLYCEMIA, WITHOUT LONG-TERM CURRENT USE OF INSULIN: ICD-10-CM

## 2024-02-15 RX ORDER — SEMAGLUTIDE 0.68 MG/ML
0.5 INJECTION, SOLUTION SUBCUTANEOUS
Qty: 4 EACH | Refills: 0 | Status: SHIPPED | OUTPATIENT
Start: 2024-02-15 | End: 2024-03-27

## 2024-02-23 DIAGNOSIS — I10 ESSENTIAL HYPERTENSION: ICD-10-CM

## 2024-02-23 RX ORDER — HYDROCHLOROTHIAZIDE 25 MG/1
25 TABLET ORAL DAILY
Qty: 30 TABLET | Refills: 11 | Status: CANCELLED | OUTPATIENT
Start: 2024-02-23 | End: 2025-02-22

## 2024-02-23 RX ORDER — LOSARTAN POTASSIUM AND HYDROCHLOROTHIAZIDE 25; 100 MG/1; MG/1
1 TABLET ORAL DAILY
Qty: 90 TABLET | Refills: 3 | Status: SHIPPED | OUTPATIENT
Start: 2024-02-23 | End: 2024-05-18 | Stop reason: SDUPTHER

## 2024-02-23 NOTE — TELEPHONE ENCOUNTER
"Per patient, " the doctor was going to make my blood pressure pills. One pill instead of two can you please make the change"reviewed chart, I do not see this change please advise  "

## 2024-03-04 ENCOUNTER — OFFICE VISIT (OUTPATIENT)
Dept: FAMILY MEDICINE | Facility: CLINIC | Age: 64
End: 2024-03-04
Payer: MEDICARE

## 2024-03-04 VITALS
BODY MASS INDEX: 27.08 KG/M2 | WEIGHT: 158.63 LBS | RESPIRATION RATE: 14 BRPM | HEIGHT: 64 IN | HEART RATE: 74 BPM | SYSTOLIC BLOOD PRESSURE: 122 MMHG | DIASTOLIC BLOOD PRESSURE: 80 MMHG | OXYGEN SATURATION: 98 %

## 2024-03-04 DIAGNOSIS — T78.40XA ALLERGY, INITIAL ENCOUNTER: ICD-10-CM

## 2024-03-04 DIAGNOSIS — I10 ESSENTIAL HYPERTENSION: ICD-10-CM

## 2024-03-04 DIAGNOSIS — R35.89 POLYURIA: Primary | ICD-10-CM

## 2024-03-04 LAB
BACTERIA #/AREA URNS HPF: ABNORMAL /HPF
BILIRUB SERPL-MCNC: NEGATIVE MG/DL
BILIRUB UR QL STRIP: NEGATIVE
BLOOD URINE, POC: ABNORMAL
CLARITY UR: ABNORMAL
CLARITY, POC UA: CLEAR
COLOR UR: YELLOW
COLOR, POC UA: YELLOW
GLUCOSE UR QL STRIP: NEGATIVE
GLUCOSE UR QL STRIP: NEGATIVE
HGB UR QL STRIP: ABNORMAL
HYALINE CASTS #/AREA URNS LPF: 0 /LPF
KETONES UR QL STRIP: NEGATIVE
KETONES UR QL STRIP: NEGATIVE
LEUKOCYTE ESTERASE UR QL STRIP: ABNORMAL
LEUKOCYTE ESTERASE URINE, POC: ABNORMAL
MICROSCOPIC COMMENT: ABNORMAL
NITRITE UR QL STRIP: NEGATIVE
NITRITE, POC UA: NEGATIVE
PH UR STRIP: 6 [PH] (ref 5–8)
PH, POC UA: 6
PROT UR QL STRIP: ABNORMAL
PROTEIN, POC: 30
RBC #/AREA URNS HPF: 40 /HPF (ref 0–4)
SP GR UR STRIP: 1.02 (ref 1–1.03)
SPECIFIC GRAVITY, POC UA: 1.02
SQUAMOUS #/AREA URNS HPF: 5 /HPF
URN SPEC COLLECT METH UR: ABNORMAL
UROBILINOGEN UR STRIP-ACNC: NEGATIVE EU/DL
UROBILINOGEN, POC UA: NEGATIVE
WBC #/AREA URNS HPF: >100 /HPF (ref 0–5)

## 2024-03-04 PROCEDURE — 99214 OFFICE O/P EST MOD 30 MIN: CPT | Mod: S$GLB,,, | Performed by: STUDENT IN AN ORGANIZED HEALTH CARE EDUCATION/TRAINING PROGRAM

## 2024-03-04 PROCEDURE — 1126F AMNT PAIN NOTED NONE PRSNT: CPT | Mod: CPTII,S$GLB,, | Performed by: STUDENT IN AN ORGANIZED HEALTH CARE EDUCATION/TRAINING PROGRAM

## 2024-03-04 PROCEDURE — 1157F ADVNC CARE PLAN IN RCRD: CPT | Mod: CPTII,S$GLB,, | Performed by: STUDENT IN AN ORGANIZED HEALTH CARE EDUCATION/TRAINING PROGRAM

## 2024-03-04 PROCEDURE — 81002 URINALYSIS NONAUTO W/O SCOPE: CPT | Mod: S$GLB,,, | Performed by: STUDENT IN AN ORGANIZED HEALTH CARE EDUCATION/TRAINING PROGRAM

## 2024-03-04 PROCEDURE — 3074F SYST BP LT 130 MM HG: CPT | Mod: CPTII,S$GLB,, | Performed by: STUDENT IN AN ORGANIZED HEALTH CARE EDUCATION/TRAINING PROGRAM

## 2024-03-04 PROCEDURE — 1160F RVW MEDS BY RX/DR IN RCRD: CPT | Mod: CPTII,S$GLB,, | Performed by: STUDENT IN AN ORGANIZED HEALTH CARE EDUCATION/TRAINING PROGRAM

## 2024-03-04 PROCEDURE — 87077 CULTURE AEROBIC IDENTIFY: CPT | Performed by: STUDENT IN AN ORGANIZED HEALTH CARE EDUCATION/TRAINING PROGRAM

## 2024-03-04 PROCEDURE — 87086 URINE CULTURE/COLONY COUNT: CPT | Performed by: STUDENT IN AN ORGANIZED HEALTH CARE EDUCATION/TRAINING PROGRAM

## 2024-03-04 PROCEDURE — 1159F MED LIST DOCD IN RCRD: CPT | Mod: CPTII,S$GLB,, | Performed by: STUDENT IN AN ORGANIZED HEALTH CARE EDUCATION/TRAINING PROGRAM

## 2024-03-04 PROCEDURE — 3044F HG A1C LEVEL LT 7.0%: CPT | Mod: CPTII,S$GLB,, | Performed by: STUDENT IN AN ORGANIZED HEALTH CARE EDUCATION/TRAINING PROGRAM

## 2024-03-04 PROCEDURE — 87088 URINE BACTERIA CULTURE: CPT | Performed by: STUDENT IN AN ORGANIZED HEALTH CARE EDUCATION/TRAINING PROGRAM

## 2024-03-04 PROCEDURE — 87186 SC STD MICRODIL/AGAR DIL: CPT | Performed by: STUDENT IN AN ORGANIZED HEALTH CARE EDUCATION/TRAINING PROGRAM

## 2024-03-04 PROCEDURE — 3079F DIAST BP 80-89 MM HG: CPT | Mod: CPTII,S$GLB,, | Performed by: STUDENT IN AN ORGANIZED HEALTH CARE EDUCATION/TRAINING PROGRAM

## 2024-03-04 PROCEDURE — 99999 PR PBB SHADOW E&M-EST. PATIENT-LVL III: CPT | Mod: PBBFAC,,, | Performed by: STUDENT IN AN ORGANIZED HEALTH CARE EDUCATION/TRAINING PROGRAM

## 2024-03-04 PROCEDURE — 3288F FALL RISK ASSESSMENT DOCD: CPT | Mod: CPTII,S$GLB,, | Performed by: STUDENT IN AN ORGANIZED HEALTH CARE EDUCATION/TRAINING PROGRAM

## 2024-03-04 PROCEDURE — 81000 URINALYSIS NONAUTO W/SCOPE: CPT | Performed by: STUDENT IN AN ORGANIZED HEALTH CARE EDUCATION/TRAINING PROGRAM

## 2024-03-04 PROCEDURE — 3008F BODY MASS INDEX DOCD: CPT | Mod: CPTII,S$GLB,, | Performed by: STUDENT IN AN ORGANIZED HEALTH CARE EDUCATION/TRAINING PROGRAM

## 2024-03-04 PROCEDURE — 1101F PT FALLS ASSESS-DOCD LE1/YR: CPT | Mod: CPTII,S$GLB,, | Performed by: STUDENT IN AN ORGANIZED HEALTH CARE EDUCATION/TRAINING PROGRAM

## 2024-03-04 RX ORDER — FLUTICASONE PROPIONATE 50 MCG
2 SPRAY, SUSPENSION (ML) NASAL
Qty: 1 ML | Refills: 3 | Status: SHIPPED | OUTPATIENT
Start: 2024-03-04

## 2024-03-04 RX ORDER — NITROFURANTOIN 25; 75 MG/1; MG/1
100 CAPSULE ORAL 2 TIMES DAILY
Qty: 14 CAPSULE | Refills: 0 | Status: SHIPPED | OUTPATIENT
Start: 2024-03-04 | End: 2024-04-17

## 2024-03-04 NOTE — PROGRESS NOTES
[facility]    Subjective:    The HPI and pertinent ROS is included in the Diagnostic Impression Remarks section at the end of the note. Please see below for further details. Chief complaint is at end of note.     Liv is a pleasant intelligent patient who is here for evaluation.     Modified Medications    Modified Medication Previous Medication    FLUTICASONE PROPIONATE (FLONASE) 50 MCG/ACTUATION NASAL SPRAY fluticasone propionate (FLONASE) 50 mcg/actuation nasal spray       2 sprays (100 mcg total) by Each Nostril route as needed for Rhinitis.    by Each Nostril route.       Data reviewed 274}  Previous medical records reviewed and summarized in plan section at end of note.      If you are due for any health screening(s) below please notify me so we can arrange them to be ordered and scheduled. Most healthy patients at your age complete them, but you are free to accept or refuse. If you can't do it, I'll definitely understand. If you can, I'd certainly appreciate it!     All of your core healthy metrics are met.      The following portions of the patient's history were reviewed and updated as appropriate: allergies, current medications, past family history, past medical history, past social history, past surgical history and problem list.    She  has a past medical history of Arthritis, Bilateral breast cancer, Breast cancer (2004), Diabetes mellitus, High cholesterol, Hypertension, and PONV (postoperative nausea and vomiting).  She  has a past surgical history that includes Tonsillectomy (1974); Hysterectomy (10/1/2003); Mastectomy (Bilateral, 5/16/2004); Cholecystectomy (2/2001); Oophorectomy (Left, 5/18/2004); Ablation of dysrhythmic focus (2000); Total abdominal hysterectomy w/ bilateral salpingoophorectomy; Colonoscopy; Mitral valve replacement (1980); Injection of joint (Right, 11/25/2020); Injection of steroid (Right, 2/10/2021); Hip Arthroplasty (Right, 4/5/2021); Osteotomy of  "metatarsal bone (Left, 2/10/2022); Esophagogastroduodenoscopy (N/A, 5/23/2022); Esophageal dilation (N/A, 5/23/2022); and Hip Arthroplasty (Left, 9/26/2022).    She  reports that she quit smoking about 25 years ago. Her smoking use included cigarettes. She has never used smokeless tobacco. She reports current alcohol use. She reports that she does not use drugs.  She family history includes Cancer in her mother; Early death in her brother; Heart failure in her father; Liver cancer in her mother; No Known Problems in her sister.    Review of patient's allergies indicates:   Allergen Reactions    Lisinopril      Other reaction(s): Angioedema       Tobacco Use: Medium Risk (3/4/2024)    Patient History     Smoking Tobacco Use: Former     Smokeless Tobacco Use: Never     Passive Exposure: Not on file     Physical Examination  General appearance: alert, cooperative, no distress  Neck: no thyromegaly, no neck stiffness  Lungs: clear to auscultation, no wheezes, rales or rhonchi, symmetric air entry  Heart: normal rate, regular rhythm, normal S1, S2, no murmurs, rubs, clicks or gallops  Abdomen: soft, nontender, nondistended, no rigidity, rebound, or guarding.   Back: no point tenderness over spine  Extremities: peripheral pulses normal, no unilateral leg swelling or calf tenderness   Neurological:alert, oriented, normal speech, no new focal findings or movement disorder noted from baseline    BP Readings from Last 3 Encounters:   03/04/24 122/80   01/23/24 (!) 144/84   11/30/23 136/84     Wt Readings from Last 3 Encounters:   03/04/24 71.9 kg (158 lb 9.6 oz)   01/23/24 72.6 kg (160 lb)   11/30/23 72.7 kg (160 lb 3.2 oz)     /80 (BP Location: Right arm, Patient Position: Sitting, BP Method: Medium (Manual))   Pulse 74   Resp 14   Ht 5' 4" (1.626 m)   Wt 71.9 kg (158 lb 9.6 oz)   LMP  (LMP Unknown)   SpO2 98%   BMI 27.22 kg/m²    274}  Laboratory: I have reviewed old labs below:    274}    Lab Results " "  Component Value Date    WBC 5.47 09/21/2023    HGB 13.8 09/21/2023    HCT 41.1 09/21/2023    MCV 94 09/21/2023     09/21/2023     09/21/2023    K 3.4 (L) 09/21/2023     09/21/2023    CALCIUM 9.5 09/21/2023    CO2 24 09/21/2023     (H) 09/21/2023    BUN 8 09/21/2023    CREATININE 0.8 09/21/2023    EGFRNORACEVR >60.0 09/21/2023    ANIONGAP 11 09/21/2023    PROT 7.4 09/21/2023    ALBUMIN 4.4 09/21/2023    BILITOT 0.5 09/21/2023    ALKPHOS 54 (L) 09/21/2023    ALT 27 09/21/2023    AST 20 09/21/2023    INR 1.0 09/16/2022    CHOL 242 (H) 06/28/2023    TRIG 181 (H) 06/28/2023    HDL 52 06/28/2023    LDLCALC 153.8 06/28/2023    TSH 1.674 06/28/2023    HGBA1C 5.7 (H) 01/23/2024      Lab reviewed by me: Particular labs of significance that I will monitor, workup, or treat to improve are mentioned below in diagnostic impression remarks.    Imaging/EKG: I have reviewed the pertinent results and my findings are noted in remarks.  274}    CC:   Chief Complaint   Patient presents with    Urinary Tract Infection        274}    Assessment/Plan  Liv Garland is a 63 y.o. female who presents to clinic with:  1. Polyuria    2. Allergy, initial encounter    3. Essential hypertension       274}  Diagnostic Impression Remarks + HPI     Documentation entered by me for this encounter may have been done in part using speech-recognition technology. Although I have made an effort to ensure accuracy, "sound like" errors may exist and should be interpreted in context.      Polyuria: similar symptoms to prior UTI. Symptoms started shortly at intercourse. Will treat empirically and do work up  Allergies: controlled on flonase. Continue   HTN: controlled hctz    Additional workup planned: see labs ordered below.    See below for labs and meds ordered with associated diagnosis      1. Polyuria  - nitrofurantoin, macrocrystal-monohydrate, (MACROBID) 100 MG capsule; Take 1 capsule (100 mg total) by mouth 2 (two) times " daily.  Dispense: 14 capsule; Refill: 0  - POCT URINE DIPSTICK WITHOUT MICROSCOPE; Future  - Urinalysis, Reflex to Urine Culture Urine, Clean Catch; Future    2. Allergy, initial encounter  - fluticasone propionate (FLONASE) 50 mcg/actuation nasal spray; 2 sprays (100 mcg total) by Each Nostril route as needed for Rhinitis.  Dispense: 1 mL; Refill: 3    3. Essential hypertension      Jayashree Brand MD   274}    If you are due for any health screening(s) below please notify me so we can arrange them to be ordered and scheduled. Most healthy patients at your age complete them, but you are free to accept or refuse.     If you can't do it, I'll definitely understand. If you can, I'd certainly appreciate it!   All of your core healthy metrics are met.

## 2024-03-07 LAB — BACTERIA UR CULT: ABNORMAL

## 2024-03-13 ENCOUNTER — PATIENT MESSAGE (OUTPATIENT)
Dept: ADMINISTRATIVE | Facility: OTHER | Age: 64
End: 2024-03-13
Payer: MEDICARE

## 2024-03-17 ENCOUNTER — PATIENT MESSAGE (OUTPATIENT)
Dept: ADMINISTRATIVE | Facility: OTHER | Age: 64
End: 2024-03-17
Payer: MEDICARE

## 2024-03-24 ENCOUNTER — PATIENT MESSAGE (OUTPATIENT)
Dept: ADMINISTRATIVE | Facility: OTHER | Age: 64
End: 2024-03-24
Payer: MEDICARE

## 2024-03-27 ENCOUNTER — PATIENT MESSAGE (OUTPATIENT)
Dept: ADMINISTRATIVE | Facility: OTHER | Age: 64
End: 2024-03-27
Payer: MEDICARE

## 2024-03-27 DIAGNOSIS — E11.65 TYPE 2 DIABETES MELLITUS WITH HYPERGLYCEMIA, WITHOUT LONG-TERM CURRENT USE OF INSULIN: ICD-10-CM

## 2024-03-27 RX ORDER — SEMAGLUTIDE 0.68 MG/ML
0.5 INJECTION, SOLUTION SUBCUTANEOUS
Qty: 4 EACH | Refills: 0 | Status: CANCELLED | OUTPATIENT
Start: 2024-03-27

## 2024-03-27 RX ORDER — SEMAGLUTIDE 0.68 MG/ML
0.5 INJECTION, SOLUTION SUBCUTANEOUS
Qty: 9 ML | Refills: 1 | Status: CANCELLED | OUTPATIENT
Start: 2024-03-27

## 2024-03-27 NOTE — TELEPHONE ENCOUNTER
Refill Routing Note   Medication(s) are not appropriate for processing by [facility] for the following reason(s):        No active prescription written by provider: patient requesting for dose increase to 1 mg weekly; pended order for provider's review    [facility] action(s):  Defer             Appointments  past 12m or future 3m with PCP    Date Provider   Last Visit   3/4/2024 Jayashree Brand MD   Next Visit   Visit date not found Jayashree Brand MD   ED visits in past 90 days: 0        Note composed:2:03 PM 03/27/2024

## 2024-03-27 NOTE — TELEPHONE ENCOUNTER
No care due was identified.  [facility] Embedded Care Due Messages. Reference number: [id number].   3/27/2024 8:49:36 AM CDT

## 2024-04-17 ENCOUNTER — OFFICE VISIT (OUTPATIENT)
Dept: FAMILY MEDICINE | Facility: CLINIC | Age: 64
End: 2024-04-17
Payer: MEDICARE

## 2024-04-17 VITALS
SYSTOLIC BLOOD PRESSURE: 132 MMHG | DIASTOLIC BLOOD PRESSURE: 76 MMHG | BODY MASS INDEX: 26.36 KG/M2 | HEIGHT: 64 IN | OXYGEN SATURATION: 96 % | HEART RATE: 71 BPM | WEIGHT: 154.38 LBS

## 2024-04-17 DIAGNOSIS — K30 INDIGESTION: Primary | ICD-10-CM

## 2024-04-17 DIAGNOSIS — E11.65 TYPE 2 DIABETES MELLITUS WITH HYPERGLYCEMIA, WITHOUT LONG-TERM CURRENT USE OF INSULIN: ICD-10-CM

## 2024-04-17 PROCEDURE — 99214 OFFICE O/P EST MOD 30 MIN: CPT | Mod: S$GLB,,, | Performed by: STUDENT IN AN ORGANIZED HEALTH CARE EDUCATION/TRAINING PROGRAM

## 2024-04-17 PROCEDURE — 99999 PR PBB SHADOW E&M-EST. PATIENT-LVL III: CPT | Mod: PBBFAC,,, | Performed by: STUDENT IN AN ORGANIZED HEALTH CARE EDUCATION/TRAINING PROGRAM

## 2024-04-17 PROCEDURE — 1126F AMNT PAIN NOTED NONE PRSNT: CPT | Mod: CPTII,S$GLB,, | Performed by: STUDENT IN AN ORGANIZED HEALTH CARE EDUCATION/TRAINING PROGRAM

## 2024-04-17 PROCEDURE — 3008F BODY MASS INDEX DOCD: CPT | Mod: CPTII,S$GLB,, | Performed by: STUDENT IN AN ORGANIZED HEALTH CARE EDUCATION/TRAINING PROGRAM

## 2024-04-17 PROCEDURE — 1101F PT FALLS ASSESS-DOCD LE1/YR: CPT | Mod: CPTII,S$GLB,, | Performed by: STUDENT IN AN ORGANIZED HEALTH CARE EDUCATION/TRAINING PROGRAM

## 2024-04-17 PROCEDURE — 3044F HG A1C LEVEL LT 7.0%: CPT | Mod: CPTII,S$GLB,, | Performed by: STUDENT IN AN ORGANIZED HEALTH CARE EDUCATION/TRAINING PROGRAM

## 2024-04-17 PROCEDURE — 1157F ADVNC CARE PLAN IN RCRD: CPT | Mod: CPTII,S$GLB,, | Performed by: STUDENT IN AN ORGANIZED HEALTH CARE EDUCATION/TRAINING PROGRAM

## 2024-04-17 PROCEDURE — 3288F FALL RISK ASSESSMENT DOCD: CPT | Mod: CPTII,S$GLB,, | Performed by: STUDENT IN AN ORGANIZED HEALTH CARE EDUCATION/TRAINING PROGRAM

## 2024-04-17 PROCEDURE — 1159F MED LIST DOCD IN RCRD: CPT | Mod: CPTII,S$GLB,, | Performed by: STUDENT IN AN ORGANIZED HEALTH CARE EDUCATION/TRAINING PROGRAM

## 2024-04-17 PROCEDURE — 1160F RVW MEDS BY RX/DR IN RCRD: CPT | Mod: CPTII,S$GLB,, | Performed by: STUDENT IN AN ORGANIZED HEALTH CARE EDUCATION/TRAINING PROGRAM

## 2024-04-17 PROCEDURE — 3078F DIAST BP <80 MM HG: CPT | Mod: CPTII,S$GLB,, | Performed by: STUDENT IN AN ORGANIZED HEALTH CARE EDUCATION/TRAINING PROGRAM

## 2024-04-17 PROCEDURE — 3075F SYST BP GE 130 - 139MM HG: CPT | Mod: CPTII,S$GLB,, | Performed by: STUDENT IN AN ORGANIZED HEALTH CARE EDUCATION/TRAINING PROGRAM

## 2024-04-17 RX ORDER — OMEPRAZOLE 20 MG/1
20 CAPSULE, DELAYED RELEASE ORAL DAILY
COMMUNITY
End: 2024-04-17

## 2024-04-17 RX ORDER — SUCRALFATE 1 G/1
1 TABLET ORAL 4 TIMES DAILY
Qty: 30 TABLET | Refills: 3 | Status: SHIPPED | OUTPATIENT
Start: 2024-04-17

## 2024-04-17 RX ORDER — SEMAGLUTIDE 2.68 MG/ML
2 INJECTION, SOLUTION SUBCUTANEOUS
Qty: 3 ML | Refills: 11 | Status: SHIPPED | OUTPATIENT
Start: 2024-04-17 | End: 2024-04-24 | Stop reason: SDUPTHER

## 2024-04-17 RX ORDER — TRAZODONE HYDROCHLORIDE 50 MG/1
50 TABLET ORAL
COMMUNITY
Start: 2024-04-11 | End: 2024-05-21 | Stop reason: SDUPTHER

## 2024-04-17 RX ORDER — OMEPRAZOLE 40 MG/1
40 CAPSULE, DELAYED RELEASE ORAL DAILY
Qty: 90 CAPSULE | Refills: 3 | Status: SHIPPED | OUTPATIENT
Start: 2024-04-17 | End: 2024-05-18 | Stop reason: SDUPTHER

## 2024-04-17 NOTE — PROGRESS NOTES
[facility] Note    Subjective:    The HPI and pertinent ROS is included in the Diagnostic Impression Remarks section at the end of the note. Please see below for further details. Chief complaint is at end of note.     Liv is a pleasant intelligent patient who is here for evaluation.     Modified Medications    No medications on file       Data reviewed 274}  Previous medical records reviewed and summarized in plan section at end of note.      If you are due for any health screening(s) below please notify me so we can arrange them to be ordered and scheduled. Most healthy patients at your age complete them, but you are free to accept or refuse. If you can't do it, I'll definitely understand. If you can, I'd certainly appreciate it!     Tests to Keep You Healthy    Eye Exam: DUE  Colon Cancer Screening: Met on 7/23/2021  Last Blood Pressure <= 139/89 (4/17/2024): Yes  Last HbA1c < 8 (01/23/2024): Yes      The following portions of the patient's history were reviewed and updated as appropriate: allergies, current medications, past family history, past medical history, past social history, past surgical history and problem list.    She  has a past medical history of Arthritis, Bilateral breast cancer, Breast cancer (2004), Diabetes mellitus, High cholesterol, Hypertension, and PONV (postoperative nausea and vomiting).  She  has a past surgical history that includes Tonsillectomy (1974); Hysterectomy (10/1/2003); Mastectomy (Bilateral, 5/16/2004); Cholecystectomy (2/2001); Oophorectomy (Left, 5/18/2004); Ablation of dysrhythmic focus (2000); Total abdominal hysterectomy w/ bilateral salpingoophorectomy; Colonoscopy; Mitral valve replacement (1980); Injection of joint (Right, 11/25/2020); Injection of steroid (Right, 2/10/2021); Hip Arthroplasty (Right, 4/5/2021); Osteotomy of metatarsal bone (Left, 2/10/2022); Esophagogastroduodenoscopy (N/A, 5/23/2022); Esophageal dilation (N/A, 5/23/2022); and Hip  "Arthroplasty (Left, 9/26/2022).    She  reports that she quit smoking about 25 years ago. Her smoking use included cigarettes. She has never used smokeless tobacco. She reports current alcohol use. She reports that she does not use drugs.  She family history includes Cancer in her mother; Early death in her brother; Heart failure in her father; Liver cancer in her mother; No Known Problems in her sister.    Review of patient's allergies indicates:   Allergen Reactions    Lisinopril      Other reaction(s): Angioedema       Tobacco Use: Medium Risk (3/4/2024)    Patient History     Smoking Tobacco Use: Former     Smokeless Tobacco Use: Never     Passive Exposure: Not on file     Physical Examination  General appearance: alert, cooperative, no distress  Neck: no thyromegaly, no neck stiffness  Lungs: clear to auscultation, no wheezes, rales or rhonchi, symmetric air entry  Heart: normal rate, regular rhythm, normal S1, S2, no murmurs, rubs, clicks or gallops  Abdomen: soft, nontender, nondistended, no rigidity, rebound, or guarding.   Back: no point tenderness over spine  Extremities: peripheral pulses normal, no unilateral leg swelling or calf tenderness   Neurological:alert, oriented, normal speech, no new focal findings or movement disorder noted from baseline    BP Readings from Last 3 Encounters:   04/17/24 132/76   03/04/24 122/80   01/23/24 (!) 144/84     Wt Readings from Last 3 Encounters:   04/17/24 70 kg (154 lb 6.4 oz)   03/04/24 71.9 kg (158 lb 9.6 oz)   01/23/24 72.6 kg (160 lb)     /76 (BP Location: Right arm, Patient Position: Sitting, BP Method: Medium (Manual))   Pulse 71   Ht 5' 4" (1.626 m)   Wt 70 kg (154 lb 6.4 oz)   LMP  (LMP Unknown)   SpO2 96%   BMI 26.50 kg/m²    274}  Laboratory: I have reviewed old labs below:    274}    Lab Results   Component Value Date    WBC 5.47 09/21/2023    HGB 13.8 09/21/2023    HCT 41.1 09/21/2023    MCV 94 09/21/2023     09/21/2023     " "09/21/2023    K 3.4 (L) 09/21/2023     09/21/2023    CALCIUM 9.5 09/21/2023    CO2 24 09/21/2023     (H) 09/21/2023    BUN 8 09/21/2023    CREATININE 0.8 09/21/2023    EGFRNORACEVR >60.0 09/21/2023    ANIONGAP 11 09/21/2023    PROT 7.4 09/21/2023    ALBUMIN 4.4 09/21/2023    BILITOT 0.5 09/21/2023    ALKPHOS 54 (L) 09/21/2023    ALT 27 09/21/2023    AST 20 09/21/2023    INR 1.0 09/16/2022    CHOL 242 (H) 06/28/2023    TRIG 181 (H) 06/28/2023    HDL 52 06/28/2023    LDLCALC 153.8 06/28/2023    TSH 1.674 06/28/2023    HGBA1C 5.7 (H) 01/23/2024      Lab reviewed by me: Particular labs of significance that I will monitor, workup, or treat to improve are mentioned below in diagnostic impression remarks.    Imaging/EKG: I have reviewed the pertinent results and my findings are noted in remarks.  274}    CC:   Chief Complaint   Patient presents with    Heartburn     Battling over a year, heartburn, willing to do a scope        274}    Assessment/Plan  Liv Garland is a 63 y.o. female who presents to clinic with:  1. Indigestion    2. Type 2 diabetes mellitus with hyperglycemia, without long-term current use of insulin       274}  Diagnostic Impression Remarks + HPI     Documentation entered by me for this encounter may have been done in part using speech-recognition technology. Although I have made an effort to ensure accuracy, "sound like" errors may exist and should be interpreted in context.      Patient c/o indigestion. Taking omeprazole which it helps. If not improving, will refer to GI for scope  Patient agreed to increase ozempic    Additional workup planned: see labs ordered below.    See below for labs and meds ordered with associated diagnosis      1. Type 2 diabetes mellitus with hyperglycemia, without long-term current use of insulin  - semaglutide (OZEMPIC) 2 mg/dose (8 mg/3 mL) PnIj; Inject 2 mg into the skin every 7 days.  Dispense: 3 mL; Refill: 11    2. Indigestion  - omeprazole (PRILOSEC) " 40 MG capsule; Take 1 capsule (40 mg total) by mouth once daily.  Dispense: 90 capsule; Refill: 3  - sucralfate (CARAFATE) 1 gram tablet; Take 1 tablet (1 g total) by mouth 4 (four) times daily.  Dispense: 30 tablet; Refill: 3      Jayashree Brand MD   274}    If you are due for any health screening(s) below please notify me so we can arrange them to be ordered and scheduled. Most healthy patients at your age complete them, but you are free to accept or refuse.     If you can't do it, I'll definitely understand. If you can, I'd certainly appreciate it!   Tests to Keep You Healthy    Eye Exam: DUE  Colon Cancer Screening: Met on 7/23/2021  Last Blood Pressure <= 139/89 (4/17/2024): Yes  Last HbA1c < 8 (01/23/2024): Yes

## 2024-04-18 ENCOUNTER — TELEPHONE (OUTPATIENT)
Dept: FAMILY MEDICINE | Facility: CLINIC | Age: 64
End: 2024-04-18
Payer: MEDICARE

## 2024-04-18 NOTE — TELEPHONE ENCOUNTER
----- Message from Dee Dee Franz sent at 4/18/2024  2:01 PM CDT -----  Contact: KULWANT MARIN  Type:  Pharmacy Calling to Clarify an RX    Name of Caller:TANIA  Pharmacy Name:[facility]   Prescription Name:semaglutide (OZEMPIC) 2 mg/dose (8 mg/3 mL) PnIj  What do they need to clarify?:PLEASE WRITE A NEX RX WITH THE DIAGNOSES CODE    Best Call Back Number:[phone] / [phone]  Additional Information: THANK YOU

## 2024-04-24 DIAGNOSIS — E11.65 TYPE 2 DIABETES MELLITUS WITH HYPERGLYCEMIA, WITHOUT LONG-TERM CURRENT USE OF INSULIN: ICD-10-CM

## 2024-04-24 RX ORDER — SEMAGLUTIDE 2.68 MG/ML
2 INJECTION, SOLUTION SUBCUTANEOUS
Qty: 3 ML | Refills: 11 | Status: SHIPPED | OUTPATIENT
Start: 2024-04-24 | End: 2024-04-25 | Stop reason: SDUPTHER

## 2024-04-24 RX ORDER — SEMAGLUTIDE 2.68 MG/ML
2 INJECTION, SOLUTION SUBCUTANEOUS
Qty: 3 ML | Refills: 11 | OUTPATIENT
Start: 2024-04-24 | End: 2025-04-24

## 2024-04-24 NOTE — TELEPHONE ENCOUNTER
Care Due:                  Date            Visit Type   Department     Provider  --------------------------------------------------------------------------------                                MYCHART                              FOLLOWUP/OF  [facility] 2ND FLOOR  Last Visit: 04-      [facility]  Next Visit: None Scheduled  None         None Found                                                            Last  Test          Frequency    Reason                     Performed    Due Date  --------------------------------------------------------------------------------    HBA1C.......  6 months...  semaglutide..............  01- 07-    Health [facility] Embedded Care Due Messages. Reference number: [id number].   4/24/2024 9:34:32 AM CDT

## 2024-04-25 DIAGNOSIS — E11.65 TYPE 2 DIABETES MELLITUS WITH HYPERGLYCEMIA, WITHOUT LONG-TERM CURRENT USE OF INSULIN: ICD-10-CM

## 2024-04-25 RX ORDER — SEMAGLUTIDE 2.68 MG/ML
2 INJECTION, SOLUTION SUBCUTANEOUS
Qty: 3 ML | Refills: 11 | Status: SHIPPED | OUTPATIENT
Start: 2024-04-25 | End: 2024-05-18 | Stop reason: SDUPTHER

## 2024-04-25 NOTE — TELEPHONE ENCOUNTER
No care due was identified.  [facility] Embedded Care Due Messages. Reference number: [id number].   4/25/2024 9:31:31 AM CDT

## 2024-05-06 ENCOUNTER — TELEPHONE (OUTPATIENT)
Dept: FAMILY MEDICINE | Facility: CLINIC | Age: 64
End: 2024-05-06
Payer: MEDICARE

## 2024-05-06 NOTE — TELEPHONE ENCOUNTER
----- Message from Doris Linares sent at 5/6/2024 11:24 AM CDT -----  #Type: Needs Medical Advice    Who Called: [place] from [email or web address]    Would the patient rather a call back or a response via [facility]? Call back    Best Call Back Number: [phone]    Additional Information: Trying to confirm office received has received fax. Please call back to advise. Thanks!

## 2024-05-18 DIAGNOSIS — F51.01 PRIMARY INSOMNIA: Primary | ICD-10-CM

## 2024-05-18 DIAGNOSIS — K30 INDIGESTION: ICD-10-CM

## 2024-05-18 DIAGNOSIS — E11.65 TYPE 2 DIABETES MELLITUS WITH HYPERGLYCEMIA, WITHOUT LONG-TERM CURRENT USE OF INSULIN: ICD-10-CM

## 2024-05-18 NOTE — TELEPHONE ENCOUNTER
No care due was identified.  [facility] Embedded Care Due Messages. Reference number: [id number].   5/18/2024 9:26:05 AM CDT

## 2024-05-20 RX ORDER — SEMAGLUTIDE 2.68 MG/ML
2 INJECTION, SOLUTION SUBCUTANEOUS
Qty: 3 ML | Refills: 11 | Status: SHIPPED | OUTPATIENT
Start: 2024-05-20 | End: 2025-05-20

## 2024-05-20 RX ORDER — LOSARTAN POTASSIUM AND HYDROCHLOROTHIAZIDE 25; 100 MG/1; MG/1
1 TABLET ORAL DAILY
Qty: 90 TABLET | Refills: 3 | Status: SHIPPED | OUTPATIENT
Start: 2024-05-20 | End: 2025-05-20

## 2024-05-20 RX ORDER — OMEPRAZOLE 40 MG/1
40 CAPSULE, DELAYED RELEASE ORAL DAILY
Qty: 90 CAPSULE | Refills: 3 | Status: SHIPPED | OUTPATIENT
Start: 2024-05-20 | End: 2025-05-20

## 2024-05-21 ENCOUNTER — TELEPHONE (OUTPATIENT)
Dept: FAMILY MEDICINE | Facility: CLINIC | Age: 64
End: 2024-05-21
Payer: MEDICARE

## 2024-05-21 RX ORDER — TRAZODONE HYDROCHLORIDE 50 MG/1
50 TABLET ORAL NIGHTLY
Qty: 90 TABLET | Refills: 3 | Status: SHIPPED | OUTPATIENT
Start: 2024-05-21

## 2024-05-21 NOTE — TELEPHONE ENCOUNTER
Spoke with patient and cancelled virtual per pt request. MD filled medication (reason for visit) and voiced she did not need appt.

## 2024-06-03 ENCOUNTER — HOSPITAL ENCOUNTER (OUTPATIENT)
Dept: RADIOLOGY | Facility: HOSPITAL | Age: 64
Discharge: HOME | End: 2024-06-03
Attending: INTERNAL MEDICINE
Payer: COMMERCIAL

## 2024-06-03 DIAGNOSIS — M25.559 HIP PAIN: ICD-10-CM

## 2024-06-03 DIAGNOSIS — M79.89 LEG SWELLING: ICD-10-CM

## 2024-06-03 DIAGNOSIS — M79.89 HAND SWELLING: ICD-10-CM

## 2024-06-03 PROCEDURE — 73522 X-RAY EXAM HIPS BI 3-4 VIEWS: CPT | Mod: 26,,, | Performed by: RADIOLOGY

## 2024-06-03 PROCEDURE — 73100 X-RAY EXAM OF WRIST: CPT | Mod: 26,LT,, | Performed by: RADIOLOGY

## 2024-06-03 PROCEDURE — 73522 X-RAY EXAM HIPS BI 3-4 VIEWS: CPT | Mod: TC

## 2024-06-03 PROCEDURE — 73100 X-RAY EXAM OF WRIST: CPT | Mod: TC,LT

## 2024-06-03 PROCEDURE — 72170 X-RAY EXAM OF PELVIS: CPT | Mod: 26,,, | Performed by: RADIOLOGY

## 2024-06-03 PROCEDURE — 72170 X-RAY EXAM OF PELVIS: CPT | Mod: TC

## 2024-06-25 DIAGNOSIS — T78.40XA ALLERGY, INITIAL ENCOUNTER: ICD-10-CM

## 2024-06-26 RX ORDER — FLUTICASONE PROPIONATE 50 MCG
2 SPRAY, SUSPENSION (ML) NASAL
Qty: 16 ML | Refills: 3 | Status: SHIPPED | OUTPATIENT
Start: 2024-06-26

## 2024-06-26 NOTE — TELEPHONE ENCOUNTER
Care Due:                  Date            Visit Type   Department     Provider  --------------------------------------------------------------------------------                                MYCHART                              [place]  [facility] [place]  Last Visit: 04-      [facility]  Next Visit: None Scheduled  None         None Found                                                            Last  Test          Frequency    Reason                     Performed    Due Date  --------------------------------------------------------------------------------    CMP.........  12 months..  losartan-hydrochlorothiaz  09-   09-                             ayaka......................    HBA1C.......  6 months...  semaglutide..............  01- 07-    Health [facility] Embedded Care Due Messages. Reference number: [id number].   6/25/2024 8:15:27 PM CDT

## 2024-06-26 NOTE — TELEPHONE ENCOUNTER
Refill Routing Note   Medication(s) are not appropriate for processing by [facility] for the following reason(s):        New or recently adjusted medication    [facility] action(s):  Defer     Requires labs : Yes             Appointments  past 12m or future 3m with PCP    Date Provider   Last Visit   4/17/2024 Jayashree Brand MD   Next Visit   Visit date not found Jayashree Brand MD   ED visits in past 90 days: 0        Note composed:8:37 AM 06/26/2024

## 2024-07-14 DIAGNOSIS — T78.40XA ALLERGY, INITIAL ENCOUNTER: ICD-10-CM

## 2024-07-14 DIAGNOSIS — Z12.11 ENCOUNTER FOR SCREENING FOR MALIGNANT NEOPLASM OF COLON: Primary | ICD-10-CM

## 2024-07-14 NOTE — TELEPHONE ENCOUNTER
No care due was identified.  Health [facility] Embedded Care Due Messages. Reference number: [id number].   7/14/2024 10:09:17 AM CDT

## 2024-07-15 RX ORDER — FLUTICASONE PROPIONATE 50 MCG
2 SPRAY, SUSPENSION (ML) NASAL
Qty: 48 ML | Refills: 3 | Status: SHIPPED | OUTPATIENT
Start: 2024-07-15

## 2024-07-15 NOTE — TELEPHONE ENCOUNTER
Refill Decision Note   Liv Garland  is requesting a refill authorization.    Brief Assessment and Rationale for Refill:   Approve       Medication Therapy Plan:         Comments:     Note composed:12:00 PM 07/15/2024

## 2024-07-16 ENCOUNTER — TELEPHONE (OUTPATIENT)
Dept: FAMILY MEDICINE | Facility: CLINIC | Age: 64
End: 2024-07-16
Payer: MEDICARE

## 2024-08-18 ENCOUNTER — E-VISIT (OUTPATIENT)
Dept: FAMILY MEDICINE | Facility: CLINIC | Age: 64
End: 2024-08-18
Payer: MEDICARE

## 2024-08-18 DIAGNOSIS — E78.00 HYPERCHOLESTEROLEMIA: ICD-10-CM

## 2024-08-18 DIAGNOSIS — I10 ESSENTIAL HYPERTENSION: Primary | ICD-10-CM

## 2024-08-18 DIAGNOSIS — E11.65 TYPE 2 DIABETES MELLITUS WITH HYPERGLYCEMIA, WITHOUT LONG-TERM CURRENT USE OF INSULIN: ICD-10-CM

## 2024-08-18 DIAGNOSIS — E55.9 VITAMIN D DEFICIENCY DISEASE: ICD-10-CM

## 2024-08-20 ENCOUNTER — PATIENT MESSAGE (OUTPATIENT)
Dept: ADMINISTRATIVE | Facility: HOSPITAL | Age: 64
End: 2024-08-20
Payer: MEDICARE

## 2024-08-20 DIAGNOSIS — Z12.11 ENCOUNTER FOR SCREENING FOR MALIGNANT NEOPLASM OF COLON: Primary | ICD-10-CM

## 2024-08-21 ENCOUNTER — TELEPHONE (OUTPATIENT)
Dept: FAMILY MEDICINE | Facility: CLINIC | Age: 64
End: 2024-08-21
Payer: MEDICARE

## 2024-08-21 NOTE — PATIENT INSTRUCTIONS
Garth ePck,     If you are due for any health screening(s) below please notify me so we can arrange them to be ordered and scheduled. Most healthy patients at your age complete them, but you are free to accept or refuse.     If you can't do it, I'll definitely understand. If you can, I'd certainly appreciate it!    Tests to Keep You Healthy    Eye Exam: DUE  Colon Cancer Screening: ORDERED  Last Blood Pressure <= 139/89 (4/17/2024): Yes  Last HbA1c < 8 (01/23/2024): Yes      Its time for your colon cancer screening     Colorectal cancer is one of the leading causes of cancer death for men and women but it doesnt have to be. Screenings can prevent colorectal cancer or find it early enough to treat and cure the disease.     Our records indicate that you may be overdue for colon cancer screening. A colonoscopy or stool screening test can help identify patients at risk for developing colon cancer. Cancer screenings save lives, so schedule yours today to stay healthy.     A colonoscopy is the preferred test for detecting colon cancer. It is needed only once every 10 years if results are negative. While you are sedated, a flexible, lighted tube with a tiny camera is inserted into the rectum and advanced through the colon to look for cancers.     An alternative screening test that is used at home and returned to the lab may also be used. It detects hidden blood in bowel movements which could indicate cancer in the colon. If results are positive, you will need a colonoscopy to determine if the blood is a sign of cancer. This type of follow up (diagnostic) colonoscopy usually requires additional copays as required by your insurance provider.     If you recently had your colon cancer screening performed outside of [facility], please let your Health care team know so that they can update your health record. Please contact your PCP if you have any questions.    Your diabetic retinal eye exam is due     Diabetes is  the #1 cause of blindness in the [place] - early detection before signs or symptoms develop can prevent debilitating blindness.     Our records indicate that you may be overdue for your annual diabetic eye exam. Eye screening can help identify patients at risk for developing vision loss which is common in diabetes. This simple screening is an important step to keeping you healthy and preventing complications from diabetes.     This recommended diabetic eye exam should take place once per year and can prevent and treat diabetes complications in the eye before developing symptoms. This can be done with a special camera is used to take photographs of the back of your eye without having to dilate them, or you can see an eye doctor for a full dilated exam.     If you recently had your yearly diabetic eye exam performed outside of [facility], please let your Health care team know so that they can update your health record.

## 2024-08-21 NOTE — TELEPHONE ENCOUNTER
Spoke with the patient and booked her labs.     Pt asked if we received a notice that her cologuard was invalid? She said they told her that and will send ehr another one to complete. I stated I will let Dr. Brand know.      ----- Message from Jayashree Brand MD sent at 8/21/2024 10:34 AM CDT -----  Please help patient schedule blood work   -likely due to underlying malignancy, lymphoma vs new MM.  PTH is inappropriately normal. vitamin D 1,25 not elevated/ PTHrP not elevated   -s/p pamidronate on 8/30; Ca improved and now remaining stable  -calcitonin x 4 doses  -hold off fluids for now given pulmonary edema that developed with IVF upon admission  -Apprec Endocrine recs, no further workup indicated for HyperCa  -continue to encourage PO intake  -Monitor Ca levels

## 2024-08-21 NOTE — PROGRESS NOTES
SUBJECTIVE:    CHIEF COMPLAINT:   Chief Complaint   Patient presents with    General Illness     Entered automatically based on patient selection in [employer].           274}    HISTORY OF PRESENT ILLNESS:  Liv Garland is a 64 y.o. female who presents to the clinic today for lab work prior to visit      PAST MEDICAL HISTORY:     274}  Past Medical History:   Diagnosis Date    Arthritis     Bilateral breast cancer     Breast cancer 2004    Dr. Dos Santos, bilat mast, no rt, tamoxifen stopped after 9 mos due to side effects    Diabetes mellitus     High cholesterol     Hypertension     PONV (postoperative nausea and vomiting)        PAST SURGICAL HISTORY:  Past Surgical History:   Procedure Laterality Date    ABLATION OF DYSRHYTHMIC FOCUS  2000    Dr. Melgoza    CHOLECYSTECTOMY  2/2001    COLONOSCOPY      repeat in 2020    ESOPHAGEAL DILATION N/A 5/23/2022    Procedure: DILATION, ESOPHAGUS;  Surgeon: Francis Bower MD;  Location: [facility] ENDO;  Service: General;  Laterality: N/A;    ESOPHAGOGASTRODUODENOSCOPY N/A 5/23/2022    Procedure: ESOPHAGOGASTRODUODENOSCOPY (EGD);  Surgeon: Francis Bower MD;  Location: [facility] ENDO;  Service: General;  Laterality: N/A;    HIP ARTHROPLASTY Right 4/5/2021    Procedure: ARTHROPLASTY, HIP;  Surgeon: Don Suárez DO;  Location: [facility] OR;  Service: Orthopedics;  Laterality: Right;  Equipment: [employer] Total Hip System  Vendor: [employer]  REQUIRES ASSISTANT: KOJO    HIP ARTHROPLASTY Left 9/26/2022    Procedure: ARTHROPLASTY, HIP;  Surgeon: Don Suárez DO;  Location: [facility] OR;  Service: Orthopedics;  Laterality: Left;    HYSTERECTOMY  10/1/2003    INJECTION OF JOINT Right 11/25/2020    Procedure: Right Hip Intra-articular Steroid Injection Under Fluoro;  Surgeon: Rios Colon MD;  Location: [facility];  Service: Pain Management;  Laterality: Right;    INJECTION OF STEROID Right 2/10/2021    Procedure: INJECTION, STEROID;  Surgeon: Don Suárez DO;  Location: [facility];   "Service: Orthopedics;  Laterality: Right;  Sedation  Equipment: 3cc syringes x2, 18ga 1.5" needle x2; 3.5" 20ga spinal needle; Kenalog 40; 1% lidocaine without Epi; 0.25% marcaine without epi  C-Arm: Entire    MASTECTOMY Bilateral 2004    MITRAL VALVE REPLACEMENT  1980    OOPHORECTOMY Left 2004    OSTEOTOMY OF METATARSAL BONE Left 2/10/2022    Procedure: OSTEOTOMY, METATARSAL BONE RESURFACING ARTHROPLASTY VERSES SHORTENING OSTEOTOMY 2ND METATARSAL, LEFT FOOT.;  Surgeon: Don Suárez DO;  Location: [facility] OR;  Service: Orthopedics;  Laterality: Left;  THIS IS AN ORIF METARSAL CARD; MAKE NOTES TO CREATE A NEW CARD PLEASE    TONSILLECTOMY  1974    TOTAL ABDOMINAL HYSTERECTOMY W/ BILATERAL SALPINGOOPHORECTOMY         SOCIAL HISTORY:  Social History     Socioeconomic History    Marital status: Single   Tobacco Use    Smoking status: Former     Current packs/day: 0.00     Types: Cigarettes     Quit date: 1999     Years since quittin.6    Smokeless tobacco: Never   Substance and Sexual Activity    Alcohol use: Yes    Drug use: No    Sexual activity: Yes     Partners: Male     Social Determinants of Health     Financial Resource Strain: Low Risk  (3/4/2024)    Overall Financial Resource Strain (CARDIA)     Difficulty of Paying Living Expenses: Not hard at all   Food Insecurity: No Food Insecurity (3/4/2024)    Hunger Vital Sign     Worried About Running Out of Food in the Last Year: Never true     Ran Out of Food in the Last Year: Never true   Transportation Needs: No Transportation Needs (3/4/2024)    PRAPARE - Transportation     Lack of Transportation (Medical): No     Lack of Transportation (Non-Medical): No   Physical Activity: Unknown (3/4/2024)    Exercise Vital Sign     Days of Exercise per Week: 3 days   Stress: Stress Concern Present (3/4/2024)    [employer] - Occupational Stress Questionnaire     Feeling of Stress : To some extent   Housing Stability: Low Risk  " (3/4/2024)    Housing Stability Vital Sign     Unable to Pay for Housing in the Last Year: No     Number of Places Lived in the Last Year: 1     Unstable Housing in the Last Year: No       FAMILY HISTORY:       Family History   Problem Relation Name Age of Onset    Liver cancer Mother          69 y/o    Cancer Mother      Heart failure Father          81 y/o with dementia     No Known Problems Sister 2     Early death Brother 1         accident at 29 y/o       ALLERGIES AND MEDICATIONS: updated and reviewed.      274}  Review of patient's allergies indicates:   Allergen Reactions    Lisinopril      Other reaction(s): Angioedema     Medication List with Changes/Refills   Current Medications    FLUTICASONE PROPIONATE (FLONASE) 50 MCG/ACTUATION NASAL SPRAY    2 sprays (100 mcg total) by Each Nostril route as needed for Rhinitis.    LOSARTAN-HYDROCHLOROTHIAZIDE 100-25 MG (HYZAAR) 100-25 MG PER TABLET    Take 1 tablet by mouth once daily.    OMEPRAZOLE (PRILOSEC) 40 MG CAPSULE    Take 1 capsule (40 mg total) by mouth once daily.    SEMAGLUTIDE (OZEMPIC) 2 MG/DOSE (8 MG/3 ML) PNIJ    Inject 2 mg into the skin every 7 days.    SUCRALFATE (CARAFATE) 1 GRAM TABLET    Take 1 tablet (1 g total) by mouth 4 (four) times daily.    TRAZODONE (DESYREL) 50 MG TABLET    Take 1 tablet (50 mg total) by mouth every evening.       SCREENING HISTORY:    274}  Health Maintenance         Date Due Completion Date    Diabetes Urine Screening Never done ---    Foot Exam Never done ---    Eye Exam Never done ---    RSV Vaccine (Age 60+ and Pregnant patients) (1 - 1-dose 60+ series) Never done ---    COVID-19 Vaccine (4 - 2023-24 season) 09/01/2023 8/19/2021    Lipid Panel 06/28/2024 6/28/2023    Colorectal Cancer Screening 07/23/2024 7/23/2021    Hemoglobin A1c 07/23/2024 1/23/2024    Influenza Vaccine (1) 09/01/2024 11/30/2023    DEXA Scan 07/07/2026 7/7/2023    TETANUS VACCINE 04/17/2027 4/17/2017            REVIEW OF SYSTEMS:   Review of  "Systems   Constitutional:  Negative for chills, fever, malaise/fatigue and weight loss.   Respiratory:  Negative for cough, shortness of breath and wheezing.    Cardiovascular:  Negative for chest pain, palpitations and leg swelling.       PHYSICAL EXAM:      274}  LMP  (LMP Unknown)   Wt Readings from Last 3 Encounters:   04/17/24 70 kg (154 lb 6.4 oz)   03/04/24 71.9 kg (158 lb 9.6 oz)   01/23/24 72.6 kg (160 lb)     BP Readings from Last 3 Encounters:   04/17/24 132/76   03/04/24 122/80   01/23/24 (!) 144/84     Estimated body mass index is 26.5 kg/m² as calculated from the following:    Height as of 4/17/24: 5' 4" (1.626 m).    Weight as of 4/17/24: 70 kg (154 lb 6.4 oz).     Physical Exam    LABS:   274}  I have reviewed old labs below:  Lab Results   Component Value Date    WBC 5.47 09/21/2023    HGB 13.8 09/21/2023    HCT 41.1 09/21/2023    MCV 94 09/21/2023     09/21/2023     09/21/2023    K 3.4 (L) 09/21/2023     09/21/2023    CALCIUM 9.5 09/21/2023    CO2 24 09/21/2023     (H) 09/21/2023    BUN 8 09/21/2023    CREATININE 0.8 09/21/2023    ANIONGAP 11 09/21/2023    ESTGFRAFRICA >60.0 05/20/2022    EGFRNONAA >60.0 05/20/2022    PROT 7.4 09/21/2023    ALBUMIN 4.4 09/21/2023    BILITOT 0.5 09/21/2023    ALKPHOS 54 (L) 09/21/2023    ALT 27 09/21/2023    AST 20 09/21/2023    INR 1.0 09/16/2022    CHOL 242 (H) 06/28/2023    TRIG 181 (H) 06/28/2023    HDL 52 06/28/2023    LDLCALC 153.8 06/28/2023    TSH 1.674 06/28/2023    HGBA1C 5.7 (H) 01/23/2024       ASSESSMENT AND PLAN:  274}  1. Essential hypertension  - CBC Auto Differential; Future  - Comprehensive Metabolic Panel; Future  - Hemoglobin A1C; Future  - Lipid Panel; Future  - TSH; Future  - Vitamin D 25-Hydroxy; Future    2. Type 2 diabetes mellitus with hyperglycemia, without long-term current use of insulin  - CBC Auto Differential; Future  - Comprehensive Metabolic Panel; Future  - Hemoglobin A1C; Future  - Lipid Panel; Future  - " TSH; Future  - Vitamin D 25-Hydroxy; Future    3. Hypercholesterolemia  - CBC Auto Differential; Future  - Comprehensive Metabolic Panel; Future  - Hemoglobin A1C; Future  - Lipid Panel; Future  - TSH; Future  - Vitamin D 25-Hydroxy; Future    4. Vitamin D deficiency disease  - CBC Auto Differential; Future  - Comprehensive Metabolic Panel; Future  - Hemoglobin A1C; Future  - Lipid Panel; Future  - TSH; Future  - Vitamin D 25-Hydroxy; Future         Orders Placed This Encounter   Procedures    CBC Auto Differential    Comprehensive Metabolic Panel    Hemoglobin A1C    Lipid Panel    TSH    Vitamin D 25-Hydroxy       No follow-ups on file. or sooner as needed.      14 minute encounter

## 2024-08-22 ENCOUNTER — LAB VISIT (OUTPATIENT)
Dept: LAB | Facility: HOSPITAL | Age: 64
End: 2024-08-22
Attending: STUDENT IN AN ORGANIZED HEALTH CARE EDUCATION/TRAINING PROGRAM
Payer: MEDICARE

## 2024-08-22 DIAGNOSIS — I10 ESSENTIAL HYPERTENSION: ICD-10-CM

## 2024-08-22 DIAGNOSIS — E11.65 TYPE 2 DIABETES MELLITUS WITH HYPERGLYCEMIA, WITHOUT LONG-TERM CURRENT USE OF INSULIN: ICD-10-CM

## 2024-08-22 DIAGNOSIS — E78.00 HYPERCHOLESTEROLEMIA: ICD-10-CM

## 2024-08-22 DIAGNOSIS — E55.9 VITAMIN D DEFICIENCY DISEASE: ICD-10-CM

## 2024-08-22 LAB
25(OH)D3+25(OH)D2 SERPL-MCNC: 83 NG/ML (ref 30–96)
ALBUMIN SERPL BCP-MCNC: 4.1 G/DL (ref 3.5–5.2)
ALP SERPL-CCNC: 59 U/L (ref 55–135)
ALT SERPL W/O P-5'-P-CCNC: 28 U/L (ref 10–44)
ANION GAP SERPL CALC-SCNC: 13 MMOL/L (ref 8–16)
AST SERPL-CCNC: 18 U/L (ref 10–40)
BASOPHILS # BLD AUTO: 0.07 K/UL (ref 0–0.2)
BASOPHILS NFR BLD: 1.1 % (ref 0–1.9)
BILIRUB SERPL-MCNC: 0.6 MG/DL (ref 0.1–1)
BUN SERPL-MCNC: 14 MG/DL (ref 8–23)
CALCIUM SERPL-MCNC: 9.9 MG/DL (ref 8.7–10.5)
CHLORIDE SERPL-SCNC: 102 MMOL/L (ref 95–110)
CHOLEST SERPL-MCNC: 222 MG/DL (ref 120–199)
CHOLEST/HDLC SERPL: 6.2 {RATIO} (ref 2–5)
CO2 SERPL-SCNC: 26 MMOL/L (ref 23–29)
CREAT SERPL-MCNC: 0.9 MG/DL (ref 0.5–1.4)
DIFFERENTIAL METHOD BLD: NORMAL
EOSINOPHIL # BLD AUTO: 0.5 K/UL (ref 0–0.5)
EOSINOPHIL NFR BLD: 7 % (ref 0–8)
ERYTHROCYTE [DISTWIDTH] IN BLOOD BY AUTOMATED COUNT: 13.6 % (ref 11.5–14.5)
EST. GFR  (NO RACE VARIABLE): >60 ML/MIN/1.73 M^2
ESTIMATED AVG GLUCOSE: 114 MG/DL (ref 68–131)
GLUCOSE SERPL-MCNC: 102 MG/DL (ref 70–110)
HBA1C MFR BLD: 5.6 % (ref 4–5.6)
HCT VFR BLD AUTO: 39.1 % (ref 37–48.5)
HDLC SERPL-MCNC: 36 MG/DL (ref 40–75)
HDLC SERPL: 16.2 % (ref 20–50)
HGB BLD-MCNC: 13.5 G/DL (ref 12–16)
IMM GRANULOCYTES # BLD AUTO: 0.01 K/UL (ref 0–0.04)
IMM GRANULOCYTES NFR BLD AUTO: 0.2 % (ref 0–0.5)
LDLC SERPL CALC-MCNC: 140.2 MG/DL (ref 63–159)
LYMPHOCYTES # BLD AUTO: 2.7 K/UL (ref 1–4.8)
LYMPHOCYTES NFR BLD: 40.7 % (ref 18–48)
MCH RBC QN AUTO: 30.4 PG (ref 27–31)
MCHC RBC AUTO-ENTMCNC: 34.5 G/DL (ref 32–36)
MCV RBC AUTO: 88 FL (ref 82–98)
MONOCYTES # BLD AUTO: 0.5 K/UL (ref 0.3–1)
MONOCYTES NFR BLD: 7 % (ref 4–15)
NEUTROPHILS # BLD AUTO: 2.9 K/UL (ref 1.8–7.7)
NEUTROPHILS NFR BLD: 44 % (ref 38–73)
NONHDLC SERPL-MCNC: 186 MG/DL
NRBC BLD-RTO: 0 /100 WBC
PLATELET # BLD AUTO: 391 K/UL (ref 150–450)
PMV BLD AUTO: 9.2 FL (ref 9.2–12.9)
POTASSIUM SERPL-SCNC: 4.2 MMOL/L (ref 3.5–5.1)
PROT SERPL-MCNC: 7.1 G/DL (ref 6–8.4)
RBC # BLD AUTO: 4.44 M/UL (ref 4–5.4)
SODIUM SERPL-SCNC: 141 MMOL/L (ref 136–145)
TRIGL SERPL-MCNC: 229 MG/DL (ref 30–150)
TSH SERPL DL<=0.005 MIU/L-ACNC: 2.41 UIU/ML (ref 0.4–4)
WBC # BLD AUTO: 6.56 K/UL (ref 3.9–12.7)

## 2024-08-22 PROCEDURE — 36415 COLL VENOUS BLD VENIPUNCTURE: CPT | Performed by: STUDENT IN AN ORGANIZED HEALTH CARE EDUCATION/TRAINING PROGRAM

## 2024-08-22 PROCEDURE — 84443 ASSAY THYROID STIM HORMONE: CPT | Performed by: STUDENT IN AN ORGANIZED HEALTH CARE EDUCATION/TRAINING PROGRAM

## 2024-08-22 PROCEDURE — 80061 LIPID PANEL: CPT | Performed by: STUDENT IN AN ORGANIZED HEALTH CARE EDUCATION/TRAINING PROGRAM

## 2024-08-22 PROCEDURE — 85025 COMPLETE CBC W/AUTO DIFF WBC: CPT | Performed by: STUDENT IN AN ORGANIZED HEALTH CARE EDUCATION/TRAINING PROGRAM

## 2024-08-22 PROCEDURE — 80053 COMPREHEN METABOLIC PANEL: CPT | Performed by: STUDENT IN AN ORGANIZED HEALTH CARE EDUCATION/TRAINING PROGRAM

## 2024-08-22 PROCEDURE — 83036 HEMOGLOBIN GLYCOSYLATED A1C: CPT | Performed by: STUDENT IN AN ORGANIZED HEALTH CARE EDUCATION/TRAINING PROGRAM

## 2024-08-22 PROCEDURE — 82306 VITAMIN D 25 HYDROXY: CPT | Performed by: STUDENT IN AN ORGANIZED HEALTH CARE EDUCATION/TRAINING PROGRAM

## 2024-09-09 ENCOUNTER — PATIENT MESSAGE (OUTPATIENT)
Dept: ADMINISTRATIVE | Facility: HOSPITAL | Age: 64
End: 2024-09-09
Payer: MEDICARE

## 2024-09-10 ENCOUNTER — PATIENT OUTREACH (OUTPATIENT)
Dept: ADMINISTRATIVE | Facility: HOSPITAL | Age: 64
End: 2024-09-10
Payer: MEDICARE

## 2024-09-10 NOTE — LETTER
"       AUTHORIZATION FOR RELEASE OF   CONFIDENTIAL INFORMATION    Dear [facility],    We are seeing Liv Garland, date of birth 1960, in the clinic at [facility] 2ND FLOOR FAMILY MEDICINE. Jayashree Brand MD is the patient's PCP. Liv Garland has an outstanding lab/procedure at the time we reviewed her chart. In order to help keep her health information updated, she has authorized us to request the following medical record(s):                                              ( X )  EYE EXAM                Please fax records to [facility], Tsai, Kang-Lin, MD, [phone] OR [phone].        Thank you  Leidy Rod [facility]  Clinical Care Coordinator  [facility]             Patient Name: Liv Garland  : 1960  Patient Phone #: [phone]                    A. Consent for Examination and Treatment: I hereby authorize the providers and employees of [facility] ("[facility] to provide medical treatment/services which includes, but is not limited to, performing and administering tests and diagnostic procedures that are deemed necessary, Including, but not limited to, imaging examinations, blood tests and other laboratory procedures as may be required by the hospital, clinic, or may be ordered by my physician(s) or persons working under the general and/or special instructions of my physician(s).                   1.      I understand and agree that this consent covers all authorized persons, including but not limited to physicians, residents, nurse practitioners, physicians' assistants, specialists, consultants, student nurses, and independently contracted physicians, who are called upon by the physician in charge, to carry out the diagnostic procedures and medical or surgical treatment.  2.      I hereby authorize [facility] to retain or dispose of any specimens or tissue, should there be such remaining from any test or procedure.  3.      I hereby authorize and give consent for " [facility] providers and employees to take photographs, images or videotapes of such diagnostic, surgical or treatment procedures of Patient as may be required by [facility] or as may be ordered by a physician.  I further acknowledge and agree that [facility] may use cameras or other devices for patient monitoring.  4.      I am aware that the practice of medicine is not an exact science, and I acknowledge that no guarantees have been made to me as to the outcome of any tests, procedures or treatment.                   B. Authorization for Release of Information:  I understand that my insurance company and/or their agents may need information necessary to make determinations about payment/reimbursement.  I hereby provide authorization to release to all insurance companies, their successors, assignees, other parties with whom they may have contracted, or others acting on their behalf, that are involved with payment for any hospital and/or clinic charges incurred by the patient, any information that they request and deem necessary for payment/reimbursement, and/or quality review.  I further authorize the release of my health information to physicians or other health care practitioners on staff who are involved in my health care now and in the future, and to other health care providers, entities, or institutions for the purpose of my continued care and treatment, including referrals.     C. Medicare Patient's Certification and Authorization to Release Information and Payment Request:  I certify that the information given by me in applying for payment under Title XVIII of the Social Security Act is correct.  I authorize any hig[place] of medical or other information about me to release to the Social Security Administration, or its intermediaries or carriers, any information needed for this or a related Medicare claim.  I request that payment of authorized benefits be made on my behalf.     D. Assignment of Insurance Benefits:  I  hereby authorize any and all insurance companies, health plans, defined benefit plans, health insurers or any entity that is or may be responsible for payment of my medical expenses to pay all hospital and medical benefits now due, and to become due and payable to me under any hospital benefits, sick benefits, injury benefits or any other benefit for services rendered to me, including Major Medical Benefits, direct to [facility] and all independently contracted physicians.  I assign any and all rights that I may have against any and all insurance companies, health plans, defined benefit plans, health insurers or any entity that is or may be responsible for payment of my medical expenses, including, but not limited to any right to appeal a denial of a claim, any right to bring any action, lawsuit, administrative proceeding, or other cause of action on my behalf.  I specifically assign my right to pursue litigation against any and all insurance companies, health plans, defined benefit plans, health insurers or any entity that is or may be responsible for payment of medical expenses based upon a refusal to pay charges.              REGISTRATION  AUTHORIZATION                    E. Valuables:  It is understood and agreed that [facility] is not liable for the damage to or loss of any money, jewelry, documents, dentures, eye glasses, hearing aids, prosthetics, or other property of value.     F. Computer Equipment:  I understand and agree that should I choose to use computer equipment owned by [facility] or if I choose to access the Internet via [facility]'s network, I do so at my own risk.  [facility] is not responsible for any damage to my computer equipment or to any damages of any type that might arise from my loss of equipment or data.                   G. Acceptance of Financial Responsibility:  I agree that in consideration of the services and supplies that have been or will be furnished to the patient,  I am hereby obligated to  pay all charges made for or on the account of the patient according to the standard rates (in effect at the time the services and supplies are delivered) established by [facility], including its Patient Financial Assistance Policy to the extent it is applicable.  I understand that I am responsible for all charges, or portions thereof, not covered by insurance or other sources.  Patient refunds will be distributed only after balances at all [facility] facilities are paid.                   H. Communication Authorization:  I hereby authorize [facility] and its representatives, along with any billing service or  who may work on their behalf, to contact me on my cell phone and/or home phone using prerecorded messages, artificial voice messages, automatic telephone dialing devices or other computer assisted technology, or by electronic mail, text messaging, or by any other form of electronic communication.  This includes, but is not limited to appointment reminders, yearly physical exam reminders, preventive care reminders, patient campaigns, welcome calls, and calls about account balances on my account or any account on which I am listed as a guarantor.  I understand I have the right to opt out of these communications at any time.     I.  Relationship Between Facility and Physician:  I understand that some, but not all, providers furnishing services to the patient are not employees or agents of [facility].  The patient is under the care and supervision of his/her attending physician, and it is the responsibility of the facility and its nursing staff to carry out the instructions of such physicians.  It is the responsibility of the patient's physician/designee to obtain the patient's informed consent, when required, for medical or surgical treatment, special diagnostic or therapeutic procedures, or hospital services rendered for the patient under the special instructions of the physician/designee.     J. Notice of  Private Practices:  I acknowledge I have received a copy of [facility]'s Notice of Privacy Practices.     K. Facility Directory:  I have discussed with the organization my desire to be either included or excluded in the facility directory.  I understand that if my choice is to opt-out of being identified in the facility directory that the facility will not provide any information about me such as my condition (e.g. Fair, stable, etc.) or my location in the facility (e.g. Room number, department).     L. LINKS:  For [place] Residents:  [facility] is a LINKS ([place] Immunization [employer] for [employer] State[employer]) participating facility.  LINKS is a [facility]-sponsored confidential computer system that helps you and your doctor keep track of you and your child's immunization history.  I acknowledge that I am allowing [facility] to share this information with [facility].  For [place] Residents:  [facility] is a MIIX ([place] Immunization Information eXchange) participant.  [employer] is a [place] Department of Health-sponsored confidential computer system that helps you and your doctor keep track of you and your child's immunization history.  I acknowledge that I am allowing [facility] to share information with [employer].     M. Term:  This authorization is valid for this and subsequent care/treatment I receive at [facility] and will remain valid unless/until revoked in writing by me.      ACKNOWLEDGMENT OF POTENTIAL RISKS OF COVID-19 VACCINE  It is important that you, as the patient or patients legally authorized representative(s), understand and acknowledge the following, with regard to administration of the COVID-19 vaccine offered by [facility]:  The SARS-CoV-2 virus (COVID-19) has caused an unprecedented modern global pandemic that has mobilized scientists and drug manufacturers to work to create safe and effective vaccines to get the crisis under control.  No vaccine is released in the [place] without undergoing rigorous,  multi-layered testing and approval by the Food and Drug Administration.  During a public health emergency, however, vaccines can be released for patient administration by the FDA prior to completion of multi-phase clinical trials and approval. This is done by the FDAs granting of Emergency Use Authorization (EUA) when the vaccine meets reasonable thresholds for safety and effectiveness and people are in urgent need of care. Under an EUA, the FDA has found that known and potential benefits outweigh its known and potential risks.  The vaccine for which you are presenting to [facility] has been released under an EUA, which [facility] is honoring in its distribution of the vaccine to the public. While the FDAs authorization indicates its belief that usage is recommended over possible risks, there is still the possibility that unknown risks of the vaccine could exist.  By signing this document, you acknowledge and assume these risks. Further, you waive any and all claims of liability against and hold harmless any [facility] entity or provider for any harm caused to you by said possible unknown risks of the vaccine.        N. [facility]:  As used in this document, [facility] means all [facility] affiliated entities including all health centers, surgery centers, clinics, and hospitals.  It includes more specifically, the following entities: [facility], a not for profit [facility], and its subsidiaries and affiliates, including [facility], [facility], [facility], [facility], [facility]d/b/a [facility], L.L[facility]C.d/b/a [facility] [employer] As manager of Jaden ORNELAS [facility], [facility], HEENA, [facility], L.L.C.d/b/a [facility], [facility] and [facility] as manager of [facility].                                                                Patient/Legal Guardian Signature                                                    This signature was collected at 08/21/2024      /                                                                            Printed Name/Relationship to Patient

## 2024-09-10 NOTE — LETTER
"       AUTHORIZATION FOR RELEASE OF   CONFIDENTIAL INFORMATION    Dear Dr Salgado,    We are seeing Liv Garland, date of birth 1960, in the clinic at [facility]. Jayashree Brand MD is the patient's PCP. Liv Garland has an outstanding lab/procedure at the time we reviewed her chart. In order to help keep her health information updated, she has authorized us to request the following medical record(s):                                               ( X )  EYE EXAM                Please fax records to [facility], Tsai, Kang-Lin, MD, [phone] or [phone].        Thank you  Leidy Rod LIUDMILA  Clinical Care Coordinator  [facility]             Patient Name: Liv Garland  : 1960  Patient Phone #: [phone]                    A. Consent for Examination and Treatment: I hereby authorize the providers and employees of [facility] ("[facility] to provide medical treatment/services which includes, but is not limited to, performing and administering tests and diagnostic procedures that are deemed necessary, Including, but not limited to, imaging examinations, blood tests and other laboratory procedures as may be required by the hospital, clinic, or may be ordered by my physician(s) or persons working under the general and/or special instructions of my physician(s).                   1.      I understand and agree that this consent covers all authorized persons, including but not limited to physicians, residents, nurse practitioners, physicians' assistants, specialists, consultants, student nurses, and independently contracted physicians, who are called upon by the physician in charge, to carry out the diagnostic procedures and medical or surgical treatment.  2.      I hereby authorize [facility] to retain or dispose of any specimens or tissue, should there be such remaining from any test or procedure.  3.      I hereby authorize and give consent for [facility] " providers and employees to take photographs, images or videotapes of such diagnostic, surgical or treatment procedures of Patient as may be required by [facility] or as may be ordered by a physician.  I further acknowledge and agree that [facility] may use cameras or other devices for patient monitoring.  4.      I am aware that the practice of medicine is not an exact science, and I acknowledge that no guarantees have been made to me as to the outcome of any tests, procedures or treatment.                   B. Authorization for Release of Information:  I understand that my insurance company and/or their agents may need information necessary to make determinations about payment/reimbursement.  I hereby provide authorization to release to all insurance companies, their successors, assignees, other parties with whom they may have contracted, or others acting on their behalf, that are involved with payment for any hospital and/or clinic charges incurred by the patient, any information that they request and deem necessary for payment/reimbursement, and/or quality review.  I further authorize the release of my health information to physicians or other health care practitioners on staff who are involved in my health care now and in the future, and to other health care providers, entities, or institutions for the purpose of my continued care and treatment, including referrals.     C. Medicare Patient's Certification and Authorization to Release Information and Payment Request:  I certify that the information given by me in applying for payment under Title XVIII of the Social Security Act is correct.  I authorize any higginbotham of medical or other information about me to release to the Social Security Administration, or its intermediaries or carriers, any information needed for this or a related Medicare claim.  I request that payment of authorized benefits be made on my behalf.     D. Assignment of Insurance Benefits:  I hereby  authorize any and all insurance companies, health plans, defined benefit plans, health insurers or any entity that is or may be responsible for payment of my medical expenses to pay all hospital and medical benefits now due, and to become due and payable to me under any hospital benefits, sick benefits, injury benefits or any other benefit for services rendered to me, including Major Medical Benefits, direct to [facility] and all independently contracted physicians.  I assign any and all rights that I may have against any and all insurance companies, health plans, defined benefit plans, health insurers or any entity that is or may be responsible for payment of my medical expenses, including, but not limited to any right to appeal a denial of a claim, any right to bring any action, lawsuit, administrative proceeding, or other cause of action on my behalf.  I specifically assign my right to pursue litigation against any and all insurance companies, health plans, defined benefit plans, health insurers or any entity that is or may be responsible for payment of medical expenses based upon a refusal to pay charges.              REGISTRATION  AUTHORIZATION                    E. Valuables:  It is understood and agreed that [facility] is not liable for the damage to or loss of any money, jewelry, documents, dentures, eye glasses, hearing aids, prosthetics, or other property of value.     F. Computer Equipment:  I understand and agree that should I choose to use computer equipment owned by [facility] or if I choose to access the Internet via [facility]'s network, I do so at my own risk.  [facility] is not responsible for any damage to my computer equipment or to any damages of any type that might arise from my loss of equipment or data.                   G. Acceptance of Financial Responsibility:  I agree that in consideration of the services and supplies that have been or will be furnished to the patient,  I am hereby obligated to pay all  charges made for or on the account of the patient according to the standard rates (in effect at the time the services and supplies are delivered) established by [facility], including its Patient Financial Assistance Policy to the extent it is applicable.  I understand that I am responsible for all charges, or portions thereof, not covered by insurance or other sources.  Patient refunds will be distributed only after balances at all [facility] facilities are paid.                   H. Communication Authorization:  I hereby authorize [facility] and its representatives, along with any billing service or  who may work on their behalf, to contact me on my cell phone and/or home phone using prerecorded messages, artificial voice messages, automatic telephone dialing devices or other computer assisted technology, or by electronic mail, text messaging, or by any other form of electronic communication.  This includes, but is not limited to appointment reminders, yearly physical exam reminders, preventive care reminders, patient campaigns, welcome calls, and calls about account balances on my account or any account on which I am listed as a guarantor.  I understand I have the right to opt out of these communications at any time.     I.  Relationship Between Facility and Physician:  I understand that some, but not all, providers furnishing services to the patient are not employees or agents of [facility].  The patient is under the care and supervision of his/her attending physician, and it is the responsibility of the facility and its nursing staff to carry out the instructions of such physicians.  It is the responsibility of the patient's physician/designee to obtain the patient's informed consent, when required, for medical or surgical treatment, special diagnostic or therapeutic procedures, or hospital services rendered for the patient under the special instructions of the physician/designee.     J. Notice of Private  Practices:  I acknowledge I have received a copy of [facility]'s Notice of Privacy Practices.     K. Facility Directory:  I have discussed with the organization my desire to be either included or excluded in the facility directory.  I understand that if my choice is to opt-out of being identified in the facility directory that the facility will not provide any information about me such as my condition (e.g. Fair, stable, etc.) or my location in the facility (e.g. Room number, department).     L. LINKS:  For [place] Residents:  [facility] is a LINKS ([place] [employer]ization [employer] [facility] [employer] State[employer]) participating facility.  LINKS is a [facility]-sponsored confidential computer system that helps you and your doctor keep track of you and your child's immunization history.  I acknowledge that I am allowing [facility] to share this information with [facility].  For [place] Residents:  [facility] is a MIIX ([place] Immunization Information eXchange) participant.  [employer] is a [place] Department of Health-sponsored confidential computer system that helps you and your doctor keep track of you and your child's immunization history.  I acknowledge that I am allowing [facility] to share information with [employer].     M. Term:  This authorization is valid for this and subsequent care/treatment I receive at [facility] and will remain valid unless/until revoked in writing by me.      ACKNOWLEDGMENT OF POTENTIAL RISKS OF COVID-19 VACCINE  It is important that you, as the patient or patients legally authorized representative(s), understand and acknowledge the following, with regard to administration of the COVID-19 vaccine offered by [facility]:  The SARS-CoV-2 virus (COVID-19) has caused an unprecedented modern global pandemic that has mobilized scientists and drug manufacturers to work to create safe and effective vaccines to get the crisis under control.  No vaccine is released in the [place] without undergoing rigorous, multi-layered  testing and approval by the Food and Drug Administration.  During a public health emergency, however, vaccines can be released for patient administration by the FDA prior to completion of multi-phase clinical trials and approval. This is done by the FDAs granting of Emergency Use Authorization (EUA) when the vaccine meets reasonable thresholds for safety and effectiveness and people are in urgent need of care. Under an EUA, the FDA has found that known and potential benefits outweigh its known and potential risks.  The vaccine for which you are presenting to [facility] has been released under an EUA, which [facility] is honoring in its distribution of the vaccine to the public. While the FDAs authorization indicates its belief that usage is recommended over possible risks, there is still the possibility that unknown risks of the vaccine could exist.  By signing this document, you acknowledge and assume these risks. Further, you waive any and all claims of liability against and hold harmless any [facility] entity or provider for any harm caused to you by said possible unknown risks of the vaccine.        N. [facility]:  As used in this document, [facility] means all [facility] affiliated entities including all health centers, surgery centers, clinics, and hospitals.  It includes more specifically, the following entities: [facility], a not for profit [facility], and its subsidiaries and affiliates, including [facility], [facility], [facility], [facility], [facility], [facility]., [facility] L.LWILL[facility], L.L[facility]C.d/b/a [facility], L.L.C.d/b/a [facility] [employer], L.L[employer] As manager of Jaden ORNELAS [facility], [facility],  L.L.C, [facility] [employer], L.L.C.d/b/a [facility], [facility], [facility], L[facility]., and [facility] as manager of [facility].                                                                Patient/Legal Guardian Signature                                                    This signature was collected at 08/21/2024      /                                                                            Printed Name/Relationship to Patient

## 2024-09-10 NOTE — PROGRESS NOTES
Population Health Chart Review & Patient Outreach Details      Additional [facility] Health Notes:               Updates Requested / Reviewed:      Updated Care Coordination Note, Care Everywhere, Care Team Updated, and Immunizations Reconciliation Completed or Queried: [facility] Topics Overdue:      [facility] Score: 3     Urine Screening  Eye Exam  Foot Exam    Influenza Vaccine  RSV Vaccine                  Health Maintenance Topic(s) Outreach Outcomes & Actions Taken:    Eye Exam - Outreach Outcomes & Actions Taken  : External Records Requested & Care Team Updated if Applicable

## 2024-09-13 ENCOUNTER — PATIENT MESSAGE (OUTPATIENT)
Dept: ADMINISTRATIVE | Facility: OTHER | Age: 64
End: 2024-09-13
Payer: MEDICARE

## 2024-09-19 ENCOUNTER — OFFICE VISIT (OUTPATIENT)
Dept: FAMILY MEDICINE | Facility: CLINIC | Age: 64
End: 2024-09-19
Payer: MEDICARE

## 2024-09-19 VITALS
WEIGHT: 142.31 LBS | BODY MASS INDEX: 24.3 KG/M2 | SYSTOLIC BLOOD PRESSURE: 122 MMHG | HEIGHT: 64 IN | DIASTOLIC BLOOD PRESSURE: 72 MMHG | HEART RATE: 64 BPM | OXYGEN SATURATION: 96 %

## 2024-09-19 DIAGNOSIS — F51.01 PRIMARY INSOMNIA: ICD-10-CM

## 2024-09-19 DIAGNOSIS — E11.69 HYPERLIPIDEMIA ASSOCIATED WITH TYPE 2 DIABETES MELLITUS: ICD-10-CM

## 2024-09-19 DIAGNOSIS — Z23 ENCOUNTER FOR VACCINATION: ICD-10-CM

## 2024-09-19 DIAGNOSIS — E11.65 TYPE 2 DIABETES MELLITUS WITH HYPERGLYCEMIA, WITHOUT LONG-TERM CURRENT USE OF INSULIN: Primary | ICD-10-CM

## 2024-09-19 DIAGNOSIS — E78.5 HYPERLIPIDEMIA ASSOCIATED WITH TYPE 2 DIABETES MELLITUS: ICD-10-CM

## 2024-09-19 PROCEDURE — 99999 PR PBB SHADOW E&M-EST. PATIENT-LVL III: CPT | Mod: PBBFAC,,, | Performed by: STUDENT IN AN ORGANIZED HEALTH CARE EDUCATION/TRAINING PROGRAM

## 2024-09-19 RX ORDER — TRAZODONE HYDROCHLORIDE 100 MG/1
100 TABLET ORAL NIGHTLY
Qty: 90 TABLET | Refills: 3 | Status: SHIPPED | OUTPATIENT
Start: 2024-09-19 | End: 2025-09-19

## 2024-09-19 RX ORDER — EZETIMIBE 10 MG/1
10 TABLET ORAL DAILY
Qty: 90 TABLET | Refills: 3 | Status: SHIPPED | OUTPATIENT
Start: 2024-09-19 | End: 2025-09-19

## 2024-09-19 NOTE — PATIENT INSTRUCTIONS
Garth Peck,     If you are due for any health screening(s) below please notify me so we can arrange them to be ordered and scheduled. Most healthy patients at your age complete them, but you are free to accept or refuse.     If you can't do it, I'll definitely understand. If you can, I'd certainly appreciate it!    Tests to Keep You Healthy    Eye Exam: DUE  Colon Cancer Screening: Met on 9/6/2024  Last Blood Pressure <= 139/89 (9/19/2024): Yes  Last HbA1c < 8 (08/22/2024): Yes      Your diabetic retinal eye exam is due     Diabetes is the #1 cause of blindness in the [place] - early detection before signs or symptoms develop can prevent debilitating blindness.     Our records indicate that you may be overdue for your annual diabetic eye exam. Eye screening can help identify patients at risk for developing vision loss which is common in diabetes. This simple screening is an important step to keeping you healthy and preventing complications from diabetes.     This recommended diabetic eye exam should take place once per year and can prevent and treat diabetes complications in the eye before developing symptoms. This can be done with a special camera is used to take photographs of the back of your eye without having to dilate them, or you can see an eye doctor for a full dilated exam.     If you recently had your yearly diabetic eye exam performed outside of [facility], please let your Health care team know so that they can update your health record.

## 2024-09-19 NOTE — PROGRESS NOTES
SUBJECTIVE:    CHIEF COMPLAINT:   Chief Complaint   Patient presents with    Follow-up     Wants flu shot  Due for foot exam           274}    HISTORY OF PRESENT ILLNESS:  Liv Garland is a 64 y.o. female who presents to the clinic today for follow up    DM: controlled w/ ozempic 2 mg.     Insomnia: uncontrolled w/ trazodone 50 mg. Wishes to increase trazodone.     HTN: controlled w/ hyzaar.     Hyperlipidemia: does not wish to be on statin.    PAST MEDICAL HISTORY:     274}  Past Medical History:   Diagnosis Date    Arthritis     Bilateral breast cancer     Breast cancer 2004    Dr. Dos Santos, bilat mast, no rt, tamoxifen stopped after 9 mos due to side effects    Diabetes mellitus     High cholesterol     Hypertension     PONV (postoperative nausea and vomiting)        PAST SURGICAL HISTORY:  Past Surgical History:   Procedure Laterality Date    ABLATION OF DYSRHYTHMIC FOCUS  2000    Dr. Melgoza    CHOLECYSTECTOMY  2/2001    COLONOSCOPY      repeat in 2020    ESOPHAGEAL DILATION N/A 5/23/2022    Procedure: DILATION, ESOPHAGUS;  Surgeon: Francis Bower MD;  Location: [facility] ENDO;  Service: General;  Laterality: N/A;    ESOPHAGOGASTRODUODENOSCOPY N/A 5/23/2022    Procedure: ESOPHAGOGASTRODUODENOSCOPY (EGD);  Surgeon: Francis Bower MD;  Location: [facility] ENDO;  Service: General;  Laterality: N/A;    HIP ARTHROPLASTY Right 4/5/2021    Procedure: ARTHROPLASTY, HIP;  Surgeon: Don Suárez DO;  Location: [facility] OR;  Service: Orthopedics;  Laterality: Right;  Equipment: [employer] Total Hip System  Vendor: [employer]  REQUIRES ASSISTANT: KOJO    HIP ARTHROPLASTY Left 9/26/2022    Procedure: ARTHROPLASTY, HIP;  Surgeon: Don Suárez DO;  Location: [facility] OR;  Service: Orthopedics;  Laterality: Left;    HYSTERECTOMY  10/1/2003    INJECTION OF JOINT Right 11/25/2020    Procedure: Right Hip Intra-articular Steroid Injection Under Fluoro;  Surgeon: Rios Colon MD;  Location: [facility];  Service: Pain Management;   "Laterality: Right;    INJECTION OF STEROID Right 2/10/2021    Procedure: INJECTION, STEROID;  Surgeon: Don Suárez DO;  Location: [facility] OR;  Service: Orthopedics;  Laterality: Right;  Sedation  Equipment: 3cc syringes x2, 18ga 1.5" needle x2; 3.5" 20ga spinal needle; Kenalog 40; 1% lidocaine without Epi; 0.25% marcaine without epi  C-Arm: Entire    MASTECTOMY Bilateral 2004    MITRAL VALVE REPLACEMENT  1980    OOPHORECTOMY Left 2004    OSTEOTOMY OF METATARSAL BONE Left 2/10/2022    Procedure: OSTEOTOMY, METATARSAL BONE RESURFACING ARTHROPLASTY VERSES SHORTENING OSTEOTOMY 2ND METATARSAL, LEFT FOOT.;  Surgeon: Don Suárez DO;  Location: [facility] OR;  Service: Orthopedics;  Laterality: Left;  THIS IS AN ORIF METARSAL CARD; MAKE NOTES TO CREATE A NEW CARD PLEASE    TONSILLECTOMY  1974    TOTAL ABDOMINAL HYSTERECTOMY W/ BILATERAL SALPINGOOPHORECTOMY         SOCIAL HISTORY:  Social History     Socioeconomic History    Marital status: Single   Tobacco Use    Smoking status: Former     Current packs/day: 0.00     Types: Cigarettes     Quit date: 1999     Years since quittin.7    Smokeless tobacco: Never   Substance and Sexual Activity    Alcohol use: Yes     Comment: rarely    Drug use: No    Sexual activity: Yes     Partners: Male     Social Determinants of Health     Financial Resource Strain: Low Risk  (3/4/2024)    Overall Financial Resource Strain (CARDIA)     Difficulty of Paying Living Expenses: Not hard at all   Food Insecurity: No Food Insecurity (3/4/2024)    Hunger Vital Sign     Worried About Running Out of Food in the Last Year: Never true     Ran Out of Food in the Last Year: Never true   Transportation Needs: No Transportation Needs (3/4/2024)    PRAPARE - Transportation     Lack of Transportation (Medical): No     Lack of Transportation (Non-Medical): No   Physical Activity: Unknown (3/4/2024)    Exercise Vital Sign     Days of Exercise per Week: 3 days   Stress: Stress Concern " Present (3/4/2024)    [employer] - Occupational Stress Questionnaire     Feeling of Stress : To some extent   Housing Stability: Low Risk  (3/4/2024)    Housing Stability Vital Sign     Unable to Pay for Housing in the Last Year: No     Number of Places Lived in the Last Year: 1     Unstable Housing in the Last Year: No       FAMILY HISTORY:       Family History   Problem Relation Name Age of Onset    Liver cancer Mother          67 y/o    Cancer Mother      Heart failure Father          79 y/o with dementia     No Known Problems Sister 2     Early death Brother 1         accident at 29 y/o       ALLERGIES AND MEDICATIONS: updated and reviewed.      274}  Review of patient's allergies indicates:   Allergen Reactions    Lisinopril      Other reaction(s): Angioedema     Medication List with Changes/Refills   New Medications    EZETIMIBE (ZETIA) 10 MG TABLET    Take 1 tablet (10 mg total) by mouth once daily.    TRAZODONE (DESYREL) 100 MG TABLET    Take 1 tablet (100 mg total) by mouth every evening.   Current Medications    FLUTICASONE PROPIONATE (FLONASE) 50 MCG/ACTUATION NASAL SPRAY    2 sprays (100 mcg total) by Each Nostril route as needed for Rhinitis.    LOSARTAN-HYDROCHLOROTHIAZIDE 100-25 MG (HYZAAR) 100-25 MG PER TABLET    Take 1 tablet by mouth once daily.    OMEPRAZOLE (PRILOSEC) 40 MG CAPSULE    Take 1 capsule (40 mg total) by mouth once daily.    SEMAGLUTIDE (OZEMPIC) 2 MG/DOSE (8 MG/3 ML) PNIJ    Inject 2 mg into the skin every 7 days.   Discontinued Medications    SUCRALFATE (CARAFATE) 1 GRAM TABLET    Take 1 tablet (1 g total) by mouth 4 (four) times daily.    TRAZODONE (DESYREL) 50 MG TABLET    Take 1 tablet (50 mg total) by mouth every evening.       SCREENING HISTORY:    274}  Health Maintenance         Date Due Completion Date    Diabetes Urine Screening Never done ---    Foot Exam Never done ---    Eye Exam Never done ---    RSV Vaccine (Age 60+ and Pregnant patients)  "(1 - 1-dose 60+ series) Never done ---    COVID-19 Vaccine (4 - 2023-24 season) 09/01/2024 8/19/2021    Hemoglobin A1c 02/22/2025 8/22/2024    Lipid Panel 08/22/2025 8/22/2024    DEXA Scan 07/07/2026 7/7/2023    TETANUS VACCINE 04/17/2027 4/17/2017    Colorectal Cancer Screening 09/06/2027 9/6/2024            REVIEW OF SYSTEMS:   Review of Systems   Constitutional:  Negative for chills, fever, malaise/fatigue and weight loss.   Respiratory:  Negative for cough, shortness of breath and wheezing.    Cardiovascular:  Negative for chest pain, palpitations and leg swelling.   Gastrointestinal:  Negative for abdominal pain, heartburn, nausea and vomiting.   Musculoskeletal:  Negative for back pain, myalgias and neck pain.   Neurological:  Negative for dizziness, tingling, tremors and headaches.   Psychiatric/Behavioral:  Negative for depression. The patient has insomnia. The patient is not nervous/anxious.        PHYSICAL EXAM:      274}  /72 (BP Location: Left arm, Patient Position: Sitting, BP Method: Medium (Manual))   Pulse 64   Ht 5' 4" (1.626 m)   Wt 64.5 kg (142 lb 4.8 oz)   LMP  (LMP Unknown)   SpO2 96%   BMI 24.43 kg/m²   Wt Readings from Last 3 Encounters:   09/19/24 64.5 kg (142 lb 4.8 oz)   04/17/24 70 kg (154 lb 6.4 oz)   03/04/24 71.9 kg (158 lb 9.6 oz)     BP Readings from Last 3 Encounters:   09/19/24 122/72   04/17/24 132/76   03/04/24 122/80     Estimated body mass index is 24.43 kg/m² as calculated from the following:    Height as of this encounter: 5' 4" (1.626 m).    Weight as of this encounter: 64.5 kg (142 lb 4.8 oz).     Physical Exam  HENT:      Head: Normocephalic and atraumatic.      Nose: Nose normal.      Mouth/Throat:      Mouth: Mucous membranes are dry.      Pharynx: Oropharynx is clear.   Eyes:      Extraocular Movements: Extraocular movements intact.      Conjunctiva/sclera: Conjunctivae normal.   Cardiovascular:      Rate and Rhythm: Normal rate and regular rhythm. "   Pulmonary:      Effort: Pulmonary effort is normal.      Breath sounds: Normal breath sounds.   Abdominal:      General: Bowel sounds are normal.      Palpations: Abdomen is soft.   Musculoskeletal:         General: Normal range of motion.      Cervical back: Normal range of motion.   Skin:     General: Skin is warm.   Neurological:      General: No focal deficit present.      Mental Status: She is alert. Mental status is at baseline.   Psychiatric:         Mood and Affect: Mood normal.         Thought Content: Thought content normal.         LABS:   274}  I have reviewed old labs below:  Lab Results   Component Value Date    WBC 6.56 08/22/2024    HGB 13.5 08/22/2024    HCT 39.1 08/22/2024    MCV 88 08/22/2024     08/22/2024     08/22/2024    K 4.2 08/22/2024     08/22/2024    CALCIUM 9.9 08/22/2024    CO2 26 08/22/2024     08/22/2024    BUN 14 08/22/2024    CREATININE 0.9 08/22/2024    ANIONGAP 13 08/22/2024    ESTGFRAFRICA >60.0 05/20/2022    EGFRNONAA >60.0 05/20/2022    PROT 7.1 08/22/2024    ALBUMIN 4.1 08/22/2024    BILITOT 0.6 08/22/2024    ALKPHOS 59 08/22/2024    ALT 28 08/22/2024    AST 18 08/22/2024    INR 1.0 09/16/2022    CHOL 222 (H) 08/22/2024    TRIG 229 (H) 08/22/2024    HDL 36 (L) 08/22/2024    LDLCALC 140.2 08/22/2024    TSH 2.408 08/22/2024    HGBA1C 5.6 08/22/2024       ASSESSMENT AND PLAN:  274}  1. Type 2 diabetes mellitus with hyperglycemia, without long-term current use of insulin  - Hemoglobin A1C; Future    2. Hyperlipidemia associated with type 2 diabetes mellitus  - ezetimibe (ZETIA) 10 mg tablet; Take 1 tablet (10 mg total) by mouth once daily.  Dispense: 90 tablet; Refill: 3    3. Primary insomnia  - traZODone (DESYREL) 100 MG tablet; Take 1 tablet (100 mg total) by mouth every evening.  Dispense: 90 tablet; Refill: 3    4. Encounter for vaccination  - influenza (adjuvanted) (Fluad) 45 mcg/0.5 mL IM vaccine (> or = 64 yo) 0.5 mL         Orders Placed This  Encounter   Procedures    Hemoglobin A1C       No follow-ups on file. or sooner as needed.

## 2024-10-25 ENCOUNTER — E-VISIT (OUTPATIENT)
Dept: FAMILY MEDICINE | Facility: CLINIC | Age: 64
End: 2024-10-25
Payer: MEDICARE

## 2024-10-25 DIAGNOSIS — R30.0 DYSURIA: Primary | ICD-10-CM

## 2024-10-25 RX ORDER — NITROFURANTOIN 25; 75 MG/1; MG/1
100 CAPSULE ORAL 2 TIMES DAILY
Qty: 10 CAPSULE | Refills: 0 | Status: SHIPPED | OUTPATIENT
Start: 2024-10-25

## 2024-10-25 NOTE — PROGRESS NOTES
SUBJECTIVE:    CHIEF COMPLAINT:   Chief Complaint   Patient presents with    General Illness     Entered automatically based on patient selection in [employer].           274}    HISTORY OF PRESENT ILLNESS:  Liv Garland is a 64 y.o. female who presents to the clinic today for symptoms of UTI w/ dysuria and incomplete emptying    PAST MEDICAL HISTORY:     274}  Past Medical History:   Diagnosis Date    Arthritis     Bilateral breast cancer     Breast cancer 2004    Dr. Dos Santos, bilat mast, no rt, tamoxifen stopped after 9 mos due to side effects    Diabetes mellitus     High cholesterol     Hypertension     PONV (postoperative nausea and vomiting)        PAST SURGICAL HISTORY:  Past Surgical History:   Procedure Laterality Date    ABLATION OF DYSRHYTHMIC FOCUS  2000    Dr. Melgoza    CHOLECYSTECTOMY  2/2001    COLONOSCOPY      repeat in 2020    ESOPHAGEAL DILATION N/A 5/23/2022    Procedure: DILATION, ESOPHAGUS;  Surgeon: Francis Bower MD;  Location: [facility] ENDO;  Service: General;  Laterality: N/A;    ESOPHAGOGASTRODUODENOSCOPY N/A 5/23/2022    Procedure: ESOPHAGOGASTRODUODENOSCOPY (EGD);  Surgeon: Francis Bower MD;  Location: [facility] ENDO;  Service: General;  Laterality: N/A;    HIP ARTHROPLASTY Right 4/5/2021    Procedure: ARTHROPLASTY, HIP;  Surgeon: Don Suárez DO;  Location: [facility] OR;  Service: Orthopedics;  Laterality: Right;  Equipment: [employer]ir Total Hip System  Vendor: [employer]  REQUIRES ASSISTANT: KOJO    HIP ARTHROPLASTY Left 9/26/2022    Procedure: ARTHROPLASTY, HIP;  Surgeon: Don Suárez DO;  Location: [facility] OR;  Service: Orthopedics;  Laterality: Left;    HYSTERECTOMY  10/1/2003    INJECTION OF JOINT Right 11/25/2020    Procedure: Right Hip Intra-articular Steroid Injection Under Fluoro;  Surgeon: Rios Colon MD;  Location: [facility] OR;  Service: Pain Management;  Laterality: Right;    INJECTION OF STEROID Right 2/10/2021    Procedure: INJECTION, STEROID;  Surgeon:  "Don Suárez DO;  Location: [facility] OR;  Service: Orthopedics;  Laterality: Right;  Sedation  Equipment: 3cc syringes x2, 18ga 1.5" needle x2; 3.5" 20ga spinal needle; Kenalog 40; 1% lidocaine without Epi; 0.25% marcaine without epi  C-Arm: Entire    MASTECTOMY Bilateral 2004    MITRAL VALVE REPLACEMENT  1980    OOPHORECTOMY Left 2004    OSTEOTOMY OF METATARSAL BONE Left 2/10/2022    Procedure: OSTEOTOMY, METATARSAL BONE RESURFACING ARTHROPLASTY VERSES SHORTENING OSTEOTOMY 2ND METATARSAL, LEFT FOOT.;  Surgeon: Don Suárez DO;  Location: [facility] OR;  Service: Orthopedics;  Laterality: Left;  THIS IS AN ORIF METARSAL CARD; MAKE NOTES TO CREATE A NEW CARD PLEASE    TONSILLECTOMY  1974    TOTAL ABDOMINAL HYSTERECTOMY W/ BILATERAL SALPINGOOPHORECTOMY         SOCIAL HISTORY:  Social History     Socioeconomic History    Marital status: Single   Tobacco Use    Smoking status: Former     Current packs/day: 0.00     Types: Cigarettes     Quit date: 1999     Years since quittin.8    Smokeless tobacco: Never   Substance and Sexual Activity    Alcohol use: Yes     Comment: rarely    Drug use: No    Sexual activity: Yes     Partners: Male     Social Drivers of Health     Financial Resource Strain: Low Risk  (3/4/2024)    Overall Financial Resource Strain (CARDIA)     Difficulty of Paying Living Expenses: Not hard at all   Food Insecurity: No Food Insecurity (3/4/2024)    Hunger Vital Sign     Worried About Running Out of Food in the Last Year: Never true     Ran Out of Food in the Last Year: Never true   Transportation Needs: No Transportation Needs (3/4/2024)    PRAPARE - Transportation     Lack of Transportation (Medical): No     Lack of Transportation (Non-Medical): No   Physical Activity: Unknown (3/4/2024)    Exercise Vital Sign     Days of Exercise per Week: 3 days   Stress: Stress Concern Present (3/4/2024)    [employer] - Occupational Stress " Questionnaire     Feeling of Stress : To some extent   Housing Stability: Low Risk  (3/4/2024)    Housing Stability Vital Sign     Unable to Pay for Housing in the Last Year: No     Number of Places Lived in the Last Year: 1     Unstable Housing in the Last Year: No       FAMILY HISTORY:       Family History   Problem Relation Name Age of Onset    Liver cancer Mother          67 y/o    Cancer Mother      Heart failure Father          81 y/o with dementia     No Known Problems Sister 2     Early death Brother 1         accident at 29 y/o       ALLERGIES AND MEDICATIONS: updated and reviewed.      274}  Review of patient's allergies indicates:   Allergen Reactions    Lisinopril      Other reaction(s): Angioedema     Medication List with Changes/Refills   New Medications    NITROFURANTOIN, MACROCRYSTAL-MONOHYDRATE, (MACROBID) 100 MG CAPSULE    Take 1 capsule (100 mg total) by mouth 2 (two) times daily.   Current Medications    EZETIMIBE (ZETIA) 10 MG TABLET    Take 1 tablet (10 mg total) by mouth once daily.    FLUTICASONE PROPIONATE (FLONASE) 50 MCG/ACTUATION NASAL SPRAY    2 sprays (100 mcg total) by Each Nostril route as needed for Rhinitis.    LOSARTAN-HYDROCHLOROTHIAZIDE 100-25 MG (HYZAAR) 100-25 MG PER TABLET    Take 1 tablet by mouth once daily.    OMEPRAZOLE (PRILOSEC) 40 MG CAPSULE    Take 1 capsule (40 mg total) by mouth once daily.    SEMAGLUTIDE (OZEMPIC) 2 MG/DOSE (8 MG/3 ML) PNIJ    Inject 2 mg into the skin every 7 days.    TRAZODONE (DESYREL) 100 MG TABLET    Take 1 tablet (100 mg total) by mouth every evening.       SCREENING HISTORY:    274}  Health Maintenance         Date Due Completion Date    Diabetes Urine Screening Never done ---    Foot Exam Never done ---    Eye Exam Never done ---    RSV Vaccine (Age 60+ and Pregnant patients) (1 - Risk 60-74 years 1-dose series) Never done ---    COVID-19 Vaccine (4 - 2024-25 season) 09/01/2024 8/19/2021    Hemoglobin A1c 02/22/2025 8/22/2024     "Lipid Panel 08/22/2025 8/22/2024    DEXA Scan 07/07/2026 7/7/2023    TETANUS VACCINE 04/17/2027 4/17/2017    Colorectal Cancer Screening 09/06/2027 9/6/2024            REVIEW OF SYSTEMS:   Review of Systems   Genitourinary:  Positive for dysuria and urgency. Negative for flank pain, frequency and hematuria.     PHYSICAL EXAM:      274}  LMP  (LMP Unknown)   Wt Readings from Last 3 Encounters:   09/19/24 64.5 kg (142 lb 4.8 oz)   04/17/24 70 kg (154 lb 6.4 oz)   03/04/24 71.9 kg (158 lb 9.6 oz)     BP Readings from Last 3 Encounters:   09/19/24 122/72   04/17/24 132/76   03/04/24 122/80     Estimated body mass index is 24.43 kg/m² as calculated from the following:    Height as of 9/19/24: 5' 4" (1.626 m).    Weight as of 9/19/24: 64.5 kg (142 lb 4.8 oz).     Physical Exam    LABS:   274}  I have reviewed old labs below:  Lab Results   Component Value Date    WBC 6.56 08/22/2024    HGB 13.5 08/22/2024    HCT 39.1 08/22/2024    MCV 88 08/22/2024     08/22/2024     08/22/2024    K 4.2 08/22/2024     08/22/2024    CALCIUM 9.9 08/22/2024    CO2 26 08/22/2024     08/22/2024    BUN 14 08/22/2024    CREATININE 0.9 08/22/2024    ANIONGAP 13 08/22/2024    ESTGFRAFRICA >60.0 05/20/2022    EGFRNONAA >60.0 05/20/2022    PROT 7.1 08/22/2024    ALBUMIN 4.1 08/22/2024    BILITOT 0.6 08/22/2024    ALKPHOS 59 08/22/2024    ALT 28 08/22/2024    AST 18 08/22/2024    INR 1.0 09/16/2022    CHOL 222 (H) 08/22/2024    TRIG 229 (H) 08/22/2024    HDL 36 (L) 08/22/2024    LDLCALC 140.2 08/22/2024    TSH 2.408 08/22/2024    HGBA1C 5.6 08/22/2024       ASSESSMENT AND PLAN:  274}  Assessment & Plan             1. Dysuria  - nitrofurantoin, macrocrystal-monohydrate, (MACROBID) 100 MG capsule; Take 1 capsule (100 mg total) by mouth 2 (two) times daily.  Dispense: 10 capsule; Refill: 0  - Urinalysis, Reflex to Urine Culture Urine, Clean Catch; Future         Orders Placed This Encounter   Procedures    Urinalysis, Reflex " to Urine Culture Urine, Clean Catch       No follow-ups on file. or sooner as needed.    8 minute encounter

## 2024-10-29 ENCOUNTER — TELEPHONE (OUTPATIENT)
Dept: FAMILY MEDICINE | Facility: CLINIC | Age: 64
End: 2024-10-29
Payer: MEDICARE

## 2024-11-15 ENCOUNTER — TELEPHONE (OUTPATIENT)
Dept: FAMILY MEDICINE | Facility: CLINIC | Age: 64
End: 2024-11-15
Payer: MEDICARE

## 2024-11-15 NOTE — TELEPHONE ENCOUNTER
Spoke with the patient and rescheduled appt from 12/23 due to MD being out of clinic. She voiced acceptance.

## 2024-11-18 ENCOUNTER — PATIENT OUTREACH (OUTPATIENT)
Dept: ADMINISTRATIVE | Facility: HOSPITAL | Age: 64
End: 2024-11-18
Payer: MEDICARE

## 2024-11-18 DIAGNOSIS — E11.9 DIABETES MELLITUS WITHOUT COMPLICATION: Primary | ICD-10-CM

## 2024-11-18 NOTE — PROGRESS NOTES
Population Health Chart Review & Patient Outreach Details      Additional [facility] Notes:               Updates Requested / Reviewed:      Updated Care Coordination Note, , and External Sources: [facility] and [facility]         Health Maintenance Topics Overdue:      VBHM Score: 3     Urine Screening  Eye Exam  Foot Exam    RSV Vaccine                  Health Maintenance Topic(s) Outreach Outcomes & Actions Taken:    Lab(s) - Outreach Outcomes & Actions Taken  : Overdue Lab(s) Ordered and Overdue Lab(s) Scheduled

## 2024-11-19 ENCOUNTER — LAB VISIT (OUTPATIENT)
Dept: LAB | Facility: HOSPITAL | Age: 64
End: 2024-11-19
Attending: STUDENT IN AN ORGANIZED HEALTH CARE EDUCATION/TRAINING PROGRAM
Payer: MEDICARE

## 2024-11-19 DIAGNOSIS — E11.9 DIABETES MELLITUS WITHOUT COMPLICATION: ICD-10-CM

## 2024-11-19 DIAGNOSIS — E11.65 TYPE 2 DIABETES MELLITUS WITH HYPERGLYCEMIA, WITHOUT LONG-TERM CURRENT USE OF INSULIN: ICD-10-CM

## 2024-11-19 LAB
ALBUMIN/CREAT UR: 5.3 UG/MG (ref 0–30)
CREAT UR-MCNC: 152 MG/DL (ref 15–325)
ESTIMATED AVG GLUCOSE: 103 MG/DL (ref 68–131)
HBA1C MFR BLD: 5.2 % (ref 4–5.6)
MICROALBUMIN UR DL<=1MG/L-MCNC: 8 UG/ML

## 2024-11-19 PROCEDURE — 83036 HEMOGLOBIN GLYCOSYLATED A1C: CPT | Performed by: STUDENT IN AN ORGANIZED HEALTH CARE EDUCATION/TRAINING PROGRAM

## 2024-11-19 PROCEDURE — 82570 ASSAY OF URINE CREATININE: CPT | Performed by: STUDENT IN AN ORGANIZED HEALTH CARE EDUCATION/TRAINING PROGRAM

## 2024-11-19 PROCEDURE — 36415 COLL VENOUS BLD VENIPUNCTURE: CPT | Performed by: STUDENT IN AN ORGANIZED HEALTH CARE EDUCATION/TRAINING PROGRAM

## 2024-12-09 DIAGNOSIS — E11.69 HYPERLIPIDEMIA ASSOCIATED WITH TYPE 2 DIABETES MELLITUS: ICD-10-CM

## 2024-12-09 DIAGNOSIS — E78.5 HYPERLIPIDEMIA ASSOCIATED WITH TYPE 2 DIABETES MELLITUS: ICD-10-CM

## 2024-12-09 RX ORDER — EZETIMIBE 10 MG/1
10 TABLET ORAL DAILY
Qty: 90 TABLET | Refills: 2 | Status: SHIPPED | OUTPATIENT
Start: 2024-12-09

## 2024-12-09 NOTE — TELEPHONE ENCOUNTER
No care due was identified.  Health [facility] Embedded Care Due Messages. Reference number: [id number].   12/09/2024 8:09:15 AM CST

## 2024-12-09 NOTE — TELEPHONE ENCOUNTER
Refill Routing Note   Medication(s) are not appropriate for processing by [facility] for the following reason(s):        New or recently adjusted medication    [facility] action(s):  Defer               Appointments  past 12m or future 3m with PCP    Date Provider   Last Visit   10/25/2024 Jayashree Brand MD   Next Visit   Visit date not found Jayashree Brand MD   ED visits in past 90 days: 0        Note composed:4:25 PM 12/09/2024

## 2024-12-22 ENCOUNTER — HOSPITAL ENCOUNTER (EMERGENCY)
Facility: HOSPITAL | Age: 64
Discharge: HOME | End: 2024-12-22
Attending: STUDENT IN AN ORGANIZED HEALTH CARE EDUCATION/TRAINING PROGRAM
Payer: MEDICARE

## 2024-12-22 VITALS
HEART RATE: 61 BPM | WEIGHT: 135 LBS | HEIGHT: 64 IN | BODY MASS INDEX: 23.05 KG/M2 | RESPIRATION RATE: 17 BRPM | TEMPERATURE: 98 F | DIASTOLIC BLOOD PRESSURE: 73 MMHG | OXYGEN SATURATION: 99 % | SYSTOLIC BLOOD PRESSURE: 156 MMHG

## 2024-12-22 DIAGNOSIS — R06.02 SHORTNESS OF BREATH: ICD-10-CM

## 2024-12-22 DIAGNOSIS — R07.89 ATYPICAL CHEST PAIN: Primary | ICD-10-CM

## 2024-12-22 DIAGNOSIS — R07.9 CHEST PAIN: ICD-10-CM

## 2024-12-22 LAB
ALBUMIN SERPL BCP-MCNC: 4.2 G/DL (ref 3.5–5.2)
ALP SERPL-CCNC: 46 U/L (ref 40–150)
ALT SERPL W/O P-5'-P-CCNC: 28 U/L (ref 10–44)
ANION GAP SERPL CALC-SCNC: 11 MMOL/L (ref 8–16)
AST SERPL-CCNC: 20 U/L (ref 10–40)
BASOPHILS # BLD AUTO: 0.05 K/UL (ref 0–0.2)
BASOPHILS NFR BLD: 0.8 % (ref 0–1.9)
BILIRUB SERPL-MCNC: 0.6 MG/DL (ref 0.1–1)
BNP SERPL-MCNC: 108 PG/ML (ref 0–99)
BUN SERPL-MCNC: 14 MG/DL (ref 8–23)
CALCIUM SERPL-MCNC: 9.3 MG/DL (ref 8.7–10.5)
CHLORIDE SERPL-SCNC: 103 MMOL/L (ref 95–110)
CO2 SERPL-SCNC: 25 MMOL/L (ref 23–29)
CREAT SERPL-MCNC: 0.9 MG/DL (ref 0.5–1.4)
D DIMER PPP IA.FEU-MCNC: 0.21 MG/L FEU
DIFFERENTIAL METHOD BLD: ABNORMAL
EOSINOPHIL # BLD AUTO: 0.2 K/UL (ref 0–0.5)
EOSINOPHIL NFR BLD: 3.7 % (ref 0–8)
ERYTHROCYTE [DISTWIDTH] IN BLOOD BY AUTOMATED COUNT: 13.4 % (ref 11.5–14.5)
EST. GFR  (NO RACE VARIABLE): >60 ML/MIN/1.73 M^2
GLUCOSE SERPL-MCNC: 109 MG/DL (ref 70–110)
HCT VFR BLD AUTO: 38.1 % (ref 37–48.5)
HGB BLD-MCNC: 13.2 G/DL (ref 12–16)
IMM GRANULOCYTES # BLD AUTO: 0.01 K/UL (ref 0–0.04)
IMM GRANULOCYTES NFR BLD AUTO: 0.2 % (ref 0–0.5)
INFLUENZA A, MOLECULAR: NEGATIVE
INFLUENZA B, MOLECULAR: NEGATIVE
LYMPHOCYTES # BLD AUTO: 2.5 K/UL (ref 1–4.8)
LYMPHOCYTES NFR BLD: 38.5 % (ref 18–48)
MCH RBC QN AUTO: 31.5 PG (ref 27–31)
MCHC RBC AUTO-ENTMCNC: 34.6 G/DL (ref 32–36)
MCV RBC AUTO: 91 FL (ref 82–98)
MONOCYTES # BLD AUTO: 0.6 K/UL (ref 0.3–1)
MONOCYTES NFR BLD: 9.8 % (ref 4–15)
NEUTROPHILS # BLD AUTO: 3 K/UL (ref 1.8–7.7)
NEUTROPHILS NFR BLD: 47 % (ref 38–73)
NRBC BLD-RTO: 0 /100 WBC
PLATELET # BLD AUTO: 352 K/UL (ref 150–450)
PMV BLD AUTO: 9.4 FL (ref 9.2–12.9)
POTASSIUM SERPL-SCNC: 3.2 MMOL/L (ref 3.5–5.1)
PROT SERPL-MCNC: 7.1 G/DL (ref 6–8.4)
RBC # BLD AUTO: 4.19 M/UL (ref 4–5.4)
SARS-COV-2 RDRP RESP QL NAA+PROBE: NEGATIVE
SODIUM SERPL-SCNC: 139 MMOL/L (ref 136–145)
SPECIMEN SOURCE: NORMAL
TROPONIN I SERPL DL<=0.01 NG/ML-MCNC: <0.006 NG/ML (ref 0–0.03)
TROPONIN I SERPL DL<=0.01 NG/ML-MCNC: <0.006 NG/ML (ref 0–0.03)
WBC # BLD AUTO: 6.41 K/UL (ref 3.9–12.7)

## 2024-12-22 PROCEDURE — 87502 INFLUENZA DNA AMP PROBE: CPT | Performed by: STUDENT IN AN ORGANIZED HEALTH CARE EDUCATION/TRAINING PROGRAM

## 2024-12-22 PROCEDURE — 94760 N-INVAS EAR/PLS OXIMETRY 1: CPT

## 2024-12-22 PROCEDURE — 71045 X-RAY EXAM CHEST 1 VIEW: CPT | Mod: TC

## 2024-12-22 PROCEDURE — 80053 COMPREHEN METABOLIC PANEL: CPT | Performed by: STUDENT IN AN ORGANIZED HEALTH CARE EDUCATION/TRAINING PROGRAM

## 2024-12-22 PROCEDURE — 84484 ASSAY OF TROPONIN QUANT: CPT | Performed by: STUDENT IN AN ORGANIZED HEALTH CARE EDUCATION/TRAINING PROGRAM

## 2024-12-22 PROCEDURE — 87635 SARS-COV-2 COVID-19 AMP PRB: CPT | Performed by: STUDENT IN AN ORGANIZED HEALTH CARE EDUCATION/TRAINING PROGRAM

## 2024-12-22 PROCEDURE — 93010 ELECTROCARDIOGRAM REPORT: CPT | Mod: ,,, | Performed by: INTERNAL MEDICINE

## 2024-12-22 PROCEDURE — 85025 COMPLETE CBC W/AUTO DIFF WBC: CPT | Performed by: STUDENT IN AN ORGANIZED HEALTH CARE EDUCATION/TRAINING PROGRAM

## 2024-12-22 PROCEDURE — 25000003 PHARM REV CODE 250: Performed by: STUDENT IN AN ORGANIZED HEALTH CARE EDUCATION/TRAINING PROGRAM

## 2024-12-22 PROCEDURE — 99285 EMERGENCY DEPT VISIT HI MDM: CPT | Mod: 25

## 2024-12-22 PROCEDURE — 93005 ELECTROCARDIOGRAM TRACING: CPT

## 2024-12-22 PROCEDURE — 85379 FIBRIN DEGRADATION QUANT: CPT | Performed by: STUDENT IN AN ORGANIZED HEALTH CARE EDUCATION/TRAINING PROGRAM

## 2024-12-22 PROCEDURE — 83880 ASSAY OF NATRIURETIC PEPTIDE: CPT | Performed by: STUDENT IN AN ORGANIZED HEALTH CARE EDUCATION/TRAINING PROGRAM

## 2024-12-22 PROCEDURE — 71045 X-RAY EXAM CHEST 1 VIEW: CPT | Mod: 26,,, | Performed by: RADIOLOGY

## 2024-12-22 RX ORDER — POTASSIUM CHLORIDE 20 MEQ/1
40 TABLET, EXTENDED RELEASE ORAL
Status: COMPLETED | OUTPATIENT
Start: 2024-12-22 | End: 2024-12-22

## 2024-12-22 RX ORDER — TRAZODONE HYDROCHLORIDE 50 MG/1
50 TABLET ORAL
COMMUNITY
Start: 2024-11-11

## 2024-12-22 RX ORDER — HYDROXYZINE HYDROCHLORIDE 25 MG/1
25 TABLET, FILM COATED ORAL EVERY 6 HOURS PRN
Qty: 15 TABLET | Refills: 0 | Status: SHIPPED | OUTPATIENT
Start: 2024-12-22

## 2024-12-22 RX ADMIN — POTASSIUM CHLORIDE 40 MEQ: 1500 TABLET, EXTENDED RELEASE ORAL at 05:12

## 2024-12-22 NOTE — ED PROVIDER NOTES
Encounter Date: 12/22/2024       History     Chief Complaint   Patient presents with    Shortness of Breath     Intermittent shortness of breath x 3 days. Reports 2 episodes of diaphoresis today.     64-year-old female history of hypertension presents to ED with chief complaints of intermittent chest tightness at rest for 3 days. Tightness lasts for a few seconds and then goes away. She denies specific chest pain, or shortness of breath, cough, fever, chills, nausea, vomiting. She does not smoke, drinks alcohol occasionally. She has family history of coronary artery disease, MI.     The history is provided by the patient. No  was used.     Review of patient's allergies indicates:   Allergen Reactions    Lisinopril      Other reaction(s): Angioedema     Past Medical History:   Diagnosis Date    Arthritis     Bilateral breast cancer     Breast cancer 2004    Dr. Dos Santos, bilat mast, no rt, tamoxifen stopped after 9 mos due to side effects    Diabetes mellitus     High cholesterol     Hypertension     PONV (postoperative nausea and vomiting)      Past Surgical History:   Procedure Laterality Date    ABLATION OF DYSRHYTHMIC FOCUS  2000    Dr. Melgoza    CHOLECYSTECTOMY  2/2001    COLONOSCOPY      repeat in 2020    ESOPHAGEAL DILATION N/A 5/23/2022    Procedure: DILATION, ESOPHAGUS;  Surgeon: Francis Bower MD;  Location: [facility] ENDO;  Service: General;  Laterality: N/A;    ESOPHAGOGASTRODUODENOSCOPY N/A 5/23/2022    Procedure: ESOPHAGOGASTRODUODENOSCOPY (EGD);  Surgeon: Francis Bower MD;  Location: [facility] ENDO;  Service: General;  Laterality: N/A;    HIP ARTHROPLASTY Right 4/5/2021    Procedure: ARTHROPLASTY, HIP;  Surgeon: Don Suárez DO;  Location: [facility] OR;  Service: Orthopedics;  Laterality: Right;  Equipment: A[employer]ir Total Hip System  Vendor: [employer]  REQUIRES ASSISTANT: KOJO    HIP ARTHROPLASTY Left 9/26/2022    Procedure: ARTHROPLASTY, HIP;  Surgeon: Don Suárez DO;   "Location: [facility] OR;  Service: Orthopedics;  Laterality: Left;    HYSTERECTOMY  10/1/2003    INJECTION OF JOINT Right 2020    Procedure: Right Hip Intra-articular Steroid Injection Under Fluoro;  Surgeon: Rios Colon MD;  Location: [facility] OR;  Service: Pain Management;  Laterality: Right;    INJECTION OF STEROID Right 2/10/2021    Procedure: INJECTION, STEROID;  Surgeon: Don Suárez DO;  Location: [facility] OR;  Service: Orthopedics;  Laterality: Right;  Sedation  Equipment: 3cc syringes x2, 18ga 1.5" needle x2; 3.5" 20ga spinal needle; Kenalog 40; 1% lidocaine without Epi; 0.25% marcaine without epi  C-Arm: Entire    MASTECTOMY Bilateral 2004    MITRAL VALVE REPLACEMENT  1980    OOPHORECTOMY Left 2004    OSTEOTOMY OF METATARSAL BONE Left 2/10/2022    Procedure: OSTEOTOMY, METATARSAL BONE RESURFACING ARTHROPLASTY VERSES SHORTENING OSTEOTOMY 2ND METATARSAL, LEFT FOOT.;  Surgeon: Don Suárez DO;  Location: [facility] OR;  Service: Orthopedics;  Laterality: Left;  THIS IS AN ORIF METARSAL CARD; MAKE NOTES TO CREATE A NEW CARD PLEASE    TONSILLECTOMY  1974    TOTAL ABDOMINAL HYSTERECTOMY W/ BILATERAL SALPINGOOPHORECTOMY       Family History   Problem Relation Name Age of Onset    Liver cancer Mother          69 y/o    Cancer Mother      Heart failure Father          81 y/o with dementia     No Known Problems Sister 2     Early death Brother 1         accident at 29 y/o     Social History     Tobacco Use    Smoking status: Former     Current packs/day: 0.00     Types: Cigarettes     Quit date: 1999     Years since quittin.9    Smokeless tobacco: Never   Substance Use Topics    Alcohol use: Yes     Comment: rarely    Drug use: No     Review of Systems   Constitutional: Negative.    HENT: Negative.     Eyes: Negative.    Respiratory:  Positive for chest tightness.    Cardiovascular: Negative.    Gastrointestinal: Negative.    Endocrine: Negative.    Genitourinary: Negative.    Musculoskeletal: " Negative.    Skin: Negative.    Neurological: Negative.    Hematological: Negative.    Psychiatric/Behavioral: Negative.     All other systems reviewed and are negative.      Physical Exam     Initial Vitals [12/22/24 1343]   BP Pulse Resp Temp SpO2   113/60 60 20 97.7 °F (36.5 °C) 99 %      MAP       --         Physical Exam    Nursing note and vitals reviewed.  Constitutional: She appears well-developed.   HENT:   Head: Normocephalic.   Eyes: Pupils are equal, round, and reactive to light.   Neck: No JVD present.   Normal range of motion.  Cardiovascular:  Normal rate.           Pulmonary/Chest: Breath sounds normal. No respiratory distress. She has no wheezes. She has no rales.   Abdominal: Abdomen is soft. Bowel sounds are normal. She exhibits no distension. There is no abdominal tenderness.   Musculoskeletal:      Cervical back: Normal range of motion.     Neurological: She is alert and oriented to person, place, and time. She has normal strength. GCS score is 15. GCS eye subscore is 4. GCS verbal subscore is 5. GCS motor subscore is 6.   Skin: Skin is warm. Capillary refill takes less than 2 seconds.   Psychiatric: She has a normal mood and affect.         ED Course   Procedures  Labs Reviewed   CBC W/ AUTO DIFFERENTIAL - Abnormal       Result Value    WBC 6.41      RBC 4.19      Hemoglobin 13.2      Hematocrit 38.1      MCV 91      MCH 31.5 (*)     MCHC 34.6      RDW 13.4      Platelets 352      MPV 9.4      Immature Granulocytes 0.2      Gran # (ANC) 3.0      Immature Grans (Abs) 0.01      Lymph # 2.5      Mono # 0.6      Eos # 0.2      Baso # 0.05      nRBC 0      Gran % 47.0      Lymph % 38.5      Mono % 9.8      Eosinophil % 3.7      Basophil % 0.8      Differential Method Automated     COMPREHENSIVE METABOLIC PANEL - Abnormal    Sodium 139      Potassium 3.2 (*)     Chloride 103      CO2 25      Glucose 109      BUN 14      Creatinine 0.9      Calcium 9.3      Total Protein 7.1      Albumin 4.2       Total Bilirubin 0.6      Alkaline Phosphatase 46      AST 20      ALT 28      eGFR >60.0      Anion Gap 11     B-TYPE NATRIURETIC PEPTIDE - Abnormal     (*)    INFLUENZA A & B BY MOLECULAR    Influenza A, Molecular Negative      Influenza B, Molecular Negative      Flu A & B Source Nasal Swab     TROPONIN I    Troponin I <0.006     D DIMER, QUANTITATIVE    D-Dimer 0.21     SARS-COV-2 RNA AMPLIFICATION, QUAL    SARS-CoV-2 RNA, Amplification, Qual Negative     TROPONIN I    Troponin I <0.006       EKG Readings: (Independently Interpreted)   normal sinus rhythm with PAC no ischemia       Imaging Results              X-Ray Chest AP Portable (Final result)  Result time 12/22/24 14:41:55      Final result by Thaddeus Streling Jr., MD (12/22/24 14:41:55)                   Impression:      No acute abnormality.      Electronically signed by: Thaddeus Sterling MD  Date:    12/22/2024  Time:    14:41               Narrative:    EXAMINATION:  XR CHEST AP PORTABLE    CLINICAL HISTORY:  Chest Pain;    TECHNIQUE:  Single frontal view of the chest was performed.    COMPARISON:  Chest x-ray of October 3, 2016    FINDINGS:  The lungs are clear, with normal appearance of pulmonary vasculature and no pleural effusion or pneumothorax.    The cardiac silhouette is normal in size. The hilar and mediastinal contours are unremarkable.    Bones are intact.                                       Medications   potassium chloride SA CR tablet 40 mEq (40 mEq Oral Given 12/22/24 1740)     Medical Decision Making  Ddx ACS, PE, anxiety, pneumonia, others  EKG shows normal sinus rhythm with PAC no ischemia  Troponin x2 unremarkable D-dimer normal, CBC, CMP without significant gross abnormalities. Chest x-ray normal  Given trial Rx hydroxyzine possible anxiety  Patient was seen and reevaluated. Patient's symptoms seem to be stable. I discussed the patient's diagnosis, treatment plan, and plan for discharge with the patient. Patient was  instructed to follow up with PCP and was given strict return precautions to the ED. The patient voiced understanding and agreed with the plan        Amount and/or Complexity of Data Reviewed  Labs: ordered.  Radiology: ordered.    Risk  Prescription drug management.                                      Clinical Impression:  Final diagnoses:  [R06.02] Shortness of breath  [R07.9] Chest pain  [R07.89] Atypical chest pain (Primary)          ED Disposition Condition    Discharge Stable          ED Prescriptions       Medication Sig Dispense Start Date End Date Auth. Provider    hydrOXYzine HCL (ATARAX) 25 MG tablet Take 1 tablet (25 mg total) by mouth every 6 (six) hours as needed for Anxiety. 15 tablet 12/22/2024 -- Devon Mendiola MD          Follow-up Information       Follow up With Specialties Details Why Contact Info    Jayashree Brand MD Family Medicine  As needed [address] [facility] [place] [address] [phone]               Devon Mendiola MD  12/22/24 0354

## 2024-12-22 NOTE — DISCHARGE INSTRUCTIONS
May return to the ED at any time if any new or worsening symptoms    Follow up with your primary care physician

## 2024-12-22 NOTE — RESPIRATORY THERAPY
12/22/24 1501   Patient Assessment/Suction   Level of Consciousness (AVPU) alert   Respiratory Effort Normal;Unlabored   Expansion/Accessory Muscles/Retractions no retractions;no use of accessory muscles   Rhythm/Pattern, Respiratory depth regular;pattern regular;unlabored   Cough Frequency no cough   PRE-TX-O2   Device (Oxygen Therapy) room air   SpO2 98 %   Pulse Oximetry Type Continuous   $ Pulse Oximetry - Single Charge Pulse Oximetry - Single   Pulse 67   Resp (!) 21

## 2024-12-23 LAB
OHS QRS DURATION: 86 MS
OHS QTC CALCULATION: 437 MS

## 2025-01-09 ENCOUNTER — LAB VISIT (OUTPATIENT)
Dept: LAB | Facility: HOSPITAL | Age: 65
End: 2025-01-09
Attending: STUDENT IN AN ORGANIZED HEALTH CARE EDUCATION/TRAINING PROGRAM
Payer: MEDICARE

## 2025-01-09 ENCOUNTER — OFFICE VISIT (OUTPATIENT)
Dept: FAMILY MEDICINE | Facility: CLINIC | Age: 65
End: 2025-01-09
Payer: MEDICARE

## 2025-01-09 VITALS
BODY MASS INDEX: 24.18 KG/M2 | HEART RATE: 72 BPM | WEIGHT: 141.63 LBS | OXYGEN SATURATION: 97 % | DIASTOLIC BLOOD PRESSURE: 76 MMHG | SYSTOLIC BLOOD PRESSURE: 118 MMHG | HEIGHT: 64 IN | RESPIRATION RATE: 14 BRPM

## 2025-01-09 DIAGNOSIS — E78.5 HYPERLIPIDEMIA ASSOCIATED WITH TYPE 2 DIABETES MELLITUS: ICD-10-CM

## 2025-01-09 DIAGNOSIS — E87.6 HYPOKALEMIA: ICD-10-CM

## 2025-01-09 DIAGNOSIS — E11.69 HYPERLIPIDEMIA ASSOCIATED WITH TYPE 2 DIABETES MELLITUS: ICD-10-CM

## 2025-01-09 DIAGNOSIS — F41.9 ANXIETY: Primary | ICD-10-CM

## 2025-01-09 LAB
MAGNESIUM SERPL-MCNC: 1.7 MG/DL (ref 1.6–2.6)
PHOSPHATE SERPL-MCNC: 3.5 MG/DL (ref 2.7–4.5)
POTASSIUM SERPL-SCNC: 4.2 MMOL/L (ref 3.5–5.1)

## 2025-01-09 PROCEDURE — 1159F MED LIST DOCD IN RCRD: CPT | Mod: CPTII,S$GLB,, | Performed by: STUDENT IN AN ORGANIZED HEALTH CARE EDUCATION/TRAINING PROGRAM

## 2025-01-09 PROCEDURE — 84132 ASSAY OF SERUM POTASSIUM: CPT | Performed by: STUDENT IN AN ORGANIZED HEALTH CARE EDUCATION/TRAINING PROGRAM

## 2025-01-09 PROCEDURE — 1157F ADVNC CARE PLAN IN RCRD: CPT | Mod: CPTII,S$GLB,, | Performed by: STUDENT IN AN ORGANIZED HEALTH CARE EDUCATION/TRAINING PROGRAM

## 2025-01-09 PROCEDURE — 36415 COLL VENOUS BLD VENIPUNCTURE: CPT | Performed by: STUDENT IN AN ORGANIZED HEALTH CARE EDUCATION/TRAINING PROGRAM

## 2025-01-09 PROCEDURE — 83735 ASSAY OF MAGNESIUM: CPT | Performed by: STUDENT IN AN ORGANIZED HEALTH CARE EDUCATION/TRAINING PROGRAM

## 2025-01-09 PROCEDURE — 3078F DIAST BP <80 MM HG: CPT | Mod: CPTII,S$GLB,, | Performed by: STUDENT IN AN ORGANIZED HEALTH CARE EDUCATION/TRAINING PROGRAM

## 2025-01-09 PROCEDURE — 84100 ASSAY OF PHOSPHORUS: CPT | Performed by: STUDENT IN AN ORGANIZED HEALTH CARE EDUCATION/TRAINING PROGRAM

## 2025-01-09 PROCEDURE — 3008F BODY MASS INDEX DOCD: CPT | Mod: CPTII,S$GLB,, | Performed by: STUDENT IN AN ORGANIZED HEALTH CARE EDUCATION/TRAINING PROGRAM

## 2025-01-09 PROCEDURE — 3288F FALL RISK ASSESSMENT DOCD: CPT | Mod: CPTII,S$GLB,, | Performed by: STUDENT IN AN ORGANIZED HEALTH CARE EDUCATION/TRAINING PROGRAM

## 2025-01-09 PROCEDURE — 3074F SYST BP LT 130 MM HG: CPT | Mod: CPTII,S$GLB,, | Performed by: STUDENT IN AN ORGANIZED HEALTH CARE EDUCATION/TRAINING PROGRAM

## 2025-01-09 PROCEDURE — 1126F AMNT PAIN NOTED NONE PRSNT: CPT | Mod: CPTII,S$GLB,, | Performed by: STUDENT IN AN ORGANIZED HEALTH CARE EDUCATION/TRAINING PROGRAM

## 2025-01-09 PROCEDURE — 99214 OFFICE O/P EST MOD 30 MIN: CPT | Mod: S$GLB,,, | Performed by: STUDENT IN AN ORGANIZED HEALTH CARE EDUCATION/TRAINING PROGRAM

## 2025-01-09 PROCEDURE — 1101F PT FALLS ASSESS-DOCD LE1/YR: CPT | Mod: CPTII,S$GLB,, | Performed by: STUDENT IN AN ORGANIZED HEALTH CARE EDUCATION/TRAINING PROGRAM

## 2025-01-09 PROCEDURE — 99999 PR PBB SHADOW E&M-EST. PATIENT-LVL III: CPT | Mod: PBBFAC,,, | Performed by: STUDENT IN AN ORGANIZED HEALTH CARE EDUCATION/TRAINING PROGRAM

## 2025-01-09 RX ORDER — SERTRALINE HYDROCHLORIDE 25 MG/1
25 TABLET, FILM COATED ORAL DAILY
Qty: 30 TABLET | Refills: 11 | Status: SHIPPED | OUTPATIENT
Start: 2025-01-09 | End: 2026-01-09

## 2025-01-09 RX ORDER — HYDROXYZINE HYDROCHLORIDE 25 MG/1
25 TABLET, FILM COATED ORAL EVERY 6 HOURS PRN
Qty: 30 TABLET | Refills: 11 | Status: SHIPPED | OUTPATIENT
Start: 2025-01-09

## 2025-01-10 ENCOUNTER — TELEPHONE (OUTPATIENT)
Dept: FAMILY MEDICINE | Facility: CLINIC | Age: 65
End: 2025-01-10
Payer: MEDICARE

## 2025-01-10 NOTE — PROGRESS NOTES
SUBJECTIVE:    CHIEF COMPLAINT:   Chief Complaint   Patient presents with    Hospital Follow Up           274}    HISTORY OF PRESENT ILLNESS:  Liv Garland is a 64 y.o. female who presents to the clinic today   History of Present Illness    CHIEF COMPLAINT:  Ms. Garland presents today for follow-up after recent hospital visit for lightheadedness.    HISTORY OF PRESENT ILLNESS:  She presented to hospital with lightheadedness. Laboratory studies showed low potassium levels. She has been taking OTC potassium supplement daily in the morning since the hospital visit.    ANXIETY:  She experiences constant worrying, overthinking with racing thoughts, and increased irritability. She denies prior anxiety medication use or previous anxiety diagnoses. She reports using hydroxyzine as needed for sleep, which was initially prescribed by ED physician. She notes experiencing stress related to the holidays and her daughter's travel, but feels calmer since the holidays have passed.      ROS:  General: -fever, -chills, -fatigue, -weight gain, -weight loss  Eyes: -vision changes, -redness, -discharge  ENT: -ear pain, -nasal congestion, -sore throat  Cardiovascular: -chest pain, -palpitations, -lower extremity edema  Respiratory: -cough, -shortness of breath  Gastrointestinal: -abdominal pain, -nausea, -vomiting, -diarrhea, -constipation, -blood in stool  Genitourinary: -dysuria, -hematuria, -frequency  Musculoskeletal: -joint pain, -muscle pain  Skin: -rash, -lesion  Neurological: -headache, -dizziness, -numbness, -tingling, +lightheadedness  Psychiatric: +anxiety, -depression, -sleep difficulty          PAST MEDICAL HISTORY:     274}  Past Medical History:   Diagnosis Date    Arthritis     Bilateral breast cancer     Breast cancer 2004    Dr. Dos Santos, avrilat mast, no rt, tamoxifen stopped after 9 mos due to side effects    Diabetes mellitus     High cholesterol     Hypertension     PONV (postoperative nausea and vomiting)        PAST  "SURGICAL HISTORY:  Past Surgical History:   Procedure Laterality Date    ABLATION OF DYSRHYTHMIC FOCUS  2000    Dr. Melgoza    CHOLECYSTECTOMY  2/2001    COLONOSCOPY      repeat in 2020    ESOPHAGEAL DILATION N/A 5/23/2022    Procedure: DILATION, ESOPHAGUS;  Surgeon: Francis Bower MD;  Location: [facility] ENDO;  Service: General;  Laterality: N/A;    ESOPHAGOGASTRODUODENOSCOPY N/A 5/23/2022    Procedure: ESOPHAGOGASTRODUODENOSCOPY (EGD);  Surgeon: Francis Bower MD;  Location: [facility] ENDO;  Service: General;  Laterality: N/A;    HIP ARTHROPLASTY Right 4/5/2021    Procedure: ARTHROPLASTY, HIP;  Surgeon: Don Suárez DO;  Location: [facility] OR;  Service: Orthopedics;  Laterality: Right;  Equipment: [employer] Total Hip System  Vendor: [employer]  REQUIRES ASSISTANT: KOJO    HIP ARTHROPLASTY Left 9/26/2022    Procedure: ARTHROPLASTY, HIP;  Surgeon: Don Suárez DO;  Location: [facility] OR;  Service: Orthopedics;  Laterality: Left;    HYSTERECTOMY  10/1/2003    INJECTION OF JOINT Right 11/25/2020    Procedure: Right Hip Intra-articular Steroid Injection Under Fluoro;  Surgeon: Rios Colon MD;  Location: [facility] OR;  Service: Pain Management;  Laterality: Right;    INJECTION OF STEROID Right 2/10/2021    Procedure: INJECTION, STEROID;  Surgeon: Don Suárez DO;  Location: [facility] OR;  Service: Orthopedics;  Laterality: Right;  Sedation  Equipment: 3cc syringes x2, 18ga 1.5" needle x2; 3.5" 20ga spinal needle; Kenalog 40; 1% lidocaine without Epi; 0.25% marcaine without epi  C-Arm: Entire    MASTECTOMY Bilateral 5/16/2004    MITRAL VALVE REPLACEMENT  1980    OOPHORECTOMY Left 5/18/2004    OSTEOTOMY OF METATARSAL BONE Left 2/10/2022    Procedure: OSTEOTOMY, METATARSAL BONE RESURFACING ARTHROPLASTY VERSES SHORTENING OSTEOTOMY 2ND METATARSAL, LEFT FOOT.;  Surgeon: Don Suárez DO;  Location: [facility] OR;  Service: Orthopedics;  Laterality: Left;  THIS IS AN ORIF METARSAL CARD; MAKE NOTES TO CREATE A NEW CARD PLEASE "    TONSILLECTOMY  1974    TOTAL ABDOMINAL HYSTERECTOMY W/ BILATERAL SALPINGOOPHORECTOMY         SOCIAL HISTORY:  Social History     Socioeconomic History    Marital status: Single   Tobacco Use    Smoking status: Former     Current packs/day: 0.00     Types: Cigarettes     Quit date: 1999     Years since quittin.0    Smokeless tobacco: Never   Substance and Sexual Activity    Alcohol use: Yes     Comment: rarely    Drug use: No    Sexual activity: Yes     Partners: Male     Social Drivers of Health     Financial Resource Strain: Low Risk  (3/4/2024)    Overall Financial Resource Strain (CARDIA)     Difficulty of Paying Living Expenses: Not hard at all   Food Insecurity: No Food Insecurity (3/4/2024)    Hunger Vital Sign     Worried About Running Out of Food in the Last Year: Never true     Ran Out of Food in the Last Year: Never true   Transportation Needs: No Transportation Needs (3/4/2024)    PRAPARE - Transportation     Lack of Transportation (Medical): No     Lack of Transportation (Non-Medical): No   Physical Activity: Unknown (3/4/2024)    Exercise Vital Sign     Days of Exercise per Week: 3 days   Stress: Stress Concern Present (3/4/2024)    [employer] - Occupational Stress Questionnaire     Feeling of Stress : To some extent   Housing Stability: Low Risk  (3/4/2024)    Housing Stability Vital Sign     Unable to Pay for Housing in the Last Year: No     Number of Places Lived in the Last Year: 1     Unstable Housing in the Last Year: No       FAMILY HISTORY:       Family History   Problem Relation Name Age of Onset    Liver cancer Mother          67 y/o    Cancer Mother      Heart failure Father          81 y/o with dementia     No Known Problems Sister 2     Early death Brother 1         accident at 31 y/o       ALLERGIES AND MEDICATIONS: updated and reviewed.      274}  Review of patient's allergies indicates:   Allergen Reactions    Lisinopril      Other reaction(s):  Angioedema     Medication List with Changes/Refills   New Medications    SERTRALINE (ZOLOFT) 25 MG TABLET    Take 1 tablet (25 mg total) by mouth once daily.   Current Medications    EZETIMIBE (ZETIA) 10 MG TABLET    Take 1 tablet (10 mg total) by mouth once daily.    FLUTICASONE PROPIONATE (FLONASE) 50 MCG/ACTUATION NASAL SPRAY    2 sprays (100 mcg total) by Each Nostril route as needed for Rhinitis.    LOSARTAN-HYDROCHLOROTHIAZIDE 100-25 MG (HYZAAR) 100-25 MG PER TABLET    Take 1 tablet by mouth once daily.    OMEPRAZOLE (PRILOSEC) 40 MG CAPSULE    Take 1 capsule (40 mg total) by mouth once daily.    SEMAGLUTIDE (OZEMPIC) 2 MG/DOSE (8 MG/3 ML) PNIJ    Inject 2 mg into the skin every 7 days.   Changed and/or Refilled Medications    Modified Medication Previous Medication    HYDROXYZINE HCL (ATARAX) 25 MG TABLET hydrOXYzine HCL (ATARAX) 25 MG tablet       Take 1 tablet (25 mg total) by mouth every 6 (six) hours as needed for Anxiety.    Take 1 tablet (25 mg total) by mouth every 6 (six) hours as needed for Anxiety.   Discontinued Medications    NITROFURANTOIN, MACROCRYSTAL-MONOHYDRATE, (MACROBID) 100 MG CAPSULE    Take 1 capsule (100 mg total) by mouth 2 (two) times daily.    TRAZODONE (DESYREL) 100 MG TABLET    Take 1 tablet (100 mg total) by mouth every evening.    TRAZODONE (DESYREL) 50 MG TABLET    Take 50 mg by mouth.       SCREENING HISTORY:    274}  Health Maintenance         Date Due Completion Date    Foot Exam Never done ---    Eye Exam Never done ---    RSV Vaccine (Age 60+ and Pregnant patients) (1 - Risk 60-74 years 1-dose series) Never done ---    COVID-19 Vaccine (4 - 2024-25 season) 09/01/2024 8/19/2021    Hemoglobin A1c 05/19/2025 11/19/2024    Lipid Panel 08/22/2025 8/22/2024    Diabetes Urine Screening 11/19/2025 11/19/2024    DEXA Scan 07/07/2026 7/7/2023    TETANUS VACCINE 04/17/2027 4/17/2017    Colorectal Cancer Screening 09/06/2027 9/6/2024            REVIEW OF SYSTEMS:   ROS    PHYSICAL  "EXAM:      274}  /76 (BP Location: Right arm, Patient Position: Sitting)   Pulse 72   Resp 14   Ht 5' 4" (1.626 m)   Wt 64.2 kg (141 lb 9.6 oz)   LMP  (LMP Unknown)   SpO2 97%   BMI 24.31 kg/m²   Wt Readings from Last 3 Encounters:   01/09/25 64.2 kg (141 lb 9.6 oz)   12/22/24 61.2 kg (135 lb)   09/19/24 64.5 kg (142 lb 4.8 oz)     BP Readings from Last 3 Encounters:   01/09/25 118/76   12/22/24 (!) 156/73   09/19/24 122/72     Estimated body mass index is 24.31 kg/m² as calculated from the following:    Height as of this encounter: 5' 4" (1.626 m).    Weight as of this encounter: 64.2 kg (141 lb 9.6 oz).     Physical Exam  HENT:      Head: Normocephalic and atraumatic.      Nose: Nose normal.      Mouth/Throat:      Mouth: Mucous membranes are dry.      Pharynx: Oropharynx is clear.   Cardiovascular:      Rate and Rhythm: Normal rate.   Pulmonary:      Effort: Pulmonary effort is normal.      Breath sounds: Normal breath sounds.   Abdominal:      General: Bowel sounds are normal.      Palpations: Abdomen is soft.   Musculoskeletal:         General: Normal range of motion.      Cervical back: Normal range of motion.   Skin:     General: Skin is warm.   Neurological:      General: No focal deficit present.      Mental Status: She is alert. Mental status is at baseline.   Psychiatric:         Mood and Affect: Mood normal.         Thought Content: Thought content normal.         LABS:   274}  I have reviewed old labs below:  Lab Results   Component Value Date    WBC 6.41 12/22/2024    HGB 13.2 12/22/2024    HCT 38.1 12/22/2024    MCV 91 12/22/2024     12/22/2024     12/22/2024    K 4.2 01/09/2025     12/22/2024    CALCIUM 9.3 12/22/2024    PHOS 3.5 01/09/2025    CO2 25 12/22/2024     12/22/2024    BUN 14 12/22/2024    CREATININE 0.9 12/22/2024    ANIONGAP 11 12/22/2024    ESTGFRAFRICA >60.0 05/20/2022    EGFRNONAA >60.0 05/20/2022    PROT 7.1 12/22/2024    ALBUMIN 4.2 12/22/2024 "    BILITOT 0.6 12/22/2024    ALKPHOS 46 12/22/2024    ALT 28 12/22/2024    AST 20 12/22/2024    INR 1.0 09/16/2022    CHOL 222 (H) 08/22/2024    TRIG 229 (H) 08/22/2024    HDL 36 (L) 08/22/2024    LDLCALC 140.2 08/22/2024    TSH 2.408 08/22/2024    HGBA1C 5.2 11/19/2024       ASSESSMENT AND PLAN:  274}  Assessment & Plan    IMPRESSION:  Assessed patient's recent hospital visit for lightheadedness; noted low potassium levels but otherwise normal results  Considered stress and anxiety as potential underlying causes for symptoms, given normal test results aside from low potassium  Evaluated effectiveness of hydroxyzine prescribed by ER for sleep and anxiety  Determined need for long-term anxiety management; proposed trial of Zoloft to address chronic worry, racing thoughts, and irritability    ANXIETY:  - Evaluated the patient's anxiety symptoms, including constant worrying, overthinking, and difficulty sleeping.  - Acknowledged the patient's positive response to hydroxyzine prescribed by the ER doctor.  - Explained that hydroxyzine is non-addictive and does not cause withdrawal symptoms.  - Discussed Zoloft as a medication to help level out mood, calm anxiety, and reduce constant worrying.  - Prescribed Zoloft for long-term anxiety management.  - Refilled hydroxyzine for as-needed use at night for sleep and anxiety.  - Inquired about potential triggers for anxiety symptoms.  - Instructed the patient to contact the office if experiencing any side effects or issues with new medications.    HYPOKALEMIA:  - Noted that the patient's potassium levels were low during a recent hospital visit.  - Continued the OTC potassium supplement taken every morning, which the patient started after the hospital visit.  - Ordered labs to recheck potassium levels.    RECENT EMERGENCY ROOM VISIT:  - Noted the patient's recent emergency room visit due to lightheadedness, where all tests were normal except for potassium levels.    FOLLOW  UP:  - Scheduled a follow-up visit in 6 months to assess medication effectiveness.  - Follow up in 6 months for annual checkup and medication review.        2. Anxiety  - sertraline (ZOLOFT) 25 MG tablet; Take 1 tablet (25 mg total) by mouth once daily.  Dispense: 30 tablet; Refill: 11  - hydrOXYzine HCL (ATARAX) 25 MG tablet; Take 1 tablet (25 mg total) by mouth every 6 (six) hours as needed for Anxiety.  Dispense: 30 tablet; Refill: 11    3. Hypokalemia  - Potassium; Future  - Magnesium; Future  - Phosphorus; Future    4. Hyperlipidemia associated with type 2 diabetes mellitus  Continue ozempic         Orders Placed This Encounter   Procedures    Potassium    Magnesium    Phosphorus       Follow up in about 6 months (around 7/9/2025). or sooner as needed.

## 2025-01-30 DIAGNOSIS — T78.40XA ALLERGY, INITIAL ENCOUNTER: ICD-10-CM

## 2025-01-30 RX ORDER — FLUTICASONE PROPIONATE 50 MCG
2 SPRAY, SUSPENSION (ML) NASAL
Qty: 48 ML | Refills: 3 | Status: SHIPPED | OUTPATIENT
Start: 2025-01-30

## 2025-01-30 NOTE — TELEPHONE ENCOUNTER
No care due was identified.  [facility] Embedded Care Due Messages. Reference number: [id number].   1/30/2025 9:38:13 AM CST

## 2025-01-30 NOTE — TELEPHONE ENCOUNTER
Refill Decision Note   Liv Garland  is requesting a refill authorization.  Brief Assessment and Rationale for Refill:  Approve     Medication Therapy Plan:         Comments:     Note composed:12:05 PM 01/30/2025

## 2025-02-06 ENCOUNTER — E-VISIT (OUTPATIENT)
Dept: FAMILY MEDICINE | Facility: CLINIC | Age: 65
End: 2025-02-06
Payer: MEDICARE

## 2025-02-06 DIAGNOSIS — F41.9 ANXIETY: Primary | ICD-10-CM

## 2025-02-06 PROCEDURE — 99421 OL DIG E/M SVC 5-10 MIN: CPT | Mod: ,,, | Performed by: STUDENT IN AN ORGANIZED HEALTH CARE EDUCATION/TRAINING PROGRAM

## 2025-02-06 RX ORDER — SERTRALINE HYDROCHLORIDE 50 MG/1
50 TABLET, FILM COATED ORAL DAILY
Qty: 30 TABLET | Refills: 11 | Status: SHIPPED | OUTPATIENT
Start: 2025-02-06 | End: 2026-02-06

## 2025-02-06 NOTE — PROGRESS NOTES
SUBJECTIVE:    CHIEF COMPLAINT:   Chief Complaint   Patient presents with    General Illness     Entered automatically based on patient selection in [employer].           274}    HISTORY OF PRESENT ILLNESS:  Liv Garland is a 64 y.o. female who presents to the clinic today for increase of the zoloft to better control symptoms    PAST MEDICAL HISTORY:     274}  Past Medical History:   Diagnosis Date    Arthritis     Bilateral breast cancer     Breast cancer 2004    Dr. Dos Santos, bilat mast, no rt, tamoxifen stopped after 9 mos due to side effects    Diabetes mellitus     High cholesterol     Hypertension     PONV (postoperative nausea and vomiting)        PAST SURGICAL HISTORY:  Past Surgical History:   Procedure Laterality Date    ABLATION OF DYSRHYTHMIC FOCUS  2000    Dr. Melgoza    CHOLECYSTECTOMY  2/2001    COLONOSCOPY      repeat in 2020    ESOPHAGEAL DILATION N/A 5/23/2022    Procedure: DILATION, ESOPHAGUS;  Surgeon: Francis Bower MD;  Location: [facility] END[facility];  Service: General;  Laterality: N/A;    ESOPHAGOGASTRODUODENOSCOPY N/A 5/23/2022    Procedure: ESOPHAGOGASTRODUODENOSCOPY (EGD);  Surgeon: Francis Bower MD;  Location: [facility] ENDO;  Service: General;  Laterality: N/A;    HIP ARTHROPLASTY Right 4/5/2021    Procedure: ARTHROPLASTY, HIP;  Surgeon: Don Suárez DO;  Location: [facility] OR;  Service: Orthopedics;  Laterality: Right;  Equipment: [employer]ir Total Hip System  Vendor: [employer]  REQUIRES ASSISTANT: KOJO    HIP ARTHROPLASTY Left 9/26/2022    Procedure: ARTHROPLASTY, HIP;  Surgeon: Don Suárez DO;  Location: [facility] OR;  Service: Orthopedics;  Laterality: Left;    HYSTERECTOMY  10/1/2003    INJECTION OF JOINT Right 11/25/2020    Procedure: Right Hip Intra-articular Steroid Injection Under Fluoro;  Surgeon: Rios Colon MD;  Location: [facility] OR;  Service: Pain Management;  Laterality: Right;    INJECTION OF STEROID Right 2/10/2021    Procedure: INJECTION, STEROID;  Surgeon: Don Suárez  "DO;  Location: [facility] OR;  Service: Orthopedics;  Laterality: Right;  Sedation  Equipment: 3cc syringes x2, 18ga 1.5" needle x2; 3.5" 20ga spinal needle; Kenalog 40; 1% lidocaine without Epi; 0.25% marcaine without epi  C-Arm: Entire    MASTECTOMY Bilateral 2004    MITRAL VALVE REPLACEMENT  1980    OOPHORECTOMY Left 2004    OSTEOTOMY OF METATARSAL BONE Left 2/10/2022    Procedure: OSTEOTOMY, METATARSAL BONE RESURFACING ARTHROPLASTY VERSES SHORTENING OSTEOTOMY 2ND METATARSAL, LEFT FOOT.;  Surgeon: Don Suárez DO;  Location: [facility] OR;  Service: Orthopedics;  Laterality: Left;  THIS IS AN ORIF METARSAL CARD; MAKE NOTES TO CREATE A NEW CARD PLEASE    TONSILLECTOMY  1974    TOTAL ABDOMINAL HYSTERECTOMY W/ BILATERAL SALPINGOOPHORECTOMY         SOCIAL HISTORY:  Social History     Socioeconomic History    Marital status: Single   Tobacco Use    Smoking status: Former     Current packs/day: 0.00     Types: Cigarettes     Quit date: 1999     Years since quittin.1    Smokeless tobacco: Never   Substance and Sexual Activity    Alcohol use: Yes     Comment: rarely    Drug use: No    Sexual activity: Yes     Partners: Male     Social Drivers of Health     Financial Resource Strain: Low Risk  (3/4/2024)    Overall Financial Resource Strain (CARDIA)     Difficulty of Paying Living Expenses: Not hard at all   Food Insecurity: No Food Insecurity (3/4/2024)    Hunger Vital Sign     Worried About Running Out of Food in the Last Year: Never true     Ran Out of Food in the Last Year: Never true   Transportation Needs: No Transportation Needs (3/4/2024)    PRAPARE - Transportation     Lack of Transportation (Medical): No     Lack of Transportation (Non-Medical): No   Physical Activity: Unknown (3/4/2024)    Exercise Vital Sign     Days of Exercise per Week: 3 days   Stress: Stress Concern Present (3/4/2024)    [employer] [place] [employer] - Occupational Stress Questionnaire     Feeling of Stress : To some " extent   Housing Stability: Low Risk  (3/4/2024)    Housing Stability Vital Sign     Unable to Pay for Housing in the Last Year: No     Number of Places Lived in the Last Year: 1     Unstable Housing in the Last Year: No       FAMILY HISTORY:       Family History   Problem Relation Name Age of Onset    Liver cancer Mother          67 y/o    Cancer Mother      Heart failure Father          79 y/o with dementia     No Known Problems Sister 2     Early death Brother 1         accident at 29 y/o       ALLERGIES AND MEDICATIONS: updated and reviewed.      274}  Review of patient's allergies indicates:   Allergen Reactions    Lisinopril      Other reaction(s): Angioedema     Medication List with Changes/Refills   New Medications    SERTRALINE (ZOLOFT) 50 MG TABLET    Take 1 tablet (50 mg total) by mouth once daily.   Current Medications    EZETIMIBE (ZETIA) 10 MG TABLET    Take 1 tablet (10 mg total) by mouth once daily.    FLUTICASONE PROPIONATE (FLONASE) 50 MCG/ACTUATION NASAL SPRAY    2 sprays (100 mcg total) by Each Nostril route as needed for Rhinitis.    HYDROXYZINE HCL (ATARAX) 25 MG TABLET    Take 1 tablet (25 mg total) by mouth every 6 (six) hours as needed for Anxiety.    LOSARTAN-HYDROCHLOROTHIAZIDE 100-25 MG (HYZAAR) 100-25 MG PER TABLET    Take 1 tablet by mouth once daily.    OMEPRAZOLE (PRILOSEC) 40 MG CAPSULE    Take 1 capsule (40 mg total) by mouth once daily.    SEMAGLUTIDE (OZEMPIC) 2 MG/DOSE (8 MG/3 ML) PNIJ    Inject 2 mg into the skin every 7 days.   Discontinued Medications    SERTRALINE (ZOLOFT) 25 MG TABLET    Take 1 tablet (25 mg total) by mouth once daily.       SCREENING HISTORY:    274}  Health Maintenance         Date Due Completion Date    Foot Exam Never done ---    Diabetic Eye Exam Never done ---    RSV Vaccine (Age 60+ and Pregnant patients) (1 - Risk 60-74 years 1-dose series) Never done ---    COVID-19 Vaccine (4 - 2024-25 season) 09/01/2024 8/19/2021    Hemoglobin A1c 05/19/2025  "11/19/2024    Lipid Panel 08/22/2025 8/22/2024    Diabetes Urine Screening 11/19/2025 11/19/2024    DEXA Scan 07/07/2026 7/7/2023    TETANUS VACCINE 04/17/2027 4/17/2017    Colorectal Cancer Screening 09/06/2027 9/6/2024            REVIEW OF SYSTEMS:   Review of Systems   Constitutional:  Negative for chills, fever, malaise/fatigue and weight loss.   Psychiatric/Behavioral:  Positive for depression. Negative for hallucinations, substance abuse and suicidal ideas. The patient is nervous/anxious.        PHYSICAL EXAM:      274}  LMP  (LMP Unknown)   Wt Readings from Last 3 Encounters:   01/09/25 64.2 kg (141 lb 9.6 oz)   12/22/24 61.2 kg (135 lb)   09/19/24 64.5 kg (142 lb 4.8 oz)     BP Readings from Last 3 Encounters:   01/09/25 118/76   12/22/24 (!) 156/73   09/19/24 122/72     Estimated body mass index is 24.31 kg/m² as calculated from the following:    Height as of 1/9/25: 5' 4" (1.626 m).    Weight as of 1/9/25: 64.2 kg (141 lb 9.6 oz).     Physical Exam    LABS:   274}  I have reviewed old labs below:  Lab Results   Component Value Date    WBC 6.41 12/22/2024    HGB 13.2 12/22/2024    HCT 38.1 12/22/2024    MCV 91 12/22/2024     12/22/2024     12/22/2024    K 4.2 01/09/2025     12/22/2024    CALCIUM 9.3 12/22/2024    PHOS 3.5 01/09/2025    CO2 25 12/22/2024     12/22/2024    BUN 14 12/22/2024    CREATININE 0.9 12/22/2024    ANIONGAP 11 12/22/2024    ESTGFRAFRICA >60.0 05/20/2022    EGFRNONAA >60.0 05/20/2022    PROT 7.1 12/22/2024    ALBUMIN 4.2 12/22/2024    BILITOT 0.6 12/22/2024    ALKPHOS 46 12/22/2024    ALT 28 12/22/2024    AST 20 12/22/2024    INR 1.0 09/16/2022    CHOL 222 (H) 08/22/2024    TRIG 229 (H) 08/22/2024    HDL 36 (L) 08/22/2024    LDLCALC 140.2 08/22/2024    TSH 2.408 08/22/2024    HGBA1C 5.2 11/19/2024       ASSESSMENT AND PLAN:  274}  Assessment & Plan             1. Anxiety  - sertraline (ZOLOFT) 50 MG tablet; Take 1 tablet (50 mg total) by mouth once daily.  " Dispense: 30 tablet; Refill: 11         No orders of the defined types were placed in this encounter.      No follow-ups on file. or sooner as needed.        3 minutes

## 2025-02-14 DIAGNOSIS — E78.5 HYPERLIPIDEMIA ASSOCIATED WITH TYPE 2 DIABETES MELLITUS: ICD-10-CM

## 2025-02-14 DIAGNOSIS — E11.69 HYPERLIPIDEMIA ASSOCIATED WITH TYPE 2 DIABETES MELLITUS: ICD-10-CM

## 2025-02-14 RX ORDER — EZETIMIBE 10 MG/1
10 TABLET ORAL DAILY
Qty: 90 TABLET | Refills: 1 | Status: SHIPPED | OUTPATIENT
Start: 2025-02-14

## 2025-02-14 NOTE — TELEPHONE ENCOUNTER
Refill Decision Note   Liv Garland  is requesting a refill authorization.  Brief Assessment and Rationale for Refill:  Approve     Medication Therapy Plan:        Comments:     Note composed:10:08 AM 02/14/2025

## 2025-02-14 NOTE — TELEPHONE ENCOUNTER
No care due was identified.  [facility] Embedded Care Due Messages. Reference number: [id number].   2/14/2025 9:04:19 AM CST

## 2025-03-12 DIAGNOSIS — E11.65 TYPE 2 DIABETES MELLITUS WITH HYPERGLYCEMIA, WITHOUT LONG-TERM CURRENT USE OF INSULIN: ICD-10-CM

## 2025-03-12 RX ORDER — SEMAGLUTIDE 2.68 MG/ML
2 INJECTION, SOLUTION SUBCUTANEOUS
Qty: 9 ML | Refills: 3 | Status: SHIPPED | OUTPATIENT
Start: 2025-03-12 | End: 2026-03-12

## 2025-03-12 NOTE — TELEPHONE ENCOUNTER
No care due was identified.  [facility] Embedded Care Due Messages. Reference number: [id number].   3/12/2025 8:59:10 AM CDT

## 2025-03-13 ENCOUNTER — PATIENT MESSAGE (OUTPATIENT)
Dept: ADMINISTRATIVE | Facility: OTHER | Age: 65
End: 2025-03-13
Payer: MEDICARE

## 2025-03-26 ENCOUNTER — OFFICE VISIT (OUTPATIENT)
Dept: FAMILY MEDICINE | Facility: CLINIC | Age: 65
End: 2025-03-26
Payer: MEDICARE

## 2025-03-26 VITALS
BODY MASS INDEX: 24.07 KG/M2 | OXYGEN SATURATION: 99 % | HEART RATE: 88 BPM | HEIGHT: 64 IN | RESPIRATION RATE: 18 BRPM | WEIGHT: 141 LBS | SYSTOLIC BLOOD PRESSURE: 125 MMHG | DIASTOLIC BLOOD PRESSURE: 76 MMHG

## 2025-03-26 DIAGNOSIS — L08.9 SKIN INFECTION: Primary | ICD-10-CM

## 2025-03-26 PROCEDURE — 99214 OFFICE O/P EST MOD 30 MIN: CPT | Mod: S$GLB,,, | Performed by: STUDENT IN AN ORGANIZED HEALTH CARE EDUCATION/TRAINING PROGRAM

## 2025-03-26 PROCEDURE — 99999 PR PBB SHADOW E&M-EST. PATIENT-LVL III: CPT | Mod: PBBFAC,,, | Performed by: STUDENT IN AN ORGANIZED HEALTH CARE EDUCATION/TRAINING PROGRAM

## 2025-03-26 RX ORDER — MUPIROCIN 20 MG/G
OINTMENT TOPICAL 3 TIMES DAILY
Qty: 15 G | Refills: 0 | Status: SHIPPED | OUTPATIENT
Start: 2025-03-26

## 2025-03-26 RX ORDER — SULFAMETHOXAZOLE AND TRIMETHOPRIM 800; 160 MG/1; MG/1
1 TABLET ORAL 2 TIMES DAILY
Qty: 10 TABLET | Refills: 0 | Status: SHIPPED | OUTPATIENT
Start: 2025-03-26

## 2025-03-27 NOTE — PROGRESS NOTES
SUBJECTIVE:    CHIEF COMPLAINT:   Chief Complaint   Patient presents with    Recurrent Skin Infections     Pt had a mole removed and is now infected on the right inner foot more towards the inner ankle           274}    HISTORY OF PRESENT ILLNESS:  Liv Garland is a 64 y.o. female who presents to the clinic today   History of Present Illness    CHIEF COMPLAINT:  Ms. Garland presents today for concerns about a potentially infected lesion following dermatological procedure    DERMATOLOGY HISTORY:  She receives regular dermatology care every 6 months due to significant sun exposure history, routinely undergoing cryotherapy for lesion removal. She recently underwent procedures at [facility] for multiple areas, including treatment of a pre-cancerous dry spot. The day following the procedure, she developed swelling on one side of the treated area on her left ankle and a concerning lesion started bubbling up. When she informed the dermatology clinic about these symptoms, they advised her to seek care at an urgent care facility.      ROS:  General: -fever, -chills, -fatigue, -weight gain, -weight loss  Eyes: -vision changes, -redness, -discharge  ENT: -ear pain, -nasal congestion, -sore throat  Cardiovascular: -chest pain, -palpitations, -lower extremity edema  Respiratory: -cough, -shortness of breath  Gastrointestinal: -abdominal pain, -nausea, -vomiting, -diarrhea, -constipation, -blood in stool  Genitourinary: -dysuria, -hematuria, -frequency  Musculoskeletal: -joint pain, -muscle pain  Skin: -rash, +lesion  Neurological: -headache, -dizziness, -numbness, -tingling  Psychiatric: -anxiety, -depression, -sleep difficulty          PAST MEDICAL HISTORY:     274}  Past Medical History:   Diagnosis Date    Arthritis     Bilateral breast cancer     Breast cancer 2004    Dr. Dos Santos, bilat mast, no rt, tamoxifen stopped after 9 mos due to side effects    Diabetes mellitus     High cholesterol     Hypertension   "   PONV (postoperative nausea and vomiting)        PAST SURGICAL HISTORY:  Past Surgical History:   Procedure Laterality Date    ABLATION OF DYSRHYTHMIC FOCUS  2000    Dr. Melgoza    CHOLECYSTECTOMY  2/2001    COLONOSCOPY      repeat in 2020    ESOPHAGEAL DILATION N/A 5/23/2022    Procedure: DILATION, ESOPHAGUS;  Surgeon: Francis Bower MD;  Location: [facility] ENDO;  Service: General;  Laterality: N/A;    ESOPHAGOGASTRODUODENOSCOPY N/A 5/23/2022    Procedure: ESOPHAGOGASTRODUODENOSCOPY (EGD);  Surgeon: Francis Bower MD;  Location: [facility] ENDO;  Service: General;  Laterality: N/A;    HIP ARTHROPLASTY Right 4/5/2021    Procedure: ARTHROPLASTY, HIP;  Surgeon: Don Suárez DO;  Location: [facility] OR;  Service: Orthopedics;  Laterality: Right;  Equipment: [employer] Total Hip System  Vendor: [employer]  REQUIRES ASSISTANT: KOJO    HIP ARTHROPLASTY Left 9/26/2022    Procedure: ARTHROPLASTY, HIP;  Surgeon: Don Suárez DO;  Location: [facility] OR;  Service: Orthopedics;  Laterality: Left;    HYSTERECTOMY  10/1/2003    INJECTION OF JOINT Right 11/25/2020    Procedure: Right Hip Intra-articular Steroid Injection Under Fluoro;  Surgeon: Rios Colon MD;  Location: [facility] OR;  Service: Pain Management;  Laterality: Right;    INJECTION OF STEROID Right 2/10/2021    Procedure: INJECTION, STEROID;  Surgeon: Don Suárez DO;  Location: [facility] OR;  Service: Orthopedics;  Laterality: Right;  Sedation  Equipment: 3cc syringes x2, 18ga 1.5" needle x2; 3.5" 20ga spinal needle; Kenalog 40; 1% lidocaine without Epi; 0.25% marcaine without epi  C-Arm: Entire    MASTECTOMY Bilateral 5/16/2004    MITRAL VALVE REPLACEMENT  1980    OOPHORECTOMY Left 5/18/2004    OSTEOTOMY OF METATARSAL BONE Left 2/10/2022    Procedure: OSTEOTOMY, METATARSAL BONE RESURFACING ARTHROPLASTY VERSES SHORTENING OSTEOTOMY 2ND METATARSAL, LEFT FOOT.;  Surgeon: Don Suárez DO;  Location: [facility] OR;  Service: Orthopedics;  Laterality: Left;  THIS IS AN " [facility] METARSAL CARD; MAKE NOTES TO CREATE A NEW CARD PLEASE    TONSILLECTOMY  1974    TOTAL ABDOMINAL HYSTERECTOMY W/ BILATERAL SALPINGOOPHORECTOMY         SOCIAL HISTORY:  Social History[1]    FAMILY HISTORY:       Family History   Problem Relation Name Age of Onset    Liver cancer Mother          67 y/o    Cancer Mother      Heart failure Father          81 y/o with dementia     No Known Problems Sister 2     Early death Brother 1         accident at 29 y/o       ALLERGIES AND MEDICATIONS: updated and reviewed.      274}  Review of patient's allergies indicates:   Allergen Reactions    Lisinopril      Other reaction(s): Angioedema     Medication List with Changes/Refills   New Medications    MUPIROCIN (BACTROBAN) 2 % OINTMENT    Apply topically 3 (three) times daily.    SULFAMETHOXAZOLE-TRIMETHOPRIM 800-160MG (BACTRIM DS) 800-160 MG TAB    Take 1 tablet by mouth 2 (two) times daily.   Current Medications    EZETIMIBE (ZETIA) 10 MG TABLET    Take 1 tablet (10 mg total) by mouth once daily.    FLUTICASONE PROPIONATE (FLONASE) 50 MCG/ACTUATION NASAL SPRAY    2 sprays (100 mcg total) by Each Nostril route as needed for Rhinitis.    HYDROXYZINE HCL (ATARAX) 25 MG TABLET    Take 1 tablet (25 mg total) by mouth every 6 (six) hours as needed for Anxiety.    OMEPRAZOLE (PRILOSEC) 40 MG CAPSULE    Take 1 capsule (40 mg total) by mouth once daily.    SEMAGLUTIDE (OZEMPIC) 2 MG/DOSE (8 MG/3 ML) PNIJ    Inject 2 mg into the skin every 7 days.    SERTRALINE (ZOLOFT) 50 MG TABLET    Take 1 tablet (50 mg total) by mouth once daily.    VALSARTAN-HYDROCHLOROTHIAZIDE (DIOVAN-HCT) 320-25 MG PER TABLET    Take 1 tablet by mouth once daily.       SCREENING HISTORY:    274}  Health Maintenance         Date Due Completion Date    Foot Exam Never done ---    Diabetic Eye Exam Never done ---    RSV Vaccine (Age 60+ and Pregnant patients) (1 - Risk 60-74 years 1-dose series) Never done ---    COVID-19 Vaccine (4 - 2024-25 season) 09/01/2024  "8/19/2021    Hemoglobin A1c 05/19/2025 11/19/2024    Lipid Panel 08/22/2025 8/22/2024    Diabetes Urine Screening 11/19/2025 11/19/2024    DEXA Scan 07/07/2026 7/7/2023    TETANUS VACCINE 04/17/2027 4/17/2017    Colorectal Cancer Screening 09/06/2027 9/6/2024            REVIEW OF SYSTEMS:   ROS    PHYSICAL EXAM:      274}  /76 (BP Location: Left arm, Patient Position: Sitting)   Pulse 88   Resp 18   Ht 5' 4.02" (1.626 m)   Wt 64 kg (141 lb)   LMP  (LMP Unknown)   SpO2 99%   BMI 24.19 kg/m²   Wt Readings from Last 3 Encounters:   03/26/25 64 kg (141 lb)   01/09/25 64.2 kg (141 lb 9.6 oz)   12/22/24 61.2 kg (135 lb)     BP Readings from Last 3 Encounters:   03/26/25 125/76   01/09/25 118/76   12/22/24 (!) 156/73     Estimated body mass index is 24.19 kg/m² as calculated from the following:    Height as of this encounter: 5' 4.02" (1.626 m).    Weight as of this encounter: 64 kg (141 lb).     Physical Exam  HENT:      Head: Normocephalic and atraumatic.      Nose: Nose normal.      Mouth/Throat:      Mouth: Mucous membranes are dry.      Pharynx: Oropharynx is clear.   Eyes:      Extraocular Movements: Extraocular movements intact.      Conjunctiva/sclera: Conjunctivae normal.   Cardiovascular:      Rate and Rhythm: Normal rate and regular rhythm.   Pulmonary:      Effort: Pulmonary effort is normal.      Breath sounds: Normal breath sounds.   Abdominal:      General: Bowel sounds are normal.      Palpations: Abdomen is soft.   Skin:     Comments: There is mild erythema and edema in the lateral left ankle around the site of cryotherapy    Neurological:      General: No focal deficit present.      Mental Status: She is alert. Mental status is at baseline.   Psychiatric:         Mood and Affect: Mood normal.         Thought Content: Thought content normal.         LABS:   274}  I have reviewed old labs below:  Lab Results   Component Value Date    WBC 6.41 12/22/2024    HGB 13.2 12/22/2024    HCT 38.1 " 12/22/2024    MCV 91 12/22/2024     12/22/2024     12/22/2024    K 4.2 01/09/2025     12/22/2024    CALCIUM 9.3 12/22/2024    PHOS 3.5 01/09/2025    CO2 25 12/22/2024     12/22/2024    BUN 14 12/22/2024    CREATININE 0.9 12/22/2024    ANIONGAP 11 12/22/2024    ESTGFRAFRICA >60.0 05/20/2022    EGFRNONAA >60.0 05/20/2022    PROT 7.1 12/22/2024    ALBUMIN 4.2 12/22/2024    BILITOT 0.6 12/22/2024    ALKPHOS 46 12/22/2024    ALT 28 12/22/2024    AST 20 12/22/2024    INR 1.0 09/16/2022    CHOL 222 (H) 08/22/2024    TRIG 229 (H) 08/22/2024    HDL 36 (L) 08/22/2024    LDLCALC 140.2 08/22/2024    TSH 2.408 08/22/2024    HGBA1C 5.2 11/19/2024       ASSESSMENT AND PLAN:  274}  Assessment & Plan    IMPRESSION:  Assessed concern about potential infection at site of recent dermatological procedure.  Noted minimal redness around affected area, indicating low likelihood of significant infection.  Evaluated other sites of recent dermatological procedures, found no signs of complications.    POSTPROCEDURAL COMPLICATIONS OF SKIN:  - Evaluated the patient's complication after a procedure at the [facility], noting bubbling and swelling in the treated area.  - Examined the affected area and observed minimal erythema, which is a positive sign.  - Assessed that the complication is not severe but requires early intervention.  - Prescribed a short course of topical and oral antibiotics for the treated area as a preventative measure.  - Instructed the patient to monitor the area closely and report any worsening symptoms.    ACTINIC KERATOSIS:  - Noted the patient's regular visits to the dermatologist every 6 months for removal of pre-cancerous lesions due to sun exposure.  - Examined multiple areas where lesions were removed, including the back, and observed satisfactory healing in most sites.  - Acknowledged the patient's ongoing dermatological care for actinic keratosis.  - Advised the patient to  continue regular dermatology follow-ups for monitoring and treatment of pre-cancerous lesions.  - Educated the patient about sun protection measures to prevent further skin damage.    HISTORY OF SKIN CONDITIONS AND TREATMENTS:  - Reviewed the patient's history of regular dermatology visits for removal of pre-cancerous lesions.  - Examined multiple areas where lesions were previously removed, confirming the patient's history of skin treatments.  - Acknowledged the patient's history of regular skin treatments and significant sun exposure.  - Advised the patient to continue regular dermatology follow-ups for ongoing monitoring and management of skin conditions.  - Emphasized the importance of maintaining a comprehensive record of all skin treatments and procedures.        1. Skin infection  - mupirocin (BACTROBAN) 2 % ointment; Apply topically 3 (three) times daily.  Dispense: 15 g; Refill: 0  - sulfamethoxazole-trimethoprim 800-160mg (BACTRIM DS) 800-160 mg Tab; Take 1 tablet by mouth 2 (two) times daily.  Dispense: 10 tablet; Refill: 0         No orders of the defined types were placed in this encounter.      No follow-ups on file. or sooner as needed.                 [1]   Social History  Socioeconomic History    Marital status: Single   Tobacco Use    Smoking status: Former     Current packs/day: 0.00     Types: Cigarettes     Quit date: 1999     Years since quittin.2    Smokeless tobacco: Never   Substance and Sexual Activity    Alcohol use: Yes     Comment: rarely    Drug use: No    Sexual activity: Yes     Partners: Male     Social Drivers of Health     Financial Resource Strain: Low Risk  (3/26/2025)    Overall Financial Resource Strain (CARDIA)     Difficulty of Paying Living Expenses: Not hard at all   Food Insecurity: No Food Insecurity (3/26/2025)    Hunger Vital Sign     Worried About Running Out of Food in the Last Year: Never true     Ran Out of Food in the Last Year: Never true   Transportation  Needs: No Transportation Needs (3/26/2025)    PRAPARE - Transportation     Lack of Transportation (Medical): No     Lack of Transportation (Non-Medical): No   Physical Activity: Sufficiently Active (3/26/2025)    Exercise Vital Sign     Days of Exercise per Week: 5 days     Minutes of Exercise per Session: 30 min   Stress: No Stress Concern Present (3/26/2025)    [employer] [place] [employer] - Occupational Stress Questionnaire     Feeling of Stress : Not at all   Housing Stability: Low Risk  (3/26/2025)    Housing Stability Vital Sign     Unable to Pay for Housing in the Last Year: No     Number of Times Moved in the Last Year: 0     Homeless in the Last Year: No

## 2025-04-28 DIAGNOSIS — T78.40XA ALLERGY, INITIAL ENCOUNTER: ICD-10-CM

## 2025-04-28 RX ORDER — FLUTICASONE PROPIONATE 50 MCG
2 SPRAY, SUSPENSION (ML) NASAL
Qty: 48 ML | Refills: 3 | Status: SHIPPED | OUTPATIENT
Start: 2025-04-28

## 2025-04-28 NOTE — TELEPHONE ENCOUNTER
Refill Decision Note   Liv Garland  is requesting a refill authorization.  Brief Assessment and Rationale for Refill:  Approve     Medication Therapy Plan:        Comments:     Note composed:1:07 PM 04/28/2025

## 2025-04-28 NOTE — TELEPHONE ENCOUNTER
No care due was identified.  [facility] Embedded Care Due Messages. Reference number: [id number].   4/28/2025 12:40:25 PM CDT

## 2025-05-14 DIAGNOSIS — K30 INDIGESTION: ICD-10-CM

## 2025-05-14 RX ORDER — OMEPRAZOLE 40 MG/1
40 CAPSULE, DELAYED RELEASE ORAL DAILY
Qty: 90 CAPSULE | Refills: 3 | Status: SHIPPED | OUTPATIENT
Start: 2025-05-14 | End: 2026-05-14

## 2025-05-14 NOTE — TELEPHONE ENCOUNTER
Provider Staff:  Action required for this patient    Requires labs      Please see care gap opportunities below in Care Due Message.    Thanks!  [facility]     Appointments      Date Provider   Last Visit   3/26/2025 Jayashree Brand MD   Next Visit   7/9/2025 Jayashree Brand MD     Refill Decision Note   Liv Garland  is requesting a refill authorization.  Brief Assessment and Rationale for Refill:  Approve     Medication Therapy Plan:        Comments:     Note composed:11:03 AM 05/14/2025

## 2025-05-14 NOTE — TELEPHONE ENCOUNTER
Care Due:                  Date            Visit Type   Department     Provider  --------------------------------------------------------------------------------                                SAME DAY -                              ESTABLISHED   [facility] 2ND FLOOR  Last Visit: 03-      [facility]                              [facility] -                              PRIMARY      [facility] 2ND FLOOR  Next Visit: 07-      [facility]                                                            Last  Test          Frequency    Reason                     Performed    Due Date  --------------------------------------------------------------------------------    HBA1C.......  6 months...  semaglutide..............  11- 05-    [facility] Embedded Care Due Messages. Reference number: [id number].   5/14/2025 9:30:56 AM CDT

## 2025-06-12 ENCOUNTER — PATIENT MESSAGE (OUTPATIENT)
Dept: ADMINISTRATIVE | Facility: HOSPITAL | Age: 65
End: 2025-06-12
Payer: MEDICARE

## 2025-06-20 ENCOUNTER — PATIENT OUTREACH (OUTPATIENT)
Dept: ADMINISTRATIVE | Facility: HOSPITAL | Age: 65
End: 2025-06-20
Payer: MEDICARE

## 2025-06-20 NOTE — LETTER
"       AUTHORIZATION FOR RELEASE OF   CONFIDENTIAL INFORMATION    Dear Dr Sosa,    We are seeing Liv Garland, date of birth 1960, in the clinic at [facility] 2ND FLOOR FAMILY MEDICINE. Jayashree Brand MD is the patient's PCP. Liv Garland has an outstanding lab/procedure at the time we reviewed her chart. In order to help keep her health information updated, she has authorized us to request the following medical record(s):        (  )  MAMMOGRAM                                      (  )  COLONOSCOPY      (  )  PAP SMEAR                                          (  )  OUTSIDE LAB RESULTS     (  )  DEXA SCAN                                          ( X )  EYE EXAM            (  )  FOOT EXAM                                          (  )  ENTIRE RECORD     (  )  OUTSIDE IMMUNIZATIONS                 (  )  _______________         Please fax records to [facility], Tsai, Kang-Lin, MD at [phone]    Thanks so much and have a great day!    Genet Kelly LPN [address] [address]   [phone]   [phone]           Patient Name: Liv Garland  : 1960  Patient Phone #: [phone]          A. Consent for Examination and Treatment: I hereby authorize the providers and employees of [facility] to provide medical treatment/services which includes, but is not limited to, performing and administering tests and diagnostic procedures that are deemed necessary, Including, but not limited to, imaging examinations, blood tests and other laboratory procedures as may be required by the hospital, clinic, or may be ordered by my physician(s) or persons working under the general and/or special instructions of my physician(s).                   1.      I understand and agree that this consent covers all authorized persons, including but not limited to physicians, residents, nurse practitioners, physicians' assistants, specialists, consultants, student " nurses, and independently contracted physicians, who are called upon by the physician in charge, to carry out the diagnostic procedures and medical or surgical treatment.  2.      I hereby authorize [facility] to retain or dispose of any specimens or tissue, should there be such remaining from any test or procedure.  3.      I hereby authorize and give consent for [facility] providers and employees to take photographs, images or videotapes of such diagnostic, surgical or treatment procedures of Patient as may be required by [facility] or as may be ordered by a physician.  I further acknowledge and agree that [facility] may use cameras or other devices for patient monitoring.  4.      I am aware that the practice of medicine is not an exact science, and I acknowledge that no guarantees have been made to me as to the outcome of any tests, procedures or treatment.                   B. Authorization for Release of Information:  I understand that my insurance company and/or their agents may need information necessary to make determinations about payment/reimbursement.  I hereby provide authorization to release to all insurance companies, their successors, assignees, other parties with whom they may have contracted, or others acting on their behalf, that are involved with payment for any hospital and/or clinic charges incurred by the patient, any information that they request and deem necessary for payment/reimbursement, and/or quality review.  I further authorize the release of my health information to physicians or other health care practitioners on staff who are involved in my health care now and in the future, and to other health care providers, entities, or institutions for the purpose of my continued care and treatment, including referrals.     C. Medicare Patient's Certification and Authorization to Release Information and Payment Request:  I certify that the information given by me in applying for payment under Title XVIII of  the Social Security Act is correct.  I authorize any hig[place] of medical or other information about me to release to the Social Security Administration, or its intermediaries or carriers, any information needed for this or a related Medicare claim.  I request that payment of authorized benefits be made on my behalf.     D. Assignment of Insurance Benefits:  I hereby authorize any and all insurance companies, health plans, defined benefit plans, health insurers or any entity that is or may be responsible for payment of my medical expenses to pay all hospital and medical benefits now due, and to become due and payable to me under any hospital benefits, sick benefits, injury benefits or any other benefit for services rendered to me, including Major Medical Benefits, direct to [facility] and all independently contracted physicians.  I assign any and all rights that I may have against any and all insurance companies, health plans, defined benefit plans, health insurers or any entity that is or may be responsible for payment of my medical expenses, including, but not limited to any right to appeal a denial of a claim, any right to bring any action, lawsuit, administrative proceeding, or other cause of action on my behalf.  I specifically assign my right to pursue litigation against any and all insurance companies, health plans, defined benefit plans, health insurers or any entity that is or may be responsible for payment of medical expenses based upon a refusal to pay charges.              REGISTRATION  AUTHORIZATION                    E. Valuables:  It is understood and agreed that [facility] is not liable for the damage to or loss of any money, jewelry, documents, dentures, eye glasses, hearing aids, prosthetics, or other property of value.     F. Computer Equipment:  I understand and agree that should I choose to use computer equipment owned by [facility] or if I choose to access the Internet via [facility]'s network, I do so at my  own risk.  [facility] is not responsible for any damage to my computer equipment or to any damages of any type that might arise from my loss of equipment or data.                   G. Acceptance of Financial Responsibility:  I agree that in consideration of the services and supplies that have been or will be furnished to the patient,  I am hereby obligated to pay all charges made for or on the account of the patient according to the standard rates (in effect at the time the services and supplies are delivered) established by [facility], including its Patient Financial Assistance Policy to the extent it is applicable.  I understand that I am responsible for all charges, or portions thereof, not covered by insurance or other sources.  Patient refunds will be distributed only after balances at all [facility] facilities are paid.                   H. Communication Authorization:  I hereby authorize [facility] and its representatives, along with any billing service or  who may work on their behalf, to contact me on my cell phone and/or home phone using prerecorded messages, artificial voice messages, automatic telephone dialing devices or other computer assisted technology, or by electronic mail, text messaging, or by any other form of electronic communication.  This includes, but is not limited to appointment reminders, yearly physical exam reminders, preventive care reminders, patient campaigns, welcome calls, and calls about account balances on my account or any account on which I am listed as a guarantor.  I understand I have the right to opt out of these communications at any time.     I.  Relationship Between Facility and Physician:  I understand that some, but not all, providers furnishing services to the patient are not employees or agents of [facility].  The patient is under the care and supervision of his/her attending physician, and it is the responsibility of the facility and its nursing staff to carry out  the instructions of such physicians.  It is the responsibility of the patient's physician/designee to obtain the patient's informed consent, when required, for medical or surgical treatment, special diagnostic or therapeutic procedures, or hospital services rendered for the patient under the special instructions of the physician/designee.     J. Notice of Private Practices:  I acknowledge I have received a copy of [facility]'s Notice of Privacy Practices.     K. Facility Directory:  I have discussed with the organization my desire to be either included or excluded in the facility directory.  I understand that if my choice is to opt-out of being identified in the facility directory that the facility will not provide any information about me such as my condition (e.g. Fair, stable, etc.) or my location in the facility (e.g. Room number, department).     L. LINKS:  For [place] Residents:  [facility] is a LINKS ([place] [facility]) participating facility.  LINKS is a [facility]-sponsored confidential computer system that helps you and your doctor keep track of you and your child's immunization history.  I acknowledge that I am allowing [facility] to share this information with [facility].  For [place] Residents:  [facility] is a MIIX ([place] Immunization Information eXchange) participant.  MIIX is a [place] Department of Health-sponsored confidential computer system that helps you and your doctor keep track of you and your child's immunization history.  I acknowledge that I am allowing [facility] to share information with [employer].     M. Term:  This authorization is valid for this and subsequent care/treatment I receive at [facility] and will remain valid unless/until revoked in writing by me.      ACKNOWLEDGMENT OF POTENTIAL RISKS OF COVID-19 VACCINE  It is important that you, as the patient or patients legally authorized representative(s), understand and acknowledge the following, with regard to  administration of the COVID-19 vaccine offered by [facility]:  The SARS-CoV-2 virus (COVID-19) has caused an unprecedented modern global pandemic that has mobilized scientists and drug manufacturers to work to create safe and effective vaccines to get the crisis under control.  No vaccine is released in the [place] without undergoing rigorous, multi-layered testing and approval by the Food and Drug Administration.  During a public health emergency, however, vaccines can be released for patient administration by the FDA prior to completion of multi-phase clinical trials and approval. This is done by the FDAs granting of Emergency Use Authorization (EUA) when the vaccine meets reasonable thresholds for safety and effectiveness and people are in urgent need of care. Under an EUA, the FDA has found that known and potential benefits outweigh its known and potential risks.  The vaccine for which you are presenting to [facility] has been released under an EUA, which [facility] is honoring in its distribution of the vaccine to the public. While the FDAs authorization indicates its belief that usage is recommended over possible risks, there is still the possibility that unknown risks of the vaccine could exist.  By signing this document, you acknowledge and assume these risks. Further, you waive any and all claims of liability against and hold harmless any [facility] entity or provider for any harm caused to you by said possible unknown risks of the vaccine.        N. [facility]:  As used in this document, [facility] means all [facility] affiliated entities including all health centers, surgery centers, clinics, and hospitals.  It includes more specifically, the following entities: [facility], a not for profit [facility], and its subsidiaries and affiliates, including [facility], [facility]  [facility], [facility], [facility]d/b/a [facility], L.L.C.d/b/a [facility], [employer], [facility] As manager of [facility], [facility], L.[facility]UST[facility], [facility], L.L.C.d/b/a [facility], [facility], [facility], [facility] and [facility] as manager of [facility].                                                                Patient/Legal Guardian Signature                                                    This signature was collected at 12/22/2024      Liv Garland/Self                                                                            Printed Name/Relationship to Patient                                                [facility] complies with applicable Federal civil rights laws and does not discriminate on the basis of race, color, national origin, age, disability, or sex.          ATENCIÓN: si habla magaly, tiene a martínez disposición servicios gratuitos de asistencia lingüística. Jessica enrique [phone].         CHÚ Ý: N?u b?n nói Ti?ng Vi?t, có các d?ch v? h? tr? ngôn ng? mi?n phí dành cho b?n. G?i s? [phone].              REGISTRATION  AUTHORIZATION

## 2025-06-21 NOTE — PROGRESS NOTES
Population Health Chart Review & Patient Outreach Details      Additional Pop Health Notes:               Updates Requested / Reviewed:      Updated Care Coordination Note         Health Maintenance Topics Overdue:      VB Score: 4     Osteoporosis Screening  Eye Exam  Hemoglobin A1c  Foot Exam    RSV Vaccine                  Health Maintenance Topic(s) Outreach Outcomes & Actions Taken:    Eye Exam - Outreach Outcomes & Actions Taken  : External Records Requested & Care Team Updated if Applicable

## 2025-06-25 ENCOUNTER — E-VISIT (OUTPATIENT)
Dept: FAMILY MEDICINE | Facility: CLINIC | Age: 65
End: 2025-06-25
Payer: MEDICARE

## 2025-06-25 DIAGNOSIS — J32.9 SINUSITIS, UNSPECIFIED CHRONICITY, UNSPECIFIED LOCATION: Primary | ICD-10-CM

## 2025-06-26 RX ORDER — GUAIFENESIN 600 MG/1
1200 TABLET, EXTENDED RELEASE ORAL 2 TIMES DAILY
Qty: 30 TABLET | Refills: 0 | Status: SHIPPED | OUTPATIENT
Start: 2025-06-26 | End: 2025-07-06

## 2025-06-26 RX ORDER — AZELASTINE 1 MG/ML
1 SPRAY, METERED NASAL 2 TIMES DAILY
Qty: 18.2 ML | Refills: 0 | Status: SHIPPED | OUTPATIENT
Start: 2025-06-26 | End: 2026-06-26

## 2025-06-26 RX ORDER — AMOXICILLIN 875 MG/1
875 TABLET, COATED ORAL 2 TIMES DAILY
Qty: 20 TABLET | Refills: 0 | Status: SHIPPED | OUTPATIENT
Start: 2025-06-26

## 2025-06-26 RX ORDER — FLUTICASONE PROPIONATE 50 MCG
2 SPRAY, SUSPENSION (ML) NASAL
Qty: 48 ML | Refills: 3 | Status: SHIPPED | OUTPATIENT
Start: 2025-06-26

## 2025-06-26 RX ORDER — LEVOCETIRIZINE DIHYDROCHLORIDE 5 MG/1
5 TABLET, FILM COATED ORAL NIGHTLY
Qty: 30 TABLET | Refills: 11 | Status: SHIPPED | OUTPATIENT
Start: 2025-06-26 | End: 2026-06-26

## 2025-06-26 NOTE — PROGRESS NOTES
Patient ID: Liv Garland is a 65 y.o. female.        E-Visit Time Tracking:             Chief Complaint: General Illness (Entered automatically based on patient selection in [employer].)      The patient initiated a request through [employer] on 6/25/2025 for evaluation and management with a chief complaint of General Illness (Entered automatically based on patient selection in [employer].)     I evaluated the questionnaire submission on 06/26/2025.    [facility] Evisit [facility]-Medication    6/25/2025  5:28 PM CDT - Filed by Patient   What do you need help with? Medication Request   Do you agree to participate in an E-Visit? Yes   If you have any of the following symptoms, please present to your local emergency room or call 911:  I acknowledge   Medication requests for narcotics will not be addressed via an E-Visit.  Please schedule an appointment. I acknowledge   Do you want to address a new or existing medication? (Start a new medication, Address a current medication) Start a new medication   What is the main issue you would like addressed today? I have a sinus infection   What is the name of the medication you would like to start? Something for a sinus infection   Have you taken a similar medication in the past? No   Why are you requesting this particular medication? (Previous experience, Recommendation from another healthcare provider, Belief in the medication's effectiveness, Fewer side effects, Cost or insurance coverage, Other) Other   Why are you requesting this particular medication? For a sinus infection    What medical condition is the  medication intended to treat? I need something for a sinus infection   Provide any information you feel is important to your history not asked above My sinuses are running, and my ears are stopped up   Please attach any relevant images or files    Are you able to take your vitals? Yes   Systolic Blood Pressure    Diastolic Blood Pressure    Weight:    Height:    Pluse:    Temp     Resp:    SpO2          Encounter Diagnosis   Name Primary?    Sinusitis, unspecified chronicity, unspecified location Yes        No orders of the defined types were placed in this encounter.     Medications Ordered This Encounter   Medications    amoxicillin (AMOXIL) 875 MG tablet     Sig: Take 1 tablet (875 mg total) by mouth 2 (two) times daily.     Dispense:  20 tablet     Refill:  0    azelastine (ASTELIN) 137 mcg (0.1 %) nasal spray     Si spray (137 mcg total) by Nasal route 2 (two) times daily.     Dispense:  18.2 mL     Refill:  0    fluticasone propionate (FLONASE) 50 mcg/actuation nasal spray     Si sprays (100 mcg total) by Each Nostril route as needed for Rhinitis.     Dispense:  48 mL     Refill:  3    guaiFENesin (MUCINEX) 600 mg 12 hr tablet     Sig: Take 2 tablets (1,200 mg total) by mouth 2 (two) times daily. for 10 days     Dispense:  30 tablet     Refill:  0    levocetirizine (XYZAL) 5 MG tablet     Sig: Take 1 tablet (5 mg total) by mouth every evening.     Dispense:  30 tablet     Refill:  11        No follow-ups on file.    13 minute encounter

## 2025-07-09 ENCOUNTER — OFFICE VISIT (OUTPATIENT)
Dept: FAMILY MEDICINE | Facility: CLINIC | Age: 65
End: 2025-07-09
Payer: MEDICARE

## 2025-07-09 ENCOUNTER — LAB VISIT (OUTPATIENT)
Dept: LAB | Facility: HOSPITAL | Age: 65
End: 2025-07-09
Attending: STUDENT IN AN ORGANIZED HEALTH CARE EDUCATION/TRAINING PROGRAM
Payer: MEDICARE

## 2025-07-09 VITALS
RESPIRATION RATE: 18 BRPM | WEIGHT: 140 LBS | HEIGHT: 64 IN | BODY MASS INDEX: 23.9 KG/M2 | OXYGEN SATURATION: 96 % | DIASTOLIC BLOOD PRESSURE: 68 MMHG | SYSTOLIC BLOOD PRESSURE: 136 MMHG | HEART RATE: 88 BPM

## 2025-07-09 DIAGNOSIS — E11.69 HYPERLIPIDEMIA ASSOCIATED WITH TYPE 2 DIABETES MELLITUS: ICD-10-CM

## 2025-07-09 DIAGNOSIS — I15.2 HYPERTENSION ASSOCIATED WITH DIABETES: ICD-10-CM

## 2025-07-09 DIAGNOSIS — E11.59 HYPERTENSION ASSOCIATED WITH DIABETES: ICD-10-CM

## 2025-07-09 DIAGNOSIS — E78.5 HYPERLIPIDEMIA ASSOCIATED WITH TYPE 2 DIABETES MELLITUS: Primary | ICD-10-CM

## 2025-07-09 DIAGNOSIS — E55.9 VITAMIN D DEFICIENCY DISEASE: ICD-10-CM

## 2025-07-09 DIAGNOSIS — F41.9 ANXIETY: ICD-10-CM

## 2025-07-09 DIAGNOSIS — E78.5 HYPERLIPIDEMIA ASSOCIATED WITH TYPE 2 DIABETES MELLITUS: ICD-10-CM

## 2025-07-09 DIAGNOSIS — E11.69 HYPERLIPIDEMIA ASSOCIATED WITH TYPE 2 DIABETES MELLITUS: Primary | ICD-10-CM

## 2025-07-09 LAB
25(OH)D3+25(OH)D2 SERPL-MCNC: 65 NG/ML (ref 30–96)
ABSOLUTE EOSINOPHIL (OHS): 0.23 K/UL
ABSOLUTE MONOCYTE (OHS): 0.5 K/UL (ref 0.3–1)
ABSOLUTE NEUTROPHIL COUNT (OHS): 3.39 K/UL (ref 1.8–7.7)
ALBUMIN SERPL BCP-MCNC: 4.2 G/DL (ref 3.5–5.2)
ALBUMIN/CREAT UR: 17.1 UG/MG
ALP SERPL-CCNC: 56 UNIT/L (ref 40–150)
ALT SERPL W/O P-5'-P-CCNC: 23 UNIT/L (ref 10–44)
ANION GAP (OHS): 12 MMOL/L (ref 8–16)
AST SERPL-CCNC: 23 UNIT/L (ref 11–45)
BASOPHILS # BLD AUTO: 0.05 K/UL
BASOPHILS NFR BLD AUTO: 0.8 %
BILIRUB SERPL-MCNC: 0.7 MG/DL (ref 0.1–1)
BUN SERPL-MCNC: 25 MG/DL (ref 8–23)
CALCIUM SERPL-MCNC: 9.6 MG/DL (ref 8.7–10.5)
CHLORIDE SERPL-SCNC: 102 MMOL/L (ref 95–110)
CHOLEST SERPL-MCNC: 241 MG/DL (ref 120–199)
CHOLEST/HDLC SERPL: 3.8 {RATIO} (ref 2–5)
CO2 SERPL-SCNC: 24 MMOL/L (ref 23–29)
CREAT SERPL-MCNC: 1 MG/DL (ref 0.5–1.4)
CREAT UR-MCNC: 76 MG/DL (ref 15–325)
EAG (OHS): 114 MG/DL (ref 68–131)
ERYTHROCYTE [DISTWIDTH] IN BLOOD BY AUTOMATED COUNT: 14.5 % (ref 11.5–14.5)
GFR SERPLBLD CREATININE-BSD FMLA CKD-EPI: >60 ML/MIN/1.73/M2
GLUCOSE SERPL-MCNC: 86 MG/DL (ref 70–110)
HBA1C MFR BLD: 5.6 % (ref 4–5.6)
HCT VFR BLD AUTO: 38.1 % (ref 37–48.5)
HDLC SERPL-MCNC: 63 MG/DL (ref 40–75)
HDLC SERPL: 26.1 % (ref 20–50)
HGB BLD-MCNC: 13.1 GM/DL (ref 12–16)
IMM GRANULOCYTES # BLD AUTO: 0.02 K/UL (ref 0–0.04)
IMM GRANULOCYTES NFR BLD AUTO: 0.3 % (ref 0–0.5)
LDLC SERPL CALC-MCNC: 130 MG/DL (ref 63–159)
LYMPHOCYTES # BLD AUTO: 2.43 K/UL (ref 1–4.8)
MCH RBC QN AUTO: 31 PG (ref 27–31)
MCHC RBC AUTO-ENTMCNC: 34.4 G/DL (ref 32–36)
MCV RBC AUTO: 90 FL (ref 82–98)
MICROALBUMIN UR-MCNC: 13 UG/ML (ref ?–5000)
NONHDLC SERPL-MCNC: 178 MG/DL
NUCLEATED RBC (/100WBC) (OHS): 0 /100 WBC
PLATELET # BLD AUTO: 344 K/UL (ref 150–450)
PMV BLD AUTO: 9.2 FL (ref 9.2–12.9)
POTASSIUM SERPL-SCNC: 3.6 MMOL/L (ref 3.5–5.1)
PROT SERPL-MCNC: 7.5 GM/DL (ref 6–8.4)
RBC # BLD AUTO: 4.22 M/UL (ref 4–5.4)
RELATIVE EOSINOPHIL (OHS): 3.5 %
RELATIVE LYMPHOCYTE (OHS): 36.7 % (ref 18–48)
RELATIVE MONOCYTE (OHS): 7.6 % (ref 4–15)
RELATIVE NEUTROPHIL (OHS): 51.1 % (ref 38–73)
SODIUM SERPL-SCNC: 138 MMOL/L (ref 136–145)
TRIGL SERPL-MCNC: 240 MG/DL (ref 30–150)
TSH SERPL-ACNC: 0.54 UIU/ML (ref 0.4–4)
WBC # BLD AUTO: 6.62 K/UL (ref 3.9–12.7)

## 2025-07-09 PROCEDURE — 80061 LIPID PANEL: CPT

## 2025-07-09 PROCEDURE — 36415 COLL VENOUS BLD VENIPUNCTURE: CPT

## 2025-07-09 PROCEDURE — 1126F AMNT PAIN NOTED NONE PRSNT: CPT | Mod: CPTII,S$GLB,, | Performed by: STUDENT IN AN ORGANIZED HEALTH CARE EDUCATION/TRAINING PROGRAM

## 2025-07-09 PROCEDURE — 82043 UR ALBUMIN QUANTITATIVE: CPT

## 2025-07-09 PROCEDURE — 82306 VITAMIN D 25 HYDROXY: CPT

## 2025-07-09 PROCEDURE — 2023F DILAT RTA XM W/O RTNOPTHY: CPT | Mod: CPTII,S$GLB,, | Performed by: STUDENT IN AN ORGANIZED HEALTH CARE EDUCATION/TRAINING PROGRAM

## 2025-07-09 PROCEDURE — 99999 PR PBB SHADOW E&M-EST. PATIENT-LVL III: CPT | Mod: PBBFAC,,, | Performed by: STUDENT IN AN ORGANIZED HEALTH CARE EDUCATION/TRAINING PROGRAM

## 2025-07-09 PROCEDURE — 1159F MED LIST DOCD IN RCRD: CPT | Mod: CPTII,S$GLB,, | Performed by: STUDENT IN AN ORGANIZED HEALTH CARE EDUCATION/TRAINING PROGRAM

## 2025-07-09 PROCEDURE — 1157F ADVNC CARE PLAN IN RCRD: CPT | Mod: CPTII,S$GLB,, | Performed by: STUDENT IN AN ORGANIZED HEALTH CARE EDUCATION/TRAINING PROGRAM

## 2025-07-09 PROCEDURE — 83036 HEMOGLOBIN GLYCOSYLATED A1C: CPT

## 2025-07-09 PROCEDURE — 3288F FALL RISK ASSESSMENT DOCD: CPT | Mod: CPTII,S$GLB,, | Performed by: STUDENT IN AN ORGANIZED HEALTH CARE EDUCATION/TRAINING PROGRAM

## 2025-07-09 PROCEDURE — 85025 COMPLETE CBC W/AUTO DIFF WBC: CPT

## 2025-07-09 PROCEDURE — 80053 COMPREHEN METABOLIC PANEL: CPT

## 2025-07-09 PROCEDURE — 3075F SYST BP GE 130 - 139MM HG: CPT | Mod: CPTII,S$GLB,, | Performed by: STUDENT IN AN ORGANIZED HEALTH CARE EDUCATION/TRAINING PROGRAM

## 2025-07-09 PROCEDURE — 99214 OFFICE O/P EST MOD 30 MIN: CPT | Mod: S$GLB,,, | Performed by: STUDENT IN AN ORGANIZED HEALTH CARE EDUCATION/TRAINING PROGRAM

## 2025-07-09 PROCEDURE — 84443 ASSAY THYROID STIM HORMONE: CPT

## 2025-07-09 PROCEDURE — 3078F DIAST BP <80 MM HG: CPT | Mod: CPTII,S$GLB,, | Performed by: STUDENT IN AN ORGANIZED HEALTH CARE EDUCATION/TRAINING PROGRAM

## 2025-07-09 PROCEDURE — 3008F BODY MASS INDEX DOCD: CPT | Mod: CPTII,S$GLB,, | Performed by: STUDENT IN AN ORGANIZED HEALTH CARE EDUCATION/TRAINING PROGRAM

## 2025-07-09 PROCEDURE — 1101F PT FALLS ASSESS-DOCD LE1/YR: CPT | Mod: CPTII,S$GLB,, | Performed by: STUDENT IN AN ORGANIZED HEALTH CARE EDUCATION/TRAINING PROGRAM

## 2025-07-09 NOTE — PROGRESS NOTES
SUBJECTIVE:    CHIEF COMPLAINT:   Chief Complaint   Patient presents with    Follow-up     6month follow up           274}    HISTORY OF PRESENT ILLNESS:  Liv Garland is a 65 y.o. female who presents to the clinic today   History of Present Illness    CHIEF COMPLAINT:  Ms. Garland presents today for follow up of upper respiratory infection    UPPER RESPIRATORY INFECTION:  She reports an upper respiratory infection that is improving but not completely resolved with persistent stuffiness. This is her first experience with such symptoms. She denies fever and is using prescribed nasal sprays intermittently along with a nighttime medication which she reports is effective in managing her symptoms.    DIABETES:  She has a long-standing history of diabetes and is currently on weekly injection medication with one month remaining. Her diabetes is currently well-controlled according to her eye doctor.    DIABETIC RETINOPATHY:  She is receiving intravitreal injections for diabetes-related retinal leakage in the right eye every 7 weeks, which has improved from previous frequency of every 4 weeks. The condition is improving with progression indicating better disease control.    DERMATOLOGY:  She reports regular dermatology visits for skin monitoring and recently underwent a biopsy with additional tissue removal. She actively self-monitors for new skin spots, particularly after sun exposure, and promptly seeks medical evaluation when new lesions are identified.    MEDICATIONS:  She is currently taking Zoloft with good response and reports the medication is effective at current dosage.      ROS:  General: -fever, -chills, -fatigue, -weight gain, -weight loss  Eyes: -vision changes, -redness, -discharge  ENT: -ear pain, +nasal congestion, -sore throat  Cardiovascular: -chest pain, -palpitations, -lower extremity edema  Respiratory: -cough, -shortness of breath  Gastrointestinal: -abdominal pain, -nausea, -vomiting, -diarrhea,  -constipation, -blood in stool  Genitourinary: -dysuria, -hematuria, -frequency  Musculoskeletal: -joint pain, -muscle pain  Skin: -rash, +lesion  Neurological: -headache, -dizziness, -numbness, -tingling  Psychiatric: -anxiety, -depression, -sleep difficulty          PAST MEDICAL HISTORY:     274}  Past Medical History:   Diagnosis Date    Arthritis     Bilateral breast cancer     Breast cancer 2004    Dr. Dos Santos, bilat mast, no rt, tamoxifen stopped after 9 mos due to side effects    Diabetes mellitus     High cholesterol     Hypertension     PONV (postoperative nausea and vomiting)     Unspecified cataract 06/04/2025       PAST SURGICAL HISTORY:  Past Surgical History:   Procedure Laterality Date    ABLATION OF DYSRHYTHMIC FOCUS  2000    Dr. Melgoza    BREAST SURGERY      CHOLECYSTECTOMY  02/2001    COLONOSCOPY      repeat in 2020    ESOPHAGEAL DILATION N/A 05/23/2022    Procedure: DILATION, ESOPHAGUS;  Surgeon: Francis Bower MD;  Location: [facility];  Service: General;  Laterality: N/A;    ESOPHAGOGASTRODUODENOSCOPY N/A 05/23/2022    Procedure: ESOPHAGOGASTRODUODENOSCOPY (EGD);  Surgeon: Francis Bower MD;  Location: [facility];  Service: General;  Laterality: N/A;    HIP ARTHROPLASTY Right 04/05/2021    Procedure: ARTHROPLASTY, HIP;  Surgeon: Don Suárez DO;  Location: [facility] OR;  Service: Orthopedics;  Laterality: Right;  Equipment: [employer] Total Hip System  Vendor: [employer]  REQUIRES ASSISTANT: KOJO    HIP ARTHROPLASTY Left 09/26/2022    Procedure: ARTHROPLASTY, HIP;  Surgeon: Don Suárez DO;  Location: [facility] OR;  Service: Orthopedics;  Laterality: Left;    HYSTERECTOMY  10/01/2003    INJECTION OF JOINT Right 11/25/2020    Procedure: Right Hip Intra-articular Steroid Injection Under Fluoro;  Surgeon: Rios Colon MD;  Location: [facility] OR;  Service: Pain Management;  Laterality: Right;    INJECTION OF STEROID Right 02/10/2021    Procedure: INJECTION, STEROID;  Surgeon: Don Suárez  "DO;  Location: [facility] OR;  Service: Orthopedics;  Laterality: Right;  Sedation  Equipment: 3cc syringes x2, 18ga 1.5" needle x2; 3.5" 20ga spinal needle; Kenalog 40; 1% lidocaine without Epi; 0.25% marcaine without epi  C-Arm: Entire    JOINT REPLACEMENT      Both hips    MASTECTOMY Bilateral 05/16/2004    MITRAL VALVE REPLACEMENT  1980    OOPHORECTOMY Left 05/18/2004    OSTEOTOMY OF METATARSAL BONE Left 02/10/2022    Procedure: OSTEOTOMY, METATARSAL BONE RESURFACING ARTHROPLASTY VERSES SHORTENING OSTEOTOMY 2ND METATARSAL, LEFT FOOT.;  Surgeon: Don Suárez DO;  Location: [facility] OR;  Service: Orthopedics;  Laterality: Left;  THIS IS AN ORIF METARSAL CARD; MAKE NOTES TO CREATE A NEW CARD PLEASE    TONSILLECTOMY  1974    TOTAL ABDOMINAL HYSTERECTOMY W/ BILATERAL SALPINGOOPHORECTOMY         SOCIAL HISTORY:  Social History[1]    FAMILY HISTORY:       Family History   Problem Relation Name Age of Onset    Liver cancer Mother Ree Amest         67 y/o    Cancer Mother Ree Henry     Diabetes Mother Ree Henry     Heart failure Father          79 y/o with dementia     No Known Problems Sister 2     Early death Brother 1         accident at 29 y/o       ALLERGIES AND MEDICATIONS: updated and reviewed.      274}  Review of patient's allergies indicates:   Allergen Reactions    Lisinopril      Other reaction(s): Angioedema     Medication List with Changes/Refills   Current Medications    AZELASTINE (ASTELIN) 137 MCG (0.1 %) NASAL SPRAY    1 spray (137 mcg total) by Nasal route 2 (two) times daily.    EZETIMIBE (ZETIA) 10 MG TABLET    Take 1 tablet (10 mg total) by mouth once daily.    FLUTICASONE PROPIONATE (FLONASE) 50 MCG/ACTUATION NASAL SPRAY    2 sprays (100 mcg total) by Each Nostril route as needed for Rhinitis.    HYDROXYZINE HCL (ATARAX) 25 MG TABLET    Take 1 tablet (25 mg total) by mouth every 6 (six) hours as needed for Anxiety.    LEVOCETIRIZINE (XYZAL) 5 MG TABLET    Take 1 tablet (5 mg total) by mouth " "every evening.    OMEPRAZOLE (PRILOSEC) 40 MG CAPSULE    Take 1 capsule (40 mg total) by mouth once daily.    SEMAGLUTIDE (OZEMPIC) 2 MG/DOSE (8 MG/3 ML) PNIJ    Inject 2 mg into the skin every 7 days.    SERTRALINE (ZOLOFT) 50 MG TABLET    Take 1 tablet (50 mg total) by mouth once daily.    VALSARTAN-HYDROCHLOROTHIAZIDE (DIOVAN-HCT) 320-25 MG PER TABLET    Take 1 tablet by mouth once daily.   Discontinued Medications    AMOXICILLIN (AMOXIL) 875 MG TABLET    Take 1 tablet (875 mg total) by mouth 2 (two) times daily.    MUPIROCIN (BACTROBAN) 2 % OINTMENT    Apply topically 3 (three) times daily.       SCREENING HISTORY:    274}  Health Maintenance         Date Due Completion Date    RSV Vaccine (Age 60+ and Pregnant patients) (1 - Risk 60-74 years 1-dose series) Never done ---    Hemoglobin A1c 05/19/2025 11/19/2024    COVID-19 Vaccine (4 - 2024-25 season) 06/26/2026 (Originally 9/1/2024) 8/19/2021    Lipid Panel 08/22/2025 8/22/2024    Influenza Vaccine (1) 09/01/2025 9/19/2024    Diabetes Urine Screening 11/19/2025 11/19/2024    Diabetic Eye Exam 06/04/2026 6/4/2025    Foot Exam 07/09/2026 7/9/2025 (Done)    Override on 7/9/2025: Done    TETANUS VACCINE 04/17/2027 4/17/2017    Colorectal Cancer Screening 09/06/2027 9/6/2024    DEXA Scan 07/07/2028 7/7/2023            REVIEW OF SYSTEMS:   ROS    PHYSICAL EXAM:      274}  /68 (BP Location: Left arm, Patient Position: Sitting)   Pulse 88   Resp 18   Ht 5' 4.02" (1.626 m)   Wt 63.5 kg (140 lb)   LMP  (LMP Unknown)   SpO2 96%   BMI 24.02 kg/m²   Wt Readings from Last 3 Encounters:   07/09/25 63.5 kg (140 lb)   06/26/25 63.6 kg (140 lb 3.2 oz)   03/26/25 64 kg (141 lb)     BP Readings from Last 3 Encounters:   07/09/25 136/68   06/26/25 118/84   03/26/25 125/76     Estimated body mass index is 24.02 kg/m² as calculated from the following:    Height as of this encounter: 5' 4.02" (1.626 m).    Weight as of this encounter: 63.5 kg (140 lb).     Physical " Exam  HENT:      Head: Normocephalic and atraumatic.      Nose: Nose normal.      Mouth/Throat:      Mouth: Mucous membranes are dry.      Pharynx: Oropharynx is clear.   Eyes:      Extraocular Movements: Extraocular movements intact.      Conjunctiva/sclera: Conjunctivae normal.   Cardiovascular:      Rate and Rhythm: Normal rate and regular rhythm.   Pulmonary:      Effort: Pulmonary effort is normal.      Breath sounds: Normal breath sounds.   Abdominal:      General: Bowel sounds are normal.      Palpations: Abdomen is soft.   Musculoskeletal:         General: Normal range of motion.      Cervical back: Normal range of motion.   Skin:     General: Skin is warm.   Neurological:      General: No focal deficit present.      Mental Status: She is alert. Mental status is at baseline.   Psychiatric:         Mood and Affect: Mood normal.         Thought Content: Thought content normal.         LABS:   274}  I have reviewed old labs below:  Lab Results   Component Value Date    WBC 6.41 12/22/2024    HGB 13.2 12/22/2024    HCT 38.1 12/22/2024    MCV 91 12/22/2024     12/22/2024     12/22/2024    K 4.2 01/09/2025     12/22/2024    CALCIUM 9.3 12/22/2024    PHOS 3.5 01/09/2025    CO2 25 12/22/2024     12/22/2024    BUN 14 12/22/2024    CREATININE 0.9 12/22/2024    ANIONGAP 11 12/22/2024    ESTGFRAFRICA >60.0 05/20/2022    EGFRNONAA >60.0 05/20/2022    PROT 7.1 12/22/2024    ALBUMIN 4.2 12/22/2024    BILITOT 0.6 12/22/2024    ALKPHOS 46 12/22/2024    ALT 28 12/22/2024    AST 20 12/22/2024    INR 1.0 09/16/2022    CHOL 222 (H) 08/22/2024    TRIG 229 (H) 08/22/2024    HDL 36 (L) 08/22/2024    LDLCALC 140.2 08/22/2024    TSH 2.408 08/22/2024    HGBA1C 5.2 11/19/2024       ASSESSMENT AND PLAN:  274}  Assessment & Plan    IMPRESSION:  Ongoing upper respiratory infection/sinus symptoms improving but not completely resolved.  Current allergy/sinus treatment regimen helping and improving.  Diabetes  management stable with good control.  Considered switching from current injectable diabetes medication to Mounjaro for potential additional weight loss.  Diabetic foot exam normal.  Ongoing retinal treatment for diabetes-related eye issues.  Recent dermatological care included biopsies and treatments for skin lesions.    UPPER RESPIRATORY INFECTION:  - Ms. Garland reports improvement but not complete resolution of URI symptoms.  - Still sounds stuffy and occasionally uses nasal sprays.  - No fever present.  - Continue current allergy/sinus medications, including nighttime antihistamine, until completion of current supply as patient feels the regimen is helping.    TYPE 2 DIABETES WITH DIABETIC RETINOPATHY:  - Diabetes is well-controlled with stable weight, confirmed by optometologist.  - Ms. Garland receives intravitreal injections every 7 weeks (previously every 4 weeks) for leakage in right retina.  - Ordered labs to check A1C and glucose levels.  - Ms. Garland has 1 month of insulin pens remaining; after completion, switch to Mounjaro for better weight management outcomes.  - Ms. Garland instructed to request Mounjaro when submitting refill request and to contact the office.    DEPRESSION:  - Ms. Garland reports satisfactory response to current Zoloft regimen and does not feel the need to increase dosage.  - Continue current medication without changes.    HISTORY OF SKIN CANCER:  - Ms. Garland remains vigilant about checking for new lesions and regularly visits dermatologist for skin surveillance.  - No current malignancy noted.  - Recently had a biopsy which required further excision.        1. Hyperlipidemia associated with type 2 diabetes mellitus  - Lipid Panel; Future    2. Hypertension associated with diabetes  - CBC Auto Differential; Future  - Comprehensive Metabolic Panel; Future  - Hemoglobin A1C; Future  - Microalbumin/Creatinine Ratio, Urine; Future    3. Anxiety  - TSH; Future    4. Vitamin D deficiency  disease  - Vitamin D 25-Hydroxy; Future         Orders Placed This Encounter   Procedures    CBC Auto Differential    Comprehensive Metabolic Panel    Hemoglobin A1C    Lipid Panel    Microalbumin/Creatinine Ratio, Urine    TSH    Vitamin D 25-Hydroxy       Follow up in about 6 months (around 2026). or sooner as needed.    This note was generated with the assistance of ambient listening technology. Verbal consent was obtained by the patient and accompanying visitor(s) for the recording of patient appointment to facilitate this note. I attest to having reviewed and edited the generated note for accuracy, though some syntax or spelling errors may persist. Please contact the author of this note for any clarification.            [1]   Social History  Socioeconomic History    Marital status: Single   Tobacco Use    Smoking status: Former     Current packs/day: 0.00     Types: Cigarettes     Quit date: 1999     Years since quittin.5    Smokeless tobacco: Never   Substance and Sexual Activity    Alcohol use: Not Currently     Alcohol/week: 2.0 standard drinks of alcohol     Types: 2 Drinks containing 0.5 oz of alcohol per week     Comment: Gen    Drug use: No    Sexual activity: Yes     Partners: Male     Birth control/protection: Other-see comments     Social Drivers of Health     Financial Resource Strain: Low Risk  (3/26/2025)    Overall Financial Resource Strain (CARDIA)     Difficulty of Paying Living Expenses: Not hard at all   Food Insecurity: No Food Insecurity (3/26/2025)    Hunger Vital Sign     Worried About Running Out of Food in the Last Year: Never true     Ran Out of Food in the Last Year: Never true   Transportation Needs: No Transportation Needs (3/26/2025)    PRAPARE - Transportation     Lack of Transportation (Medical): No     Lack of Transportation (Non-Medical): No   Physical Activity: Sufficiently Active (3/26/2025)    Exercise Vital Sign     Days of Exercise per Week: 5 days     Minutes  of Exercise per Session: 30 min   Stress: No Stress Concern Present (3/26/2025)    [employer] - Occupational Stress Questionnaire     Feeling of Stress : Not at all   Housing Stability: Low Risk  (3/26/2025)    Housing Stability Vital Sign     Unable to Pay for Housing in the Last Year: No     Number of Times Moved in the Last Year: 0     Homeless in the Last Year: No

## 2025-07-10 ENCOUNTER — PATIENT OUTREACH (OUTPATIENT)
Dept: ADMINISTRATIVE | Facility: HOSPITAL | Age: 65
End: 2025-07-10
Payer: MEDICARE

## 2025-07-10 NOTE — LETTER
"       AUTHORIZATION FOR RELEASE OF   CONFIDENTIAL INFORMATION    Dear Dr Salgado,    We are seeing Liv Garland, date of birth 1960, in the clinic at [facility] 2ND FLOOR FAMILY MEDICINE. Jayashree Brand MD is the patient's PCP. Liv Garland has an outstanding lab/procedure at the time we reviewed her chart. In order to help keep her health information updated, she has authorized us to request the following medical record(s):        (  )  MAMMOGRAM                                      (  )  COLONOSCOPY      (  )  PAP SMEAR                                          (  )  OUTSIDE LAB RESULTS     (  )  DEXA SCAN                                          ( X )  EYE EXAM            (  )  FOOT EXAM                                          (  )  ENTIRE RECORD     (  )  OUTSIDE IMMUNIZATIONS                 (  )  _______________         Please fax records to [facility], Tsai, Kang-Lin, MD at [phone]    Thanks so much and have a great day!    Genet Kelly LPN [address] [address]   [phone]   [phone]           Patient Name: Liv Garland  : 1960  Patient Phone #: [phone]          A. Consent for Examination and Treatment: I hereby authorize the providers and employees of [facility] to provide medical treatment/services which includes, but is not limited to, performing and administering tests and diagnostic procedures that are deemed necessary, Including, but not limited to, imaging examinations, blood tests and other laboratory procedures as may be required by the hospital, clinic, or may be ordered by my physician(s) or persons working under the general and/or special instructions of my physician(s).                   1.      I understand and agree that this consent covers all authorized persons, including but not limited to physicians, residents, nurse practitioners, physicians' assistants, specialists, consultants, " student nurses, and independently contracted physicians, who are called upon by the physician in charge, to carry out the diagnostic procedures and medical or surgical treatment.  2.      I hereby authorize [facility] to retain or dispose of any specimens or tissue, should there be such remaining from any test or procedure.  3.      I hereby authorize and give consent for [facility] providers and employees to take photographs, images or videotapes of such diagnostic, surgical or treatment procedures of Patient as may be required by [facility] or as may be ordered by a physician.  I further acknowledge and agree that [facility] may use cameras or other devices for patient monitoring.  4.      I am aware that the practice of medicine is not an exact science, and I acknowledge that no guarantees have been made to me as to the outcome of any tests, procedures or treatment.                   B. Authorization for Release of Information:  I understand that my insurance company and/or their agents may need information necessary to make determinations about payment/reimbursement.  I hereby provide authorization to release to all insurance companies, their successors, assignees, other parties with whom they may have contracted, or others acting on their behalf, that are involved with payment for any hospital and/or clinic charges incurred by the patient, any information that they request and deem necessary for payment/reimbursement, and/or quality review.  I further authorize the release of my health information to physicians or other health care practitioners on staff who are involved in my health care now and in the future, and to other health care providers, entities, or institutions for the purpose of my continued care and treatment, including referrals.     C. Medicare Patient's Certification and Authorization to Release Information and Payment Request:  I certify that the information given by me in applying for payment under Title  XVIII of the Social Security Act is correct.  I authorize any hig[place] of medical or other information about me to release to the Social Security Administration, or its intermediaries or carriers, any information needed for this or a related Medicare claim.  I request that payment of authorized benefits be made on my behalf.     D. Assignment of Insurance Benefits:  I hereby authorize any and all insurance companies, health plans, defined benefit plans, health insurers or any entity that is or may be responsible for payment of my medical expenses to pay all hospital and medical benefits now due, and to become due and payable to me under any hospital benefits, sick benefits, injury benefits or any other benefit for services rendered to me, including Major Medical Benefits, direct to [facility] and all independently contracted physicians.  I assign any and all rights that I may have against any and all insurance companies, health plans, defined benefit plans, health insurers or any entity that is or may be responsible for payment of my medical expenses, including, but not limited to any right to appeal a denial of a claim, any right to bring any action, lawsuit, administrative proceeding, or other cause of action on my behalf.  I specifically assign my right to pursue litigation against any and all insurance companies, health plans, defined benefit plans, health insurers or any entity that is or may be responsible for payment of medical expenses based upon a refusal to pay charges.              REGISTRATION  AUTHORIZATION                    E. Valuables:  It is understood and agreed that [facility] is not liable for the damage to or loss of any money, jewelry, documents, dentures, eye glasses, hearing aids, prosthetics, or other property of value.     F. Computer Equipment:  I understand and agree that should I choose to use computer equipment owned by [facility] or if I choose to access the Internet via [facility]'s network, I do  so at my own risk.  [facility] is not responsible for any damage to my computer equipment or to any damages of any type that might arise from my loss of equipment or data.                   G. Acceptance of Financial Responsibility:  I agree that in consideration of the services and supplies that have been or will be furnished to the patient,  I am hereby obligated to pay all charges made for or on the account of the patient according to the standard rates (in effect at the time the services and supplies are delivered) established by [facility], including its Patient Financial Assistance Policy to the extent it is applicable.  I understand that I am responsible for all charges, or portions thereof, not covered by insurance or other sources.  Patient refunds will be distributed only after balances at all [facility] facilities are paid.                   H. Communication Authorization:  I hereby authorize [facility] and its representatives, along with any billing service or  who may work on their behalf, to contact me on my cell phone and/or home phone using prerecorded messages, artificial voice messages, automatic telephone dialing devices or other computer assisted technology, or by electronic mail, text messaging, or by any other form of electronic communication.  This includes, but is not limited to appointment reminders, yearly physical exam reminders, preventive care reminders, patient campaigns, welcome calls, and calls about account balances on my account or any account on which I am listed as a guarantor.  I understand I have the right to opt out of these communications at any time.     I.  Relationship Between Facility and Physician:  I understand that some, but not all, providers furnishing services to the patient are not employees or agents of [facility].  The patient is under the care and supervision of his/her attending physician, and it is the responsibility of the facility and its nursing staff to  carry out the instructions of such physicians.  It is the responsibility of the patient's physician/designee to obtain the patient's informed consent, when required, for medical or surgical treatment, special diagnostic or therapeutic procedures, or hospital services rendered for the patient under the special instructions of the physician/designee.     J. Notice of Private Practices:  I acknowledge I have received a copy of [facility]'s Notice of Privacy Practices.     K. Facility Directory:  I have discussed with the organization my desire to be either included or excluded in the facility directory.  I understand that if my choice is to opt-out of being identified in the facility directory that the facility will not provide any information about me such as my condition (e.g. Fair, stable, etc.) or my location in the facility (e.g. Room number, department).     L. LINKS:  For [place] Residents:  [facility] is a LINKS ([place] [facility]) participating facility.  LINKS is a [facility]-sponsored confidential computer system that helps you and your doctor keep track of you and your child's immunization history.  I acknowledge that I am allowing [facility] to share this information with [facility].  For [place] Residents:  [facility] is a MIIX ([place] Immunization Information eXchange) participant.  MIIX is a [facility]-sponsored confidential computer system that helps you and your doctor keep track of you and your child's immunization history.  I acknowledge that I am allowing [facility] to share information with [employer].     M. Term:  This authorization is valid for this and subsequent care/treatment I receive at [facility] and will remain valid unless/until revoked in writing by me.      ACKNOWLEDGMENT OF POTENTIAL RISKS OF COVID-19 VACCINE  It is important that you, as the patient or patients legally authorized representative(s), understand and acknowledge the following, with regard  to administration of the COVID-19 vaccine offered by [facility]:  The SARS-CoV-2 virus (COVID-19) has caused an unprecedented modern global pandemic that has mobilized scientists and drug manufacturers to work to create safe and effective vaccines to get the crisis under control.  No vaccine is released in the [place] without undergoing rigorous, multi-layered testing and approval by the Food and Drug Administration.  During a public health emergency, however, vaccines can be released for patient administration by the FDA prior to completion of multi-phase clinical trials and approval. This is done by the FDAs granting of Emergency Use Authorization (EUA) when the vaccine meets reasonable thresholds for safety and effectiveness and people are in urgent need of care. Under an EUA, the FDA has found that known and potential benefits outweigh its known and potential risks.  The vaccine for which you are presenting to [facility] has been released under an EUA, which [facility] is honoring in its distribution of the vaccine to the public. While the FDAs authorization indicates its belief that usage is recommended over possible risks, there is still the possibility that unknown risks of the vaccine could exist.  By signing this document, you acknowledge and assume these risks. Further, you waive any and all claims of liability against and hold harmless any [facility] entity or provider for any harm caused to you by said possible unknown risks of the vaccine.        N. [facility]:  As used in this document, [facility] means all [facility] affiliated entities including all health centers, surgery centers, clinics, and hospitals.  It includes more specifically, the following entities: [facility], a not for profit [facility], and its subsidiaries and affiliates, including [facility], [facility]  [facility], [facility], [facility].d/b/a [facility], L.L.C.d/b/a [facility], [employer], L.L[facility]. As manager of [facility], [facility], L.[facility]UST[facility], [facility], L.L.C.d/b/a [facility], [facility], [facility], [facility] and [facility] as manager of [facility].                                                                Patient/Legal Guardian Signature                                                    This signature was collected at 08/21/2024      /                                                                            Printed Name/Relationship to Patient                                                [facility] complies with applicable Federal civil rights laws and does not discriminate on the basis of race, color, national origin, age, disability, or sex.          ATENCIÓN: si habla español, tiene a martínez disposición servicios gratuitos de asistencia lingüística. Jessica enrique [phone].         CHÚ Ý: N?u b?n nói Ti?ng Vi?t, có các d?ch v? h? tr? ngôn ng? mi?n phí dành cho b?n. G?i s? [phone].              REGISTRATION  AUTHORIZATION

## 2025-07-10 NOTE — PROGRESS NOTES
Population Health Chart Review & Patient Outreach Details      Additional Pop Health Notes:               Updates Requested / Reviewed:      Updated Care Coordination Note         Health Maintenance Topics Overdue:      VBHM Score: 0     Patient is not due for any topics at this time.    RSV Vaccine                  Health Maintenance Topic(s) Outreach Outcomes & Actions Taken:    Eye Exam - Outreach Outcomes & Actions Taken  : External Records Requested & Care Team Updated if Applicable

## 2025-07-11 DIAGNOSIS — E11.65 TYPE 2 DIABETES MELLITUS WITH HYPERGLYCEMIA, WITHOUT LONG-TERM CURRENT USE OF INSULIN: ICD-10-CM

## 2025-07-11 RX ORDER — SEMAGLUTIDE 2.68 MG/ML
2 INJECTION, SOLUTION SUBCUTANEOUS
Qty: 9 ML | Refills: 3 | Status: CANCELLED | OUTPATIENT
Start: 2025-07-11 | End: 2026-07-11

## 2025-07-11 NOTE — TELEPHONE ENCOUNTER
No care due was identified.  [facility] Embedded Care Due Messages. Reference number: [id number].   7/11/2025 5:05:59 PM CDT

## 2025-07-14 ENCOUNTER — PATIENT MESSAGE (OUTPATIENT)
Dept: FAMILY MEDICINE | Facility: CLINIC | Age: 65
End: 2025-07-14
Payer: MEDICARE

## 2025-07-14 RX ORDER — TIRZEPATIDE 5 MG/.5ML
4 INJECTION, SOLUTION SUBCUTANEOUS
Qty: 4 PEN | Refills: 0 | Status: SHIPPED | OUTPATIENT
Start: 2025-07-14

## 2025-07-28 ENCOUNTER — E-VISIT (OUTPATIENT)
Dept: FAMILY MEDICINE | Facility: CLINIC | Age: 65
End: 2025-07-28

## 2025-07-28 DIAGNOSIS — R30.0 DYSURIA: Primary | ICD-10-CM

## 2025-07-28 RX ORDER — NITROFURANTOIN 25; 75 MG/1; MG/1
100 CAPSULE ORAL 2 TIMES DAILY
Qty: 14 CAPSULE | Refills: 0 | Status: SHIPPED | OUTPATIENT
Start: 2025-07-28

## 2025-07-29 ENCOUNTER — LAB VISIT (OUTPATIENT)
Dept: LAB | Facility: HOSPITAL | Age: 65
End: 2025-07-29
Attending: STUDENT IN AN ORGANIZED HEALTH CARE EDUCATION/TRAINING PROGRAM
Payer: MEDICARE

## 2025-07-29 ENCOUNTER — PATIENT MESSAGE (OUTPATIENT)
Dept: FAMILY MEDICINE | Facility: CLINIC | Age: 65
End: 2025-07-29
Payer: MEDICARE

## 2025-07-29 DIAGNOSIS — R30.0 DYSURIA: ICD-10-CM

## 2025-07-29 LAB
BACTERIA #/AREA URNS HPF: ABNORMAL /HPF
BILIRUB UR QL STRIP.AUTO: NEGATIVE
CLARITY UR: ABNORMAL
COLOR UR AUTO: YELLOW
GLUCOSE UR QL STRIP: NEGATIVE
HGB UR QL STRIP: ABNORMAL
HYALINE CASTS #/AREA URNS LPF: 0 /LPF (ref 0–1)
KETONES UR QL STRIP: NEGATIVE
LEUKOCYTE ESTERASE UR QL STRIP: ABNORMAL
MICROSCOPIC COMMENT: ABNORMAL
NITRITE UR QL STRIP: NEGATIVE
PH UR STRIP: 6 [PH]
PROT UR QL STRIP: NEGATIVE
RBC #/AREA URNS HPF: 2 /HPF (ref 0–4)
SP GR UR STRIP: 1.02
UROBILINOGEN UR STRIP-ACNC: NEGATIVE EU/DL
WBC #/AREA URNS HPF: >100 /HPF (ref 0–5)

## 2025-07-29 PROCEDURE — 87088 URINE BACTERIA CULTURE: CPT

## 2025-07-29 PROCEDURE — 81003 URINALYSIS AUTO W/O SCOPE: CPT

## 2025-07-29 NOTE — PROGRESS NOTES
Patient ID: Liv Garland is a 65 y.o. female.        E-Visit Time Tracking:             Chief Complaint: General Illness (Entered automatically based on patient selection in [employer].)      The patient initiated a request through [employer] on 7/28/2025 for evaluation and management with a chief complaint of General Illness (Entered automatically based on patient selection in [employer].)     I evaluated the questionnaire submission on 07/28/2025.    [facility] Peq Evisit [facility]group-Common Problems    7/28/2025  7:34 PM CDT - Filed by Patient   What do you need help with? Urinary Symptoms   Do you agree to participate in an E-Visit? Yes   If you have any of the following symptoms, please go to the nearest emergency room or call 911: I acknowledge   What is the main issue you would like addressed today? I have a UTI   Do you have any of the following symptoms? ( Discomfort or pain passing urine, Passing urine more frequently, Cloudy urine, Discharge in urine, Other, None) Discomfort or pain passing urine;  Passing urine more frequently   When did your concern begin? 7/20/2025   What medications or treatments have you used to help your symptoms? (Antibiotics, Cranberry products, Drinking more fluids, Painkillers, Other, None) Cranberry products;  Drinking more fluids   What does your urine look like? (Clear, Cloudy, Dark yellow, Light yellow, Pink or red (bloody), Foamy, Not sure) Light yellow   Have you had any of the following symptoms during the past 24 hours?   Urgency (a sudden and uncontrollable urge to urinate) (None, Mild, Moderate, Severe) Mild   Frequency (going to the toilet very often) (None, Mild, Moderate, Severe) Mild   Burning pain when urinating (None, Mild, Moderate, Severe) None   Incomplete bladder emptying (still feel like you need to urinate again after urination) (None, Mild, Moderate, Severe) Mild   Pain in the lower belly when youre not urinating. (None, Mild, Moderate, Severe) None   Please rate how  much discomfort these symptoms have caused in the last 24 hours. (None, Mild, Moderate, Severe) None   Have you had any of the following symptoms during the past 24 hours?   Blood seen in the urine (None, Mild, Moderate, Severe) Mild   Pain in the lower back on one or both sides (flank pain) (None, Mild, Moderate, Severe) None   Abnormal Vaginal Discharge (abnormal amount, color and/or odor) (None, Mild, Moderate, Severe) None   Discharge from the opening you urinate from (urethra) when not urinating. (None, Mild, Moderate, Severe) None   Tell us how the symptoms have interfered with your every day activities/work in the past 24 hours. (None, Mild, Moderate, Severe) None   Do you have a fever? (Less than 100.4°F, 100.4°F or greater, No, Not sure) No   Are you a diabetic? Yes   If you are female, please tell us if you have any of the following right now (as youre completing the questionnaire): (Menstrual period (menses), PMS (Premenstrual syndrome),Signs of menopause such as hot flashes, Pregnancy, None) None   Do you have a history of kidney stones? No   Provide any information you feel is important to your history not asked above This started about a week and a half ago. I kind of felt it coming on. It seems to be getting a little worse as the days gone.   Please attach any relevant images or files (if you have performed a home test for UTI, please submit a photo of results)    Are you able to take your vitals? Yes   Systolic Blood Pressure 117   Diastolic Blood Pressure 66   Weight: 138   Height:    Pluse:    Temp    Resp:    SpO2          Encounter Diagnosis   Name Primary?    Dysuria Yes        Orders Placed This Encounter   Procedures    Urinalysis, Reflex to Urine Culture Urine, Clean Catch     Standing Status:   Future     Expected Date:   7/28/2025     Expiration Date:   9/26/2026     Preferred Collection Type:   Urine, Clean Catch     Specimen Source:   Urine     Send normal result to authorizing  provider's In Basket if patient is active on MyChart::   Yes      Medications Ordered This Encounter   Medications    nitrofurantoin, macrocrystal-monohydrate, (MACROBID) 100 MG capsule     Sig: Take 1 capsule (100 mg total) by mouth 2 (two) times daily.     Dispense:  14 capsule     Refill:  0        No follow-ups on file.    4 minute encounter

## 2025-08-01 LAB — BACTERIA UR CULT: ABNORMAL

## 2025-08-04 ENCOUNTER — E-VISIT (OUTPATIENT)
Dept: FAMILY MEDICINE | Facility: CLINIC | Age: 65
End: 2025-08-04
Payer: MEDICARE

## 2025-08-04 DIAGNOSIS — K30 INDIGESTION: ICD-10-CM

## 2025-08-04 DIAGNOSIS — B96.89 ACUTE BACTERIAL SIMPLE CYSTITIS: Primary | ICD-10-CM

## 2025-08-04 DIAGNOSIS — E11.65 TYPE 2 DIABETES MELLITUS WITH HYPERGLYCEMIA, WITHOUT LONG-TERM CURRENT USE OF INSULIN: ICD-10-CM

## 2025-08-04 DIAGNOSIS — J32.9 SINUSITIS, UNSPECIFIED CHRONICITY, UNSPECIFIED LOCATION: ICD-10-CM

## 2025-08-04 DIAGNOSIS — N30.80 ACUTE BACTERIAL SIMPLE CYSTITIS: Primary | ICD-10-CM

## 2025-08-04 RX ORDER — FLUTICASONE PROPIONATE 50 MCG
2 SPRAY, SUSPENSION (ML) NASAL
Qty: 48 ML | Refills: 3 | Status: SHIPPED | OUTPATIENT
Start: 2025-08-04

## 2025-08-04 RX ORDER — OMEPRAZOLE 40 MG/1
40 CAPSULE, DELAYED RELEASE ORAL DAILY
Qty: 90 CAPSULE | Refills: 3 | Status: SHIPPED | OUTPATIENT
Start: 2025-08-04 | End: 2025-08-08 | Stop reason: SDUPTHER

## 2025-08-04 RX ORDER — TIRZEPATIDE 5 MG/.5ML
4 INJECTION, SOLUTION SUBCUTANEOUS
Qty: 4 PEN | Refills: 0 | Status: CANCELLED | OUTPATIENT
Start: 2025-08-04

## 2025-08-04 RX ORDER — TIRZEPATIDE 7.5 MG/.5ML
7.5 INJECTION, SOLUTION SUBCUTANEOUS
Qty: 4 PEN | Refills: 0 | Status: SHIPPED | OUTPATIENT
Start: 2025-08-04

## 2025-08-04 NOTE — TELEPHONE ENCOUNTER
No care due was identified.  [facility] Embedded Care Due Messages. Reference number: [id number].   8/04/2025 10:55:40 AM CDT

## 2025-08-05 ENCOUNTER — LAB VISIT (OUTPATIENT)
Dept: LAB | Facility: HOSPITAL | Age: 65
End: 2025-08-05
Attending: STUDENT IN AN ORGANIZED HEALTH CARE EDUCATION/TRAINING PROGRAM
Payer: MEDICARE

## 2025-08-05 LAB
BACTERIA #/AREA URNS HPF: ABNORMAL /HPF
BILIRUB UR QL STRIP.AUTO: ABNORMAL
CLARITY UR: CLEAR
COLOR UR AUTO: YELLOW
GLUCOSE UR QL STRIP: NEGATIVE
GRAN CASTS #/AREA URNS LPF: 3 /LPF (ref ?–0)
HGB UR QL STRIP: NEGATIVE
KETONES UR QL STRIP: NEGATIVE
LEUKOCYTE ESTERASE UR QL STRIP: ABNORMAL
MICROSCOPIC COMMENT: ABNORMAL
NITRITE UR QL STRIP: NEGATIVE
PH UR STRIP: 6 [PH]
PROT UR QL STRIP: ABNORMAL
RBC #/AREA URNS HPF: 1 /HPF (ref 0–4)
SP GR UR STRIP: 1.01
UROBILINOGEN UR STRIP-ACNC: NEGATIVE EU/DL
WBC #/AREA URNS HPF: 2 /HPF (ref 0–5)

## 2025-08-05 PROCEDURE — 81000 URINALYSIS NONAUTO W/SCOPE: CPT

## 2025-08-05 RX ORDER — CIPROFLOXACIN 500 MG/1
500 TABLET, FILM COATED ORAL EVERY 12 HOURS
Qty: 6 TABLET | Refills: 0 | Status: SHIPPED | OUTPATIENT
Start: 2025-08-05 | End: 2025-08-08

## 2025-08-05 NOTE — PROGRESS NOTES
Patient ID: Liv Garland is a 65 y.o. female.        E-Visit Time Tracking:             Chief Complaint: General Illness (Entered automatically based on patient selection in [facility].)      The patient initiated a request through [employer] on 8/4/2025 for evaluation and management with a chief complaint of General Illness (Entered automatically based on patient selection in [facility].)     I evaluated the questionnaire submission on 08/05/2025.    [facility] Peq Evisit [facility]group-Common Problems    8/5/2025 11:33 AM CDT - Filed by Patient   What do you need help with? Urinary Symptoms   Do you agree to participate in an E-Visit? Yes   If you have any of the following symptoms, please go to the nearest emergency room or call 911: I acknowledge   What is the main issue you would like addressed today? UTI   Do you have any of the following symptoms? ( Discomfort or pain passing urine, Passing urine more frequently, Cloudy urine, Discharge in urine, Other, None) Discomfort or pain passing urine;  Passing urine more frequently;  Cloudy urine   When did your concern begin? 7/26/2025   What medications or treatments have you used to help your symptoms? (Antibiotics, Cranberry products, Drinking more fluids, Painkillers, Other, None) Antibiotics   What does your urine look like? (Clear, Cloudy, Dark yellow, Light yellow, Pink or red (bloody), Foamy, Not sure) Light yellow   Have you had any of the following symptoms during the past 24 hours?   Urgency (a sudden and uncontrollable urge to urinate) (None, Mild, Moderate, Severe) Mild   Frequency (going to the toilet very often) (None, Mild, Moderate, Severe) Mild   Burning pain when urinating (None, Mild, Moderate, Severe) None   Incomplete bladder emptying (still feel like you need to urinate again after urination) (None, Mild, Moderate, Severe) Mild   Pain in the lower belly when youre not urinating. (None, Mild, Moderate, Severe) Mild   Please rate how much discomfort these  symptoms have caused in the last 24 hours. (None, Mild, Moderate, Severe) Mild   Have you had any of the following symptoms during the past 24 hours?   Blood seen in the urine (None, Mild, Moderate, Severe) None   Pain in the lower back on one or both sides (flank pain) (None, Mild, Moderate, Severe) Mild   Abnormal Vaginal Discharge (abnormal amount, color and/or odor) (None, Mild, Moderate, Severe) None   Discharge from the opening you urinate from (urethra) when not urinating. (None, Mild, Moderate, Severe) None   Tell us how the symptoms have interfered with your every day activities/work in the past 24 hours. (None, Mild, Moderate, Severe) None   Do you have a fever? (Less than 100.4°F, 100.4°F or greater, No, Not sure) No   Are you a diabetic? Yes   If you are female, please tell us if you have any of the following right now (as youre completing the questionnaire): (Menstrual period (menses), PMS (Premenstrual syndrome),Signs of menopause such as hot flashes, Pregnancy, None) None   Do you have a history of kidney stones? No   Provide any information you feel is important to your history not asked above    Please attach any relevant images or files (if you have performed a home test for UTI, please submit a photo of results)    Are you able to take your vitals? Yes   Systolic Blood Pressure    Diastolic Blood Pressure    Weight:    Height:    Pluse:    Temp    Resp:    SpO2          Encounter Diagnosis   Name Primary?    Acute bacterial simple cystitis Yes        Orders Placed This Encounter   Procedures    Urinalysis, Reflex to Urine Culture Urine, Clean Catch     Standing Status:   Future     Expected Date:   8/5/2025     Expiration Date:   10/4/2026     Preferred Collection Type:   Urine, Clean Catch     Specimen Source:   Urine     Send normal result to authorizing provider's In Basket if patient is active on MyChart::   Yes      Medications Ordered This Encounter   Medications    ciprofloxacin HCl  (CIPRO) 500 MG tablet     Sig: Take 1 tablet (500 mg total) by mouth every 12 (twelve) hours. for 3 days     Dispense:  6 tablet     Refill:  0        No follow-ups on file.    4 minute encounter

## 2025-08-06 LAB — HOLD SPECIMEN: NORMAL

## 2025-08-08 DIAGNOSIS — K30 INDIGESTION: ICD-10-CM

## 2025-08-08 RX ORDER — OMEPRAZOLE 40 MG/1
40 CAPSULE, DELAYED RELEASE ORAL DAILY
Qty: 90 CAPSULE | Refills: 3 | Status: SHIPPED | OUTPATIENT
Start: 2025-08-08 | End: 2026-08-08

## 2025-08-08 NOTE — TELEPHONE ENCOUNTER
Please see the attached refill request.  Refill Request              --->Care Gap information included in message below if applicable.       Automatic Epic Generated Protocol Data:        Requested Prescriptions   Pending Prescriptions Disp Refills    omeprazole (PRILOSEC) 40 MG capsule 90 capsule 3     Sig: Take 1 capsule (40 mg total) by mouth once daily.       [facility]  Proton Pump Inhibitors Passed - 8/8/2025 10:20 AM        Passed - Patient is at least 18 years old        Passed - Plavix is not on active medication list        Passed - Valid encounter within last 15 months     Recent Visits  Date Type Provider Dept   07/09/25 Office Visit Jayashree Brand MD [address] Family Medicine   03/26/25 Office Visit Jayashree Brand MD [address]   01/09/25 Office Visit Jayashree Brand MD [address]   09/19/24 Office Visit Jayashree Brand MD [address] Family [facility]   04/17/24 Office Visit Jayashree Brand MD [address] Family Medicine   03/04/24 Office Visit Jayashree Brand MD [address] Family Medicine   01/23/24 Office Visit Jayashree Brand MD [address] Family Medicine   11/30/23 Office Visit Jayashree Brand MD [address]   09/21/23 Office Visit Vargas Paulino MD [address]   Showing recent visits within past 720 days and meeting all other requirements  Future Appointments  No visits were found meeting these conditions.  Showing future appointments within next 150 days and meeting all other requirements      Future Appointments                In 5 months Jayashree Brand MD [facility]            Care Team Information  Encounter Date Encounter Department Encounter Provider   7/9/2025 [address] FAMILY MEDICINE Jayashree Brand MD             Passed - Matches previous order       Previous Authorizing Provider: Jayashree Brand MD (omeprazole (PRILOSEC) 40 MG capsule)  Previous  Pharmacy: [id number] - [place], [place] - [address] [id number]            Passed - No ED/Admission since last PCP visit     Last PCP Visit: 7/9/2025 Last Admission: 4/5/2021 Last ED Visit: 12/22/2024                Appointments  past 12m or future 3m with PCP    Date Provider   Last Visit   8/4/2025 Jayashree Brand MD   Next Visit   1/7/2026 Jayashree Brand MD   ED visits in past 90 days: 0        Note composed:10:20 AM 08/08/2025

## 2025-08-13 DIAGNOSIS — K30 INDIGESTION: ICD-10-CM

## 2025-08-14 RX ORDER — OMEPRAZOLE 40 MG/1
40 CAPSULE, DELAYED RELEASE ORAL DAILY
Qty: 90 CAPSULE | Refills: 3 | Status: SHIPPED | OUTPATIENT
Start: 2025-08-14 | End: 2026-08-14

## 2025-08-27 DIAGNOSIS — E11.65 TYPE 2 DIABETES MELLITUS WITH HYPERGLYCEMIA, WITHOUT LONG-TERM CURRENT USE OF INSULIN: ICD-10-CM

## 2025-08-27 RX ORDER — TIRZEPATIDE 7.5 MG/.5ML
7.5 INJECTION, SOLUTION SUBCUTANEOUS
Qty: 12 PEN | Refills: 1 | Status: CANCELLED | OUTPATIENT
Start: 2025-08-27

## 2025-08-28 RX ORDER — TIRZEPATIDE 10 MG/.5ML
10 INJECTION, SOLUTION SUBCUTANEOUS
Qty: 4 PEN | Refills: 0 | Status: SHIPPED | OUTPATIENT
Start: 2025-08-28

## 2025-09-04 DIAGNOSIS — I10 ESSENTIAL HYPERTENSION: ICD-10-CM

## 2025-09-04 RX ORDER — VALSARTAN AND HYDROCHLOROTHIAZIDE 320; 25 MG/1; MG/1
1 TABLET, FILM COATED ORAL DAILY
Qty: 90 TABLET | Refills: 3 | Status: SHIPPED | OUTPATIENT
Start: 2025-09-04 | End: 2026-08-30

## (undated) DEVICE — DRAPE PLASTIC U 60X72

## (undated) DEVICE — GLOVE SURG ULTRA TOUCH 7.5

## (undated) DEVICE — GLOVE SURG ULTRA TOUCH 9

## (undated) DEVICE — SUT 0 36IN COATED VICRYL VI

## (undated) DEVICE — RETRIEVER SUTURE HEWSON DISP

## (undated) DEVICE — GOWN SURGICAL XX LARGE X LONG

## (undated) DEVICE — DRAPE STERI INSTRUMENT 1018

## (undated) DEVICE — GLOVE PI ULTRA TOUCH G SURGEON

## (undated) DEVICE — COUNTER BDL BLADEGUARD LI DBL

## (undated) DEVICE — GLOVE SURG ULTRA TOUCH 8.5

## (undated) DEVICE — SPONGE NOVAPLUS LAP 18X18IN

## (undated) DEVICE — APPLICATOR CHLORAPREP CLR 10.5

## (undated) DEVICE — CUBE COLD THERAPY POLAR CARE

## (undated) DEVICE — SEE MEDLINE ITEM 152622

## (undated) DEVICE — DRESSING N ADH OIL EMUL 3X3

## (undated) DEVICE — TRAY CATH FOL SIL TEMP 10 16FR

## (undated) DEVICE — GOWN POLY REINF X-LONG XL

## (undated) DEVICE — GLOVE SURG ULTRA TOUCH 8

## (undated) DEVICE — ELECTRODE BLD EXT 6.50 ST MDFD

## (undated) DEVICE — KIT NERVE BLOCK PREP BAPTIST

## (undated) DEVICE — DRAPE INCISE IOBAN 2 23X33IN

## (undated) DEVICE — PILLOW ABDUCTION FOAM MED

## (undated) DEVICE — SEE MEDLINE ITEM 152530

## (undated) DEVICE — SYR BULB EAR/ULCER STER 3OZ

## (undated) DEVICE — SOL WATER STRL IRR 1000ML

## (undated) DEVICE — GAUZE SPONGE 4X4 12PLY

## (undated) DEVICE — SPONGE LAP 18X18 PREWASHED

## (undated) DEVICE — SHEET DRAPE FAN-FOLDED 3/4

## (undated) DEVICE — COVER TABLE BACK 44IN X 90IN

## (undated) DEVICE — NDL ECLIPSE SAFETY 18GX1-1/2IN

## (undated) DEVICE — CANISTER SUCTION 3000CC

## (undated) DEVICE — GLOVE GAMMEX SURG LF PI SZ 8

## (undated) DEVICE — BNDG COFLEX FOAM LF2 ST 4X5YD

## (undated) DEVICE — TRAY CATH URETHRAL FOLEY 16FR

## (undated) DEVICE — DRAPE STERI U-SHAPED 47X51IN

## (undated) DEVICE — UNDERGLOVE BIOGEL PI SZ 6.5 LF

## (undated) DEVICE — DRAPE HIP WITH POUCHES

## (undated) DEVICE — DRAPE THREE-QUARTER 53X77IN

## (undated) DEVICE — KIT CANIST SUCTION 1200CC

## (undated) DEVICE — SEE MEDLINE ITEM 157116

## (undated) DEVICE — KIT ENDO CARRY-ON PROC 100310

## (undated) DEVICE — DRAPE U STD FILM LG 60X84IN

## (undated) DEVICE — GOWN B1 X-LG X-LONG

## (undated) DEVICE — DRESSING WND ADH PRIMAPORE 10

## (undated) DEVICE — NDL SPINAL 22GX5

## (undated) DEVICE — STRAP OR TABLE 5IN X 72IN

## (undated) DEVICE — BANDAGE ESMARK ELASTIC ST 4X9

## (undated) DEVICE — UNDERGLOVES BIOGEL PI SZ 7 LF

## (undated) DEVICE — Device

## (undated) DEVICE — SEE MEDLINE ITEM 157216

## (undated) DEVICE — GLOVE SURG ULTRA TOUCH 7

## (undated) DEVICE — SUT 2-0 VICRYL / CT-1

## (undated) DEVICE — DRESSING LEUKOPLAST FLEX 1X3IN

## (undated) DEVICE — TOURNIQUET SB QC SP 30X4IN

## (undated) DEVICE — NDL SAFETY 25G X 1.5 ECLIPSE

## (undated) DEVICE — TOWEL OR DISP STRL BLUE 4/PK

## (undated) DEVICE — SUT MONO 3-0 PS-2 18 PLST

## (undated) DEVICE — GLOVE BIOGEL PI ORTHO PRO 6.5

## (undated) DEVICE — TUBING SUCTION SCALLOP 3/16X10

## (undated) DEVICE — BANDAGE ESMARK 6X12

## (undated) DEVICE — SEE MEDLINE ITEM 146292

## (undated) DEVICE — SUT VCRL 0 OBGYBN 36IN CTX

## (undated) DEVICE — SOL IRR NACL .9% 3000ML

## (undated) DEVICE — PAD CAST SPECIALIST STRL 4

## (undated) DEVICE — CLOSURE SKIN STERI STRIP 1/2X4

## (undated) DEVICE — SUT CTD VICRYL 2-0 VIL BR C

## (undated) DEVICE — ADHESIVE MASTISOL VIAL 48/BX

## (undated) DEVICE — SUT FIBERWIRE 2 38 IN TAPER

## (undated) DEVICE — GLOVE BIOGEL PI ORTHO PRO SZ7

## (undated) DEVICE — SYR DISP LL 5CC

## (undated) DEVICE — DRAPE C ARM 42 X 120 10/BX

## (undated) DEVICE — SEE MEDLINE ITEM 146298

## (undated) DEVICE — GLOVE SURGEONS ULTRA TOUCH 6.5

## (undated) DEVICE — NDL ELECTRODE E-Z CLEAN 2.75IN

## (undated) DEVICE — DRAPE C-ARMOR EQUIPMENT COVER

## (undated) DEVICE — SPONGE/BRUSH SCRUB SURG PCMX

## (undated) DEVICE — SYR B-D DISP CONTROL 10CC100/C

## (undated) DEVICE — BLADE SAW SGTL DL STR 25X1.27X

## (undated) DEVICE — INTERPULSE SET

## (undated) DEVICE — PAD SUREFIT GRND ELECTRD 10FT

## (undated) DEVICE — BITE BLOCK ADULT LATEX FREE

## (undated) DEVICE — NDL SPINAL 22GA 3.5 IN QUINCKE

## (undated) DEVICE — DRAPE INCISE IOBAN 2 23X17IN

## (undated) DEVICE — SOL 9P NACL IRR PIC IL

## (undated) DEVICE — ELECTRODE REM PLYHSV RETURN 9

## (undated) DEVICE — DRESSING AQUACEL AG 3.5X10IN

## (undated) DEVICE — SUT ETHILON 4-0 PS2 18 BLK

## (undated) DEVICE — PENCIL ROCKER SWITCH 10FT CORD

## (undated) DEVICE — SYR 30CC LUER LOCK

## (undated) DEVICE — SCALPEL #10 BLADE STRL DISP

## (undated) DEVICE — GOWN POLY REINF BRTH SLV LG

## (undated) DEVICE — FORCEP BIOSY RJ 4 HOT 2.2X240

## (undated) DEVICE — SUT CTD VICRYL 4-0 P-3 18IN

## (undated) DEVICE — SEE MEDLINE ITEM 156964

## (undated) DEVICE — BLADE #15 STERILE CARBON

## (undated) DEVICE — UNDERGLOVES BIOGEL PI SIZE 8

## (undated) DEVICE — MARKER SKIN RULER AND LABEL

## (undated) DEVICE — COVER LIGHT HANDLE 80/CA

## (undated) DEVICE — BANDAGE MATRIX HK LOOP 4IN 5YD